# Patient Record
Sex: FEMALE | Race: WHITE | NOT HISPANIC OR LATINO | Employment: OTHER | URBAN - NONMETROPOLITAN AREA
[De-identification: names, ages, dates, MRNs, and addresses within clinical notes are randomized per-mention and may not be internally consistent; named-entity substitution may affect disease eponyms.]

---

## 2017-05-17 ENCOUNTER — TRANSCRIBE ORDERS (OUTPATIENT)
Dept: ADMINISTRATIVE | Facility: HOSPITAL | Age: 47
End: 2017-05-17

## 2017-05-17 DIAGNOSIS — M54.17 LUMBOSACRAL RADICULOPATHY: Primary | ICD-10-CM

## 2017-05-24 ENCOUNTER — HOSPITAL ENCOUNTER (OUTPATIENT)
Dept: MRI IMAGING | Facility: HOSPITAL | Age: 47
Discharge: HOME | End: 2017-05-24
Admitting: CLINICAL NURSE SPECIALIST

## 2017-05-24 DIAGNOSIS — M54.17 LUMBOSACRAL RADICULOPATHY: ICD-10-CM

## 2017-05-24 PROCEDURE — 72148 MRI LUMBAR SPINE W/O DYE: CPT

## 2018-04-30 ENCOUNTER — OFFICE VISIT (OUTPATIENT)
Dept: NEUROLOGY | Age: 48
End: 2018-04-30
Payer: COMMERCIAL

## 2018-04-30 VITALS
HEIGHT: 63 IN | BODY MASS INDEX: 48.9 KG/M2 | DIASTOLIC BLOOD PRESSURE: 73 MMHG | SYSTOLIC BLOOD PRESSURE: 116 MMHG | HEART RATE: 85 BPM | WEIGHT: 276 LBS

## 2018-04-30 DIAGNOSIS — G47.33 OBSTRUCTIVE SLEEP APNEA: Primary | ICD-10-CM

## 2018-04-30 DIAGNOSIS — R06.83 SNORING: ICD-10-CM

## 2018-04-30 DIAGNOSIS — R40.0 SOMNOLENCE, DAYTIME: ICD-10-CM

## 2018-04-30 DIAGNOSIS — R06.81 WITNESSED APNEIC SPELLS: ICD-10-CM

## 2018-04-30 DIAGNOSIS — G47.9 SLEEP DISTURBANCE: ICD-10-CM

## 2018-04-30 PROCEDURE — 99203 OFFICE O/P NEW LOW 30 MIN: CPT | Performed by: PHYSICIAN ASSISTANT

## 2018-04-30 RX ORDER — LISINOPRIL AND HYDROCHLOROTHIAZIDE 20; 12.5 MG/1; MG/1
1 TABLET ORAL DAILY
COMMUNITY
Start: 2018-03-29 | End: 2019-02-07 | Stop reason: SDUPTHER

## 2018-04-30 RX ORDER — EZETIMIBE AND SIMVASTATIN 10; 40 MG/1; MG/1
1 TABLET ORAL DAILY
COMMUNITY
Start: 2018-03-29

## 2018-06-11 ENCOUNTER — TELEPHONE (OUTPATIENT)
Dept: NEUROLOGY | Age: 48
End: 2018-06-11

## 2018-07-06 ENCOUNTER — HOSPITAL ENCOUNTER (OUTPATIENT)
Dept: SLEEP CENTER | Age: 48
Discharge: HOME | End: 2018-07-08
Payer: COMMERCIAL

## 2018-07-06 PROCEDURE — 95811 POLYSOM 6/>YRS CPAP 4/> PARM: CPT

## 2018-07-07 NOTE — PROGRESS NOTES
[facility]  [place] [address]  [place], [address]  Phone [phone]  Fax [phone]     Sleep Study Technician Review    Patient Name:  Jasbir Rocha  :   1970  Referring Provider: MIGUEL Bond       Brief History:  Jasbir Rocha is a 49 y.o. woman with a history of hypertension and hyperlipidemia who was referred for a diagnostic PSG. She was referred due to her history of snoring, excessive daytime somnolence, and witnessed apneas. Patient complains of snoring, witnessed apneas, of excessive daytime somnolence, and of sleep disturbance. She complains of awakening with choking or gasping and complains of insomnia. She does have difficulty initiating and maintaining sleep. She does use sleep aides. She sleeps 4-6 hours per night. The sleep is not restorative. She does fall asleep when sedentary. She does awaken with a dry mouth. She does not awaken with a morning headache. Shedoes not have nocturia. She does not have cognitive deficits. She does have poor concentration. Height:  5' 3\"  Weight: 276 lbs  BMI:  48.8  Neck Circ: 18.5\"  Mallampati  4  ESS:      Type of Study: PSG  Time Stage Position Snore Hypopnea Obs Apnea Abby Apnea PAP O2   2200 1 supine Yes Yes Yes No 0 92   2300 2 supine Yes Yes Yes No 0 88   2400 REM supine No No Yes No 11/7 90   0100 SWS supine No Yes No No  95   0200 2 supine No No No No  96   0300 1 supine No No No No  98   0400 REM supine No No No No  97   0500        RA   0600        RA     Summary:  Severe respiratory events began with sleep onset. Oxygen sat's in the 80's. Severe snoring. Limb movements noted. Began CPAP at 4 cm. Switched to BIPAP @2358 for pt intolerance to pressure. A final BIPAP pressure of 18/14 cmH2O. No arrhythmia. DME:  [facility]  Mask Type: FFM  Mask:  Air Fit F-10  Mask Size: medium    The study was reviewed briefly with Jasbir Rocha.   She will be notified of the formal results and recommendations after the study is scored and interpreted. The report will be sent to her referring provider. Technician: YOLANDA Kapadia

## 2018-07-13 PROCEDURE — 95811 POLYSOM 6/>YRS CPAP 4/> PARM: CPT | Performed by: PSYCHIATRY & NEUROLOGY

## 2018-07-17 DIAGNOSIS — G47.33 OBSTRUCTIVE SLEEP APNEA: Primary | ICD-10-CM

## 2018-07-17 NOTE — PROCEDURES
[facility]  [place] [address]  [place], [address]  Phone [phone]  Fax [phone]     Preliminary Sleep Study Report    Date:   18    Name:   Abida Abad  :   1970  Referring Provider: MIGUEL Garcia  Study:   Split Night Polysomnogram    Abida Abad underwent a split night polysomnogram on 18. The initial portion showed significant obstructive sleep apnea. She underwent a positive airway titration during the later portion. At BiPAP pressures of 18/14cm, her apneas and hypopneas were eliminated. Abida Abad is being referred to the  DME provider of her choice for equipment set up. A sleep clinic follow up is also being arranged. A report will follow.                                    Atul Higgins MD, [facility]         Board Certified Sleep Physician

## 2018-07-17 NOTE — PROGRESS NOTES
[facility]  [place] [address]  [place], [address]  Phone [phone]  Fax [phone]     Patient Name: Ammy Juarez 2018  : 1970  Age: 49 y.o.   Patient Address: [address] [place] [address]       Patient Phone: [phone] (home) [phone] (work)    REFERRAL  Referred to: DME provider of patient's choice  Ammy Juarez is referred for the following:    DME Equipment HPCPS Code Setting   Auto Adjusting BiPAP device with flex or comparable pressure relief per comfort  Min EPAP: 18cm to   Max IPAP: 14cm   Heated Humidifier  Patient Choice       Replinishible PAP Supplies, 1 year supply  Item HPCPS Code Frequency   Mask of choice  or  1 per 3 months   Nasal Mask cushion/pillows  or  2 per 30 days   Full Face Mask Interface  1 per 30 days   Headgear  1 per 6 months   Tubing, length of choice  or  1 per 3 months   Water Chamber  1 per 6 months   Chinstrap  1 per 6 months   Disposable Filters  2 per 30 days   Reusable Filters  1 per 6 months     Diagnoses:  Obstructive sleep apnea (G47.33)  Length of Need: Lifetime, 99    Ordering Provider: KEYSHA Connell Speckin         Signature:       Date: 2018      Electronically Signed by Monika Felix M.D.

## 2018-07-24 ENCOUNTER — HOSPITAL ENCOUNTER (OUTPATIENT)
Dept: MRI IMAGING | Age: 48
Discharge: HOME | End: 2018-07-24
Payer: COMMERCIAL

## 2018-07-24 DIAGNOSIS — M51.16 LUMBAR DISC DISEASE WITH RADICULOPATHY: ICD-10-CM

## 2018-07-24 DIAGNOSIS — M54.16 LUMBAR RADICULOPATHY: ICD-10-CM

## 2018-07-24 PROCEDURE — 72148 MRI LUMBAR SPINE W/O DYE: CPT

## 2018-07-30 ENCOUNTER — TELEPHONE (OUTPATIENT)
Dept: NEUROLOGY | Age: 48
End: 2018-07-30

## 2018-09-04 ENCOUNTER — HOSPITAL ENCOUNTER (OUTPATIENT)
Age: 48
Setting detail: OUTPATIENT SURGERY
Discharge: HOME | End: 2018-09-04
Attending: PHYSICAL MEDICINE & REHABILITATION | Admitting: PHYSICAL MEDICINE & REHABILITATION

## 2018-09-04 VITALS
DIASTOLIC BLOOD PRESSURE: 55 MMHG | SYSTOLIC BLOOD PRESSURE: 115 MMHG | OXYGEN SATURATION: 97 % | RESPIRATION RATE: 18 BRPM | HEART RATE: 78 BPM

## 2018-09-04 PROCEDURE — G8918 PT W/O PREOP ORDER IV AB PRO: HCPCS

## 2018-09-04 PROCEDURE — G8907 PT DOC NO EVENTS ON DISCHARG: HCPCS

## 2018-09-04 PROCEDURE — 62323 NJX INTERLAMINAR LMBR/SAC: CPT

## 2018-09-04 RX ORDER — METHYLPREDNISOLONE ACETATE 80 MG/ML
INJECTION, SUSPENSION INTRA-ARTICULAR; INTRALESIONAL; INTRAMUSCULAR; SOFT TISSUE PRN
Status: DISCONTINUED | OUTPATIENT
Start: 2018-09-04 | End: 2018-09-04 | Stop reason: HOSPADM

## 2018-09-04 RX ORDER — 0.9 % SODIUM CHLORIDE 0.9 %
VIAL (ML) INJECTION PRN
Status: DISCONTINUED | OUTPATIENT
Start: 2018-09-04 | End: 2018-09-04 | Stop reason: HOSPADM

## 2018-09-04 RX ORDER — LIDOCAINE HYDROCHLORIDE 10 MG/ML
INJECTION, SOLUTION INFILTRATION; PERINEURAL PRN
Status: DISCONTINUED | OUTPATIENT
Start: 2018-09-04 | End: 2018-09-04 | Stop reason: HOSPADM

## 2018-10-09 ENCOUNTER — HOSPITAL ENCOUNTER (OUTPATIENT)
Dept: GENERAL RADIOLOGY | Age: 48
Discharge: HOME | End: 2018-10-09
Payer: COMMERCIAL

## 2018-10-09 ENCOUNTER — HOSPITAL ENCOUNTER (OUTPATIENT)
Age: 48
Setting detail: OUTPATIENT SURGERY
Discharge: HOME | End: 2018-10-09
Attending: PHYSICAL MEDICINE & REHABILITATION | Admitting: PHYSICAL MEDICINE & REHABILITATION

## 2018-10-09 VITALS
RESPIRATION RATE: 18 BRPM | SYSTOLIC BLOOD PRESSURE: 115 MMHG | OXYGEN SATURATION: 99 % | DIASTOLIC BLOOD PRESSURE: 65 MMHG | HEART RATE: 86 BPM

## 2018-10-09 DIAGNOSIS — R52 PAIN: ICD-10-CM

## 2018-10-09 PROCEDURE — G8918 PT W/O PREOP ORDER IV AB PRO: HCPCS

## 2018-10-09 PROCEDURE — 3209999900 FLUORO FOR SURGICAL PROCEDURES

## 2018-10-09 PROCEDURE — G8907 PT DOC NO EVENTS ON DISCHARG: HCPCS

## 2018-10-09 PROCEDURE — G0260 INJ FOR SACROILIAC JT ANESTH: HCPCS

## 2018-10-09 RX ORDER — LIDOCAINE HYDROCHLORIDE 10 MG/ML
INJECTION, SOLUTION INFILTRATION; PERINEURAL PRN
Status: DISCONTINUED | OUTPATIENT
Start: 2018-10-09 | End: 2018-10-09 | Stop reason: HOSPADM

## 2018-10-09 RX ORDER — 0.9 % SODIUM CHLORIDE 0.9 %
VIAL (ML) INJECTION PRN
Status: DISCONTINUED | OUTPATIENT
Start: 2018-10-09 | End: 2018-10-09 | Stop reason: HOSPADM

## 2018-10-19 ENCOUNTER — HOSPITAL ENCOUNTER (EMERGENCY)
Age: 48
Discharge: HOME | End: 2018-10-19
Attending: EMERGENCY MEDICINE
Payer: COMMERCIAL

## 2018-10-19 VITALS
RESPIRATION RATE: 22 BRPM | TEMPERATURE: 98 F | SYSTOLIC BLOOD PRESSURE: 145 MMHG | OXYGEN SATURATION: 95 % | HEART RATE: 101 BPM | WEIGHT: 283 LBS | HEIGHT: 63 IN | DIASTOLIC BLOOD PRESSURE: 69 MMHG | BODY MASS INDEX: 50.14 KG/M2

## 2018-10-19 DIAGNOSIS — T50.905A ADVERSE EFFECT OF DRUG, INITIAL ENCOUNTER: Primary | ICD-10-CM

## 2018-10-19 PROCEDURE — 99282 EMERGENCY DEPT VISIT SF MDM: CPT

## 2018-10-19 PROCEDURE — 99283 EMERGENCY DEPT VISIT LOW MDM: CPT | Performed by: EMERGENCY MEDICINE

## 2018-10-19 ASSESSMENT — ENCOUNTER SYMPTOMS
ALLERGIC REACTION: 1
TROUBLE SWALLOWING: 0
DIFFICULTY BREATHING: 0
WHEEZING: 0

## 2018-10-20 NOTE — ED PROVIDER NOTES
[facility] EMERGENCY DEPT  eMERGENCY dEPARTMENT eNCOUnter      Pt Name: Jana Reilly  MRN: [id number]  [place] 1970  Date of evaluation: 10/19/2018  Provider: Sejal Orellana MD    [address] [place] [address]       Chief Complaint   Patient presents with    Allergic Reaction     POSSIBLE; tingling and heat to face/neck/ears since Tuesday, had steriod injection Tuesday         HISTORY OF PRESENT ILLNESS   (Location/Symptom, Timing/Onset,Context/Setting, Quality, Duration, Modifying Factors, Severity)  Note limiting factors. Jana Reilly is a 51 y.o. female who presents to the emergency department      The history is provided by the patient. Allergic Reaction   Presenting symptoms: no difficulty breathing, no difficulty swallowing, no itching, no rash, no swelling and no wheezing    Presenting symptoms comment:  \"tingling, feels hot\" neck up  Severity:  Moderate  Duration:  1 day  Prior allergic episodes:  No prior episodes  Context comment:  \"estrogen shot\" yesterday, \"steroid shot\" 3 days ago. \"turned red\" after that injection. Relieved by: somewhat by 25 mg Benadryl po. Worsened by:  Nothing      NursingNotes were reviewed. REVIEW OF SYSTEMS    (2-9 systems for level 4, 10 or more for level 5)     Review of Systems   HENT: Negative for trouble swallowing. Respiratory: Negative for wheezing. Skin: Negative for itching and rash. All other systems reviewed and are negative. Except as noted above the remainder of the review of systems was reviewed and negative. PAST MEDICAL HISTORY     Past Medical History:   Diagnosis Date    BiPAP (biphasic positive airway pressure) dependence     18cm/14cm    Hyperlipidemia     Hypertension     Obstructive sleep apnea     AHI: 106. 6 per PSG, 7/2018    Osteoarthritis     Psoriasis          SURGICALHISTORY       Past Surgical History:   Procedure Laterality Date    GA INJECT SI JOINT ARTHRGRPHY&/ANES/STEROID W/IMAGE Right 10/9/2018    SACROILIAC JOINT otherwise noted below, none     Procedures    FINAL IMPRESSION      1.  Adverse effect of drug, initial encounter          DISPOSITION/PLAN   DISPOSITION Decision To Discharge 10/19/2018 07:07:40 PM      PATIENT REFERRED TO:  Andrew Yoon MD  [facility]  [phone]      As needed      DISCHARGE MEDICATIONS:  Discharge Medication List as of 10/19/2018  7:08 PM             (Please note that portions of this note were completed with a voice recognition program.  Efforts were made to edit the dictations but occasionally words are mis-transcribed.)    Estelle Means MD (electronically signed)  Attending Emergency Physician         Estelle Means MD  10/20/18 [id number]

## 2018-10-22 ENCOUNTER — OFFICE VISIT (OUTPATIENT)
Dept: NEUROLOGY | Age: 48
End: 2018-10-22
Payer: COMMERCIAL

## 2018-10-22 VITALS
OXYGEN SATURATION: 98 % | WEIGHT: 285 LBS | HEIGHT: 63 IN | DIASTOLIC BLOOD PRESSURE: 85 MMHG | BODY MASS INDEX: 50.5 KG/M2 | HEART RATE: 90 BPM | SYSTOLIC BLOOD PRESSURE: 139 MMHG

## 2018-10-22 DIAGNOSIS — G47.33 OBSTRUCTIVE SLEEP APNEA: Primary | ICD-10-CM

## 2018-10-22 DIAGNOSIS — Z99.89 BIPAP (BIPHASIC POSITIVE AIRWAY PRESSURE) DEPENDENCE: ICD-10-CM

## 2018-10-22 PROCEDURE — 99214 OFFICE O/P EST MOD 30 MIN: CPT | Performed by: PHYSICIAN ASSISTANT

## 2018-10-22 RX ORDER — OMEPRAZOLE 10 MG/1
10 CAPSULE, DELAYED RELEASE ORAL DAILY
COMMUNITY
End: 2018-12-18

## 2018-10-22 NOTE — PATIENT INSTRUCTIONS
another sleep test. While the treatment may seem uncomfortable, noisy, or bulky at first, most people accept the treatment after experiencing better sleep. However, difficulty with mask comfort and nasal congestion prevent up to 50 percent of people from using the treatment on a regular basis. Continued follow up with a healthcare provider helps to ensure that the treatment is effective and comfortable. Information from the CPAP machine is often used by physicians, therapists, and insurers to track the success of treatment. CPAP can be delivered with different features to improve comfort and solve problems that may come up during treatment. Changes in treatment may be needed if symptoms do not improve or if the persons condition changes, such as a gain or loss of weight. Adjust sleep position -- Adjusting sleep position (to stay off the back) may help improve sleep quality in people who have DORITA when sleeping on the back. However, this is difficult to maintain throughout the night and is rarely an adequate solution. Weight loss -- Weight loss may be helpful for obese or overweight patients. Weight loss may be accomplished with dietary changes, exercise, and/or surgical treatment. However, it can be difficult to maintain weight loss; the five-year success of non-surgical weight loss is only 5 percent, meaning that 95 percent of people regain lost weight. Avoid alcohol and other sedatives -- Alcohol can worsen sleepiness, potentially increasing the risk of accidents or injury. People with DORITA are often counseled to drink little to no alcohol, even during the daytime. Similarly, people who take anti-anxiety medications or sedatives to sleep should speak with their healthcare provider about the safety of these medications. People with DORITA must notify all healthcare providers, including surgeons, about their condition and the potential risks of being sedated.  People with DORITA who are given anesthesia and/or pain medications require special management and close monitoring to reduce the risk of a blocked airway. Dental devices -- A dental device, called an oral appliance or mandibular advancement device, can reposition the jaw (mandible), bringing the tongue and soft palate forward as well. This may relieve obstruction in some people. This treatment is excellent for reducing snoring, although the effect on DORITA is sometimes more limited. As a result, dental devices are best used for mild cases of DORITA when relief of snoring is the main goal. Failure to tolerate and accept CPAP is another indication for dental devices. While dental devices are not as effective as CPAP for DORITA, some patients prefer a dental device to CPAP. Side effects of dental devices are generally minor but may include changes to the bite with prolonged use. Surgical treatment -- Surgery is an alternative therapy for patients who cannot tolerate or do not improve with nonsurgical treatments such as CPAP or oral devices. Surgery can also be used in combination with other nonsurgical treatments. Surgical procedures reshape structures in the upper airways or surgically reposition bone or soft tissue. Uvulopalatopharyngoplasty (UPPP) removes the uvula and excessive tissue in the throat, including the tonsils, if present. Other procedures, such as maxillomandibular advancement (MMA), address both the upper and lower pharyngeal airway more globally. UPPP alone has limited success rates (less than 50 percent) and people can relapse (when DORITA symptoms return after surgery). As a result, this surgery is only recommended in a minority of people and should be considered with caution. MMA may have a higher success rate, particularly in people with abnormal jaw (maxilla and mandible) anatomy, but it is the most complicated procedure. A newer surgical approach, nerve stimulation to protrude the tongue, has promising success rates in very selected people.   Tracheostomy creates a permanent opening in the neck. It is reserved for people with severe disease in whom less drastic measures have failed or are inappropriate. Although it is always successful in eliminating obstructive sleep apnea, tracheostomy requires significant lifestyle changes and carries some serious risks (eg, infection, bleeding, blockage). All surgical treatments require discussions about the goals of treatment, the expected outcomes, and potential complications. Hypoglossal nerve stimulator- \"Inspire\" device    WHERE TO GET MORE INFORMATION -- Your healthcare provider is the best source of information for questions and concerns related to your medical problem. Organizations  American Sleep Apnea Association  Provides information about sleep apnea to the public, publishes a newsletter, and serves as an advocate for people with the disorder. [facility], [address], [address] [place] [address]   [email or web address]. org   [email or web address].[employer]amp. org   Tel: [phone]   Fax: [facility] organization that works to [employer] and safety by promoting public understanding of sleep and sleep disorders. Supports sleep-related education, research, and advocacy; produces and distributes educational materials to the public and healthcare professionals; and offers postdoctoral fellowships and grants for sleep researchers. [address]ain [address]   [email or web address]@[email or web address] org   SurferLive.at. org   Tel: [phone]   Fax: [phone]    Important information:  Medicare/private insurance CPAP/BiPAP/APAP requirements:  Medicare/private insurance has specific requirements for PAP compliance that must be met during the first 90 days of use to continue coverage for CPAP/BiPAP/APAP  from day 91 and beyond.  The policy requires that patients use a PAP device 4 hours per 24 hour period, at least 70% of the time over a 30 day

## 2018-11-26 ENCOUNTER — TELEPHONE (OUTPATIENT)
Dept: NEUROSURGERY | Age: 48
End: 2018-11-26

## 2018-12-18 ENCOUNTER — OFFICE VISIT (OUTPATIENT)
Dept: CARDIOLOGY | Age: 48
End: 2018-12-18
Payer: COMMERCIAL

## 2018-12-18 VITALS
HEIGHT: 63 IN | SYSTOLIC BLOOD PRESSURE: 112 MMHG | HEART RATE: 82 BPM | WEIGHT: 283 LBS | BODY MASS INDEX: 50.14 KG/M2 | DIASTOLIC BLOOD PRESSURE: 72 MMHG

## 2018-12-18 DIAGNOSIS — E66.01 MORBID OBESITY WITH BMI OF 50.0-59.9, ADULT (HCC): ICD-10-CM

## 2018-12-18 DIAGNOSIS — Z99.89 BIPAP (BIPHASIC POSITIVE AIRWAY PRESSURE) DEPENDENCE: ICD-10-CM

## 2018-12-18 DIAGNOSIS — I10 ESSENTIAL HYPERTENSION: ICD-10-CM

## 2018-12-18 DIAGNOSIS — R00.0 TACHYCARDIA: ICD-10-CM

## 2018-12-18 DIAGNOSIS — R06.02 SHORTNESS OF BREATH: ICD-10-CM

## 2018-12-18 DIAGNOSIS — R07.89 OTHER CHEST PAIN: Primary | ICD-10-CM

## 2018-12-18 PROBLEM — I48.91 A-FIB (HCC): Status: RESOLVED | Noted: 2018-12-18 | Resolved: 2018-12-18

## 2018-12-18 PROBLEM — I48.91 A-FIB (HCC): Status: ACTIVE | Noted: 2018-12-18

## 2018-12-18 PROCEDURE — 93000 ELECTROCARDIOGRAM COMPLETE: CPT | Performed by: CLINICAL NURSE SPECIALIST

## 2018-12-18 PROCEDURE — 99203 OFFICE O/P NEW LOW 30 MIN: CPT | Performed by: CLINICAL NURSE SPECIALIST

## 2018-12-18 RX ORDER — ESOMEPRAZOLE MAGNESIUM 20 MG/1
20 FOR SUSPENSION ORAL PRN
COMMUNITY
End: 2019-02-14 | Stop reason: ALTCHOICE

## 2018-12-18 ASSESSMENT — ENCOUNTER SYMPTOMS
APNEA: 0
EYE DISCHARGE: 0
FACIAL SWELLING: 0
WHEEZING: 0
CHEST TIGHTNESS: 1
NAUSEA: 0
COUGH: 0
SHORTNESS OF BREATH: 1
ABDOMINAL PAIN: 0
VOMITING: 0
EYE REDNESS: 0

## 2018-12-18 NOTE — PATIENT INSTRUCTIONS
Return in about 1 month (around 1/18/2019) for Dr. Fermin Powers. Dobutamine Stress Echo  Holter Monitor 48 hrs      Tiff at the [facility] and [address] [facility] located on the first floor of [place] [address] through hospital main entrance and turn immediately to your left. Patient's contact number:  [phone] (home)     Date/Time:     Dobutamine Stress Test    A chemical stress test uses chemical agents injected into the body through the vein. These chemicals make the heart function as if it were under stress. A chemical stress test is used when a traditional stress test (called a cardiac stress test) cannot be done. Testing will take approximately one hour. Dobutamine Stress Echocardiogram Instructions:   No caffeine 24 hours prior to the testing. This includes: coffee, pop/soda, chocolate, cold medications, etc.  Any product that might contain caffeine. Do not eat or drink anything, except water, eight (8) hours before the test.   No alcohol or nicotine twelve (12) hours prior to your test.   Wear comfortable clothing. If you are taking metoprolol, stop morning of this procedure. If you need to change this appointment, please call outpatient scheduling at [phone]. Call with any questionsor concerns  Follow up with Tiffanie Madrid MD for non cardiac problems  Report any new problems  Cardiovascular Fitness-Exercise as tolerated. Strive for 15 minutes of exercise most days of the week. Cardiac / HealthyDiet  Continue current medications as directed  Continue plan of treatment  It is always recommended that you bring your medicationsbottles with you to each visit - this is for your safety!

## 2018-12-18 NOTE — PROGRESS NOTES
OR    MT NJX DX/THER SBST INTRLMNR LMBR/SAC W/IMG GDN N/A 9/4/2018    LUMBAR INTER LAMINAR KACEY L4-5 performed by Siena Hernandez at [facility]     History reviewed. No pertinent family history. Social History   Substance Use Topics    Smoking status: Former Smoker     Types: Cigarettes    Smokeless tobacco: Never Used    Alcohol use No      Current Outpatient Prescriptions   Medication Sig Dispense Refill    esomeprazole Magnesium (NEXIUM) 20 MG PACK Take 20 mg by mouth as needed      diclofenac (VOLTAREN) 50 MG EC tablet Take 50 mg by mouth 2 times daily  2    ezetimibe-simvastatin (VYTORIN) 10-40 MG per tablet Take 1 tablet by mouth daily       lisinopril-hydrochlorothiazide (PRINZIDE;ZESTORETIC) 20-12.5 MG per tablet Take 1 tablet by mouth daily        No current facility-administered medications for this visit. Allergies: Cethexonium; Ciprofloxacin; and Penicillins    Review of Systems  Review of Systems   Constitutional: Positive for fatigue. Negative for activity change, diaphoresis, fever and unexpected weight change. HENT: Negative for congestion, facial swelling and nosebleeds. Eyes: Negative for discharge, redness and visual disturbance. Respiratory: Positive for chest tightness and shortness of breath. Negative for apnea, cough and wheezing. Cardiovascular: Positive for chest pain, palpitations and leg swelling (mild). Gastrointestinal: Negative for abdominal pain, nausea and vomiting. Endocrine: Negative for cold intolerance and heat intolerance. Genitourinary: Negative for dysuria and hematuria. Musculoskeletal: Negative for arthralgias and myalgias. Skin: Negative for pallor and rash. Neurological: Negative for dizziness, seizures, syncope, weakness and light-headedness. Hematological: Does not bruise/bleed easily. Psychiatric/Behavioral: Negative for agitation. The patient is not nervous/anxious.         Objective  Vital Signs - /72   Pulse 82   Ht 5' 3\" Reason for Exam?     Answer:   Irregular heart rate     Comments:   Afib    ECHO Pharmacological Stress Test     Standing Status:   Future     Standing Expiration Date:   12/18/2019     Order Specific Question:   Reason for exam:     Answer:   chest pain, dyspnea     Return in about 1 month (around 1/18/2019) for Dr. Brenda Gallo. Dobutamine Stress Echo  Holter Monitor 48 hrs    Call with any questionsor concerns  Follow up with Kerwin Brink MD for non cardiac problems  Report any new problems  Cardiovascular Fitness-Exercise as tolerated. Strive for 15 minutes of exercise most days of the week. Cardiac / HealthyDiet  Continue current medications as directed  Continue plan of treatment  It is always recommended that you bring your medicationsbottles with you to each visit - this is for your safety!        Gisela Gallardo, APRN

## 2018-12-26 ENCOUNTER — HOSPITAL ENCOUNTER (OUTPATIENT)
Dept: NON INVASIVE DIAGNOSTICS | Age: 48
Discharge: HOME | End: 2018-12-26
Payer: COMMERCIAL

## 2018-12-26 VITALS
BODY MASS INDEX: 50.32 KG/M2 | HEIGHT: 63 IN | WEIGHT: 284 LBS | SYSTOLIC BLOOD PRESSURE: 113 MMHG | HEART RATE: 77 BPM | DIASTOLIC BLOOD PRESSURE: 62 MMHG

## 2018-12-26 DIAGNOSIS — R06.02 SHORTNESS OF BREATH: ICD-10-CM

## 2018-12-26 DIAGNOSIS — R07.89 OTHER CHEST PAIN: ICD-10-CM

## 2018-12-26 PROCEDURE — 6360000004 HC RX CONTRAST MEDICATION: Performed by: INTERNAL MEDICINE

## 2018-12-26 PROCEDURE — C8928 TTE W OR W/O FOL W/CON,STRES: HCPCS

## 2018-12-26 PROCEDURE — 93226 XTRNL ECG REC<48 HR SCAN A/R: CPT

## 2018-12-26 PROCEDURE — 6360000002 HC RX W HCPCS: Performed by: INTERNAL MEDICINE

## 2018-12-26 PROCEDURE — 93225 XTRNL ECG REC<48 HRS REC: CPT

## 2018-12-26 RX ORDER — DOBUTAMINE HYDROCHLORIDE 200 MG/100ML
10 INJECTION INTRAVENOUS CONTINUOUS PRN
Status: ACTIVE | OUTPATIENT
Start: 2018-12-26 | End: 2018-12-27

## 2018-12-26 RX ADMIN — PERFLUTREN 2.2 MG: 6.52 INJECTION, SUSPENSION INTRAVENOUS at 11:00

## 2018-12-26 RX ADMIN — PERFLUTREN 2.2 MG: 6.52 INJECTION, SUSPENSION INTRAVENOUS at 11:13

## 2018-12-26 RX ADMIN — DOBUTAMINE HYDROCHLORIDE 10 MCG/KG/MIN: 200 INJECTION INTRAVENOUS at 11:07

## 2018-12-28 ENCOUNTER — TELEPHONE (OUTPATIENT)
Dept: CARDIOLOGY | Age: 48
End: 2018-12-28

## 2018-12-28 NOTE — TELEPHONE ENCOUNTER
Please let patient know her heart monitor shows normal sinus rhythm. She had one brief run of fast heart rate that lasted only 6 beats.  I would not recommend any changes in treatment based on these findings

## 2019-01-15 ENCOUNTER — TELEPHONE (OUTPATIENT)
Dept: CARDIOLOGY | Age: 49
End: 2019-01-15

## 2019-01-16 ENCOUNTER — TELEPHONE (OUTPATIENT)
Dept: CARDIOLOGY | Age: 49
End: 2019-01-16

## 2019-01-17 ENCOUNTER — TELEPHONE (OUTPATIENT)
Dept: CARDIOLOGY | Age: 49
End: 2019-01-17

## 2019-01-29 ENCOUNTER — HOSPITAL ENCOUNTER (OUTPATIENT)
Age: 49
Setting detail: OUTPATIENT SURGERY
Discharge: HOME | End: 2019-01-29
Attending: PHYSICAL MEDICINE & REHABILITATION | Admitting: PHYSICAL MEDICINE & REHABILITATION

## 2019-01-29 ENCOUNTER — HOSPITAL ENCOUNTER (OUTPATIENT)
Dept: GENERAL RADIOLOGY | Age: 49
Discharge: HOME | End: 2019-01-29
Payer: COMMERCIAL

## 2019-01-29 VITALS
DIASTOLIC BLOOD PRESSURE: 80 MMHG | HEART RATE: 73 BPM | OXYGEN SATURATION: 98 % | SYSTOLIC BLOOD PRESSURE: 140 MMHG | RESPIRATION RATE: 16 BRPM

## 2019-01-29 DIAGNOSIS — M54.5 LOW BACK PAIN, UNSPECIFIED BACK PAIN LATERALITY, UNSPECIFIED CHRONICITY, WITH SCIATICA PRESENCE UNSPECIFIED: ICD-10-CM

## 2019-01-29 PROCEDURE — G8918 PT W/O PREOP ORDER IV AB PRO: HCPCS

## 2019-01-29 PROCEDURE — 62323 NJX INTERLAMINAR LMBR/SAC: CPT

## 2019-01-29 PROCEDURE — 3209999900 FLUORO FOR SURGICAL PROCEDURES

## 2019-01-29 PROCEDURE — G8907 PT DOC NO EVENTS ON DISCHARG: HCPCS

## 2019-01-29 RX ORDER — METHYLPREDNISOLONE ACETATE 80 MG/ML
INJECTION, SUSPENSION INTRA-ARTICULAR; INTRALESIONAL; INTRAMUSCULAR; SOFT TISSUE PRN
Status: DISCONTINUED | OUTPATIENT
Start: 2019-01-29 | End: 2019-01-29 | Stop reason: HOSPADM

## 2019-01-29 RX ORDER — 0.9 % SODIUM CHLORIDE 0.9 %
VIAL (ML) INJECTION PRN
Status: DISCONTINUED | OUTPATIENT
Start: 2019-01-29 | End: 2019-01-29 | Stop reason: HOSPADM

## 2019-01-29 RX ORDER — LIDOCAINE HYDROCHLORIDE 10 MG/ML
INJECTION, SOLUTION EPIDURAL; INFILTRATION; INTRACAUDAL; PERINEURAL PRN
Status: DISCONTINUED | OUTPATIENT
Start: 2019-01-29 | End: 2019-01-29 | Stop reason: HOSPADM

## 2019-02-07 ENCOUNTER — OFFICE VISIT (OUTPATIENT)
Dept: CARDIOLOGY | Age: 49
End: 2019-02-07
Payer: COMMERCIAL

## 2019-02-07 VITALS
HEART RATE: 86 BPM | HEIGHT: 63 IN | DIASTOLIC BLOOD PRESSURE: 74 MMHG | BODY MASS INDEX: 49.43 KG/M2 | WEIGHT: 279 LBS | SYSTOLIC BLOOD PRESSURE: 118 MMHG

## 2019-02-07 DIAGNOSIS — I10 ESSENTIAL HYPERTENSION: ICD-10-CM

## 2019-02-07 DIAGNOSIS — E66.01 MORBID OBESITY WITH BMI OF 50.0-59.9, ADULT (HCC): ICD-10-CM

## 2019-02-07 DIAGNOSIS — G47.33 OBSTRUCTIVE SLEEP APNEA: ICD-10-CM

## 2019-02-07 DIAGNOSIS — R00.0 TACHYCARDIA: Primary | ICD-10-CM

## 2019-02-07 PROCEDURE — 99214 OFFICE O/P EST MOD 30 MIN: CPT | Performed by: CLINICAL NURSE SPECIALIST

## 2019-02-07 RX ORDER — METOPROLOL SUCCINATE 25 MG/1
25 TABLET, EXTENDED RELEASE ORAL NIGHTLY
Qty: 30 TABLET | Refills: 5 | Status: SHIPPED | OUTPATIENT
Start: 2019-02-07 | End: 2019-04-19 | Stop reason: SDUPTHER

## 2019-02-07 RX ORDER — LISINOPRIL AND HYDROCHLOROTHIAZIDE 20; 12.5 MG/1; MG/1
0.5 TABLET ORAL DAILY
Qty: 30 TABLET | Refills: 5
Start: 2019-02-07 | End: 2019-10-23

## 2019-02-07 RX ORDER — METOPROLOL SUCCINATE 25 MG/1
25 TABLET, EXTENDED RELEASE ORAL NIGHTLY
Qty: 30 TABLET | Refills: 5 | Status: SHIPPED | OUTPATIENT
Start: 2019-02-07 | End: 2019-02-14 | Stop reason: SDUPTHER

## 2019-02-07 ASSESSMENT — ENCOUNTER SYMPTOMS
EYE REDNESS: 0
CHEST TIGHTNESS: 1
SHORTNESS OF BREATH: 1
COUGH: 0
FACIAL SWELLING: 0
NAUSEA: 0
VOMITING: 0
EYE DISCHARGE: 0
APNEA: 0
ABDOMINAL PAIN: 0
WHEEZING: 0

## 2019-02-14 ENCOUNTER — OFFICE VISIT (OUTPATIENT)
Dept: URGENT CARE | Age: 49
End: 2019-02-14
Payer: COMMERCIAL

## 2019-02-14 VITALS
BODY MASS INDEX: 48.73 KG/M2 | RESPIRATION RATE: 18 BRPM | OXYGEN SATURATION: 99 % | HEART RATE: 88 BPM | TEMPERATURE: 98.1 F | WEIGHT: 275 LBS | HEIGHT: 63 IN

## 2019-02-14 DIAGNOSIS — H00.031 CELLULITIS OF RIGHT UPPER EYELID: Primary | ICD-10-CM

## 2019-02-14 PROCEDURE — 99202 OFFICE O/P NEW SF 15 MIN: CPT | Performed by: SPECIALIST

## 2019-02-14 PROCEDURE — 96372 THER/PROPH/DIAG INJ SC/IM: CPT | Performed by: SPECIALIST

## 2019-02-14 RX ORDER — DEXAMETHASONE SODIUM PHOSPHATE 10 MG/ML
10 INJECTION INTRAMUSCULAR; INTRAVENOUS ONCE
Status: COMPLETED | OUTPATIENT
Start: 2019-02-14 | End: 2019-02-14

## 2019-02-14 RX ORDER — SULFAMETHOXAZOLE AND TRIMETHOPRIM 800; 160 MG/1; MG/1
1 TABLET ORAL 2 TIMES DAILY
Qty: 20 TABLET | Refills: 0 | Status: SHIPPED | OUTPATIENT
Start: 2019-02-14 | End: 2019-02-24

## 2019-02-14 RX ADMIN — DEXAMETHASONE SODIUM PHOSPHATE 10 MG: 10 INJECTION INTRAMUSCULAR; INTRAVENOUS at 10:19

## 2019-02-14 ASSESSMENT — ENCOUNTER SYMPTOMS
EYE REDNESS: 1
FOREIGN BODY SENSATION: 0
BLURRED VISION: 0
EYE DISCHARGE: 0
EYE ITCHING: 1

## 2019-02-25 ENCOUNTER — TELEPHONE (OUTPATIENT)
Dept: CARDIOLOGY | Age: 49
End: 2019-02-25

## 2019-02-26 ENCOUNTER — TELEPHONE (OUTPATIENT)
Dept: CARDIOLOGY | Age: 49
End: 2019-02-26

## 2019-04-16 ENCOUNTER — HOSPITAL ENCOUNTER (OUTPATIENT)
Dept: GENERAL RADIOLOGY | Age: 49
Discharge: HOME | End: 2019-04-16
Payer: COMMERCIAL

## 2019-04-16 ENCOUNTER — HOSPITAL ENCOUNTER (OUTPATIENT)
Age: 49
Setting detail: OUTPATIENT SURGERY
Discharge: HOME | End: 2019-04-16
Attending: PHYSICAL MEDICINE & REHABILITATION | Admitting: PHYSICAL MEDICINE & REHABILITATION

## 2019-04-16 VITALS
HEART RATE: 79 BPM | SYSTOLIC BLOOD PRESSURE: 113 MMHG | DIASTOLIC BLOOD PRESSURE: 52 MMHG | RESPIRATION RATE: 18 BRPM | OXYGEN SATURATION: 96 %

## 2019-04-16 DIAGNOSIS — R52 PAIN: ICD-10-CM

## 2019-04-16 PROCEDURE — G8918 PT W/O PREOP ORDER IV AB PRO: HCPCS | Performed by: NURSE PRACTITIONER

## 2019-04-16 PROCEDURE — 62323 NJX INTERLAMINAR LMBR/SAC: CPT

## 2019-04-16 PROCEDURE — G8907 PT DOC NO EVENTS ON DISCHARG: HCPCS | Performed by: NURSE PRACTITIONER

## 2019-04-16 PROCEDURE — 3209999900 FLUORO FOR SURGICAL PROCEDURES

## 2019-04-16 RX ORDER — METHYLPREDNISOLONE ACETATE 80 MG/ML
INJECTION, SUSPENSION INTRA-ARTICULAR; INTRALESIONAL; INTRAMUSCULAR; SOFT TISSUE PRN
Status: DISCONTINUED | OUTPATIENT
Start: 2019-04-16 | End: 2019-04-16 | Stop reason: ALTCHOICE

## 2019-04-16 RX ORDER — GABAPENTIN 600 MG/1
600 TABLET ORAL 4 TIMES DAILY
COMMUNITY
End: 2021-10-06

## 2019-04-16 RX ORDER — 0.9 % SODIUM CHLORIDE 0.9 %
VIAL (ML) INJECTION PRN
Status: DISCONTINUED | OUTPATIENT
Start: 2019-04-16 | End: 2019-04-16 | Stop reason: ALTCHOICE

## 2019-04-16 RX ORDER — LIDOCAINE HYDROCHLORIDE 10 MG/ML
INJECTION, SOLUTION EPIDURAL; INFILTRATION; INTRACAUDAL; PERINEURAL PRN
Status: DISCONTINUED | OUTPATIENT
Start: 2019-04-16 | End: 2019-04-16 | Stop reason: ALTCHOICE

## 2019-04-19 ENCOUNTER — OFFICE VISIT (OUTPATIENT)
Dept: CARDIOLOGY | Age: 49
End: 2019-04-19
Payer: COMMERCIAL

## 2019-04-19 VITALS
SYSTOLIC BLOOD PRESSURE: 116 MMHG | BODY MASS INDEX: 51.03 KG/M2 | DIASTOLIC BLOOD PRESSURE: 60 MMHG | WEIGHT: 288 LBS | HEIGHT: 63 IN

## 2019-04-19 DIAGNOSIS — R00.2 PALPITATION: Primary | ICD-10-CM

## 2019-04-19 PROCEDURE — 99243 OFF/OP CNSLTJ NEW/EST LOW 30: CPT | Performed by: INTERNAL MEDICINE

## 2019-04-19 RX ORDER — METOPROLOL SUCCINATE 25 MG/1
25 TABLET, EXTENDED RELEASE ORAL NIGHTLY
Qty: 90 TABLET | Refills: 3 | Status: SHIPPED | OUTPATIENT
Start: 2019-04-19 | End: 2020-07-10

## 2019-04-19 ASSESSMENT — ENCOUNTER SYMPTOMS
ABDOMINAL DISTENTION: 0
BACK PAIN: 1
WHEEZING: 0
EYE DISCHARGE: 0
VOMITING: 0
COUGH: 0
DIARRHEA: 0
BLOOD IN STOOL: 0
CONSTIPATION: 0
SHORTNESS OF BREATH: 1

## 2019-04-19 NOTE — PROGRESS NOTES
History:   History reviewed. No pertinent family history. Physical Examination:  /60 (Site: Right Upper Arm, Position: Sitting)   Ht 5' 3\" (1.6 m)   Wt 288 lb (130.6 kg)   BMI 51.02 kg/m²   Physical Exam  Blood pressure right arm sitting 116/80 mmHg, pulse 72 bpm regular  Morbid truncal and generalized obesity  No JVD  No pitting pedal edema  No carotid bruits  S1 and S2 of normal intensity, no murmurs or gallops are noted  Lungs are clear bilaterally with no crepitations or wheezes  Abdomen is soft nontender with no palpable organomegaly. Difficult exam.  No neurological deficits  No deformities  Psoriatic rash noted    Labs:   CBC: No results for input(s): WBC, HGB, HCT, PLT in the last 72 hours. BMP:No results for input(s): NA, K, CO2, BUN, CREATININE, LABGLOM, GLUCOSE in the last 72 hours. BNP: No results for input(s): BNP in the last 72 hours. PT/INR: No results for input(s): PROTIME, INR in the last 72 hours. APTT:No results for input(s): APTT in the last 72 hours. CARDIAC ENZYMES:No results for input(s): CKTOTAL, CKMB, CKMBINDEX, TROPONINI in the last 72 hours. FASTING LIPID PANEL:No results found for: HDL, LDLDIRECT, LDLCALC, TRIG  LIVER PROFILE:No results for input(s): AST, ALT, LABALBU in the last 72 hours. Imaging:    Conclusions      Summary   Dobutamine stress echocardiogram without clinical, electrocardiographic,   or echocardiographic evidence of myocardial ischemia.   Test was supervised by Dr. Dagmar Montoya and PLAN:    39-year-old female patient with past medical history of morbid obesity, obstructive sleep apnea, psoriasis with psoriatic arthritis on Humira presenting with episodes of sinus tachycardia with exertional shortness of breath partly brought on by pain with a negative dobutamine stress echo, Holter with only a brief run of SVT here for follow-up evaluation. 1. Her palpitations have much improved on low-dose beta blocker therapy.  She will

## 2019-04-26 ENCOUNTER — TELEPHONE (OUTPATIENT)
Dept: NEUROLOGY | Age: 49
End: 2019-04-26

## 2019-04-26 NOTE — TELEPHONE ENCOUNTER
Spoke with patient and she rescheduled her appointment due to Conrad Hodgkins out of office for 5/16/19 @8:00

## 2019-05-16 ENCOUNTER — OFFICE VISIT (OUTPATIENT)
Dept: NEUROLOGY | Age: 49
End: 2019-05-16
Payer: COMMERCIAL

## 2019-05-16 VITALS
SYSTOLIC BLOOD PRESSURE: 109 MMHG | WEIGHT: 283 LBS | DIASTOLIC BLOOD PRESSURE: 63 MMHG | HEART RATE: 72 BPM | BODY MASS INDEX: 50.14 KG/M2 | HEIGHT: 63 IN

## 2019-05-16 DIAGNOSIS — G47.33 OBSTRUCTIVE SLEEP APNEA: Primary | ICD-10-CM

## 2019-05-16 DIAGNOSIS — Z99.89 BIPAP (BIPHASIC POSITIVE AIRWAY PRESSURE) DEPENDENCE: ICD-10-CM

## 2019-05-16 PROCEDURE — 99213 OFFICE O/P EST LOW 20 MIN: CPT | Performed by: PHYSICIAN ASSISTANT

## 2019-05-16 RX ORDER — TIZANIDINE 4 MG/1
4 TABLET ORAL NIGHTLY
COMMUNITY
End: 2021-10-06

## 2019-05-16 NOTE — PROGRESS NOTES

## 2019-05-16 NOTE — PATIENT INSTRUCTIONS
Patient education: Sleep apnea in adults       INTRODUCTION -- Normally during sleep, air moves through the throat and in and out of the lungs at a regular rhythm. In a person with sleep apnea, air movement is periodically diminished or stopped. There are two types of sleep apnea: obstructive sleep apnea and central sleep apnea. In obstructive sleep apnea, breathing is abnormal because of narrowing or closure of the throat. In central sleep apnea, breathing is abnormal because of a change in the breathing control and rhythm. Sleep apnea is a serious condition that can affect a person's ability to safely perform normal daily activities and can affect long term health. Approximately 25 percent of adults are at risk for sleep apnea of some degree. Men are more commonly affected than women. Other risk factors include middle and older age, being overweight or obese, and having a small mouth and throat. This topic review focuses on the most common type of sleep apnea in adults, obstructive sleep apnea (DORITA). HOW SLEEP APNEA OCCURS -- The throat is surrounded by muscles that control the airway for speaking, swallowing, and breathing. During sleep, these muscles are less active, and this causes the throat to narrow. In most people, this narrowing does not affect breathing. In others, it can cause snoring, sometimes with reduced or completely blocked airflow. A completely blocked airway without airflow is called an obstructive apnea. Partial obstruction with diminished airflow is called a hypopnea. A person may have apnea and hypopnea during sleep. Insufficient breathing due to apnea or hypopnea causes oxygen levels to fall and carbon dioxide to rise. Because the airway is blocked, breathing faster or harder does not help to improve oxygen levels until the airway is reopened. Typically, the obstruction requires the person to awaken to activate the upper airway muscles.  Once the airway is opened, the person then takes several deep breaths to catch up on breathing. As the person awakens, he or she may move briefly, snort or snore, and take a deep breath. Less frequently, a person may awaken completely with a sensation of gasping, smothering, or choking. If the person falls back to sleep quickly, he or she will not remember the event. Many people with sleep apnea are unaware of their abnormal breathing in sleep, and all patients underestimate how often their sleep is interrupted. Awakening from sleep causes sleep to be unrefreshing and causes fatigue and daytime sleepiness. Anatomic causes of obstructive sleep apnea --  Most patients have DORITA because of a small upper airway. As the bones of the face and skull develop, some people develop a small lower face, a small mouth, and a tongue that seems too large for the mouth. These features are genetically determined, which explains why DORITA tends to cluster in families. Obesity is another major factor. Tonsil enlargement can be an important cause, especially in children. SLEEP APNEA SYMPTOMS -- The main symptoms of DORITA are loud snoring, fatigue, and daytime sleepiness. However, some people have no symptoms. For example, if the person does not have a bed partner, he or she may not be aware of the snoring. Fatigue and sleepiness have many causes and are often attributed to overwork and increasing age. As a result, a person may be slow to recognize that they have a problem. A bed partner or spouse often prompts the patient to seek medical care. Other symptoms may include one or more of the following:  ?Restless sleep  ? Awakening with choking, gasping, or smothering  ? Morning headaches, dry mouth, or sore throat  ? Waking frequently to urinate  ? Awakening unrested, groggy  ? Low energy, difficulty concentrating, memory impairment    Risk factors -- Certain factors increase the risk of sleep apnea.   ?Increasing age - DORITA occurs at all ages, but it is more common in middle and older age adults. ?Male sex - DORITA is two times more common in men, especially in middle age. ?Obesity - The more obese a person is, the more likely he or she is to have DORITA. ? Sedation from medication or alcohol - This interferes with the ability to awaken from sleep and can lengthen periods of apnea (no breathing), with potentially dangerous consequences. ? Abnormality of the airway. SLEEP APNEA CONSEQUENCES -- Complications of sleep apnea can include daytime sleepiness and difficulty concentrating. The consequence of this is an increased risk of accidents and errors in daily activities. Studies have shown that people with severe DORITA are more than twice as likely to be involved in a motor vehicle accident as people without these conditions. People with DORITA are encouraged to discuss options for driving, working, and performing other high-risk tasks with a healthcare provider. In addition, people with untreated DORITA may have an increased risk of cardiovascular problems such as high blood pressure, heart attack, abnormal heart rhythms, or stroke. This risk may be due to changes in the heart rate and blood pressure that occur during sleep. SLEEP APNEA DIAGNOSIS -- The diagnosis of DORITA is best made by a knowledgeable sleep medicine specialist who has an understanding of the individual's health issues. The diagnosis is usually based upon the person's medical history, physical examination, and testing, including:  ? A complaint of snoring and ineffective sleep  ? Neck size (greater than 16 inches in men or 14 inches in women) is associated with an increased risk of sleep apnea  ? A small upper airway: difficulty seeing the throat because of a tongue that is large for the mouth  ? High blood pressure, especially if it is resistant to treatment  ? If a bed partner has observed the patient during episodes of stopped breathing (apnea), choking, or gasping during sleep, there is a strong possibility of sleep apnea. Testing is usually performed in a sleep laboratory. A full sleep study is called a polysomnogram. The polysomnogram measures the breathing effort and airflow, blood oxygen level, heart rate and rhythm, duration of the various stages of sleep, body position, and movement of the arms/legs. Home monitoring devices are available that can perform a sleep study. This is a reasonable alternative to conventional testing in a sleep laboratory if the clinician strongly suspects moderate or severe sleep apnea and the patient does not have other illnesses or sleep disorders that may interfere with the results. SLEEP APNEA TREATMENT -- Sleep apnea is best treated by a knowledgeable sleep medicine specialist. The goal of treatment is to maintain an open airway during sleep. Effective treatment will eliminate the symptoms of sleep disturbance; long-term health consequences are also reduced. Most treatments require nightly use. The challenge for the clinician and the patient is to select an effective therapy that is appropriate for the patient's problem and that is acceptable for long term use. Continuous positive airway pressure (CPAP) -- The most effective treatment for sleep apnea uses air pressure from a mechanical device to keep the upper airway open during sleep. A CPAP (continuous positive airway pressure)  device uses an air-tight attachment to the nose, typically a mask, connected to a tube and a blower which generates the pressure. Devices that fit comfortably into the nasal opening, rather than over the nose, are also available. CPAP should be used any time the person sleeps (day or night). The CPAP device is usually used for the first time in the sleep lab, where a technician can adjust the pressure and select the best equipment to keep the airway open.  Alternatively, an auto device with a self-adjusting pressure feature, provided with proper education and training, can get treatment started without another sleep test. While the treatment may seem uncomfortable, noisy, or bulky at first, most people accept the treatment after experiencing better sleep. However, difficulty with mask comfort and nasal congestion prevent up to 50 percent of people from using the treatment on a regular basis. Continued follow up with a healthcare provider helps to ensure that the treatment is effective and comfortable. Information from the CPAP machine is often used by physicians, therapists, and insurers to track the success of treatment. CPAP can be delivered with different features to improve comfort and solve problems that may come up during treatment. Changes in treatment may be needed if symptoms do not improve or if the persons condition changes, such as a gain or loss of weight. Adjust sleep position -- Adjusting sleep position (to stay off the back) may help improve sleep quality in people who have DORITA when sleeping on the back. However, this is difficult to maintain throughout the night and is rarely an adequate solution. Weight loss -- Weight loss may be helpful for obese or overweight patients. Weight loss may be accomplished with dietary changes, exercise, and/or surgical treatment. However, it can be difficult to maintain weight loss; the five-year success of non-surgical weight loss is only 5 percent, meaning that 95 percent of people regain lost weight. Avoid alcohol and other sedatives -- Alcohol can worsen sleepiness, potentially increasing the risk of accidents or injury. People with DORITA are often counseled to drink little to no alcohol, even during the daytime. Similarly, people who take anti-anxiety medications or sedatives to sleep should speak with their healthcare provider about the safety of these medications. People with DORITA must notify all healthcare providers, including surgeons, about their condition and the potential risks of being sedated.  People with DORITA who are given anesthesia and/or pain medications require special management and close monitoring to reduce the risk of a blocked airway. Dental devices -- A dental device, called an oral appliance or mandibular advancement device, can reposition the jaw (mandible), bringing the tongue and soft palate forward as well. This may relieve obstruction in some people. This treatment is excellent for reducing snoring, although the effect on DORITA is sometimes more limited. As a result, dental devices are best used for mild cases of DORITA when relief of snoring is the main goal. Failure to tolerate and accept CPAP is another indication for dental devices. While dental devices are not as effective as CPAP for DORITA, some patients prefer a dental device to CPAP. Side effects of dental devices are generally minor but may include changes to the bite with prolonged use. Surgical treatment -- Surgery is an alternative therapy for patients who cannot tolerate or do not improve with nonsurgical treatments such as CPAP or oral devices. Surgery can also be used in combination with other nonsurgical treatments. Surgical procedures reshape structures in the upper airways or surgically reposition bone or soft tissue. Uvulopalatopharyngoplasty (UPPP) removes the uvula and excessive tissue in the throat, including the tonsils, if present. Other procedures, such as maxillomandibular advancement (MMA), address both the upper and lower pharyngeal airway more globally. UPPP alone has limited success rates (less than 50 percent) and people can relapse (when DORITA symptoms return after surgery). As a result, this surgery is only recommended in a minority of people and should be considered with caution. MMA may have a higher success rate, particularly in people with abnormal jaw (maxilla and mandible) anatomy, but it is the most complicated procedure. A newer surgical approach, nerve stimulation to protrude the tongue, has promising success rates in very selected people.   Tracheostomy creates a permanent opening in the neck. It is reserved for people with severe disease in whom less drastic measures have failed or are inappropriate. Although it is always successful in eliminating obstructive sleep apnea, tracheostomy requires significant lifestyle changes and carries some serious risks (eg, infection, bleeding, blockage). All surgical treatments require discussions about the goals of treatment, the expected outcomes, and potential complications. Hypoglossal nerve stimulator- \"Inspire\" device    WHERE TO GET MORE INFORMATION -- Your healthcare provider is the best source of information for questions and concerns related to your medical problem. Organizations  American [employer] Association  Provides information about sleep apnea to the public, publishes a newsletter, and serves as an advocate for people with the disorder. [employer], [address], [address] [place] [address]   [email or web address]   [email or web address]   Tel: [phone]   Fax: [facility] that works to [employer] and safety by promoting public understanding of sleep and sleep disorders. Supports sleep-related education, research, and advocacy; produces and distributes educational materials to the public and healthcare professionals; and offers postdoctoral fellowships and grants for sleep researchers. [address]   [email or web address]   [email or web address]   Tel: [phone]   Fax: [phone]    Important information:  Medicare/private insurance CPAP/BiPAP/APAP requirements:  Medicare/private insurance has specific requirements for PAP compliance that must be met during the first 90 days of use to continue coverage for CPAP/BiPAP/APAP  from day 91 and beyond.  The policy requires that patients use a PAP device 4 hours per 24 hour period, at least 70% of the time over a 30 day period. This data must be downloaded as a report direct from the PAP devices. This is called a compliance download. Your PAP supplier will assist you in this matter. Reminder:  Please bring a copy of the compliance download to your next office visit or have your supplier fax it to our office prior to your office visit. Note:  Where applicable, we will utilize PAP device efficiency reports, additional testing, and face-to-face  clinical evaluation subsequent to any treatment, changes in treatment, and continued treatment. Caution:  Please abstain from driving or engaging in other activities which may be hazardous in the presence of diminished alertness or daytime drowsiness. And avoid the use of sedatives or alcohol, which can worsen sleep apnea and daytime drowsiness. Mask suggestions:  - Resmed Airfit N20 (Nasal) or F20 (Full face mask). They conform to your face, thus decreasing the potential for mask leakage. You might like the [employer] (full face mask). It has a \"memory foam\" like cushion. The AirFit F30 is a smaller style full face mask designed to sit low on and cover less of your face for fewer facial marks. Respironics: You might also like to try a nasal mask called a Dreamwear nasal mask or the Dreamwear nasal pillow. Another suggestion is the [employer], it is a minimal contact full face mask. The [employer] incredible under the nose design makes it the only full face mask that won't cause red marks on the bridge of your nose when compared to other full face masks. The Dreamwear full face mask has a  soft feel, unique in-frame air-flow, and innovative air tube connection at the top of the head for the ultimate in sleep comfort. As of 2/2019 there is 1 additional Resmed masks: The AirFit L26u-vu has a top of the head tube with a nasal mask.

## 2019-05-16 NOTE — PROGRESS NOTES
Incontinence [] Urgency   [x] Denies all unless marked  Musculoskeletal: [] Arthralgia  [] Myalgias [] Muscle cramps  [] Muscle twitches   [x] Denies all unless marked   Extremities:   [] Pain   [] Swelling   [x] Denies all unless marked  Skin:[] Rash  [] Color Change  [x] Denies all unless marked  Neurological:[] Visual Disturbance [] Double Vision [] Slurred Speech [] Trouble swallowing  [] Vertigo [] Tingling [] Numbness [] Weakness [] Loss of Balance   [] Loss of Consciousness [] Memory Loss [] Seizures  [x] Denies all unless marked  Psychiatric/Behavioral:[] Depression [] Anxiety  [x] Denies all unless marked  Sleep: []  Insomnia [] Sleep Disturbance [] Snoring [] Restless Legs [] Daytime Sleepiness [x] Sleep Apnea  [x] Denies all unless marked        The MA has completed the ROS with the patient. I have reviewed it in its' entirety with the patient and agree with the documentation. PHYSICAL EXAM       Constitutional - No acute distress    HEENT- Conjunctiva normal.  No scars, masses, or lesions over external nose or ears, no neck masses noted, no jugular vein distension, no bruit  Cardiac- Regular rate and rhythm  Pulmonary- Clear to auscultation, good expansion, normal effort without use of accessory muscles  Musculoskeletal - No significant wasting of muscles noted, no bony deformities  Extremities - No clubbing, cyanosis or edema  Skin - Warm, dry, and intact. No rash, erythema, or pallor  Psychiatric - Mood, affect, and behavior appear normal      Neurologic:  Extraocular movements are intact without nystagmus. Visual fields are full to confrontation. Facial movements are symmetrical and normal.  Speech is precise. Extremity strength is normal in both uppers and lowers. Deep tendon reflexes are intact and symmetrical.  Rapid alternating movements are unimpaired. Finger-to-nose testing is performed well, without dysmetria.   Gait is normal.    I reviewed the following studies:      []  : Clinical laboratory test results    []  :  Radiology reports    []  :  Review and summarization of medical records and/or obtain medical records     []  :  Previous/recent polysomnogram report(s)    []  :  [place] Sleepiness Scale           [x]  :  Compliance download: The BiPAP is set at a pressure of 18cm/14cm. Compliance download shows that she uses device: 100% of the time;  percentage of days with usage >=4 hours: 97%. AHI: 0.9    Assessment:       ICD-10-CM    1. Obstructive sleep apnea G47.33    2. BiPAP (biphasic positive airway pressure) dependence Z99.89           []  :  Stable     []  :  Improved                       [x]  :  Well controlled              []  :  Resolving     []  :  Resolved     []  :  Inadequately controlled     []  :  Worsening     []  :  Additional workup planned    Patient is compliant and benefiting from therapy as indicated by compliance evaluation and patient report. Plan:     No orders of the defined types were placed in this encounter. 1.   Patient advised of the etiology,  pathophysiology, diagnosis, treatment options, and risks of untreated DORITA. Risks may include, but are not limited to  hypertension, coronary artery disease, diabetes, stroke, weight gain, impaired cognition, daytime somnolence,  and motor vehicle accidents. Advised to abstain from driving or operating heavy machinery when drowsy and the use of respiratory suppressants. 2.  The following educational material has been included in this visit after visit summary for your review: DORITA/PAP guidelines-Discussed with the patient and all questions fully answered. 3.  Continue BiPAP  4. Follow up in 1 year        Note:  A total of >50% (>8 minutes) of 15 minutes was spent discussing the pathophysiology and treatment and/or coordination of care of the above diagnoses.

## 2019-06-11 ENCOUNTER — HOSPITAL ENCOUNTER (OUTPATIENT)
Age: 49
Setting detail: OUTPATIENT SURGERY
Discharge: HOME | End: 2019-06-11
Attending: PHYSICAL MEDICINE & REHABILITATION | Admitting: PHYSICAL MEDICINE & REHABILITATION

## 2019-06-11 ENCOUNTER — HOSPITAL ENCOUNTER (OUTPATIENT)
Dept: GENERAL RADIOLOGY | Age: 49
Discharge: HOME | End: 2019-06-11
Payer: COMMERCIAL

## 2019-06-11 VITALS
OXYGEN SATURATION: 96 % | DIASTOLIC BLOOD PRESSURE: 49 MMHG | HEART RATE: 65 BPM | RESPIRATION RATE: 20 BRPM | SYSTOLIC BLOOD PRESSURE: 113 MMHG

## 2019-06-11 DIAGNOSIS — L90.5 SCAR PAINFUL: ICD-10-CM

## 2019-06-11 DIAGNOSIS — R52 SCAR PAINFUL: ICD-10-CM

## 2019-06-11 PROCEDURE — G8907 PT DOC NO EVENTS ON DISCHARG: HCPCS

## 2019-06-11 PROCEDURE — G8918 PT W/O PREOP ORDER IV AB PRO: HCPCS

## 2019-06-11 PROCEDURE — 3209999900 FLUORO FOR SURGICAL PROCEDURES

## 2019-06-11 PROCEDURE — G0260 INJ FOR SACROILIAC JT ANESTH: HCPCS

## 2019-06-11 RX ORDER — LIDOCAINE HYDROCHLORIDE 10 MG/ML
INJECTION, SOLUTION INFILTRATION; PERINEURAL PRN
Status: DISCONTINUED | OUTPATIENT
Start: 2019-06-11 | End: 2019-06-11 | Stop reason: ALTCHOICE

## 2019-06-11 RX ORDER — 0.9 % SODIUM CHLORIDE 0.9 %
VIAL (ML) INJECTION PRN
Status: DISCONTINUED | OUTPATIENT
Start: 2019-06-11 | End: 2019-06-11 | Stop reason: ALTCHOICE

## 2019-06-15 ENCOUNTER — HOSPITAL ENCOUNTER (EMERGENCY)
Age: 49
Discharge: HOME | End: 2019-06-15
Attending: EMERGENCY MEDICINE
Payer: COMMERCIAL

## 2019-06-15 VITALS
OXYGEN SATURATION: 95 % | BODY MASS INDEX: 49.61 KG/M2 | WEIGHT: 280 LBS | RESPIRATION RATE: 17 BRPM | HEIGHT: 63 IN | HEART RATE: 75 BPM | TEMPERATURE: 97.3 F | SYSTOLIC BLOOD PRESSURE: 152 MMHG | DIASTOLIC BLOOD PRESSURE: 85 MMHG

## 2019-06-15 DIAGNOSIS — M54.31 SCIATICA OF RIGHT SIDE: Primary | ICD-10-CM

## 2019-06-15 PROCEDURE — 96372 THER/PROPH/DIAG INJ SC/IM: CPT

## 2019-06-15 PROCEDURE — 99283 EMERGENCY DEPT VISIT LOW MDM: CPT | Performed by: EMERGENCY MEDICINE

## 2019-06-15 PROCEDURE — 6360000002 HC RX W HCPCS: Performed by: EMERGENCY MEDICINE

## 2019-06-15 PROCEDURE — 99282 EMERGENCY DEPT VISIT SF MDM: CPT

## 2019-06-15 PROCEDURE — 6370000000 HC RX 637 (ALT 250 FOR IP): Performed by: EMERGENCY MEDICINE

## 2019-06-15 RX ORDER — HYDROCODONE BITARTRATE AND ACETAMINOPHEN 7.5; 325 MG/1; MG/1
1 TABLET ORAL ONCE
Status: COMPLETED | OUTPATIENT
Start: 2019-06-15 | End: 2019-06-15

## 2019-06-15 RX ORDER — KETOROLAC TROMETHAMINE 10 MG/1
10 TABLET, FILM COATED ORAL EVERY 6 HOURS PRN
Qty: 15 TABLET | Refills: 0 | Status: SHIPPED | OUTPATIENT
Start: 2019-06-15 | End: 2019-10-23

## 2019-06-15 RX ORDER — DIAZEPAM 5 MG/1
10 TABLET ORAL ONCE
Status: COMPLETED | OUTPATIENT
Start: 2019-06-15 | End: 2019-06-15

## 2019-06-15 RX ORDER — DIAZEPAM 10 MG/1
10 TABLET ORAL NIGHTLY PRN
Qty: 10 TABLET | Refills: 0 | Status: SHIPPED | OUTPATIENT
Start: 2019-06-15 | End: 2019-06-25

## 2019-06-15 RX ORDER — HYDROCODONE BITARTRATE AND ACETAMINOPHEN 7.5; 325 MG/1; MG/1
1 TABLET ORAL EVERY 6 HOURS PRN
Qty: 12 TABLET | Refills: 0 | Status: SHIPPED | OUTPATIENT
Start: 2019-06-15 | End: 2019-06-18

## 2019-06-15 RX ORDER — KETOROLAC TROMETHAMINE 30 MG/ML
60 INJECTION, SOLUTION INTRAMUSCULAR; INTRAVENOUS ONCE
Status: COMPLETED | OUTPATIENT
Start: 2019-06-15 | End: 2019-06-15

## 2019-06-15 RX ADMIN — KETOROLAC TROMETHAMINE 60 MG: 30 INJECTION, SOLUTION INTRAMUSCULAR; INTRAVENOUS at 19:46

## 2019-06-15 RX ADMIN — HYDROCODONE BITARTRATE AND ACETAMINOPHEN 1 TABLET: 7.5; 325 TABLET ORAL at 19:46

## 2019-06-15 RX ADMIN — DIAZEPAM 10 MG: 5 TABLET ORAL at 19:46

## 2019-06-15 ASSESSMENT — ENCOUNTER SYMPTOMS
ABDOMINAL PAIN: 0
COLOR CHANGE: 0
NAUSEA: 0
WHEEZING: 0
DIARRHEA: 0
TROUBLE SWALLOWING: 0
ABDOMINAL DISTENTION: 0
SORE THROAT: 0
SHORTNESS OF BREATH: 0
CHEST TIGHTNESS: 0
COUGH: 0
VOMITING: 0
RHINORRHEA: 0
PHOTOPHOBIA: 0
CONSTIPATION: 0
BACK PAIN: 1
EYE PAIN: 0

## 2019-06-15 ASSESSMENT — PAIN SCALES - GENERAL: PAINLEVEL_OUTOF10: 10

## 2019-06-16 NOTE — ED TRIAGE NOTES
PT presents to ED c/o right hip pain. PT is being seen at pain management for pain and had an injection on Monday. No injury.

## 2019-06-16 NOTE — ED PROVIDER NOTES
[address] [facility] EMERGENCY DEPT  eMERGENCY dEPARTMENT eNCOUnter      Pt Name: Lynne Colindres  MRN: [id number]  Yordygfefrem 1970  Date of evaluation: 6/15/2019  Provider: Maco Le MD    [address] [place] [address]       Chief Complaint   Patient presents with    Hip Pain     right; no injury         HISTORY OF PRESENT ILLNESS   (Location/Symptom, Timing/Onset,Context/Setting, Quality, Duration, Modifying Factors, Severity)  Note limiting factors. Lynne Colindres is a 51 y.o. female who presents to the emergency department for right hip pain. She was seen by Dr. Mina Howell with did an SI joint injection 4 days ago with little to no improvement. She states that the pain radiates down the right LE. The pain worsened today. Pt took only her typical medicines for today. HPI    NursingNotes were reviewed. REVIEW OF SYSTEMS    (2-9 systems for level 4, 10 or more for level 5)     Review of Systems   Constitutional: Negative for activity change, appetite change, chills, fatigue and fever. HENT: Negative for congestion, ear pain, rhinorrhea, sore throat and trouble swallowing. Eyes: Negative for photophobia, pain and visual disturbance. Respiratory: Negative for cough, chest tightness, shortness of breath and wheezing. Cardiovascular: Negative for chest pain, palpitations and leg swelling. Gastrointestinal: Negative for abdominal distention, abdominal pain, constipation, diarrhea, nausea and vomiting. Genitourinary: Negative for difficulty urinating, dysuria, flank pain, urgency, vaginal bleeding and vaginal discharge. Musculoskeletal: Positive for back pain and gait problem (2/2 pain). Negative for myalgias and neck stiffness. Skin: Negative for color change, pallor and rash. Neurological: Negative for dizziness, weakness, light-headedness, numbness and headaches. Psychiatric/Behavioral: Negative for agitation, behavioral problems, confusion, hallucinations and suicidal ideas.             PAST MEDICALHISTORY     Past Medical History:   Diagnosis Date    BiPAP (biphasic positive airway pressure) dependence     18cm/14cm    Hyperlipidemia     Hypertension     Obstructive sleep apnea     AHI: 106. 6 per PSG, 2018    Osteoarthritis     Psoriasis          SURGICAL HISTORY       Past Surgical History:   Procedure Laterality Date     SECTION      CHOLECYSTECTOMY      EPIDURAL STEROID INJECTION N/A 2019    LUMBAR EPIDURAL STEROID INJECTION L4-5 performed by Eulalio Joy at [address] N/A 2019    LUMBAR EPIDURAL STEROID INJECTION L5-S1 performed by Eulalio Joy at [facility] HC INJECTION PROCEDURE FOR SACROILIAC JOINT Right 2019    SACROILIAC JOINT INJECTION performed by Vikki Ledesma at [facility] SI JOINT ARTHRGRPHY&/ANES/STEROID W/IMAGE Right 10/9/2018    SACROILIAC JOINT INJECTION performed by Vikki Ledesma at [address] DX/THER SBST INTRLMNR LMBR/SAC W/IMG GDN N/A 2018    LUMBAR INTER LAMINAR KACEY L4-5 performed by Vikki Ledesma at [address]     Discharge Medication List as of 6/15/2019  8:00 PM      CONTINUE these medications which have NOT CHANGED    Details   tiZANidine (ZANAFLEX) 4 MG tablet Take 4 mg by mouth nightly Indications: 1/2 tabHistorical Med      Adalimumab (HUMIRA) 40 MG/0.4ML PSKT Inject into the skinHistorical Med      metoprolol succinate (TOPROL XL) 25 MG extended release tablet Take 1 tablet by mouth nightly, Disp-90 tablet, R-3Normal      gabapentin (NEURONTIN) 600 MG tablet Take 600 mg by mouth 4 times daily. Historical Med      lisinopril-hydrochlorothiazide (PRINZIDE;ZESTORETIC) 20-12.5 MG per tablet Take 0.5 tablets by mouth daily, Disp-30 tablet, R-5NO PRINT      diclofenac (VOLTAREN) 50 MG EC tablet Take 50 mg by mouth 2 times daily, R-2Historical Med      ezetimibe-simvastatin (VYTORIN) 10-40 MG per tablet Take 1 tablet by mouth daily Historical Mouth/Throat: Oropharynx is clear and moist.   Cardiovascular: Normal rate, regular rhythm and normal heart sounds. No murmur heard. Pulmonary/Chest: Effort normal and breath sounds normal. She has no wheezes. She has no rales. Musculoskeletal: She exhibits tenderness. Right hip: She exhibits tenderness. She exhibits normal range of motion, normal strength and no bony tenderness. Legs:  Patient has tenderness to palpation over the right buttock. Active range of motion tends to elicit more pain than passive range of motion. Straight leg raise does not cause the pain to occur. A bent knee and flexion of the hip does not cause significant pain either. Neurological: She is alert and oriented to person, place, and time. No cranial nerve deficit. Skin: Skin is warm and dry. Capillary refill takes less than 2 seconds. Psychiatric: She has a normal mood and affect. Her behavior is normal. Thought content normal.   Nursing note and vitals reviewed. DIAGNOSTIC RESULTS     EKG: All EKG's areinterpreted by the Emergency Department Physician who either signs or Co-signs this chart in the absence of a cardiologist.        RADIOLOGY:  Non-plain film images such as CT, Ultrasound and MRI are read by the radiologist. Plain radiographic images are visualized and preliminarily interpreted bythe emergency physician with the below findings:          No orders to display           LABS:  Labs Reviewed - No data to display    All other labs were within normal range or not returned as of this dictation.     EMERGENCY DEPARTMENT COURSE and DIFFERENTIAL DIAGNOSIS/MDM:   Vitals:    Vitals:    06/15/19 1913   BP: (!) 152/85   Pulse: 75   Resp: 17   Temp: 97.3 °F (36.3 °C)   TempSrc: Temporal   SpO2: 95%   Weight: 280 lb (127 kg)   Height: 5' 3\" (1.6 m)       MDM  Number of Diagnoses or Management Options  Sciatica of right side: new and does not require workup  Diagnosis management comments: Patient has tablet Take 1 tablet by mouth every 6 hours as needed for Pain for up to 3 days. Intended supply: 3 days.  Take lowest dose possible to manage pain, Disp-12 tablet, R-0Print                (Please note that portions of this note were completed with a voice recognition program.  Efforts were made to edit thedictations but occasionally words are mis-transcribed.)    Jame Ochoa MD (electronically signed)  Attending Emergency Physician          Ivan Dodson MD  06/16/19 1304

## 2019-07-23 ENCOUNTER — HOSPITAL ENCOUNTER (OUTPATIENT)
Age: 49
Setting detail: OUTPATIENT SURGERY
Discharge: HOME | End: 2019-07-23
Attending: PHYSICAL MEDICINE & REHABILITATION | Admitting: PHYSICAL MEDICINE & REHABILITATION

## 2019-07-23 ENCOUNTER — HOSPITAL ENCOUNTER (OUTPATIENT)
Dept: GENERAL RADIOLOGY | Age: 49
Discharge: HOME | End: 2019-07-23
Payer: COMMERCIAL

## 2019-07-23 VITALS
BODY MASS INDEX: 49.61 KG/M2 | OXYGEN SATURATION: 97 % | SYSTOLIC BLOOD PRESSURE: 117 MMHG | RESPIRATION RATE: 18 BRPM | HEIGHT: 63 IN | DIASTOLIC BLOOD PRESSURE: 57 MMHG | WEIGHT: 280 LBS | HEART RATE: 69 BPM

## 2019-07-23 DIAGNOSIS — R52 PAIN: ICD-10-CM

## 2019-07-23 PROCEDURE — G8918 PT W/O PREOP ORDER IV AB PRO: HCPCS | Performed by: NURSE PRACTITIONER

## 2019-07-23 PROCEDURE — 3209999900 FLUORO FOR SURGICAL PROCEDURES

## 2019-07-23 PROCEDURE — G8907 PT DOC NO EVENTS ON DISCHARG: HCPCS | Performed by: NURSE PRACTITIONER

## 2019-07-23 PROCEDURE — 62323 NJX INTERLAMINAR LMBR/SAC: CPT

## 2019-07-23 RX ORDER — LIDOCAINE HYDROCHLORIDE 10 MG/ML
INJECTION, SOLUTION INFILTRATION; PERINEURAL PRN
Status: DISCONTINUED | OUTPATIENT
Start: 2019-07-23 | End: 2019-07-23 | Stop reason: ALTCHOICE

## 2019-07-23 RX ORDER — METHYLPREDNISOLONE ACETATE 80 MG/ML
INJECTION, SUSPENSION INTRA-ARTICULAR; INTRALESIONAL; INTRAMUSCULAR; SOFT TISSUE PRN
Status: DISCONTINUED | OUTPATIENT
Start: 2019-07-23 | End: 2019-07-23 | Stop reason: ALTCHOICE

## 2019-07-23 RX ORDER — 0.9 % SODIUM CHLORIDE 0.9 %
VIAL (ML) INJECTION PRN
Status: DISCONTINUED | OUTPATIENT
Start: 2019-07-23 | End: 2019-07-23 | Stop reason: ALTCHOICE

## 2019-09-17 ENCOUNTER — HOSPITAL ENCOUNTER (OUTPATIENT)
Dept: GENERAL RADIOLOGY | Age: 49
Discharge: HOME | End: 2019-09-17
Payer: COMMERCIAL

## 2019-09-17 ENCOUNTER — HOSPITAL ENCOUNTER (OUTPATIENT)
Age: 49
Setting detail: OUTPATIENT SURGERY
Discharge: HOME | End: 2019-09-17
Attending: PHYSICAL MEDICINE & REHABILITATION | Admitting: PHYSICAL MEDICINE & REHABILITATION

## 2019-09-17 VITALS
RESPIRATION RATE: 16 BRPM | BODY MASS INDEX: 48.73 KG/M2 | WEIGHT: 275 LBS | DIASTOLIC BLOOD PRESSURE: 64 MMHG | HEIGHT: 63 IN | OXYGEN SATURATION: 97 % | SYSTOLIC BLOOD PRESSURE: 117 MMHG | HEART RATE: 70 BPM

## 2019-09-17 DIAGNOSIS — L90.5 SCAR PAINFUL: ICD-10-CM

## 2019-09-17 DIAGNOSIS — R52 SCAR PAINFUL: ICD-10-CM

## 2019-09-17 PROCEDURE — G8918 PT W/O PREOP ORDER IV AB PRO: HCPCS

## 2019-09-17 PROCEDURE — 64494 INJ PARAVERT F JNT L/S 2 LEV: CPT

## 2019-09-17 PROCEDURE — 3209999900 FLUORO FOR SURGICAL PROCEDURES

## 2019-09-17 PROCEDURE — 64493 INJ PARAVERT F JNT L/S 1 LEV: CPT

## 2019-09-17 PROCEDURE — G8907 PT DOC NO EVENTS ON DISCHARG: HCPCS

## 2019-09-17 RX ORDER — LIDOCAINE HYDROCHLORIDE 10 MG/ML
INJECTION, SOLUTION INFILTRATION; PERINEURAL PRN
Status: DISCONTINUED | OUTPATIENT
Start: 2019-09-17 | End: 2019-09-17 | Stop reason: ALTCHOICE

## 2019-09-17 RX ORDER — DULOXETIN HYDROCHLORIDE 30 MG/1
30 CAPSULE, DELAYED RELEASE ORAL DAILY
COMMUNITY
End: 2019-10-23

## 2019-09-17 ASSESSMENT — PAIN SCALES - GENERAL: PAINLEVEL_OUTOF10: 0

## 2019-09-30 ENCOUNTER — TELEPHONE (OUTPATIENT)
Dept: NEUROSURGERY | Age: 49
End: 2019-09-30

## 2019-10-16 ENCOUNTER — TRANSCRIBE ORDERS (OUTPATIENT)
Dept: ADMINISTRATIVE | Facility: HOSPITAL | Age: 49
End: 2019-10-16

## 2019-10-16 DIAGNOSIS — M54.50 LOW BACK PAIN, UNSPECIFIED BACK PAIN LATERALITY, UNSPECIFIED CHRONICITY, UNSPECIFIED WHETHER SCIATICA PRESENT: Primary | ICD-10-CM

## 2019-10-23 ENCOUNTER — OFFICE VISIT (OUTPATIENT)
Dept: CARDIOLOGY | Age: 49
End: 2019-10-23
Payer: COMMERCIAL

## 2019-10-23 ENCOUNTER — HOSPITAL ENCOUNTER (OUTPATIENT)
Dept: MRI IMAGING | Facility: HOSPITAL | Age: 49
Discharge: HOME | End: 2019-10-23
Admitting: PHYSICIAN ASSISTANT

## 2019-10-23 VITALS
SYSTOLIC BLOOD PRESSURE: 110 MMHG | HEIGHT: 63 IN | HEART RATE: 68 BPM | WEIGHT: 272 LBS | DIASTOLIC BLOOD PRESSURE: 82 MMHG | BODY MASS INDEX: 48.2 KG/M2

## 2019-10-23 DIAGNOSIS — I10 ESSENTIAL HYPERTENSION: ICD-10-CM

## 2019-10-23 DIAGNOSIS — R00.0 TACHYCARDIA: ICD-10-CM

## 2019-10-23 DIAGNOSIS — I47.1 SVT (SUPRAVENTRICULAR TACHYCARDIA) (HCC): Primary | ICD-10-CM

## 2019-10-23 PROCEDURE — 99213 OFFICE O/P EST LOW 20 MIN: CPT | Performed by: NURSE PRACTITIONER

## 2019-10-23 PROCEDURE — 72148 MRI LUMBAR SPINE W/O DYE: CPT

## 2019-10-23 RX ORDER — LISINOPRIL AND HYDROCHLOROTHIAZIDE 20; 12.5 MG/1; MG/1
1 TABLET ORAL DAILY
COMMUNITY

## 2019-10-23 RX ORDER — DULOXETIN HYDROCHLORIDE 60 MG/1
60 CAPSULE, DELAYED RELEASE ORAL DAILY
COMMUNITY
End: 2021-10-06

## 2019-11-12 ENCOUNTER — HOSPITAL ENCOUNTER (OUTPATIENT)
Dept: GENERAL RADIOLOGY | Age: 49
Discharge: HOME | End: 2019-11-12
Payer: COMMERCIAL

## 2019-11-12 ENCOUNTER — HOSPITAL ENCOUNTER (OUTPATIENT)
Age: 49
Setting detail: OUTPATIENT SURGERY
Discharge: HOME | End: 2019-11-12
Attending: PHYSICAL MEDICINE & REHABILITATION | Admitting: PHYSICAL MEDICINE & REHABILITATION

## 2019-11-12 VITALS
SYSTOLIC BLOOD PRESSURE: 111 MMHG | HEART RATE: 71 BPM | OXYGEN SATURATION: 98 % | RESPIRATION RATE: 20 BRPM | DIASTOLIC BLOOD PRESSURE: 57 MMHG

## 2019-11-12 DIAGNOSIS — R52 PAIN: ICD-10-CM

## 2019-11-12 PROCEDURE — 62323 NJX INTERLAMINAR LMBR/SAC: CPT

## 2019-11-12 PROCEDURE — 3209999900 FLUORO FOR SURGICAL PROCEDURES

## 2019-11-12 PROCEDURE — G8918 PT W/O PREOP ORDER IV AB PRO: HCPCS

## 2019-11-12 PROCEDURE — G8907 PT DOC NO EVENTS ON DISCHARG: HCPCS

## 2019-11-12 RX ORDER — SODIUM CHLORIDE 9 MG/ML
INJECTION INTRAVENOUS PRN
Status: DISCONTINUED | OUTPATIENT
Start: 2019-11-12 | End: 2019-11-12 | Stop reason: ALTCHOICE

## 2019-11-12 RX ORDER — LIDOCAINE HYDROCHLORIDE 10 MG/ML
INJECTION, SOLUTION INFILTRATION; PERINEURAL PRN
Status: DISCONTINUED | OUTPATIENT
Start: 2019-11-12 | End: 2019-11-12 | Stop reason: ALTCHOICE

## 2019-11-12 RX ORDER — METHYLPREDNISOLONE ACETATE 80 MG/ML
INJECTION, SUSPENSION INTRA-ARTICULAR; INTRALESIONAL; INTRAMUSCULAR; SOFT TISSUE PRN
Status: DISCONTINUED | OUTPATIENT
Start: 2019-11-12 | End: 2019-11-12 | Stop reason: ALTCHOICE

## 2019-12-13 ENCOUNTER — HOSPITAL ENCOUNTER (OUTPATIENT)
Facility: HOSPITAL | Age: 49
Setting detail: SURGERY ADMIT
End: 2019-12-13
Attending: ORTHOPAEDIC SURGERY | Admitting: ORTHOPAEDIC SURGERY

## 2019-12-17 ENCOUNTER — TELEPHONE (OUTPATIENT)
Dept: VASCULAR SURGERY | Facility: CLINIC | Age: 49
End: 2019-12-17

## 2019-12-26 ENCOUNTER — OFFICE VISIT (OUTPATIENT)
Dept: GASTROENTEROLOGY | Age: 49
End: 2019-12-26
Payer: COMMERCIAL

## 2019-12-26 VITALS
DIASTOLIC BLOOD PRESSURE: 60 MMHG | OXYGEN SATURATION: 95 % | HEIGHT: 63 IN | WEIGHT: 270 LBS | SYSTOLIC BLOOD PRESSURE: 120 MMHG | BODY MASS INDEX: 47.84 KG/M2 | HEART RATE: 83 BPM

## 2019-12-26 DIAGNOSIS — K62.5 RECTAL BLEEDING: Primary | ICD-10-CM

## 2019-12-26 DIAGNOSIS — R10.2 PELVIC PAIN: ICD-10-CM

## 2019-12-26 PROCEDURE — 99204 OFFICE O/P NEW MOD 45 MIN: CPT | Performed by: NURSE PRACTITIONER

## 2019-12-26 RX ORDER — HYDROCODONE BITARTRATE AND ACETAMINOPHEN 7.5; 325 MG/1; MG/1
1 TABLET ORAL EVERY 6 HOURS PRN
COMMUNITY
End: 2021-10-06

## 2019-12-26 ASSESSMENT — ENCOUNTER SYMPTOMS
CHOKING: 0
BACK PAIN: 1
SHORTNESS OF BREATH: 0
ABDOMINAL PAIN: 1
RECTAL PAIN: 1
ANAL BLEEDING: 1
VOMITING: 0
ABDOMINAL DISTENTION: 0
DIARRHEA: 0
NAUSEA: 0
BLOOD IN STOOL: 0
TROUBLE SWALLOWING: 0
CONSTIPATION: 0
COUGH: 0

## 2020-01-02 ENCOUNTER — HOSPITAL ENCOUNTER (OUTPATIENT)
Dept: GENERAL RADIOLOGY | Facility: HOSPITAL | Age: 50
Discharge: HOME | End: 2020-01-02
Admitting: ORTHOPAEDIC SURGERY

## 2020-01-02 ENCOUNTER — APPOINTMENT (OUTPATIENT)
Dept: PREADMISSION TESTING | Facility: HOSPITAL | Age: 50
End: 2020-01-02

## 2020-01-02 VITALS
DIASTOLIC BLOOD PRESSURE: 59 MMHG | SYSTOLIC BLOOD PRESSURE: 162 MMHG | HEIGHT: 64 IN | RESPIRATION RATE: 16 BRPM | HEART RATE: 97 BPM | OXYGEN SATURATION: 97 % | WEIGHT: 275.13 LBS | BODY MASS INDEX: 46.97 KG/M2

## 2020-01-02 LAB
ALBUMIN SERPL-MCNC: 4.1 G/DL (ref 3.5–5.2)
ALBUMIN/GLOB SERPL: 1.3 G/DL
ALP SERPL-CCNC: 58 U/L (ref 39–117)
ALT SERPL W P-5'-P-CCNC: 23 U/L (ref 1–33)
ANION GAP SERPL CALCULATED.3IONS-SCNC: 13 MMOL/L (ref 5–15)
APTT PPP: 33.7 SECONDS (ref 24.1–35)
AST SERPL-CCNC: 47 U/L (ref 1–32)
BASOPHILS # BLD AUTO: 0.06 10*3/MM3 (ref 0–0.2)
BASOPHILS NFR BLD AUTO: 0.7 % (ref 0–1.5)
BILIRUB SERPL-MCNC: 0.3 MG/DL (ref 0.2–1.2)
BILIRUB UR QL STRIP: NEGATIVE
BUN BLD-MCNC: 12 MG/DL (ref 6–20)
BUN/CREAT SERPL: 23.1 (ref 7–25)
CALCIUM SPEC-SCNC: 9.2 MG/DL (ref 8.6–10.5)
CHLORIDE SERPL-SCNC: 99 MMOL/L (ref 98–107)
CLARITY UR: CLEAR
CO2 SERPL-SCNC: 25 MMOL/L (ref 22–29)
COLOR UR: YELLOW
CREAT BLD-MCNC: 0.52 MG/DL (ref 0.57–1)
DEPRECATED RDW RBC AUTO: 43.6 FL (ref 37–54)
EOSINOPHIL # BLD AUTO: 0.19 10*3/MM3 (ref 0–0.4)
EOSINOPHIL NFR BLD AUTO: 2.3 % (ref 0.3–6.2)
ERYTHROCYTE [DISTWIDTH] IN BLOOD BY AUTOMATED COUNT: 13.9 % (ref 12.3–15.4)
GFR SERPL CREATININE-BSD FRML MDRD: 125 ML/MIN/1.73
GLOBULIN UR ELPH-MCNC: 3.1 GM/DL
GLUCOSE BLD-MCNC: 100 MG/DL (ref 65–99)
GLUCOSE UR STRIP-MCNC: NEGATIVE MG/DL
HCT VFR BLD AUTO: 37 % (ref 34–46.6)
HGB BLD-MCNC: 12.4 G/DL (ref 12–15.9)
HGB UR QL STRIP.AUTO: NEGATIVE
IMM GRANULOCYTES # BLD AUTO: 0.04 10*3/MM3 (ref 0–0.05)
IMM GRANULOCYTES NFR BLD AUTO: 0.5 % (ref 0–0.5)
INR PPP: 0.93 (ref 0.91–1.09)
KETONES UR QL STRIP: NEGATIVE
LEUKOCYTE ESTERASE UR QL STRIP.AUTO: NEGATIVE
LYMPHOCYTES # BLD AUTO: 1.55 10*3/MM3 (ref 0.7–3.1)
LYMPHOCYTES NFR BLD AUTO: 18.4 % (ref 19.6–45.3)
MCH RBC QN AUTO: 28.6 PG (ref 26.6–33)
MCHC RBC AUTO-ENTMCNC: 33.5 G/DL (ref 31.5–35.7)
MCV RBC AUTO: 85.3 FL (ref 79–97)
MONOCYTES # BLD AUTO: 0.61 10*3/MM3 (ref 0.1–0.9)
MONOCYTES NFR BLD AUTO: 7.2 % (ref 5–12)
NEUTROPHILS # BLD AUTO: 5.99 10*3/MM3 (ref 1.7–7)
NEUTROPHILS NFR BLD AUTO: 70.9 % (ref 42.7–76)
NITRITE UR QL STRIP: NEGATIVE
NRBC BLD AUTO-RTO: 0 /100 WBC (ref 0–0.2)
PH UR STRIP.AUTO: 5.5 [PH] (ref 5–8)
PLATELET # BLD AUTO: 336 10*3/MM3 (ref 140–450)
PMV BLD AUTO: 10 FL (ref 6–12)
POTASSIUM BLD-SCNC: 3.7 MMOL/L (ref 3.5–5.2)
PROT SERPL-MCNC: 7.2 G/DL (ref 6–8.5)
PROT UR QL STRIP: NEGATIVE
PROTHROMBIN TIME: 12.7 SECONDS (ref 11.9–14.6)
RBC # BLD AUTO: 4.34 10*6/MM3 (ref 3.77–5.28)
SODIUM BLD-SCNC: 137 MMOL/L (ref 136–145)
SP GR UR STRIP: 1.01 (ref 1–1.03)
UROBILINOGEN UR QL STRIP: NORMAL
WBC NRBC COR # BLD: 8.44 10*3/MM3 (ref 3.4–10.8)

## 2020-01-02 PROCEDURE — 81003 URINALYSIS AUTO W/O SCOPE: CPT | Performed by: ORTHOPAEDIC SURGERY

## 2020-01-02 PROCEDURE — 85025 COMPLETE CBC W/AUTO DIFF WBC: CPT | Performed by: ORTHOPAEDIC SURGERY

## 2020-01-02 PROCEDURE — 80053 COMPREHEN METABOLIC PANEL: CPT | Performed by: ORTHOPAEDIC SURGERY

## 2020-01-02 PROCEDURE — 85730 THROMBOPLASTIN TIME PARTIAL: CPT | Performed by: ORTHOPAEDIC SURGERY

## 2020-01-02 PROCEDURE — 93005 ELECTROCARDIOGRAM TRACING: CPT

## 2020-01-02 PROCEDURE — 71046 X-RAY EXAM CHEST 2 VIEWS: CPT

## 2020-01-02 PROCEDURE — 93010 ELECTROCARDIOGRAM REPORT: CPT | Performed by: INTERNAL MEDICINE

## 2020-01-02 PROCEDURE — 85610 PROTHROMBIN TIME: CPT | Performed by: ORTHOPAEDIC SURGERY

## 2020-01-02 PROCEDURE — 36415 COLL VENOUS BLD VENIPUNCTURE: CPT

## 2020-01-02 RX ORDER — TIZANIDINE 4 MG/1
4 TABLET ORAL NIGHTLY PRN
COMMUNITY
End: 2021-11-08

## 2020-01-02 RX ORDER — DULOXETIN HYDROCHLORIDE 60 MG/1
60 CAPSULE, DELAYED RELEASE ORAL DAILY
COMMUNITY
End: 2021-11-08

## 2020-01-02 RX ORDER — GABAPENTIN 600 MG/1
600 TABLET ORAL 4 TIMES DAILY
COMMUNITY
End: 2021-11-08

## 2020-01-02 RX ORDER — LISINOPRIL AND HYDROCHLOROTHIAZIDE 20; 12.5 MG/1; MG/1
1 TABLET ORAL DAILY
COMMUNITY

## 2020-01-02 RX ORDER — HYDROCODONE BITARTRATE AND ACETAMINOPHEN 7.5; 325 MG/1; MG/1
1 TABLET ORAL EVERY 6 HOURS PRN
COMMUNITY
End: 2020-01-18 | Stop reason: HOSPADM

## 2020-01-02 NOTE — DISCHARGE INSTRUCTIONS
DAY OF SURGERY INSTRUCTIONS        YOUR SURGEON: *** Dr. Bang    PROCEDURE: ***Anterior Decompression, Anterior Lumbar Interbody Fusion With Instrumentation [id number]    DATE OF SURGERY: *** January 17, 2020    ARRIVAL TIME: AS DIRECTED BY OFFICE    YOU MAY TAKE THE FOLLOWING MEDICATION(S) THE MORNING OF SURGERY WITH A SIP OF WATER: ***      ALL OTHER HOME MEDICATION CHECK WITH YOUR PHYSICIAN                MANAGING PAIN AFTER SURGERY    We know you are probably wondering what your pain will be like after surgery.  Following surgery it is unrealistic to expect you will not have pain.   Pain is how our bodies let us know that something is wrong or cautions us to be careful.  That said, our goal is to make your pain tolerable.    Methods we may use to treat your pain include (oral or IV medications, PCAs, epidurals, nerve blocks, etc.)   While some procedures require IV pain medications for a short time after surgery, transitioning to pain medications by mouth allows for better management of pain.   Your nurse will encourage you to take oral pain medications whenever possible.  IV medications work almost immediately, but only last a short while.  Taking medications by mouth allows for a more constant level of medication in your blood stream for a longer period of time.      Once your pain is out of control it is harder to get back under control.  It is important you are aware when your next dose of pain medication is due.  If you are admitted, your nurse may write the time of your next dose on the white board in your room to help you remember.      We are interested in your pain and encourage you to inform us about aggravating factors during your visit.   Many times a simple repositioning every few hours can make a big difference.    If your physician says it is okay, do not let your pain prevent you from getting out of bed. Be sure to call your nurse for assistance prior to getting up so you do not fall.       Before surgery, please decide your tolerable pain goal.  These faces help describe the pain ratings we use on a 0-10 scale.   Be prepared to tell us your goal and whether or not you take pain or anxiety medications at home.          BEFORE YOU COME TO THE HOSPITAL  (Pre-op instructions)  • Do not eat, drink, smoke or chew gum after midnight the night before surgery.  This also includes no mints.  • Morning of surgery take only the medicines you have been instructed with a sip of water unless otherwise instructed  by your physician.  • Do not shave, wear makeup or dark nail polish.  • Remove all jewelry including rings.  • Leave anything you consider valuable at home.  • Leave your suitcase in the car until after your surgery.  • Bring the following with you if applicable:  o Picture ID and insurance, Medicare or Medicaid cards  o Co-pay/deductible required by insurance (cash, check, credit card)  o Copy of advance directive, living will or power-of- documents if not brought to PAT  o CPAP or BIPAP mask and tubing  o Relaxation aids ( book, magazine), etc.  o Hearing aids                        ON THE DAY OF SURGERY  · On the day of surgery check in at registration located at the main entrance of the hospital.   ? You will be registered and given a beeper with instructions where to wait in the main lobby.  ? When your beeper lights up and vibrates a member of the Outpatient Surgery staff will meet you at the double doors under the stair steps and escort you to your preoperative room.   · You may have cloth compression devices placed on your legs. These help to prevent blood clots and reduce swelling in your legs.  · An IV may be inserted into one of your veins.  · In the operating room, you may be given one or more of the following:  ? A medicine to help you relax (sedative).  ? A medicine to numb the area (local anesthetic).  ? A medicine to make you fall asleep (general anesthetic).  ? A medicine that  "is injected into an area of your body to numb everything below the injection site (regional anesthetic).  · Your surgical site will be marked or identified.  · You may be given an antibiotic through your IV to help prevent infection.  Contact a health care provider if you:  · Develop a fever of more than 100.4°F (38°C) or other feelings of illness during the 48 hours before your surgery.  · Have symptoms that get worse.  Have questions or concerns about your surgery    General Anesthesia/Surgery, Adult  General anesthesia is the use of medicines to make a person \"go to sleep\" (unconscious) for a medical procedure. General anesthesia must be used for certain procedures, and is often recommended for procedures that:  · Last a long time.  · Require you to be still or in an unusual position.  · Are major and can cause blood loss.  The medicines used for general anesthesia are called general anesthetics. As well as making you unconscious for a certain amount of time, these medicines:  · Prevent pain.  · Control your blood pressure.  · Relax your muscles.  Tell a health care provider about:  · Any allergies you have.  · All medicines you are taking, including vitamins, herbs, eye drops, creams, and over-the-counter medicines.  · Any problems you or family members have had with anesthetic medicines.  · Types of anesthetics you have had in the past.  · Any blood disorders you have.  · Any surgeries you have had.  · Any medical conditions you have.  · Any recent upper respiratory, chest, or ear infections.  · Any history of:  ? Heart or lung conditions, such as heart failure, sleep apnea, asthma, or chronic obstructive pulmonary disease (COPD).  ?  service.  ? Depression or anxiety.  · Any tobacco or drug use, including marijuana or alcohol use.  · Whether you are pregnant or may be pregnant.  What are the risks?  Generally, this is a safe procedure. However, problems may occur, including:  · Allergic " reaction.  · Lung and heart problems.  · Inhaling food or liquid from the stomach into the lungs (aspiration).  · Nerve injury.  · Air in the bloodstream, which can lead to stroke.  · Extreme agitation or confusion (delirium) when you wake up from the anesthetic.  · Waking up during your procedure and being unable to move. This is rare.  These problems are more likely to develop if you are having a major surgery or if you have an advanced or serious medical condition. You can prevent some of these complications by answering all of your health care provider's questions thoroughly and by following all instructions before your procedure.  General anesthesia can cause side effects, including:  · Nausea or vomiting.  · A sore throat from the breathing tube.  · Hoarseness.  · Wheezing or coughing.  · Shaking chills.  · Tiredness.  · Body aches.  · Anxiety.  · Sleepiness or drowsiness.  · Confusion or agitation.  RISKS AND COMPLICATIONS OF SURGERY  Your health care provider will discuss possible risks and complications with you before surgery. Common risks and complications include:    · Problems due to the use of anesthetics.  · Blood loss and replacement (does not apply to minor surgical procedures).  · Temporary increase in pain due to surgery.  · Uncorrected pain or problems that the surgery was meant to correct.  · Infection.  · New damage.    What happens before the procedure?    Medicines  Ask your health care provider about:  · Changing or stopping your regular medicines. This is especially important if you are taking diabetes medicines or blood thinners.  · Taking medicines such as aspirin and ibuprofen. These medicines can thin your blood. Do not take these medicines unless your health care provider tells you to take them.  · Taking over-the-counter medicines, vitamins, herbs, and supplements. Do not take these during the week before your procedure unless your health care provider approves them.  General  instructions  · Starting 3-6 weeks before the procedure, do not use any products that contain nicotine or tobacco, such as cigarettes and e-cigarettes. If you need help quitting, ask your health care provider.  · If you brush your teeth on the morning of the procedure, make sure to spit out all of the toothpaste.  · Tell your health care provider if you become ill or develop a cold, cough, or fever.  · If instructed by your health care provider, bring your sleep apnea device with you on the day of your surgery (if applicable).  · Ask your health care provider if you will be going home the same day, the following day, or after a longer hospital stay.  ? Plan to have someone take you home from the hospital or clinic.  ? Plan to have a responsible adult care for you for at least 24 hours after you leave the hospital or clinic. This is important.  What happens during the procedure?  · You will be given anesthetics through both of the following:  ? A mask placed over your nose and mouth.  ? An IV in one of your veins.  · You may receive a medicine to help you relax (sedative).  · After you are unconscious, a breathing tube may be inserted down your throat to help you breathe. This will be removed before you wake up.  · An anesthesia specialist will stay with you throughout your procedure. He or she will:  ? Keep you comfortable and safe by continuing to give you medicines and adjusting the amount of medicine that you get.  ? Monitor your blood pressure, pulse, and oxygen levels to make sure that the anesthetics do not cause any problems.  The procedure may vary among health care providers and hospitals.  What happens after the procedure?  · Your blood pressure, temperature, heart rate, breathing rate, and blood oxygen level will be monitored until the medicines you were given have worn off.  · You will wake up in a recovery area. You may wake up slowly.  · If you feel anxious or agitated, you may be given medicine to  help you calm down.  · If you will be going home the same day, your health care provider may check to make sure you can walk, drink, and urinate.  · Your health care provider will treat any pain or side effects you have before you go home.  · Do not drive for 24 hours if you were given a sedative.  Summary  · General anesthesia is used to keep you still and prevent pain during a procedure.  · It is important to tell your healthcare provider about your medical history and any surgeries you have had, and previous experience with anesthesia.  · Follow your healthcare provider’s instructions about when to stop eating, drinking, or taking certain medicines before your procedure.  · Plan to have someone take you home from the hospital or clinic.  This information is not intended to replace advice given to you by your health care provider. Make sure you discuss any questions you have with your health care provider.  Document Released: 03/26/2009 Document Revised: 08/03/2018 Document Reviewed: 08/03/2018  [employer] Interactive Patient Education © 2019 [employer]       Fall Prevention in Hospitals, Adult  As a hospital patient, your condition and the treatments you receive can increase your risk for falls. Some additional risk factors for falls in a hospital include:  · Being in an unfamiliar environment.  · Being on bed rest.  · Your surgery.  · Taking certain medicines.  · Your tubing requirements, such as intravenous (IV) therapy or catheters.  It is important that you learn how to decrease fall risks while at the hospital. Below are important tips that can help prevent falls.  SAFETY TIPS FOR PREVENTING FALLS  Talk about your risk of falling.  · Ask your health care provider why you are at risk for falling. Is it your medicine, illness, tubing placement, or something else?  · Make a plan with your health care provider to keep you safe from falls.  · Ask your health care provider or pharmacist about side effects of your  medicines. Some medicines can make you dizzy or affect your coordination.  Ask for help.  · Ask for help before getting out of bed. You may need to press your call button.  · Ask for assistance in getting safely to the toilet.  · Ask for a walker or cane to be put at your bedside. Ask that most of the side rails on your bed be placed up before your health care provider leaves the room.  · Ask family or friends to sit with you.  · Ask for things that are out of your reach, such as your glasses, hearing aids, telephone, bedside table, or call button.  Follow these tips to avoid falling:  · Stay lying or seated, rather than standing, while waiting for help.  · Wear rubber-soled slippers or shoes whenever you walk in the hospital.  · Avoid quick, sudden movements.  ¨ Change positions slowly.  ¨ Sit on the side of your bed before standing.  ¨ Stand up slowly and wait before you start to walk.  · Let your health care provider know if there is a spill on the floor.  · Pay careful attention to the medical equipment, electrical cords, and tubes around you.  · When you need help, use your call button by your bed or in the bathroom. Wait for one of your health care providers to help you.  · If you feel dizzy or unsure of your footing, return to bed and wait for assistance.  · Avoid being distracted by the TV, telephone, or another person in your room.  · Do not lean or support yourself on rolling objects, such as IV poles or bedside tables.     This information is not intended to replace advice given to you by your health care provider. Make sure you discuss any questions you have with your health care provider.     Document Released: 12/15/2001 Document Revised: 01/08/2016 Document Reviewed: 08/25/2013  [employer] Interactive Patient Education ©2016 [employer]       Surgical Site Infections FAQs  What is a Surgical Site Infection (SSI)?  A surgical site infection is an infection that occurs after surgery in the part of the  body where the surgery took place. Most patients who have surgery do not develop an infection. However, infections develop in about 1 to 3 out of every 100 patients who have surgery.  Some of the common symptoms of a surgical site infection are:  · Redness and pain around the area where you had surgery  · Drainage of cloudy fluid from your surgical wound  · Fever  Can SSIs be treated?  Yes. Most surgical site infections can be treated with antibiotics. The antibiotic given to you depends on the bacteria (germs) causing the infection. Sometimes patients with SSIs also need another surgery to treat the infection.  What are some of the things that hospitals are doing to prevent SSIs?  To prevent SSIs, doctors, nurses, and other healthcare providers:  · Clean their hands and arms up to their elbows with an antiseptic agent just before the surgery.  · Clean their hands with soap and water or an alcohol-based hand rub before and after caring for each patient.  · May remove some of your hair immediately before your surgery using electric clippers if the hair is in the same area where the procedure will occur. They should not shave you with a razor.  · Wear special hair covers, masks, gowns, and gloves during surgery to keep the surgery area clean.  · Give you antibiotics before your surgery starts. In most cases, you should get antibiotics within 60 minutes before the surgery starts and the antibiotics should be stopped within 24 hours after surgery.  · Clean the skin at the site of your surgery with a special soap that kills germs.  What can I do to help prevent SSIs?  Before your surgery:  · Tell your doctor about other medical problems you may have. Health problems such as allergies, diabetes, and obesity could affect your surgery and your treatment.  · Quit smoking. Patients who smoke get more infections. Talk to your doctor about how you can quit before your surgery.  · Do not shave near where you will have surgery.  Shaving with a razor can irritate your skin and make it easier to develop an infection.  At the time of your surgery:  · Speak up if someone tries to shave you with a razor before surgery. Ask why you need to be shaved and talk with your surgeon if you have any concerns.  · Ask if you will get antibiotics before surgery.  After your surgery:  · Make sure that your healthcare providers clean their hands before examining you, either with soap and water or an alcohol-based hand rub.  · If you do not see your providers clean their hands, please ask them to do so.  · Family and friends who visit you should not touch the surgical wound or dressings.  · Family and friends should clean their hands with soap and water or an alcohol-based hand rub before and after visiting you. If you do not see them clean their hands, ask them to clean their hands.  What do I need to do when I go home from the hospital?  · Before you go home, your doctor or nurse should explain everything you need to know about taking care of your wound. Make sure you understand how to care for your wound before you leave the hospital.  · Always clean your hands before and after caring for your wound.  · Before you go home, make sure you know who to contact if you have questions or problems after you get home.  · If you have any symptoms of an infection, such as redness and pain at the surgery site, drainage, or fever, call your doctor immediately.  If you have additional questions, please ask your doctor or nurse.  Developed and co-sponsored by The Society for Healthcare Epidemiology of [place] (SHEA); Infectious Diseases Society of [place] (IDSA); American Hospital Association; Association for Professionals in Infection Control and Epidemiology (APIC); Centers for Disease Control and Prevention (CDC); and The Joint Commission.     This information is not intended to replace advice given to you by your health care provider. Make sure you discuss any  questions you have with your health care provider.     Document Released: 12/23/2014 Document Revised: 01/08/2016 Document Reviewed: 03/02/2016  [employer] Interactive Patient Education ©2016 [employer]           [facility]  CHG 4% Patient Instruction Sheet    Chlorhexidine Before Surgery  Chlorhexidine gluconate (CHG) is a germ-killing (antiseptic) solution that is used to clean the skin. It gets rid of the bacteria that normally live on the skin. Cleaning your skin with CHG before surgery helps lower the risk for infection after surgery.    How to use CHG solution  · You will take 2 showers, one shower the night before surgery, the second shower the morning of surgery before coming to the hospital.  · Use CHG only as told by your health care provider, and follow the instructions on the label.  · Use CHG solution while taking a shower. Follow these steps when using CHG solution (unless your health care provider gives you different instructions):  1. Start the shower.  2. Use your normal soap and shampoo to wash your face and hair.  3. Turn off the shower or move out of the shower stream.  4. Pour the CHG onto a clean washcloth. Do not use any type of brush or rough-edged sponge.  5. Starting at your neck, lather your body down to your toes. Make sure you:  6. Pay special attention to the part of your body where you will be having surgery. Scrub this area for at least 1 minute.  7. Use the full amount of CHG as directed. Usually, this is one half bottle for each shower.  8. Do not use CHG on your head or face. If the solution gets into your ears or eyes, rinse them well with water.  9. Avoid your genital area.  10. Avoid any areas of skin that have broken skin, cuts, or scrapes.  11. Scrub your back and under your arms. Make sure to wash skin folds.  12. Let the lather sit on your skin for 1-2 minutes or as long as told by your health care  provider.  13. Thoroughly rinse your entire body in the shower.  Make sure that all body creases and crevices are rinsed well.  14. Dry off with a clean towel. Do not put any substances on your body afterward, such as powder, lotion, or perfume.  15. Put on clean clothes or pajamas.  16. If it is the night before your surgery, sleep in clean sheets.    What are the risks?  Risks of using CHG include:  · A skin reaction.  · Hearing loss, if CHG gets in your ears.  · Eye injury, if CHG gets in your eyes and is not rinsed out.  · The CHG product catching fire.  Make sure that you avoid smoking and flames after applying CHG to your skin.  Do not use CHG:  · If you have a chlorhexidine allergy or have previously reacted to chlorhexidine.  · On babies younger than 2 months of age.      On the day of surgery, when you are taken to your room in Outpatient Surgery you will be given a CHG prepackaged cloth to wipe the site for your surgery.  How to use CHG prepackaged cloths  · Follow the instructions on the label.  · Use the CHG cloth on clean, dry skin. Follow these steps when using a CHG cloth (unless your health care provider gives you different instructions):  1. Using the CHG cloth, vigorously scrub the part of your body where you will be having surgery. Scrub using a back-and-forth motion for 3 minutes. The area on your body should be completely wet with CHG when you are finished scrubbing.  2. Do not rinse. Discard the cloth and let the area air-dry for 1 minute. Do not put any substances on your body afterward, such as powder, lotion, or perfume.  Contact a health care provider if:  · Your skin gets irritated after scrubbing.  · You have questions about using your solution or cloth.  Get help right away if:  · Your eyes become very red or swollen.  · Your eyes itch badly.  · Your skin itches badly and is red or swollen.  · Your hearing changes.  · You have trouble seeing.  · You have swelling or tingling in your mouth or throat.  · You have trouble breathing.  · You swallow any  chlorhexidine.  Summary  · Chlorhexidine gluconate (CHG) is a germ-killing (antiseptic) solution that is used to clean the skin. Cleaning your skin with CHG before surgery helps lower the risk for infection after surgery.  · You may be given CHG to use at home. It may be in a bottle or in a prepackaged cloth to use on your skin. Carefully follow your health care provider's instructions and the instructions on the product label.  · Do not use CHG if you have a chlorhexidine allergy.  · Contact your health care provider if your skin gets irritated after scrubbing.  This information is not intended to replace advice given to you by your health care provider. Make sure you discuss any questions you have with your health care provider.  Document Released: 09/11/2013 Document Revised: 11/15/2018 Document Reviewed: 11/15/2018  [employer] Interactive Patient Education © 2019 [employer]          PATIENT/FAMILY/RESPONSIBLE PARTY VERBALIZES UNDERSTANDING OF ABOVE EDUCATION.  COPY OF PAIN SCALE GIVEN AND REVIEWED WITH VERBALIZED UNDERSTANDING.

## 2020-01-08 ENCOUNTER — TELEPHONE (OUTPATIENT)
Dept: VASCULAR SURGERY | Facility: CLINIC | Age: 50
End: 2020-01-08

## 2020-01-08 ENCOUNTER — TELEPHONE (OUTPATIENT)
Dept: GASTROENTEROLOGY | Age: 50
End: 2020-01-08

## 2020-01-08 ENCOUNTER — ANESTHESIA (OUTPATIENT)
Dept: ENDOSCOPY | Age: 50
End: 2020-01-08
Payer: COMMERCIAL

## 2020-01-08 ENCOUNTER — HOSPITAL ENCOUNTER (OUTPATIENT)
Age: 50
Setting detail: OUTPATIENT SURGERY
Discharge: HOME | End: 2020-01-08
Attending: INTERNAL MEDICINE | Admitting: INTERNAL MEDICINE
Payer: COMMERCIAL

## 2020-01-08 ENCOUNTER — HOSPITAL ENCOUNTER (EMERGENCY)
Age: 50
Discharge: HOSPICE | End: 2020-01-08
Payer: COMMERCIAL

## 2020-01-08 ENCOUNTER — ANESTHESIA EVENT (OUTPATIENT)
Dept: ENDOSCOPY | Age: 50
End: 2020-01-08
Payer: COMMERCIAL

## 2020-01-08 VITALS
OXYGEN SATURATION: 97 % | DIASTOLIC BLOOD PRESSURE: 56 MMHG | RESPIRATION RATE: 18 BRPM | SYSTOLIC BLOOD PRESSURE: 108 MMHG

## 2020-01-08 VITALS
DIASTOLIC BLOOD PRESSURE: 80 MMHG | HEART RATE: 79 BPM | TEMPERATURE: 97.7 F | WEIGHT: 272 LBS | OXYGEN SATURATION: 95 % | BODY MASS INDEX: 48.18 KG/M2 | RESPIRATION RATE: 18 BRPM | SYSTOLIC BLOOD PRESSURE: 136 MMHG

## 2020-01-08 VITALS
OXYGEN SATURATION: 100 % | BODY MASS INDEX: 48.2 KG/M2 | TEMPERATURE: 98.1 F | DIASTOLIC BLOOD PRESSURE: 65 MMHG | HEART RATE: 69 BPM | RESPIRATION RATE: 18 BRPM | WEIGHT: 272 LBS | SYSTOLIC BLOOD PRESSURE: 119 MMHG | HEIGHT: 63 IN

## 2020-01-08 LAB — HCG(URINE) PREGNANCY TEST: NEGATIVE

## 2020-01-08 PROCEDURE — 45385 COLONOSCOPY W/LESION REMOVAL: CPT | Performed by: INTERNAL MEDICINE

## 2020-01-08 PROCEDURE — 3609010600 HC COLONOSCOPY POLYPECTOMY SNARE/COLD BIOPSY: Performed by: INTERNAL MEDICINE

## 2020-01-08 PROCEDURE — 99282 EMERGENCY DEPT VISIT SF MDM: CPT

## 2020-01-08 PROCEDURE — 2709999900 HC NON-CHARGEABLE SUPPLY: Performed by: INTERNAL MEDICINE

## 2020-01-08 PROCEDURE — 2580000003 HC RX 258: Performed by: INTERNAL MEDICINE

## 2020-01-08 PROCEDURE — 2500000003 HC RX 250 WO HCPCS: Performed by: NURSE ANESTHETIST, CERTIFIED REGISTERED

## 2020-01-08 PROCEDURE — 7100000010 HC PHASE II RECOVERY - FIRST 15 MIN: Performed by: INTERNAL MEDICINE

## 2020-01-08 PROCEDURE — 7100000011 HC PHASE II RECOVERY - ADDTL 15 MIN: Performed by: INTERNAL MEDICINE

## 2020-01-08 PROCEDURE — 84703 CHORIONIC GONADOTROPIN ASSAY: CPT

## 2020-01-08 PROCEDURE — 3700000001 HC ADD 15 MINUTES (ANESTHESIA): Performed by: INTERNAL MEDICINE

## 2020-01-08 PROCEDURE — 6360000002 HC RX W HCPCS: Performed by: NURSE ANESTHETIST, CERTIFIED REGISTERED

## 2020-01-08 PROCEDURE — 3700000000 HC ANESTHESIA ATTENDED CARE: Performed by: INTERNAL MEDICINE

## 2020-01-08 PROCEDURE — 88305 TISSUE EXAM BY PATHOLOGIST: CPT

## 2020-01-08 RX ORDER — LIDOCAINE HYDROCHLORIDE 10 MG/ML
INJECTION, SOLUTION INFILTRATION; PERINEURAL PRN
Status: DISCONTINUED | OUTPATIENT
Start: 2020-01-08 | End: 2020-01-08 | Stop reason: SDUPTHER

## 2020-01-08 RX ORDER — METOCLOPRAMIDE HYDROCHLORIDE 5 MG/ML
10 INJECTION INTRAMUSCULAR; INTRAVENOUS
Status: DISCONTINUED | OUTPATIENT
Start: 2020-01-08 | End: 2020-01-08 | Stop reason: HOSPADM

## 2020-01-08 RX ORDER — ONDANSETRON 4 MG/1
4 TABLET, ORALLY DISINTEGRATING ORAL ONCE
Status: DISCONTINUED | OUTPATIENT
Start: 2020-01-08 | End: 2020-01-08 | Stop reason: HOSPADM

## 2020-01-08 RX ORDER — DEXAMETHASONE SODIUM PHOSPHATE 4 MG/ML
4 INJECTION, SOLUTION INTRA-ARTICULAR; INTRALESIONAL; INTRAMUSCULAR; INTRAVENOUS; SOFT TISSUE ONCE
Status: DISCONTINUED | OUTPATIENT
Start: 2020-01-08 | End: 2020-01-08 | Stop reason: HOSPADM

## 2020-01-08 RX ORDER — DIPHENHYDRAMINE HYDROCHLORIDE 50 MG/ML
12.5 INJECTION INTRAMUSCULAR; INTRAVENOUS
Status: DISCONTINUED | OUTPATIENT
Start: 2020-01-08 | End: 2020-01-08 | Stop reason: HOSPADM

## 2020-01-08 RX ORDER — ENALAPRILAT 2.5 MG/2ML
1.25 INJECTION INTRAVENOUS
Status: DISCONTINUED | OUTPATIENT
Start: 2020-01-08 | End: 2020-01-08 | Stop reason: HOSPADM

## 2020-01-08 RX ORDER — MEPERIDINE HYDROCHLORIDE 50 MG/ML
12.5 INJECTION INTRAMUSCULAR; INTRAVENOUS; SUBCUTANEOUS EVERY 5 MIN PRN
Status: DISCONTINUED | OUTPATIENT
Start: 2020-01-08 | End: 2020-01-08 | Stop reason: HOSPADM

## 2020-01-08 RX ORDER — LABETALOL 20 MG/4 ML (5 MG/ML) INTRAVENOUS SYRINGE
5 EVERY 10 MIN PRN
Status: DISCONTINUED | OUTPATIENT
Start: 2020-01-08 | End: 2020-01-08 | Stop reason: HOSPADM

## 2020-01-08 RX ORDER — PROPOFOL 10 MG/ML
INJECTION, EMULSION INTRAVENOUS PRN
Status: DISCONTINUED | OUTPATIENT
Start: 2020-01-08 | End: 2020-01-08 | Stop reason: SDUPTHER

## 2020-01-08 RX ORDER — HYDRALAZINE HYDROCHLORIDE 20 MG/ML
5 INJECTION INTRAMUSCULAR; INTRAVENOUS EVERY 10 MIN PRN
Status: DISCONTINUED | OUTPATIENT
Start: 2020-01-08 | End: 2020-01-08 | Stop reason: HOSPADM

## 2020-01-08 RX ORDER — PROMETHAZINE HYDROCHLORIDE 25 MG/ML
6.25 INJECTION, SOLUTION INTRAMUSCULAR; INTRAVENOUS
Status: DISCONTINUED | OUTPATIENT
Start: 2020-01-08 | End: 2020-01-08 | Stop reason: HOSPADM

## 2020-01-08 RX ORDER — SODIUM CHLORIDE, SODIUM LACTATE, POTASSIUM CHLORIDE, CALCIUM CHLORIDE 600; 310; 30; 20 MG/100ML; MG/100ML; MG/100ML; MG/100ML
INJECTION, SOLUTION INTRAVENOUS CONTINUOUS
Status: DISCONTINUED | OUTPATIENT
Start: 2020-01-08 | End: 2020-01-08 | Stop reason: HOSPADM

## 2020-01-08 RX ORDER — PREDNISONE 10 MG/1
10 TABLET ORAL DAILY
Qty: 10 TABLET | Refills: 0 | Status: SHIPPED | OUTPATIENT
Start: 2020-01-08 | End: 2020-01-18

## 2020-01-08 RX ADMIN — PROPOFOL 100 MG: 10 INJECTION, EMULSION INTRAVENOUS at 08:50

## 2020-01-08 RX ADMIN — PROPOFOL 100 MG: 10 INJECTION, EMULSION INTRAVENOUS at 08:57

## 2020-01-08 RX ADMIN — SODIUM CHLORIDE, SODIUM LACTATE, POTASSIUM CHLORIDE, AND CALCIUM CHLORIDE: 600; 310; 30; 20 INJECTION, SOLUTION INTRAVENOUS at 08:43

## 2020-01-08 RX ADMIN — SODIUM CHLORIDE, SODIUM LACTATE, POTASSIUM CHLORIDE, AND CALCIUM CHLORIDE: 600; 310; 30; 20 INJECTION, SOLUTION INTRAVENOUS at 08:15

## 2020-01-08 RX ADMIN — PROPOFOL 150 MG: 10 INJECTION, EMULSION INTRAVENOUS at 08:46

## 2020-01-08 RX ADMIN — PROPOFOL 100 MG: 10 INJECTION, EMULSION INTRAVENOUS at 08:55

## 2020-01-08 RX ADMIN — PROPOFOL 100 MG: 10 INJECTION, EMULSION INTRAVENOUS at 08:52

## 2020-01-08 RX ADMIN — PROPOFOL 50 MG: 10 INJECTION, EMULSION INTRAVENOUS at 08:49

## 2020-01-08 RX ADMIN — LIDOCAINE HYDROCHLORIDE 50 MG: 10 INJECTION, SOLUTION INFILTRATION; PERINEURAL at 08:46

## 2020-01-08 ASSESSMENT — PAIN DESCRIPTION - DESCRIPTORS: DESCRIPTORS: STABBING;SHARP

## 2020-01-08 ASSESSMENT — PAIN - FUNCTIONAL ASSESSMENT: PAIN_FUNCTIONAL_ASSESSMENT: 0-10

## 2020-01-08 NOTE — TELEPHONE ENCOUNTER
Left message reminding Mrs Echevarria of her appointment for Thursday, January 9th, 2020 at 130 pm with Dr Villalta. Also advised if she had any questions or needed to reschedule to please call the office at [phone].

## 2020-01-08 NOTE — ANESTHESIA POSTPROCEDURE EVALUATION
Department of Anesthesiology  Postprocedure Note    Patient: Aisha Carty  MRN: [id number]  YOB: 1970  Date of evaluation: 1/8/2020  Time:  9:08 AM     Procedure Summary     Date:  01/08/20 Room / Location:  [address]    Anesthesia Start:  0666 Anesthesia Stop:      Procedure:  COLONOSCOPY POLYPECTOMY SNARE/COLD BIOPSY (N/A ) Diagnosis:  (RB)    Surgeon:  Jerrica Triplett MD Responsible Provider:  ERMI[facility] CRNA    Anesthesia Type:  general ASA Status:  3          Anesthesia Type: general    Landon Phase I: Landon Score: 10    Landon Phase II:      Last vitals: Reviewed and per EMR flowsheets.        Anesthesia Post Evaluation    Patient location during evaluation: PACU  Patient participation: complete - patient participated  Level of consciousness: sleepy but conscious  Pain score: 0  Airway patency: patent  Nausea & Vomiting: no vomiting and no nausea  Complications: no  Cardiovascular status: blood pressure returned to baseline and hemodynamically stable  Respiratory status: acceptable and nasal airway  Hydration status: stable

## 2020-01-08 NOTE — H&P
Patient Name: Juan C Lomeli  : 1970  MRN: [id number]  DATE: 20    Allergies: Allergies   Allergen Reactions    Cethexonium      \"eye drops\"    Ciprofloxacin     Penicillins         ENDOSCOPY  History and Physical    Procedure:    [x] Diagnostic Colonoscopy       [] Screening Colonoscopy  [] EGD      [] ERCP      [] EUS       [] Other    [x] Previous office notes/History and Physical reviewed from the patients chart. Please see EMR for further details of HPI. I have examined the patient's status immediately prior to the procedure and:      Indications/HPI:    []Abdominal Pain   []Cancer- GI/Lung     []Fhx of colon CA/polyps  []History of Polyps  []Barretts            []Melena  []Abnormal Imaging              []Dysphagia              []Persistent Pneumonia   []Anemia                            []Food Impaction        []History of Polyps  [] GI Bleed             []Pulmonary nodule/Mass   []Change in bowel habits []Heartburn/Reflux  [x]Rectal Bleed (BRBPR)  []Chest Pain - Non Cardiac []Heme (+) Stool []Ulcers  [x]Constipation  []Hemoptysis  []Varices  []Diarrhea  []Hypoxemia    []Nausea/Vomiting   []Screening   []Crohns/Colitis  []Other:     Anesthesia:   [x] MAC [] Moderate Sedation   [] General   [] None     ROS: 12 pt Review of Symptoms was negative unless mentioned above    Medications:   Prior to Admission medications    Medication Sig Start Date End Date Taking? Authorizing Provider   HYDROcodone-acetaminophen (NORCO) 7.5-325 MG per tablet Take 1 tablet by mouth every 6 hours as needed for Pain.    Yes Historical Provider, MD   DULoxetine (CYMBALTA) 60 MG extended release capsule Take 60 mg by mouth daily   Yes Historical Provider, MD   lisinopril-hydrochlorothiazide (PRINZIDE;ZESTORETIC) 20-12.5 MG per tablet Take 1 tablet by mouth daily   Yes Historical Provider, MD   tiZANidine (ZANAFLEX) 4 MG tablet Take 4 mg by mouth nightly Indications: 1/4 tab at breakfast and lunch, 1/2 at supper, 1 tab at bedtime    Yes Historical Provider, MD   metoprolol succinate (TOPROL XL) 25 MG extended release tablet Take 1 tablet by mouth nightly 19  Yes Margaret Tom MD   gabapentin (NEURONTIN) 600 MG tablet Take 600 mg by mouth 4 times daily. Yes Historical Provider, MD   diclofenac (VOLTAREN) 50 MG EC tablet Take 50 mg by mouth 2 times daily 18  Yes Historical Provider, MD   ezetimibe-simvastatin (VYTORIN) 10-40 MG per tablet Take 1 tablet by mouth daily  3/29/18  Yes Historical Provider, MD   Adalimumab (HUMIRA) 40 MG/0.4ML PSKT Inject into the skin Pt stopped Humira because of her upcomming surgery. Will get back on after. Historical Provider, MD       Past Medical History:  Past Medical History:   Diagnosis Date    BiPAP (biphasic positive airway pressure) dependence     18cm/14cm    Hyperlipidemia     Hypertension     Obstructive sleep apnea     AHI: 106. 6 per PSG, 2018    Osteoarthritis     Psoriasis        Past Surgical History:  Past Surgical History:   Procedure Laterality Date     SECTION      CHOLECYSTECTOMY      EPIDURAL STEROID INJECTION N/A 2019    LUMBAR EPIDURAL STEROID INJECTION L4-5 performed by Eulalio Joy at . [place] [address] N/A 2019    LUMBAR EPIDURAL STEROID INJECTION L5-S1 performed by Eulalio Joy at [address] [place] [address] OF [place]. INJECTION PROCEDURE FOR SACROILIAC JOINT Right 2019    SACROILIAC JOINT INJECTION performed by Te Chaudhary at [address] [facility] N/A 2019    LUMBAR INTER LAMINAR KACEY L4-5 performed by Eulalio Jyo at [facility] Bilateral 2019    LUMBAR FACET BILATERAL L4-5 L5-S1 performed by Eulalio Joy at [facility] N/A 2019    LUMBAR INTER LAMINAR KACEY L4-5 performed by Eulalio Joy at [address] [facility] SI JOINT ARTHRGRPHY&/ANES/STEROID W/IMAGE Right 10/9/2018    SACROILIAC JOINT INJECTION performed by Te Chaudhary at [facility]  [place] NJX DX/THER SBST INTRLMNR LMBR/SAC W/IMG GDN N/A 2018    LUMBAR INTER LAMINAR KACEY L4-5 performed by Eulalio Joy at [facility]       Social History:  Social History     Tobacco Use    Smoking status: Former Smoker     Types: Cigarettes     Last attempt to quit:      Years since quittin.0    Smokeless tobacco: Never Used   Substance Use Topics    Alcohol use: No    Drug use: No       Vital Signs:   Vitals:    20 0753   BP: (!) 157/87   Pulse: 79   Resp: 16   Temp: 98.1 °F (36.7 °C)   SpO2: 92%        Physical Exam:  Cardiac:  [x]WNL  []Comments:  Pulmonary:  [x]WNL   []Comments:  Neuro/Mental Status:  [x]WNL  []Comments:  Abdominal:  [x]WNL    []Comments:  Other:   []WNL  []Comments:    Informed Consent:  The risks and benefits of the procedure have been discussed with either the patient or if they cannot consent, their representative. Assessment:  Patient examined and appropriate for planned sedation and procedure. Plan:  Proceed with planned sedation and procedure as above.          Isael Flower MD

## 2020-01-08 NOTE — ANESTHESIA PRE PROCEDURE
Department of Anesthesiology  Preprocedure Note       Name:  Karine Oneill   Age:  47 y.o.  :  1970                                          MRN:  [id number]         Date:  2020      Surgeon: Brenda Madera):  Francis Marmolejo MD    Procedure: COLONOSCOPY DIAGNOSTIC (N/A )    Medications prior to admission:   Prior to Admission medications    Medication Sig Start Date End Date Taking? Authorizing Provider   HYDROcodone-acetaminophen (NORCO) 7.5-325 MG per tablet Take 1 tablet by mouth every 6 hours as needed for Pain. Yes Historical Provider, MD   DULoxetine (CYMBALTA) 60 MG extended release capsule Take 60 mg by mouth daily   Yes Historical Provider, MD   lisinopril-hydrochlorothiazide (PRINZIDE;ZESTORETIC) 20-12.5 MG per tablet Take 1 tablet by mouth daily   Yes Historical Provider, MD   tiZANidine (ZANAFLEX) 4 MG tablet Take 4 mg by mouth nightly Indications: 1/4 tab at breakfast and lunch, 1/2 at supper, 1 tab at bedtime    Yes Historical Provider, MD   metoprolol succinate (TOPROL XL) 25 MG extended release tablet Take 1 tablet by mouth nightly 19  Yes Kellen Fierro MD   gabapentin (NEURONTIN) 600 MG tablet Take 600 mg by mouth 4 times daily. Yes Historical Provider, MD   diclofenac (VOLTAREN) 50 MG EC tablet Take 50 mg by mouth 2 times daily 18  Yes Historical Provider, MD   ezetimibe-simvastatin (VYTORIN) 10-40 MG per tablet Take 1 tablet by mouth daily  3/29/18  Yes Historical Provider, MD   Adalimumab (HUMIRA) 40 MG/0.4ML PSKT Inject into the skin Pt stopped Humira because of her upcomming surgery. Will get back on after. Historical Provider, MD       Current medications:    Current Facility-Administered Medications   Medication Dose Route Frequency Provider Last Rate Last Dose    lactated ringers infusion   Intravenous Continuous Francis Marmolejo  mL/hr at 20 0815         Allergies:     Allergies   Allergen Reactions    Cethexonium      \"eye drops\"    Ciprofloxacin     Penicillins        Problem List:    Patient Active Problem List   Diagnosis Code    Obstructive sleep apnea G47.33    Somnolence, daytime R40.0    Snoring R06.83    Witnessed apneic spells R06.81    Sleep disturbance G47.9    Morbid obesity with BMI of 50.0-59.9, adult (HCC) E66.01, Z68.43    BiPAP (biphasic positive airway pressure) dependence Z99.89    Essential hypertension I10    Tachycardia R00.0       Past Medical History:        Diagnosis Date    BiPAP (biphasic positive airway pressure) dependence     18cm/14cm    Hyperlipidemia     Hypertension     Obstructive sleep apnea     AHI: 106. 6 per PSG, 2018    Osteoarthritis     Psoriasis        Past Surgical History:        Procedure Laterality Date     SECTION      CHOLECYSTECTOMY      EPIDURAL STEROID INJECTION N/A 2019    LUMBAR EPIDURAL STEROID INJECTION L4-5 performed by Eulalio Joy at [place] [address] N/A 2019    LUMBAR EPIDURAL STEROID INJECTION L5-S1 performed by Eulalio Joy at [address] [place] [address] OF [facility]. INJECTION PROCEDURE FOR SACROILIAC JOINT Right 2019    SACROILIAC JOINT INJECTION performed by [facility] at [address] N/A 2019    LUMBAR INTER LAMINAR KACEY L4-5 performed by Eulalio Joy at [facility] Bilateral 2019    LUMBAR FACET BILATERAL L4-5 L5-S1 performed by Eulalio Joy at [facility] N/A 2019    LUMBAR INTER LAMINAR KACEY L4-5 performed by Eulalio Joy at [address] [facility] SI JOINT ARTHRGRPHY&/ANES/STEROID W/IMAGE Right 10/9/2018    SACROILIAC JOINT INJECTION performed by [facility] at [address] DX/THER SBST INTRLMNR LMBR/SAC W/IMG GDN N/A 2018    LUMBAR INTER LAMINAR KACEY L4-5 performed by [facility] at [facility]       Social History:    Social History     Tobacco Use    Smoking status: Former Smoker     Types: Cigarettes     Last attempt to quit:  Years since quittin.0    Smokeless tobacco: Never Used   Substance Use Topics    Alcohol use: No                                Counseling given: Not Answered      Vital Signs (Current):   Vitals:    20 0753   BP: (!) 157/87   Pulse: 79   Resp: 16   Temp: 98.1 °F (36.7 °C)   TempSrc: Temporal   SpO2: 92%   Weight: 272 lb (123.4 kg)   Height: 5' 3\" (1.6 m)                                              BP Readings from Last 3 Encounters:   20 (!) 157/87   19 120/60   19 (!) 111/57       NPO Status: Time of last liquid consumption: 0400                        Time of last solid consumption: 1300                        Date of last liquid consumption: 20                        Date of last solid food consumption: 20    BMI:   Wt Readings from Last 3 Encounters:   20 272 lb (123.4 kg)   19 270 lb (122.5 kg)   10/23/19 272 lb (123.4 kg)     Body mass index is 48.18 kg/m². CBC: No results found for: WBC, RBC, HGB, HCT, MCV, RDW, PLT    CMP: No results found for: NA, K, CL, CO2, BUN, CREATININE, GFRAA, AGRATIO, LABGLOM, GLUCOSE, PROT, CALCIUM, BILITOT, ALKPHOS, AST, ALT    POC Tests: No results for input(s): POCGLU, POCNA, POCK, POCCL, POCBUN, POCHEMO, POCHCT in the last 72 hours. Coags: No results found for: PROTIME, INR, APTT    HCG (If Applicable):   Lab Results   Component Value Date    PREGTESTUR Negative 2020        ABGs: No results found for: PHART, PO2ART, NSP3HNX, EDR5ODB, BEART, P3SWJBPS     Type & Screen (If Applicable):  No results found for: LABABO, [address]    Anesthesia Evaluation  Patient summary reviewed and Nursing notes reviewed  Airway: Mallampati: III  TM distance: <3 FB     Mouth opening: > = 3 FB Dental: normal exam         Pulmonary: breath sounds clear to auscultation            Patient did not smoke on day of surgery.                  Cardiovascular:            Rhythm: regular  Rate: normal           Beta Blocker:  Dose within 24

## 2020-01-08 NOTE — OP NOTE
Patient: Chris Solares: 1970  Med Rec#: [id number] Acc#: [id number]   Primary Care Provider Refugio Oneill MD    Date of Procedure:  1/8/2020    Endoscopist: Dayanara Lockhart MD    Referring Provider: Refugio Oneill MD,     Operation Performed: Colonoscopy with snare polypectomy    Indications: screening    Anesthesia:  Sedation was administered by anesthesia who monitored the patient during the procedure. I met with Raisa Gibson prior to procedure. We discussed the procedure itself, and I have discussed the risks of endoscopy (including-- but not limited to-- pain, discomfort, bleeding potentially requiring second endoscopic procedure and/or blood transfusion, organ perforation requiring operative repair, damage to organs near the colon, infection, aspiration, cardiopulmonary/allergic reaction), benefits, indications to endoscopy. Additionally, we discussed options other than colonoscopy. The patient expressed understanding. All questions answered. The patient decided to proceed with the procedure. Signed informed consent was placed on the chart. Blood Loss: minimal    Withdrawal time: > 6 minutes  Bowel Prep: adequate     Complications: no immediate complications    DESCRIPTION OF PROCEDURE:     A time out was performed. After written informed consent was obtained, the patient was placed in the left lateral position. The perianal area was inspected, and a digital rectal exam was performed. A rectal exam was performed: normal tone, no palpable lesions. At this point, a forward viewing Olympus colonoscope was inserted into the anus and carefully advanced to the cecum. The cecum was identified by the ileocecal valve and the appendiceal orifice. The colonoscope was then slowly withdrawn with careful inspection of the mucosa in a linear and circumferential fashion. The scope was retroflexed in the rectum.  Suction was utilized during the procedure to remove as much air as possible from the bowel. The colonoscope was removed from the patient, and the procedure was terminated. Findings are listed below. Findings: The mucosa appeared normal throughout the entire examined colon   In the sigmoid colon, two small sessile polyps seen and removed with hot snare polypectomy  Retroflexion in the rectum was normal and revealed no further abnormalities         Recommendations:  1. Repeat colonoscopy: pending pathology - max of 5 yrs for screening  2. Await biopsy results-you will receive a letter with your results within 7-10 days    Findings and recommendations were discussed w/ the patient. A copy of the images was provided.     Jennifer Suero MD  1/8/2020  9:04 AM

## 2020-01-09 ENCOUNTER — OFFICE VISIT (OUTPATIENT)
Dept: VASCULAR SURGERY | Facility: CLINIC | Age: 50
End: 2020-01-09

## 2020-01-09 VITALS
SYSTOLIC BLOOD PRESSURE: 140 MMHG | DIASTOLIC BLOOD PRESSURE: 90 MMHG | OXYGEN SATURATION: 97 % | HEART RATE: 81 BPM | HEIGHT: 63 IN | BODY MASS INDEX: 47.7 KG/M2 | WEIGHT: 269.2 LBS

## 2020-01-09 DIAGNOSIS — E78.5 HYPERLIPIDEMIA, UNSPECIFIED HYPERLIPIDEMIA TYPE: ICD-10-CM

## 2020-01-09 DIAGNOSIS — I10 ESSENTIAL HYPERTENSION: ICD-10-CM

## 2020-01-09 DIAGNOSIS — M54.41 CHRONIC MIDLINE LOW BACK PAIN WITH BILATERAL SCIATICA: Primary | ICD-10-CM

## 2020-01-09 DIAGNOSIS — M54.42 CHRONIC MIDLINE LOW BACK PAIN WITH BILATERAL SCIATICA: Primary | ICD-10-CM

## 2020-01-09 DIAGNOSIS — G89.29 CHRONIC MIDLINE LOW BACK PAIN WITH BILATERAL SCIATICA: Primary | ICD-10-CM

## 2020-01-09 PROCEDURE — 99204 OFFICE O/P NEW MOD 45 MIN: CPT | Performed by: SURGERY

## 2020-01-09 RX ORDER — PREDNISONE 10 MG/1
10 TABLET ORAL TAKE AS DIRECTED
COMMUNITY
Start: 2020-01-08 | End: 2020-01-18

## 2020-01-09 ASSESSMENT — ENCOUNTER SYMPTOMS
COLOR CHANGE: 0
ABDOMINAL DISTENTION: 0
BACK PAIN: 1
SHORTNESS OF BREATH: 0
APNEA: 0
NAUSEA: 0
SORE THROAT: 0
EYE DISCHARGE: 0
RHINORRHEA: 0
EYE PAIN: 0
ABDOMINAL PAIN: 0
COUGH: 0
PHOTOPHOBIA: 0

## 2020-01-09 NOTE — ED PROVIDER NOTES
change and rash. Neurological: Negative for dizziness, syncope, facial asymmetry and headaches. Hematological: Negative for adenopathy. Does not bruise/bleed easily. Psychiatric/Behavioral: Negative for agitation, behavioral problems and confusion. Except as noted above the remainder of the review of systems was reviewed and negative. PAST MEDICAL HISTORY     Past Medical History:   Diagnosis Date    BiPAP (biphasic positive airway pressure) dependence     18cm/14cm    Hyperlipidemia     Hypertension     Obstructive sleep apnea     AHI: 106. 6 per PSG, 2018    Osteoarthritis     Psoriasis          SURGICAL HISTORY       Past Surgical History:   Procedure Laterality Date     SECTION      CHOLECYSTECTOMY      COLONOSCOPY N/A 2020    Dr Shoaib Luis, 5 yr recall    EPIDURAL STEROID INJECTION N/A 2019    LUMBAR EPIDURAL STEROID INJECTION L4-5 [facility]    LUMBAR EPIDURAL STEROID INJECTION L5-S1 performed by Eulalio Joy at [address] [place] [address] [facility]. INJECTION PROCEDURE FOR SACROILIAC JOINT Right 2019    SACROILIAC JOINT INJECTION performed by Stanislaw Mena at [address] N/A 2019    LUMBAR INTER LAMINAR KACEY L4-5 performed by Eulalio Joy at [facility] Bilateral 2019    LUMBAR FACET BILATERAL L4-5 L5-S1 performed by Eulalio Joy at [facility] N/A 2019    LUMBAR INTER LAMINAR KACEY L4-5 performed by Eulalio Joy at [address] [facility] SI JOINT ARTHRGRPHY&/ANES/STEROID W/IMAGE Right 10/9/2018    SACROILIAC JOINT INJECTION performed by Anneliese Joy at [address] DX/THER SBST INTRLMNR LMBR/SAC W/IMG GDN N/A 2018    LUMBAR INTER LAMINAR KACEY L4-5 performed by Stanislaw Mena at [address]       Discharge Medication List as of 2020  4:31 PM      CONTINUE these medications use: No    Sexual activity: None   Lifestyle    Physical activity:     Days per week: None     Minutes per session: None    Stress: None   Relationships    Social connections:     Talks on phone: None     Gets together: None     Attends [employer] service: None     Active member of club or organization: None     Attends meetings of clubs or organizations: None     Relationship status: None    Intimate partner violence:     Fear of current or ex partner: None     Emotionally abused: None     Physically abused: None     Forced sexual activity: None   Other Topics Concern    None   Social History Narrative    None       SCREENINGS           PHYSICAL EXAM    (up to 7 forlevel 4, 8 or more for level 5)     ED Triage Vitals [01/08/20 1343]   BP Temp Temp src Pulse Resp SpO2 Height Weight   136/80 97.7 °F (36.5 °C) -- 79 18 95 % -- 272 lb (123.4 kg)       Physical Exam  Vitals signs and nursing note reviewed. Constitutional:       General: She is not in acute distress. Appearance: She is well-developed. She is obese. She is not diaphoretic. HENT:      Head: Normocephalic and atraumatic. Right Ear: Tympanic membrane, ear canal and external ear normal.      Left Ear: Tympanic membrane, ear canal and external ear normal.      Nose: Nose normal.      Mouth/Throat:      Mouth: Mucous membranes are moist.      Pharynx: Oropharynx is clear. No oropharyngeal exudate. Eyes:      General:         Right eye: No discharge. Left eye: No discharge. Pupils: Pupils are equal, round, and reactive to light. Neck:      Musculoskeletal: Normal range of motion and neck supple. Thyroid: No thyromegaly. Cardiovascular:      Rate and Rhythm: Normal rate and regular rhythm. Pulses: Normal pulses. Heart sounds: Normal heart sounds. No murmur. No friction rub. Pulmonary:      Effort: Pulmonary effort is normal. No respiratory distress. Breath sounds: Normal breath sounds. No stridor.  No send her home with steroids. He is more than happy to get Dr. surgery to write her a prescription for adjusted dosage of her baseline Norco if she want to come by their office. The plan will be for discharge once we treat her pain here. She is ambulatory without complaints and we will discharge her. PROCEDURES:    Procedures      FINAL IMPRESSION      1. Acute exacerbation of chronic low back pain          DISPOSITION/PLAN   DISPOSITION Decision To Discharge 01/08/2020 08:14:23 PM      PATIENT REFERRED TO:  No follow-up provider specified.     DISCHARGE MEDICATIONS:  Discharge Medication List as of 1/8/2020  4:31 PM      START taking these medications    Details   predniSONE (DELTASONE) 10 MG tablet Take 1 tablet by mouth daily for 10 days, Disp-10 tablet, R-0Print             (Please note that portions of this note were completed with a voice recognition program.  Efforts were made to edit the dictations but occasionallywords are mis-transcribed.)    Jessica Saenz [address] [place] [address]  01/09/20 0232

## 2020-01-09 NOTE — PROGRESS NOTES
2020      PAMELA Bang MD  [address] [facility] [place] [address]    Ayla Echevarria  1970    Chief Complaint   Patient presents with   • Pre-op Exam     ALIF with Dr Bang on Friday, 2020. Patient denies any stroke like symptoms.        Dear PAMELA Bang MD:      HPI  I had the pleasure of seeing your patient Ayla Echevarria in the office today.  Thank you kindly for this consultation.  As you recall, Ayla Echevarria is a 49 y.o.  female who you are currently following for chronic back pain.  Ayla Echevarriais scheduled for anterior lumbar interbody fusion of L5-S1 with Dr. Bang on 2020.  She has tried physical therapy and antiinflammatories without relief.  Symptoms have worsened over the past couple years.  The patient denies any history of DVT.  She has a history of  section x2 and cholecystectomy.    Past Medical History:   Diagnosis Date   • Hyperlipidemia    • Hypertension    • Nerve pain    • Psoriasis    • Sleep apnea        Past Surgical History:   Procedure Laterality Date   •  SECTION      x2   • CHOLECYSTECTOMY         History reviewed. No pertinent family history.    Social History     Socioeconomic History   • Marital status:      Spouse name: Not on file   • Number of children: Not on file   • Years of education: Not on file   • Highest education level: Not on file   Tobacco Use   • Smoking status: Former Smoker   • Smokeless tobacco: Never Used   • Tobacco comment: quit 5 years ago total of 20 years smoking   Substance and Sexual Activity   • Alcohol use: Never     Frequency: Never   • Drug use: Never       Allergies   Allergen Reactions   • Ciprofibrate Swelling     Throat swelling     • Penicillins Swelling     Throat swelling   • Cethexonium Unknown - Low Severity     Patient stated eye burning         Current Outpatient Medications:   •  DICLOFENAC PO, Take 100 mg by mouth Daily., Disp: , Rfl:   •  DULoxetine (CYMBALTA) 60  "MG capsule, Take 60 mg by mouth Daily., Disp: , Rfl:   •  ezetimibe 10 MG tablet 10 mg, simvastatin 40 MG tablet 40 mg, Take 1 tablet by mouth Every Night., Disp: , Rfl:   •  gabapentin (NEURONTIN) 600 MG tablet, Take 600 mg by mouth 4 (Four) Times a Day., Disp: , Rfl:   •  HYDROcodone-acetaminophen (NORCO) 7.5-325 MG per tablet, Take 1 tablet by mouth Every 6 (Six) Hours As Needed for Moderate Pain ., Disp: , Rfl:   •  lisinopril-hydrochlorothiazide (PRINZIDE,ZESTORETIC) 20-12.5 MG per tablet, Take 1 tablet by mouth Daily., Disp: , Rfl:   •  metoprolol tartrate (LOPRESSOR) 25 MG tablet, Take 25 mg by mouth 2 (Two) Times a Day., Disp: , Rfl:   •  predniSONE (DELTASONE) 10 MG tablet, Take 10 mg by mouth Take As Directed., Disp: , Rfl:   •  tiZANidine (ZANAFLEX) 4 MG tablet, Take 4 mg by mouth At Night As Needed for Muscle Spasms (1/4 tab morning and noon 1/2 tab supper and 1 tab at bedtime.)., Disp: , Rfl:      Review of Systems   Constitutional: Negative for fever.   HENT: Negative.    Eyes: Negative.    Respiratory: Negative.    Cardiovascular: Negative.  Negative for chest pain.   Gastrointestinal: Positive for abdominal pain.   Endocrine: Negative.    Genitourinary: Positive for pelvic pain. Negative for dysuria.   Musculoskeletal: Positive for back pain and gait problem.   Skin: Negative.    Allergic/Immunologic: Negative.    Neurological: Positive for weakness and numbness. Negative for headaches.   Hematological: Negative.    Psychiatric/Behavioral: Negative.    All other systems reviewed and are negative.      /90 (BP Location: Left arm, Patient Position: Sitting, Cuff Size: Adult)   Pulse 81   Ht 160 cm (63\")   Wt 122 kg (269 lb 3.2 oz)   SpO2 97%   BMI 47.69 kg/m²   Physical Exam   Constitutional: She is oriented to person, place, and time. She appears well-developed and well-nourished.   HENT:   Head: Normocephalic and atraumatic.   Eyes: Pupils are equal, round, and reactive to light. No " scleral icterus.   Neck: Neck supple. No JVD present. Carotid bruit is not present. No thyromegaly present.   Cardiovascular: Normal rate, regular rhythm and normal heart sounds.   Pulses:       Carotid pulses are 2+ on the right side, and 2+ on the left side.       Femoral pulses are 2+ on the right side, and 2+ on the left side.       Popliteal pulses are 2+ on the right side, and 2+ on the left side.        Dorsalis pedis pulses are 2+ on the right side, and 2+ on the left side.        Posterior tibial pulses are 2+ on the right side, and 2+ on the left side.   Pulmonary/Chest: Effort normal and breath sounds normal.   Abdominal: Soft. Bowel sounds are normal. She exhibits no distension, no abdominal bruit and no mass. There is no hepatosplenomegaly. There is no tenderness.   Musculoskeletal: Normal range of motion. She exhibits no edema.        Lumbar back: She exhibits pain.   Lymphadenopathy:     She has no cervical adenopathy.   Neurological: She is alert and oriented to person, place, and time. She has normal strength. No cranial nerve deficit or sensory deficit.   Skin: Skin is warm, dry and intact.   Psychiatric: She has a normal mood and affect. Her behavior is normal. Judgment and thought content normal.   Nursing note and vitals reviewed.      Xr Chest Pa & Lateral    Result Date: 1/2/2020  Narrative: EXAMINATION: XR CHEST PA AND LATERAL-  1/2/2020 3:45 PM CST  TWO-VIEW CHEST:  HISTORY: Hypertension  Frontal and lateral projection chest radiograph obtained.  COMPARISON:  none  FINDINGS:  The lungs are clear without acute infiltrates.  The heart is normal in size, pulmonary circulation appropriate, without heart failure.  The bony structures are intact without acute osseous abnormality.                                                                                                                 Impression: 1. No acute cardiopulmonary process.   This report was finalized on 01/02/2020 15:45 by Dr. Lezama  MD Austin.      Patient Active Problem List   Diagnosis   • Hypertension   • Hyperlipidemia         ICD-10-CM ICD-9-CM   1. Chronic midline low back pain with bilateral sciatica M54.41 724.2    M54.42 724.3    G89.29 338.29   2. Essential hypertension I10 401.9   3. Hyperlipidemia, unspecified hyperlipidemia type E78.5 272.4       Plan: After thoroughly evaluating Ayla Echevarria, I believe the best course of action is to proceed with anterior lumbar interbody fusion of L5-S1.  Risks of ALIF were discussed and include, but are not limited to, bleeding, infection, nerve damage, vessel damage, retrograde ejaculation, bowel damage, ureteral damage, DVT, MI, stroke, and death.  The patient understands these risks and wishes to proceed with procedure.  I did discuss vascular risk factors as they pertain to the progression of vascular disease including controlling hypertension and hyperlipidemia.  These risk factors are currently stable.  The patient can continue taking their current medication regimen as previously planned.  This was all discussed in full with complete understanding.    Thank you for allowing me to participate in the care of your patient.  Please do not hesitate with any questions or concerns.  I will keep you aware of any further encounters with Ayla Echevarria.        Sincerely yours,         Salvatore Villalta, DO

## 2020-01-17 ENCOUNTER — APPOINTMENT (OUTPATIENT)
Dept: GENERAL RADIOLOGY | Facility: HOSPITAL | Age: 50
End: 2020-01-17

## 2020-01-17 ENCOUNTER — HOSPITAL ENCOUNTER (INPATIENT)
Facility: HOSPITAL | Age: 50
LOS: 1 days | Discharge: HOME | End: 2020-01-18
Attending: ORTHOPAEDIC SURGERY | Admitting: ORTHOPAEDIC SURGERY

## 2020-01-17 ENCOUNTER — ANESTHESIA (OUTPATIENT)
Dept: PERIOP | Facility: HOSPITAL | Age: 50
End: 2020-01-17

## 2020-01-17 ENCOUNTER — ANESTHESIA EVENT (OUTPATIENT)
Dept: PERIOP | Facility: HOSPITAL | Age: 50
End: 2020-01-17

## 2020-01-17 DIAGNOSIS — M48.062 SPINAL STENOSIS OF LUMBAR REGION WITH NEUROGENIC CLAUDICATION: Primary | ICD-10-CM

## 2020-01-17 DIAGNOSIS — Z48.89 AFTERCARE FOLLOWING SURGERY: ICD-10-CM

## 2020-01-17 DIAGNOSIS — Z74.09 IMPAIRED FUNCTIONAL MOBILITY, BALANCE, GAIT, AND ENDURANCE: ICD-10-CM

## 2020-01-17 PROBLEM — M51.379 DISC DEGENERATION, LUMBOSACRAL: Status: ACTIVE | Noted: 2020-01-17

## 2020-01-17 PROBLEM — M48.061 LUMBAR SPINAL STENOSIS: Status: ACTIVE | Noted: 2020-01-17

## 2020-01-17 PROBLEM — M51.37 DISC DEGENERATION, LUMBOSACRAL: Status: ACTIVE | Noted: 2020-01-17

## 2020-01-17 PROBLEM — M54.42 CHRONIC BILATERAL LOW BACK PAIN WITH BILATERAL SCIATICA: Status: ACTIVE | Noted: 2020-01-17

## 2020-01-17 PROBLEM — G89.29 CHRONIC BILATERAL LOW BACK PAIN WITH BILATERAL SCIATICA: Status: ACTIVE | Noted: 2020-01-17

## 2020-01-17 PROBLEM — M54.41 CHRONIC BILATERAL LOW BACK PAIN WITH BILATERAL SCIATICA: Status: ACTIVE | Noted: 2020-01-17

## 2020-01-17 LAB
ABO GROUP BLD: NORMAL
B-HCG UR QL: NEGATIVE
BLD GP AB SCN SERPL QL: NEGATIVE
RH BLD: POSITIVE
T&S EXPIRATION DATE: NORMAL

## 2020-01-17 PROCEDURE — 76000 FLUOROSCOPY <1 HR PHYS/QHP: CPT

## 2020-01-17 PROCEDURE — 25010000002 MIDAZOLAM PER 1 MG: Performed by: ANESTHESIOLOGY

## 2020-01-17 PROCEDURE — 22558 ARTHRD ANT NTRBD MIN DSC LUM: CPT | Performed by: SURGERY

## 2020-01-17 PROCEDURE — 25010000002 VANCOMYCIN PER 500 MG: Performed by: SURGERY

## 2020-01-17 PROCEDURE — 94799 UNLISTED PULMONARY SVC/PX: CPT

## 2020-01-17 PROCEDURE — 25010000002 NEOSTIGMINE 10 MG/10ML SOLUTION 10 ML VIAL: Performed by: NURSE ANESTHETIST, CERTIFIED REGISTERED

## 2020-01-17 PROCEDURE — 81025 URINE PREGNANCY TEST: CPT | Performed by: ORTHOPAEDIC SURGERY

## 2020-01-17 PROCEDURE — 86850 RBC ANTIBODY SCREEN: CPT | Performed by: ORTHOPAEDIC SURGERY

## 2020-01-17 PROCEDURE — C1713 ANCHOR/SCREW BN/BN,TIS/BN: HCPCS | Performed by: ORTHOPAEDIC SURGERY

## 2020-01-17 PROCEDURE — 25010000002 MORPHINE PER 10 MG: Performed by: ANESTHESIOLOGY

## 2020-01-17 PROCEDURE — 0SG30A0 FUSION OF LUMBOSACRAL JOINT WITH INTERBODY FUSION DEVICE, ANTERIOR APPROACH, ANTERIOR COLUMN, OPEN APPROACH: ICD-10-PCS | Performed by: ORTHOPAEDIC SURGERY

## 2020-01-17 PROCEDURE — 97161 PT EVAL LOW COMPLEX 20 MIN: CPT

## 2020-01-17 PROCEDURE — 25010000002 DEXAMETHASONE PER 1 MG: Performed by: ANESTHESIOLOGY

## 2020-01-17 PROCEDURE — 74018 RADEX ABDOMEN 1 VIEW: CPT

## 2020-01-17 PROCEDURE — 86900 BLOOD TYPING SEROLOGIC ABO: CPT | Performed by: ORTHOPAEDIC SURGERY

## 2020-01-17 PROCEDURE — 0SB40ZZ EXCISION OF LUMBOSACRAL DISC, OPEN APPROACH: ICD-10-PCS | Performed by: ORTHOPAEDIC SURGERY

## 2020-01-17 PROCEDURE — 97165 OT EVAL LOW COMPLEX 30 MIN: CPT | Performed by: OCCUPATIONAL THERAPIST

## 2020-01-17 PROCEDURE — 25010000002 VANCOMYCIN 1 G RECONSTITUTED SOLUTION: Performed by: ORTHOPAEDIC SURGERY

## 2020-01-17 PROCEDURE — 25010000002 PROPOFOL 10 MG/ML EMULSION: Performed by: NURSE ANESTHETIST, CERTIFIED REGISTERED

## 2020-01-17 PROCEDURE — 86901 BLOOD TYPING SEROLOGIC RH(D): CPT | Performed by: ORTHOPAEDIC SURGERY

## 2020-01-17 PROCEDURE — 72100 X-RAY EXAM L-S SPINE 2/3 VWS: CPT

## 2020-01-17 PROCEDURE — 25010000002 ONDANSETRON PER 1 MG: Performed by: NURSE ANESTHETIST, CERTIFIED REGISTERED

## 2020-01-17 PROCEDURE — 25010000002 FENTANYL CITRATE (PF) 100 MCG/2ML SOLUTION: Performed by: ANESTHESIOLOGY

## 2020-01-17 DEVICE — CAGE ALIF STANDALONE M3 15DEG 27X40X16MM: Type: IMPLANTABLE DEVICE | Status: FUNCTIONAL

## 2020-01-17 DEVICE — LK ASMBL STANDALONE M3 ALIF: Type: IMPLANTABLE DEVICE | Status: FUNCTIONAL

## 2020-01-17 DEVICE — ALIF SCREW, 6.0MM X 30MM
Type: IMPLANTABLE DEVICE | Status: FUNCTIONAL
Brand: ASPIDA

## 2020-01-17 DEVICE — 16MM SACRAL PLATE
Type: IMPLANTABLE DEVICE | Status: FUNCTIONAL
Brand: ASPIDA

## 2020-01-17 DEVICE — SCRW STANDALONE M3 ALIF VARI S/TAP 5.5X25MM: Type: IMPLANTABLE DEVICE | Status: FUNCTIONAL

## 2020-01-17 DEVICE — BONE FILLER VOID NANOSS BIOACTIVE 3D 20CC: Type: IMPLANTABLE DEVICE | Status: FUNCTIONAL

## 2020-01-17 DEVICE — ALLOGRFT FLD BIOBURST 1ML: Type: IMPLANTABLE DEVICE | Status: FUNCTIONAL

## 2020-01-17 RX ORDER — SODIUM CHLORIDE, SODIUM LACTATE, POTASSIUM CHLORIDE, CALCIUM CHLORIDE 600; 310; 30; 20 MG/100ML; MG/100ML; MG/100ML; MG/100ML
100 INJECTION, SOLUTION INTRAVENOUS CONTINUOUS PRN
Status: DISCONTINUED | OUTPATIENT
Start: 2020-01-17 | End: 2020-01-17 | Stop reason: HOSPADM

## 2020-01-17 RX ORDER — OXYCODONE AND ACETAMINOPHEN 10; 325 MG/1; MG/1
1 TABLET ORAL ONCE AS NEEDED
Status: DISCONTINUED | OUTPATIENT
Start: 2020-01-17 | End: 2020-01-17 | Stop reason: HOSPADM

## 2020-01-17 RX ORDER — SODIUM CHLORIDE 0.9 % (FLUSH) 0.9 %
10 SYRINGE (ML) INJECTION AS NEEDED
Status: DISCONTINUED | OUTPATIENT
Start: 2020-01-17 | End: 2020-01-17 | Stop reason: HOSPADM

## 2020-01-17 RX ORDER — HYDROCODONE BITARTRATE AND ACETAMINOPHEN 7.5; 325 MG/1; MG/1
2 TABLET ORAL EVERY 4 HOURS PRN
Status: DISCONTINUED | OUTPATIENT
Start: 2020-01-17 | End: 2020-01-18 | Stop reason: HOSPADM

## 2020-01-17 RX ORDER — SODIUM CHLORIDE 0.9 % (FLUSH) 0.9 %
3-10 SYRINGE (ML) INJECTION AS NEEDED
Status: DISCONTINUED | OUTPATIENT
Start: 2020-01-17 | End: 2020-01-17 | Stop reason: HOSPADM

## 2020-01-17 RX ORDER — LIDOCAINE HYDROCHLORIDE 10 MG/ML
0.5 INJECTION, SOLUTION EPIDURAL; INFILTRATION; INTRACAUDAL; PERINEURAL ONCE AS NEEDED
Status: DISCONTINUED | OUTPATIENT
Start: 2020-01-17 | End: 2020-01-17 | Stop reason: HOSPADM

## 2020-01-17 RX ORDER — ONDANSETRON 2 MG/ML
4 INJECTION INTRAMUSCULAR; INTRAVENOUS EVERY 6 HOURS PRN
Status: DISCONTINUED | OUTPATIENT
Start: 2020-01-17 | End: 2020-01-18 | Stop reason: HOSPADM

## 2020-01-17 RX ORDER — SODIUM CHLORIDE 0.9 % (FLUSH) 0.9 %
10 SYRINGE (ML) INJECTION AS NEEDED
Status: DISCONTINUED | OUTPATIENT
Start: 2020-01-17 | End: 2020-01-18 | Stop reason: HOSPADM

## 2020-01-17 RX ORDER — SENNA AND DOCUSATE SODIUM 50; 8.6 MG/1; MG/1
1 TABLET, FILM COATED ORAL NIGHTLY PRN
Status: DISCONTINUED | OUTPATIENT
Start: 2020-01-17 | End: 2020-01-18 | Stop reason: HOSPADM

## 2020-01-17 RX ORDER — VANCOMYCIN HYDROCHLORIDE 1 G/200ML
1000 INJECTION, SOLUTION INTRAVENOUS ONCE
Status: COMPLETED | OUTPATIENT
Start: 2020-01-17 | End: 2020-01-17

## 2020-01-17 RX ORDER — SUFENTANIL CITRATE 50 UG/ML
INJECTION EPIDURAL; INTRAVENOUS AS NEEDED
Status: DISCONTINUED | OUTPATIENT
Start: 2020-01-17 | End: 2020-01-17 | Stop reason: SURG

## 2020-01-17 RX ORDER — ONDANSETRON 4 MG/1
4 TABLET, FILM COATED ORAL EVERY 6 HOURS PRN
Status: DISCONTINUED | OUTPATIENT
Start: 2020-01-17 | End: 2020-01-18 | Stop reason: HOSPADM

## 2020-01-17 RX ORDER — HYDROCODONE BITARTRATE AND ACETAMINOPHEN 7.5; 325 MG/1; MG/1
1 TABLET ORAL EVERY 4 HOURS PRN
Status: DISCONTINUED | OUTPATIENT
Start: 2020-01-17 | End: 2020-01-18 | Stop reason: HOSPADM

## 2020-01-17 RX ORDER — SODIUM CHLORIDE 0.9 % (FLUSH) 0.9 %
3 SYRINGE (ML) INJECTION EVERY 12 HOURS SCHEDULED
Status: DISCONTINUED | OUTPATIENT
Start: 2020-01-17 | End: 2020-01-17 | Stop reason: HOSPADM

## 2020-01-17 RX ORDER — LIDOCAINE HYDROCHLORIDE 20 MG/ML
INJECTION, SOLUTION INFILTRATION; PERINEURAL AS NEEDED
Status: DISCONTINUED | OUTPATIENT
Start: 2020-01-17 | End: 2020-01-17 | Stop reason: SURG

## 2020-01-17 RX ORDER — NEOSTIGMINE METHYLSULFATE 1 MG/ML
INJECTION, SOLUTION INTRAVENOUS AS NEEDED
Status: DISCONTINUED | OUTPATIENT
Start: 2020-01-17 | End: 2020-01-17 | Stop reason: SURG

## 2020-01-17 RX ORDER — FENTANYL CITRATE 50 UG/ML
25 INJECTION, SOLUTION INTRAMUSCULAR; INTRAVENOUS AS NEEDED
Status: COMPLETED | OUTPATIENT
Start: 2020-01-17 | End: 2020-01-17

## 2020-01-17 RX ORDER — NALOXONE HCL 0.4 MG/ML
0.04 VIAL (ML) INJECTION AS NEEDED
Status: DISCONTINUED | OUTPATIENT
Start: 2020-01-17 | End: 2020-01-17 | Stop reason: HOSPADM

## 2020-01-17 RX ORDER — ACETAMINOPHEN 325 MG/1
650 TABLET ORAL EVERY 4 HOURS PRN
Status: DISCONTINUED | OUTPATIENT
Start: 2020-01-17 | End: 2020-01-18 | Stop reason: HOSPADM

## 2020-01-17 RX ORDER — ONDANSETRON 2 MG/ML
INJECTION INTRAMUSCULAR; INTRAVENOUS AS NEEDED
Status: DISCONTINUED | OUTPATIENT
Start: 2020-01-17 | End: 2020-01-17 | Stop reason: SURG

## 2020-01-17 RX ORDER — METOCLOPRAMIDE HYDROCHLORIDE 5 MG/ML
5 INJECTION INTRAMUSCULAR; INTRAVENOUS
Status: DISCONTINUED | OUTPATIENT
Start: 2020-01-17 | End: 2020-01-17 | Stop reason: HOSPADM

## 2020-01-17 RX ORDER — SODIUM CHLORIDE, SODIUM LACTATE, POTASSIUM CHLORIDE, CALCIUM CHLORIDE 600; 310; 30; 20 MG/100ML; MG/100ML; MG/100ML; MG/100ML
1000 INJECTION, SOLUTION INTRAVENOUS CONTINUOUS
Status: DISCONTINUED | OUTPATIENT
Start: 2020-01-17 | End: 2020-01-17 | Stop reason: HOSPADM

## 2020-01-17 RX ORDER — SODIUM CHLORIDE 0.9 % (FLUSH) 0.9 %
10 SYRINGE (ML) INJECTION EVERY 12 HOURS SCHEDULED
Status: DISCONTINUED | OUTPATIENT
Start: 2020-01-17 | End: 2020-01-17 | Stop reason: HOSPADM

## 2020-01-17 RX ORDER — DEXAMETHASONE SODIUM PHOSPHATE 4 MG/ML
4 INJECTION, SOLUTION INTRA-ARTICULAR; INTRALESIONAL; INTRAMUSCULAR; INTRAVENOUS; SOFT TISSUE ONCE AS NEEDED
Status: COMPLETED | OUTPATIENT
Start: 2020-01-17 | End: 2020-01-17

## 2020-01-17 RX ORDER — NALOXONE HCL 0.4 MG/ML
0.4 VIAL (ML) INJECTION
Status: DISCONTINUED | OUTPATIENT
Start: 2020-01-17 | End: 2020-01-18 | Stop reason: HOSPADM

## 2020-01-17 RX ORDER — MIDAZOLAM HYDROCHLORIDE 1 MG/ML
1 INJECTION INTRAMUSCULAR; INTRAVENOUS
Status: DISCONTINUED | OUTPATIENT
Start: 2020-01-17 | End: 2020-01-17 | Stop reason: HOSPADM

## 2020-01-17 RX ORDER — SODIUM CHLORIDE 0.9 % (FLUSH) 0.9 %
3 SYRINGE (ML) INJECTION EVERY 12 HOURS SCHEDULED
Status: DISCONTINUED | OUTPATIENT
Start: 2020-01-17 | End: 2020-01-18 | Stop reason: HOSPADM

## 2020-01-17 RX ORDER — GABAPENTIN 300 MG/1
600 CAPSULE ORAL EVERY 8 HOURS SCHEDULED
Status: DISCONTINUED | OUTPATIENT
Start: 2020-01-17 | End: 2020-01-18 | Stop reason: HOSPADM

## 2020-01-17 RX ORDER — MIDAZOLAM HYDROCHLORIDE 1 MG/ML
2 INJECTION INTRAMUSCULAR; INTRAVENOUS
Status: DISCONTINUED | OUTPATIENT
Start: 2020-01-17 | End: 2020-01-17 | Stop reason: HOSPADM

## 2020-01-17 RX ORDER — HYDRALAZINE HYDROCHLORIDE 20 MG/ML
5 INJECTION INTRAMUSCULAR; INTRAVENOUS
Status: DISCONTINUED | OUTPATIENT
Start: 2020-01-17 | End: 2020-01-17 | Stop reason: HOSPADM

## 2020-01-17 RX ORDER — FLUMAZENIL 0.1 MG/ML
0.2 INJECTION INTRAVENOUS AS NEEDED
Status: DISCONTINUED | OUTPATIENT
Start: 2020-01-17 | End: 2020-01-17 | Stop reason: HOSPADM

## 2020-01-17 RX ORDER — SODIUM CHLORIDE 0.9 % (FLUSH) 0.9 %
3 SYRINGE (ML) INJECTION AS NEEDED
Status: DISCONTINUED | OUTPATIENT
Start: 2020-01-17 | End: 2020-01-17 | Stop reason: HOSPADM

## 2020-01-17 RX ORDER — OXYCODONE HCL 20 MG/1
20 TABLET, FILM COATED, EXTENDED RELEASE ORAL ONCE
Status: COMPLETED | OUTPATIENT
Start: 2020-01-17 | End: 2020-01-17

## 2020-01-17 RX ORDER — ROCURONIUM BROMIDE 10 MG/ML
INJECTION, SOLUTION INTRAVENOUS AS NEEDED
Status: DISCONTINUED | OUTPATIENT
Start: 2020-01-17 | End: 2020-01-17 | Stop reason: SURG

## 2020-01-17 RX ORDER — ONDANSETRON 2 MG/ML
4 INJECTION INTRAMUSCULAR; INTRAVENOUS AS NEEDED
Status: DISCONTINUED | OUTPATIENT
Start: 2020-01-17 | End: 2020-01-17 | Stop reason: HOSPADM

## 2020-01-17 RX ORDER — SODIUM CHLORIDE, SODIUM LACTATE, POTASSIUM CHLORIDE, CALCIUM CHLORIDE 600; 310; 30; 20 MG/100ML; MG/100ML; MG/100ML; MG/100ML
100 INJECTION, SOLUTION INTRAVENOUS CONTINUOUS
Status: DISCONTINUED | OUTPATIENT
Start: 2020-01-17 | End: 2020-01-17 | Stop reason: HOSPADM

## 2020-01-17 RX ORDER — SODIUM CHLORIDE 9 MG/ML
100 INJECTION, SOLUTION INTRAVENOUS CONTINUOUS
Status: DISCONTINUED | OUTPATIENT
Start: 2020-01-17 | End: 2020-01-18 | Stop reason: HOSPADM

## 2020-01-17 RX ORDER — PROPOFOL 10 MG/ML
VIAL (ML) INTRAVENOUS AS NEEDED
Status: DISCONTINUED | OUTPATIENT
Start: 2020-01-17 | End: 2020-01-17 | Stop reason: SURG

## 2020-01-17 RX ORDER — TIZANIDINE 4 MG/1
4 TABLET ORAL EVERY 8 HOURS PRN
Status: DISCONTINUED | OUTPATIENT
Start: 2020-01-17 | End: 2020-01-18 | Stop reason: HOSPADM

## 2020-01-17 RX ORDER — MORPHINE SULFATE 2 MG/ML
2 INJECTION, SOLUTION INTRAMUSCULAR; INTRAVENOUS
Status: COMPLETED | OUTPATIENT
Start: 2020-01-17 | End: 2020-01-17

## 2020-01-17 RX ORDER — LIDOCAINE HYDROCHLORIDE 40 MG/ML
SOLUTION TOPICAL AS NEEDED
Status: DISCONTINUED | OUTPATIENT
Start: 2020-01-17 | End: 2020-01-17 | Stop reason: SURG

## 2020-01-17 RX ORDER — DULOXETIN HYDROCHLORIDE 30 MG/1
60 CAPSULE, DELAYED RELEASE ORAL DAILY
Status: DISCONTINUED | OUTPATIENT
Start: 2020-01-17 | End: 2020-01-18 | Stop reason: HOSPADM

## 2020-01-17 RX ORDER — MAGNESIUM HYDROXIDE 1200 MG/15ML
LIQUID ORAL AS NEEDED
Status: DISCONTINUED | OUTPATIENT
Start: 2020-01-17 | End: 2020-01-17 | Stop reason: HOSPADM

## 2020-01-17 RX ORDER — LABETALOL HYDROCHLORIDE 5 MG/ML
5 INJECTION, SOLUTION INTRAVENOUS
Status: DISCONTINUED | OUTPATIENT
Start: 2020-01-17 | End: 2020-01-17 | Stop reason: HOSPADM

## 2020-01-17 RX ORDER — GLYCOPYRROLATE 0.2 MG/ML
INJECTION INTRAMUSCULAR; INTRAVENOUS AS NEEDED
Status: DISCONTINUED | OUTPATIENT
Start: 2020-01-17 | End: 2020-01-17 | Stop reason: SURG

## 2020-01-17 RX ORDER — IPRATROPIUM BROMIDE AND ALBUTEROL SULFATE 2.5; .5 MG/3ML; MG/3ML
3 SOLUTION RESPIRATORY (INHALATION) ONCE AS NEEDED
Status: DISCONTINUED | OUTPATIENT
Start: 2020-01-17 | End: 2020-01-17 | Stop reason: HOSPADM

## 2020-01-17 RX ADMIN — SODIUM CHLORIDE, PRESERVATIVE FREE 3 ML: 5 INJECTION INTRAVENOUS at 13:29

## 2020-01-17 RX ADMIN — LIDOCAINE HYDROCHLORIDE 1 EACH: 40 SOLUTION TOPICAL at 07:14

## 2020-01-17 RX ADMIN — MORPHINE SULFATE 2 MG: 2 INJECTION, SOLUTION INTRAMUSCULAR; INTRAVENOUS at 10:50

## 2020-01-17 RX ADMIN — VANCOMYCIN HYDROCHLORIDE 1000 MG: 1 INJECTION, POWDER, LYOPHILIZED, FOR SOLUTION INTRAVENOUS at 06:36

## 2020-01-17 RX ADMIN — GLYCOPYRROLATE 0.4 MG: 0.2 INJECTION, SOLUTION INTRAMUSCULAR; INTRAVENOUS at 09:50

## 2020-01-17 RX ADMIN — FENTANYL CITRATE 25 MCG: 50 INJECTION, SOLUTION INTRAMUSCULAR; INTRAVENOUS at 10:22

## 2020-01-17 RX ADMIN — HYDROCODONE BITARTRATE AND ACETAMINOPHEN 1 TABLET: 7.5; 325 TABLET ORAL at 15:04

## 2020-01-17 RX ADMIN — MORPHINE SULFATE 2 MG: 2 INJECTION, SOLUTION INTRAMUSCULAR; INTRAVENOUS at 10:30

## 2020-01-17 RX ADMIN — FENTANYL CITRATE 25 MCG: 50 INJECTION, SOLUTION INTRAMUSCULAR; INTRAVENOUS at 10:36

## 2020-01-17 RX ADMIN — DEXAMETHASONE SODIUM PHOSPHATE 4 MG: 4 INJECTION, SOLUTION INTRAMUSCULAR; INTRAVENOUS at 06:36

## 2020-01-17 RX ADMIN — SUFENTANIL CITRATE 35 MCG: 50 INJECTION EPIDURAL; INTRAVENOUS at 07:48

## 2020-01-17 RX ADMIN — MORPHINE SULFATE 2 MG: 2 INJECTION, SOLUTION INTRAMUSCULAR; INTRAVENOUS at 10:40

## 2020-01-17 RX ADMIN — FENTANYL CITRATE 25 MCG: 50 INJECTION, SOLUTION INTRAMUSCULAR; INTRAVENOUS at 10:37

## 2020-01-17 RX ADMIN — SODIUM CHLORIDE, PRESERVATIVE FREE 3 ML: 5 INJECTION INTRAVENOUS at 21:38

## 2020-01-17 RX ADMIN — LISINOPRIL: 10 TABLET ORAL at 13:26

## 2020-01-17 RX ADMIN — FENTANYL CITRATE 25 MCG: 50 INJECTION, SOLUTION INTRAMUSCULAR; INTRAVENOUS at 10:35

## 2020-01-17 RX ADMIN — DULOXETINE HYDROCHLORIDE 60 MG: 30 CAPSULE, DELAYED RELEASE ORAL at 13:26

## 2020-01-17 RX ADMIN — METOPROLOL TARTRATE 25 MG: 25 TABLET, FILM COATED ORAL at 21:38

## 2020-01-17 RX ADMIN — SODIUM CHLORIDE, POTASSIUM CHLORIDE, SODIUM LACTATE AND CALCIUM CHLORIDE 1000 ML: 600; 310; 30; 20 INJECTION, SOLUTION INTRAVENOUS at 06:04

## 2020-01-17 RX ADMIN — SUFENTANIL CITRATE 15 MCG: 50 INJECTION EPIDURAL; INTRAVENOUS at 07:13

## 2020-01-17 RX ADMIN — MORPHINE SULFATE 2 MG: 2 INJECTION, SOLUTION INTRAMUSCULAR; INTRAVENOUS at 11:00

## 2020-01-17 RX ADMIN — SODIUM CHLORIDE 100 ML/HR: 9 INJECTION, SOLUTION INTRAVENOUS at 12:03

## 2020-01-17 RX ADMIN — ONDANSETRON HYDROCHLORIDE 4 MG: 2 SOLUTION INTRAMUSCULAR; INTRAVENOUS at 09:50

## 2020-01-17 RX ADMIN — PROPOFOL 180 MG: 10 INJECTION, EMULSION INTRAVENOUS at 07:13

## 2020-01-17 RX ADMIN — FENTANYL CITRATE 25 MCG: 50 INJECTION, SOLUTION INTRAMUSCULAR; INTRAVENOUS at 10:38

## 2020-01-17 RX ADMIN — VANCOMYCIN HYDROCHLORIDE 1000 MG: 1 INJECTION, SOLUTION INTRAVENOUS at 18:14

## 2020-01-17 RX ADMIN — SODIUM CHLORIDE 100 ML/HR: 9 INJECTION, SOLUTION INTRAVENOUS at 11:30

## 2020-01-17 RX ADMIN — FENTANYL CITRATE 25 MCG: 50 INJECTION, SOLUTION INTRAMUSCULAR; INTRAVENOUS at 10:23

## 2020-01-17 RX ADMIN — LIDOCAINE HYDROCHLORIDE 100 MG: 20 INJECTION, SOLUTION INFILTRATION; PERINEURAL at 07:13

## 2020-01-17 RX ADMIN — OXYCODONE HYDROCHLORIDE 20 MG: 20 TABLET, FILM COATED, EXTENDED RELEASE ORAL at 06:07

## 2020-01-17 RX ADMIN — FENTANYL CITRATE 25 MCG: 50 INJECTION, SOLUTION INTRAMUSCULAR; INTRAVENOUS at 10:21

## 2020-01-17 RX ADMIN — FENTANYL CITRATE 25 MCG: 50 INJECTION, SOLUTION INTRAMUSCULAR; INTRAVENOUS at 10:20

## 2020-01-17 RX ADMIN — VANCOMYCIN HYDROCHLORIDE 750 MG: 1 INJECTION, POWDER, LYOPHILIZED, FOR SOLUTION INTRAVENOUS at 09:58

## 2020-01-17 RX ADMIN — MIDAZOLAM 2 MG: 1 INJECTION INTRAMUSCULAR; INTRAVENOUS at 06:39

## 2020-01-17 RX ADMIN — GABAPENTIN 600 MG: 300 CAPSULE ORAL at 21:38

## 2020-01-17 RX ADMIN — GABAPENTIN 600 MG: 300 CAPSULE ORAL at 13:28

## 2020-01-17 RX ADMIN — MORPHINE SULFATE 2 MG: 2 INJECTION, SOLUTION INTRAMUSCULAR; INTRAVENOUS at 10:18

## 2020-01-17 RX ADMIN — HYDROCODONE BITARTRATE AND ACETAMINOPHEN 2 TABLET: 7.5; 325 TABLET ORAL at 20:39

## 2020-01-17 RX ADMIN — ROCURONIUM BROMIDE 50 MG: 10 INJECTION INTRAVENOUS at 07:13

## 2020-01-17 RX ADMIN — NEOSTIGMINE METHYLSULFATE 3 MG: 1 INJECTION INTRAVENOUS at 09:50

## 2020-01-17 NOTE — OP NOTE
Ayla Echevarria  2020     PREOPERATIVE DIAGNOSIS: M43.10     POSTOPERATIVE DIAGNOSIS: same     PROCEDURE PERFORMED:   1.  Anterior lumbar interbody fusion of L5-S1 with instrumentation     SURGEON: Salvatore Villalta DO   COSURGEON: Sharif Bang MD     ANESTHESIA: General.    PREPARATION: Routine.    STAFF: Circulator: Bri Lo RN; Mercy Ernandez RN  Scrub Person: Iglesia [place]; Taty Pace  Vendor Representative: Kristina Maldonado Cari  Assistant: Tristin John PA    ESTIMATED BLOOD LOSS: 50 mL    SPECIMENS: None    COMPLICATIONS: None    INDICATIONS: Ayla Echevarria is a 49 y.o. female who you are currently following for chronic back pain.  Ayla Echevarriais scheduled for anterior lumbar interbody fusion of L5-S1 with Dr. Bang on 2020.  She has tried physical therapy and antiinflammatories without relief.  Symptoms have worsened over the past couple years.  The patient denies any history of DVT.  She has a history of  section x2 and cholecystectomy. The indications, risks, and possible complications of the procedure were explained to the patient, who voiced understanding and wished to proceed with surgery.     PROCEDURE IN DETAIL:   The patient was taken to the operating room and placed on the operating table in a supine position. After general anesthesia was obtained, the abdomen was prepped and draped in a sterile manner.  A transverse incision was then made in the left lower quadrant.  Careful dissection was made down through the subcutaneous tissues using the Bovie cautery to ensure hemostasis.  Any crossing veins were ligated with 3-0 silk suture and hemoclips.  The rectus fascia was identified.  It was incised with the Bovie cautery.  Kocher clamps are placed on each side of the rectus fascia and subfascial planes were established in a cephalad and caudad direction.  Once the subfascial planes were established the attention was then turned to the  left rectus muscle.  Blunt mobilization was made of the left rectus muscle including its blood supply medially and to the right.  Once it was fully mobilized the attention was then turned laterally to the peritoneal reflection.  Entrance into the retroperitoneal space was established with the use of a sponge stick and the Bovie cautery.  Continued blunt mobilization was made with my hand using a finger sweeping motion moving the peritoneal contents and sacral fat pad medially and to the right.  Once it was fully mobilized the Brau-Heaton retractor system was set up.  The retractor blades were set in place.  The left iliac vein was then carefully dissected free and placed safely behind the retractor blade.  The sacral vessels were carefully taken down with hemoclips.  At this point full exposure was established of the L5-S1 disc space.  The next part of the case will be dictated by Dr. Bang. Upon completion of Dr. Bang's part of the case the wound bed was irrigated with antibiotic saline and hemostasis was observed.  The retractor blades were carefully taken out one at a time.  The structures were then placed back in their normal anatomic positions.  The rectus fascia was then closed with a #1 PDS in a running fashion to meet in the midline.  The deep layers were closed with a 2-0 Vicryl in a running fashion.  The subcutaneous layers were closed with a 3-0 Vicryl in a running fashion.  The skin was then reapproximated using a 4-0 Monocryl in a subcuticular fashion.  The wound was then cleaned.  Sterile dressings were applied. The patient tolerated the procedure well. Sponge and needle counts were correct. The patient was then awakened and extubated in the operating room and taken to the recovery room in good condition.    Salvatore Villalta DO    Date: 1/17/2020 Time: 10:04 AM

## 2020-01-17 NOTE — ANESTHESIA POSTPROCEDURE EVALUATION
Patient: Ayla Echevarria    Procedure Summary     Date:  01/17/20 Room / Location:  [facility] OR  /  [facility] OR    Anesthesia Start:  0707 Anesthesia Stop:  1006    Procedures:       ANTERIOR DECOMPRESSION, ANTERIOR LUMBAR INTERBODY FUSION WITH INSTRUMENTATION L5-S1 (N/A Spine Lumbar)      ANTERIOR LUMBAR EXPOSURE (N/A Abdomen) Diagnosis:  (M43.10)    Surgeon:  PAMELA Bang MD; Salvatore Villalta DO Provider:  Sylvain Montalvo CRNA    Anesthesia Type:  general ASA Status:  3          Anesthesia Type: general    Vitals  Vitals Value Taken Time   /68 1/17/2020 11:30 AM   Temp 97.7 °F (36.5 °C) 1/17/2020 11:30 AM   Pulse 69 1/17/2020 11:33 AM   Resp 10 1/17/2020 11:30 AM   SpO2 100 % 1/17/2020 11:33 AM   Vitals shown include unvalidated device data.        Post Anesthesia Care and Evaluation    Patient location during evaluation: PACU  Patient participation: complete - patient participated  Level of consciousness: awake and alert  Pain management: adequate  Airway patency: patent  Anesthetic complications: No anesthetic complications  PONV Status: none  Cardiovascular status: acceptable and hemodynamically stable  Respiratory status: acceptable  Hydration status: acceptable    Comments: Blood pressure 144/68, pulse 76, temperature 97.7 °F (36.5 °C), temperature source Temporal, resp. rate 10, last menstrual period 01/10/2020, SpO2 99 %, not currently breastfeeding.    Patient discharged from PACU based upon Caridad score. Please see RN notes for further details

## 2020-01-17 NOTE — THERAPY EVALUATION
Patient Name: Ayla Echevarria  : 1970    MRN: [id number]                              Today's Date: 2020       Admit Date: 2020    Visit Dx:     ICD-10-CM ICD-9-CM   1. Impaired functional mobility, balance, gait, and endurance Z74.09 V49.89   2. Aftercare following surgery Z48.89 V58.89     Patient Active Problem List   Diagnosis   • Hypertension   • Hyperlipidemia   • Disc degeneration, lumbosacral   • Chronic bilateral low back pain with bilateral sciatica   • Lumbar spinal stenosis     Past Medical History:   Diagnosis Date   • Chronic bilateral low back pain with bilateral sciatica 2020   • Disc degeneration, lumbosacral 2020   • Hyperlipidemia    • Hypertension    • Nerve pain    • Psoriasis    • Sleep apnea      Past Surgical History:   Procedure Laterality Date   •  SECTION      x2   • CHOLECYSTECTOMY       General Information     Row Name 20 1346          PT Evaluation Time/Intention    Document Type  evaluation  (Pended)  anterior discectomy decompression with B neural foraminotomy L5-S1; ALIF L5-S1 with spinal instrumentation; B buttock, thigh, leg radiculopathy  -AN     Mode of Treatment  physical therapy;concurrent therapy  (Pended)   -AN     Row Name 20 1346          General Information    Patient Profile Reviewed?  yes  (Pended)   -AN     Prior Level of Function  independent:;grooming;dressing;ADL's;all household mobility;community mobility;driving  (Pended)   -AN     Existing Precautions/Restrictions  fall;spinal;brace worn when out of bed  (Pended)   -AN     Barriers to Rehab  none identified  (Pended)   -AN     Row Name 20 1346          Relationship/Environment    Lives With  spouse;child(alicia), adult  (Pended)   -AN     Row Name 20 1346          Resource/Environmental Concerns    Current Living Arrangements  home/apartment/condo  (Pended)   -AN     Row Name 20 1346          Home Main Entrance    Number of Stairs, Main Entrance  three   (Pended)  tub shower; shower stool; 4WW  -AN     Stair Railings, Main Entrance  none  (Pended)   -AN     [facility] Name 01/17/20 1346          Cognitive Assessment/Intervention- PT/OT    Orientation Status (Cognition)  oriented x 4  (Pended)   -AN     Row Name 01/17/20 1346          Safety Issues, Functional Mobility    Impairments Affecting Function (Mobility)  balance;endurance/activity tolerance;pain;strength  (Pended)   -AN       User Key  (r) = Recorded By, (t) = Taken By, (c) = Cosigned By    Initials Name Provider Type    Niesha Burch, PT Student PT Student        Mobility     [facility] Name 01/17/20 1346          Bed Mobility Assessment/Treatment    Bed Mobility Assessment/Treatment  bed mobility (all) activities  (Pended)   -AN     [place] Level (Bed Mobility)  verbal cues;contact guard assist  (Pended)   -AN     Row Name 01/17/20 1346          Sit-Stand Transfer    Sit-Stand [place] (Transfers)  verbal cues;contact guard;1 person assist  (Pended)  1 HHA  -AN     Row Name 01/17/20 [facility]          Gait/Stairs Assessment/Training    [place] Level (Gait)  contact guard;verbal cues  (Pended)  1 HHA for 50% of ambulation  -AN     Distance in Feet (Gait)  500ft  (Pended)   -AN     Comment (Gait/Stairs)  L/R sidestepping and backward stepping with decreased speed and no LOB  (Pended)   -AN       User Key  (r) = Recorded By, (t) = Taken By, (c) = Cosigned By    Initials Name Provider Type    Niesha Burch, PT Student PT Student        Obj/Interventions     [facility] Name 01/17/20 1346          General ROM    GENERAL ROM COMMENTS  generally WFL  (Pended)   -AN     Row Name 01/17/20 1346          MMT (Manual Muscle Testing)    General MMT Comments  functionally 4/5  (Pended)   -AN     Row Name 01/17/20 1346          Static Sitting Balance    Level of [place] (Unsupported Sitting, Static Balance)  supervision  (Pended)   -AN     Sitting Position (Unsupported Sitting, Static Balance)  sitting on edge of bed   (Pended)   -AN     Time Able to Maintain Position (Unsupported Sitting, Static Balance)  2 to 3 minutes  (Pended)   -AN     Row Name 01/17/20 1346          Dynamic Sitting Balance    Level of [place], Reaches Outside Midline (Sitting, Dynamic Balance)  supervision  (Pended)   -AN     Sitting Position, Reaches Outside Midline (Sitting, Dynamic Balance)  sitting on edge of bed  (Pended)   -AN     Row Name 01/17/20 1346          Static Standing Balance    Level of [place] (Supported Standing, Static Balance)  contact guard assist  (Pended)  1 HHA  -AN     Time Able to Maintain Position (Supported Standing, Static Balance)  2 to 3 minutes  (Pended)   -AN     Row Name 01/17/20 1346          Dynamic Standing Balance    Level of [place], Reaches Outside Midline (Standing, Dynamic Balance)  contact guard assist  (Pended)   -AN     Comment, Reaches Outside Midline (Standing, Dynamic Balance)  increased ant/post sway with EC  (Pended)   -AN     Row Name 01/17/20 1346          Sensory Assessment/Intervention    Sensory General Assessment  --  (Pended)  indicates decreased sensation of LLE in sitting; expresses n/t in RLE during ambulation  -AN       User Key  (r) = Recorded By, (t) = Taken By, (c) = Cosigned By    Initials Name Provider Type    AN Niesha Gagnon, PT Student PT Student        Goals/Plan     Row Name 01/17/20 1432          Transfer Goal 1 (PT)    Activity/Assistive Device (Transfer Goal 1, PT)  sit-to-stand/stand-to-sit  (Pended)   -AN     [place] Level/Cues Needed (Transfer Goal 1, PT)  independent  (Pended)   -AN     Time Frame (Transfer Goal 1, PT)  long term goal (LTG);by discharge  (Pended)   -AN     Progress/Outcome (Transfer Goal 1, PT)  goal ongoing  (Pended)   -AN     Row Name 01/17/20 1432          Gait Training Goal 1 (PT)    Activity/Assistive Device (Gait Training Goal 1, PT)  gait (walking locomotion);improve balance and speed;decrease fall risk;increase endurance/gait  distance  (Pended)   -AN     [place] Level (Gait Training Goal 1, PT)  supervision required  (Pended)   -AN     Time Frame (Gait Training Goal 1, PT)  long term goal (LTG)  (Pended)   -AN     Progress/Outcome (Gait Training Goal 1, PT)  goal ongoing  (Pended)   -AN     Row Name 01/17/20 1432          Stairs Goal 1 (PT)    Activity/Assistive Device (Stairs Goal 1, PT)  ascending stairs;descending stairs;decrease fall risk  (Pended)   -AN     Time Frame (Stairs Goal 1, PT)  long term goal (LTG);by discharge  (Pended)   -AN     Progress/Outcome (Stairs Goal 1, PT)  goal ongoing  (Pended)   -AN       User Key  (r) = Recorded By, (t) = Taken By, (c) = Cosigned By    Initials Name Provider Type    Niesha Burch, PT Student PT Student        Clinical Impression     Row Name 01/17/20 134          Pain Assessment    Additional Documentation  Pain Scale: Numbers Pre/Post-Treatment (Group)  (Pended)   -AN     Row Name 01/17/20 7766          Pain Scale: Numbers Pre/Post-Treatment    Pain Scale: Numbers, Pretreatment  5/10  (Pended)   -AN     Pain Location - Orientation  anterior  (Pended)   -AN     Pain Location  abdomen  (Pended)   -AN     Pain Intervention(s)  Ambulation/increased activity;Repositioned  (Pended)   -AN     Row Name 01/17/20 4232          Plan of Care Review    Plan of Care Reviewed With  patient  (Pended)   -AN     Progress  improving  (Pended)   -AN     Outcome Summary  PT evaluation completed. Pt demonstrates understanding of spinal precautions and LSO brace after education. Pt expresses increased pain of incision site upon sit-stand-sit transfers which dissipates after 2-3 minutes. Pt ambulates with increased SLS of RLE but no significant gait deviations. Pt requires skilled PT services to address balance and stair training. Recommend discharge home with assist.    (Pended)   -AN     Row Name 01/17/20 8853          Physical Therapy Clinical Impression    Patient/Family Goals Statement (PT  Clinical Impression)  To return to PLOF.  (Pended)   -AN     Criteria for Skilled Interventions Met (PT Clinical Impression)  yes;treatment indicated  (Pended)   -AN     Rehab Potential (PT Clinical Summary)  good, to achieve stated therapy goals  (Pended)   -AN     Predicted Duration of Therapy (PT)  until discharge  (Pended)   -AN     Row Name 01/17/20 1349          Positioning and Restraints    Pre-Treatment Position  in bed  (Pended)   -AN     Post Treatment Position  bed  (Pended)   -AN     In Bed  fowlers;call light within reach;encouraged to call for assist;with family/caregiver;side rails up x2  (Pended)   -AN       User Key  (r) = Recorded By, (t) = Taken By, (c) = Cosigned By    Initials Name Provider Type    Niesha Burch, PT Student PT Student        Outcome Measures     Row Name 01/17/20 1346          How much help from another person do you currently need...    Turning from your back to your side while in flat bed without using bedrails?  3  (Pended)   -AN     Moving from lying on back to sitting on the side of a flat bed without bedrails?  3  (Pended)   -AN     Moving to and from a bed to a chair (including a wheelchair)?  3  (Pended)   -AN     Standing up from a chair using your arms (e.g., wheelchair, bedside chair)?  4  (Pended)   -AN     Climbing 3-5 steps with a railing?  3  (Pended)   -AN     To walk in hospital room?  4  (Pended)   -AN     AM-PAC 6 Clicks Score (PT)  20  (Pended)   -CH (r) AN (t)     Row Name 01/17/20 1346          Functional Assessment    Outcome Measure Options  AM-PAC 6 Clicks Basic Mobility (PT)  (Pended)   -AN       User Key  (r) = Recorded By, (t) = Taken By, (c) = Cosigned By    Initials Name Provider Type    Jeanie Armando, OTR/L Occupational Therapist    Niesha Burch, PT Student PT Student        Physical Therapy Education                 Title: PT OT SLP Therapies (In Progress)     Topic: Physical Therapy (In Progress)     Point: Mobility training (Done)      Description:   Instruct learner(s) on safety and technique for assisting patient out of bed, chair or wheelchair.  Instruct in the proper use of assistive devices, such as walker, crutches, cane or brace.              Patient Friendly Description:   It's important to get you on your feet again, but we need to do so in a way that is safe for you. Falling has serious consequences, and your personal safety is the most important thing of all.        When it's time to get out of bed, one of us or a family member will sit next to you on the bed to give you support.     If your doctor or nurse tells you to use a walker, crutches, a cane, or a brace, be sure you use it every time you get out of bed, even if you think you don't need it.    Learning Progress Summary           Patient Acceptance, E,TB, VU by AN at 1/17/2020 1346    Comment:  Spinal precautions and techniques utilized for accomodation. LSO brace kermit/doff techniques and utilization. Role of therapy in plan of care. Encouragement of mobility.   Significant Other Acceptance, E,TB, VU by AN at 1/17/2020 1346    Comment:  Spinal precautions and techniques utilized for accomodation. LSO brace kermit/doff techniques and utilization. Role of therapy in plan of care. Encouragement of mobility.                   Point: Precautions (Done)     Description:   Instruct learner(s) on prescribed precautions during mobility and gait tasks              Learning Progress Summary           Patient Acceptance, E,TB, VU by AN at 1/17/2020 1346    Comment:  Spinal precautions and techniques utilized for accomodation. LSO brace kermit/doff techniques and utilization. Role of therapy in plan of care. Encouragement of mobility.   Significant Other Acceptance, E,TB, VU by AN at 1/17/2020 1346    Comment:  Spinal precautions and techniques utilized for accomodation. LSO brace kermit/doff techniques and utilization. Role of therapy in plan of care. Encouragement of mobility.                                User Key     Initials Effective Dates Name Provider Type Discipline    AN 12/04/19 -  Niesha Gagnon, PT Student PT Student PT              PT Recommendation and Plan  Planned Therapy Interventions (PT Eval): (P) balance training, transfer training, gait training, stair training, strengthening, patient/family education  Outcome Summary/Treatment Plan (PT)  Anticipated Discharge Disposition (PT): (P) home with assist  Plan of Care Reviewed With: (P) patient  Progress: (P) improving  Outcome Summary: (P) PT evaluation completed. Pt demonstrates understanding of spinal precautions and LSO brace after education. Pt expresses increased pain of incision site upon sit-stand-sit transfers which dissipates after 2-3 minutes. Pt ambulates with increased SLS of RLE but no significant gait deviations. Pt requires skilled PT services to address balance and stair training. Recommend discharge home with assist.       Time Calculation:   PT Charges     Row Name 01/17/20 1436             Time Calculation    Start Time  1346  (Pended)  chart review 1440-7120  -AN      Stop Time  1422  (Pended)   -AN      Time Calculation (min)  36 min  (Pended)   -AN      PT Received On  01/17/20  (Pended)   -AN      PT Goal Re-Cert Due Date  01/27/20  (Pended)   -AN        User Key  (r) = Recorded By, (t) = Taken By, (c) = Cosigned By    Initials Name Provider Type    AN Niesha Gagnon, PT Student PT Student        Therapy Charges for Today     Code Description Service Date Service Provider Modifiers Qty    04019531734 HC PT EVAL LOW COMPLEXITY 3 1/17/2020 Niesha Gagnon, PT Student GP 1          PT G-Codes  Outcome Measure Options: AM-PAC 6 Clicks Daily Activity (OT)  AM-PAC 6 Clicks Score (PT): (P) 20  AM-PAC 6 Clicks Score (OT): 22    Niesha Gagnon PT Student  1/17/2020

## 2020-01-17 NOTE — ANESTHESIA PROCEDURE NOTES
Airway  Urgency: elective    Date/Time: 1/17/2020 7:14 AM  Airway not difficult    General Information and Staff    Patient location during procedure: OR  CRNA: Sylvain Montalvo CRNA    Indications and Patient Condition  Indications for airway management: airway protection    Preoxygenated: yes  Mask difficulty assessment: 1 - vent by mask    Final Airway Details  Final airway type: endotracheal airway      Successful airway: ETT  Cuffed: yes   Successful intubation technique: direct laryngoscopy  Facilitating devices/methods: intubating stylet  Endotracheal tube insertion site: oral  Blade: [place]  Blade size: 2  ETT size (mm): 7.5  Cormack-Lehane Classification: grade I - full view of glottis  Placement verified by: chest auscultation and capnometry   Cuff volume (mL): 8  Measured from: teeth  ETT/EBT  to teeth (cm): 21  Number of attempts at approach: 1  Assessment: lips, teeth, and gum same as pre-op and atraumatic intubation

## 2020-01-17 NOTE — OP NOTE
LUMBAR ANTERIOR INTERBODY FUSION  Procedure Note    Ayla Echevarria  1/17/2020    Pre-op Diagnosis:    1. Psoriatic arthritis.   2. Increasing chronic back pain.   3. Bilateral buttock, thigh, and leg radiculopathy.   4. Severe neurogenic claudication.   5. Degenerative disk disease L4 to S1.   6. Severe L4-L5.   7. Spondylolisthesis with instability L4-L5.   8. Focal kyphosis L4-5.   9. Facet arthropathy L4 to S1.   10. Huge central disk herniation at L4-5.   11. Focal scoliosis concave left L5-S1.   12. Central and bilateral foraminal stenosis L4 to S1, worst central L4-5, left L5-S1.    Post-op Diagnosis:    same    Procedure/CPT® Codes:     1. Anterior discectomy decompression with bilateral neural foraminotomy L5-S1  2. Anterior lumbar interbody fusion L5-S1  3. Anterior spinal instrumentation L5-S1 (ATEC anterior plate and screws)  4. Use of titanium interbody biomechanical device for fusion L5-S1 (CoreLink titanium spacer)  5. Use of allograft bone matrix for fusion L5-S1  6. Use of allograft liquid for fusion L5-S1 (BioBurst)  7. Use of fluoroscopy for confirmation of surgical level, placement of interbody spacer and instrumentation  8. Intraoperative neural monitoring     Anesthesia: General     Surgeon: ASHLEY Bang MD     Co Surgeon: Dr. Salvatore Villalta D.O.     Assistant: Chuck John PA-C     Estimated Blood Loss: 50 mL     Complications: None     Condition: Stable to PACU.     Indications:     The patient is a 49-year-old who sees Dr. Malcolm Hernandes for medical issues he presented to the office with a history of increasing chronic back pain along with symptoms down both buttocks, thighs, and into her legs.  Her symptoms were consistent with severe neurogenic claudication.  Imaging studies reveal degenerative disc disease from L4-S1 that was severe at L4-5 where there was also spondylolisthesis with instability and focal kyphosis along with a huge central disc herniation.  There was facet  arthropathy at both levels there was also focal scoliosis concave to the left at L5-S1.  All of the degenerative changes combined to form central and bilateral foraminal stenosis at both levels that was worse centrally at L4-5 and on the left at L5-S1.    After failing all conservative measures, it was mutually decided that surgery would be the best option.  Risks, benefits, and complications of surgery were discussed in detail. The patient appeared well informed and wished to proceed. We specifically discussed the risk of infection, blood loss, nerve root injury, CSF leak, and the possibility of incomplete resolution of symptoms. We also discussed the possible risk of a nonunion and the potential need for additional surgery in the event of a pseudoarthrosis or hardware failure.    We elected to proceed with a staged operation.  Today we are performing an anterior decompression and fusion of L5-S1, it is planned that we will be returning to surgery for a second lateral procedure at a later date to address L4-5 followed by a final posterior procedure involving a posterior spinal fusion with instrumentation from L4-S1.     Operative Procedure:     After obtaining informed consent and verifying the correct operative level, the patient was brought to the operating room and placed supine on an operating table. A general anesthetic was provided by the anesthesia service with the assistance of an endotracheal tube. Once this was appropriately positioned and secured, the anterior abdominal region was prepped and draped in usual sterile fashion. A surgical timeout was taken to confirm this was the correct patient, we were working at the correct level, and that preoperative antibiotics were given in a timely fashion.     At this point Dr. Salvatore Villalta D.O. provided vascular access to the L5-S1 level. He performed a left sided anterior retroperitoneal approach to the L5-S1 segment. Please see his separate dictated operative  report regarding the details on the approach itself.  When I entered the procedure, self retaining retractors were already in position with excellent exposure of the L5-S1 disc space.     After confirming we were at the correct level using fluoroscopy, I used a long handle 10 blade scalpel to cut into the L5-S1 disc space. A Castellano elevator was used to remove disc material off of the endplates. Disc material was retrieved using pituitaries and Kerrisons. A disc space distractor was then placed into the L5-S1 disc space and I used curettes to remove posterior disc material. Kerrisons were used to remove posterior osteophytes across the endplates of L5 and S1. There was high-grade stenosis centrally but also foraminally. I then performed a bilateral neural foraminotomy with Kerrisons and curettes. The decompression was much more involved than what is usually required for an anterior lumbar interbody fusion by itself and required significantly more time to perform.  This was due to the severe disc space collapse and high-grade foraminal stenosis.     After the decompression was completed bilaterally and centrally, I used a series of endplate scrapers to prepare the endplates for interbody fusion. Trial spacers were then malleted into position and it was felt that a 16 mm titanium spacer with 15 degrees lordosis from the CoreLink instrumentation set would be the best fit to restore disc height also restoring foraminal height and providing some indirect decompression. The disc space was then thoroughly irrigated with saline solution.  Gelfoam powder with thrombin was used to control epidural bleeding.     A 16 mm titanium spacer from the CoreLink instrumentation set was then packed as tightly as possible with allograft bone matrix mixed with an allograft liquid called BioBurst. This spacer was then malleted into the L5-S1 disc space under fluoroscopic guidance. It was placed as an interbody biomechanical device to assist  with fusion.  I then placed a single 25 mm screw through the spacer into the L5 vertebral body to assist with preventing migration.     A 4-hole anterior plate from the [facility] instrumentation set was then chosen. The 4 holes were drilled and four 30 mm screws were used to fix the plate across the L5-S1 segment augmenting the fusion.      We then inspected the entire operative field for any signs of bleeding.  Bleeding was once again controlled using thrombin with Gelfoam powder and bipolar cautery.  Once we ensured that adequate hemostasis was accomplished, final fluoroscopy imaging was taken to confirm adequate position of our implants. There was excellent restoration of disc height and the plate and screw construct appeared to be adequately positioned across L5-S1.     Please see Dr. Salvatore Villalta's separate dictated operative report regarding the closure of the wound. Once this was accomplished, the patient was extubated and sent to the recovery room in good stable condition. We estimated blood loss to be approximately 100 mL and the patient remained hemodynamically stable during the procedure.     Dr. Salvatore Villalta D.KIP provided vascular access to the L5-S1 level and acted as a co-surgeon in this fashion.  Chuck John PA-C provided critical assistance during the decompression at L5-S1 as well as during the placement of the titanium spacer, bone graft and instrumentation to obtain a fusion across L5-S1.    ASHLEY Bang MD    Date: 1/17/2020  Time: 9:29 AM

## 2020-01-17 NOTE — THERAPY DISCHARGE NOTE
Acute Care - Occupational Therapy Initial Evaluation  [facility]     Patient Name: Ayla Echevarria  : 1970  MRN: [id number]  Today's Date: 2020  Onset of Illness/Injury or Date of Surgery: 20  Date of Referral to OT: 20  Referring Physician: Dr. Bang    Admit Date: 2020       ICD-10-CM ICD-9-CM   1. Impaired functional mobility, balance, gait, and endurance Z74.09 V49.89   2. Aftercare following surgery Z48.89 V58.89     Patient Active Problem List   Diagnosis   • Hypertension   • Hyperlipidemia   • Disc degeneration, lumbosacral   • Chronic bilateral low back pain with bilateral sciatica   • Lumbar spinal stenosis     Past Medical History:   Diagnosis Date   • Chronic bilateral low back pain with bilateral sciatica 2020   • Disc degeneration, lumbosacral 2020   • Hyperlipidemia    • Hypertension    • Nerve pain    • Psoriasis    • Sleep apnea      Past Surgical History:   Procedure Laterality Date   •  SECTION      x2   • CHOLECYSTECTOMY            OT ASSESSMENT FLOWSHEET (last 12 hours)      Occupational Therapy Evaluation     Row Name 20 1347                   OT Evaluation Time/Intention    Document Type  evaluation  -        Mode of Treatment  occupational therapy  -           General Information    Patient Profile Reviewed?  yes  -        Onset of Illness/Injury or Date of Surgery  20  -        Referring Physician  Dr. Bang  -        Patient Observations  alert;cooperative;agree to therapy  -        Patient/Family Observations  spouse present  -        General Observations of Patient  fowlers, bilat SCDs, IVs, cont pulse ox, O2  -        Prior Level of Function  independent:;ADL's;all household mobility;community mobility;home management;driving  -        Equipment Currently Used at Home  bipap/ cpap;walker, rolling  -        Existing Precautions/Restrictions  fall;spinal;brace worn when out of bed  -        Risks Reviewed   patient:;LOB;increased discomfort  -        Benefits Reviewed  patient:;increase independence;improve function;increase strength;increase balance  -           Relationship/Environment    Lives With  spouse;child(alicia), adult  -           Resource/Environmental Concerns    Current Living Arrangements  home/apartment/condo  -           Home Main Entrance    Number of Stairs, Main Entrance  three  -           Cognitive Assessment/Interventions    Additional Documentation  Cognitive Assessment/Intervention (Group)  -           Cognitive Assessment/Intervention- PT/OT    Orientation Status (Cognition)  oriented x 4  -CH        Personal Safety Interventions  fall prevention program maintained;gait belt;muscle strengthening facilitated;nonskid shoes/slippers when out of bed;supervised activity  -           Safety Issues, Functional Mobility    Impairments Affecting Function (Mobility)  balance;endurance/activity tolerance;pain  -           Bed Mobility Assessment/Treatment    Bed Mobility Assessment/Treatment  rolling right;sidelying-sit;sit-sidelying;rolling left  -        [place] Level (Bed Mobility)  contact guard assist;verbal cues  -        Rolling Left [place] (Bed Mobility)  contact guard;verbal cues  -        Rolling Right [place] (Bed Mobility)  contact guard;verbal cues  -        Sidelying-Sit [place] (Bed Mobility)  contact guard;verbal cues  -        Sit-Sidelying [place] (Bed Mobility)  contact guard;verbal cues  -           Functional Mobility    Functional Mobility- Ind. Level  supervision required  -        Functional Mobility- Comment  in room   -           Transfer Assessment/Treatment    Transfer Assessment/Treatment  sit-stand transfer;stand-sit transfer  -           Sit-Stand Transfer    Sit-Stand [place] (Transfers)  contact guard;verbal cues  -           Stand-Sit Transfer    Stand-Sit [place] (Transfers)  contact guard;verbal cues   -           ADL Assessment/Intervention    BADL Assessment/Intervention  lower body dressing;upper body dressing;toileting  -           Upper Body Dressing Assessment/Training    Upper Body Dressing [place] Level  supervision;doff;minimum assist (75% patient effort);don LSO  -        Upper Body Dressing Position  edge of bed sitting  -           Lower Body Dressing Assessment/Training    Lower Body Dressing [place] Level  minimum assist (75% patient effort);don;socks  -        Lower Body Dressing Position  edge of bed sitting  -           Toileting Assessment/Training    [place] Level (Toileting)  supervision;verbal cues;adjust/manage clothing;perform perineal hygiene  -        Assistive Devices (Toileting)  commode  -        Toileting Position  unsupported sitting;supported standing  -           BADL Safety/Performance    Impairments, BADL Safety/Performance  pain;balance;endurance/activity tolerance  -           General ROM    GENERAL ROM COMMENTS  BUE AROM WFL  -           MMT (Manual Muscle Testing)    General MMT Comments  no formal testing done 2' lifting restriction  -           Static Sitting Balance    Level of [place] (Unsupported Sitting, Static Balance)  supervision  -        Sitting Position (Unsupported Sitting, Static Balance)  sitting on edge of bed  -           Dynamic Sitting Balance    Level of [place], Reaches Outside Midline (Sitting, Dynamic Balance)  supervision  -        Sitting Position, Reaches Outside Midline (Sitting, Dynamic Balance)  sitting on edge of bed  -           Static Standing Balance    Level of [place] (Supported Standing, Static Balance)  contact guard assist  -           Dynamic Standing Balance    Level of [place], Reaches Outside Midline (Standing, Dynamic Balance)  contact guard assist  -           Positioning and Restraints    Pre-Treatment Position  in bed  -        Post Treatment Position  bed   -        In Bed  fowlers;call light within reach;encouraged to call for assist;with family/caregiver;side rails up x2  -CH           Pain Assessment    Additional Documentation  Pain Scale: Numbers Pre/Post-Treatment (Group)  -CH           Pain Scale: Numbers Pre/Post-Treatment    Pain Scale: Numbers, Pretreatment  5/10  -CH        Pain Scale: Numbers, Post-Treatment  5/10  -CH        Pain Location - Orientation  incisional  -CH           Orthotics & Prosthetics Management    Orthosis Location  spinal orthosis  -        Additional Documentation  Orthosis Location (Row)  -           Spinal Orthosis Management    Type (Spinal Orthosis)  LSO (lumbar sacral orthosis)  -CH        Functional Design (Spinal Orthosis)  static orthosis  -        Therapeutic Indications (Spinal Orthosis)  post-op positioning/protection;pain management  -CH        Wearing Schedule (Spinal Orthosis)  wear when out of bed only  -        Orthosis Training (Spinal Orthosis)  patient and caregiver;all orthosis skills  -           Wound 01/17/20 0955 lower abdomen Incision    Wound - Properties Group Date first assessed: 01/17/20  -LW Time first assessed: 0955  -LW Present on Hospital Admission: N  -LW Orientation: lower  -LW Location: abdomen  -LW Primary Wound Type: Incision  -LW Additional Comments: mastisol, steri strips, telfa, 4x4s, tegaderm  -LW       Plan of Care Review    Plan of Care Reviewed With  patient  -CH        Progress  improving  -           Clinical Impression (OT)    Date of Referral to OT  01/17/20  -        Criteria for Skilled Therapeutic Interventions Met (OT Eval)  no;treatment indicated;no problems identified which require skilled intervention  -        Rehab Potential (OT Eval)  good, to achieve stated therapy goals  -        Therapy Frequency (OT Eval)  evaluation only  -        Care Plan Review (OT)  evaluation/treatment results reviewed;care plan/treatment goals reviewed;risks/benefits  reviewed;current/potential barriers reviewed;patient/other agree to care plan  -        Anticipated Discharge Disposition (OT)  home with assist  -           Discharge Summary (Occupational Therapy)    Additional Documentation  Discharge Summary, OT Eval (Group)  -           Discharge Summary, OT Eval    Reason for Discharge (OT Discharge Summary)  no further expectation of functional progress;no further needs identified  -           Living Environment    Home Accessibility  stairs to enter home;tub/shower is not walk in  -          User Key  (r) = Recorded By, (t) = Taken By, (c) = Cosigned By    Initials Name Effective Dates    Jeanie Armando, OTR/L 08/02/16 -     Mercy Oropeza RN 09/25/19 -                OT Recommendation and Plan  Outcome Summary/Treatment Plan (OT)  Anticipated Discharge Disposition (OT): home with assist  Reason for Discharge (OT Discharge Summary): no further expectation of functional progress, no further needs identified  Therapy Frequency (OT Eval): evaluation only  Plan of Care Review  Plan of Care Reviewed With: patient  Plan of Care Reviewed With: patient    Outcome Measures     Row Name 01/17/20 1400             How much help from another is currently needed...    Putting on and taking off regular lower body clothing?  3  -CH      Bathing (including washing, rinsing, and drying)  3  -CH      Toileting (which includes using toilet bed pan or urinal)  4  -CH      Putting on and taking off regular upper body clothing  4  -CH      Taking care of personal grooming (such as brushing teeth)  4  -CH      Eating meals  4  -CH      AM-PAC 6 Clicks Score (OT)  22  -CH         Functional Assessment    Outcome Measure Options  AM-PAC 6 Clicks Daily Activity (OT)  -        User Key  (r) = Recorded By, (t) = Taken By, (c) = Cosigned By    Initials Name Provider Type    Jeanie Armando, OTR/L Occupational Therapist          Time Calculation:   Time Calculation- OT     Row Name  01/17/20 1347             Time Calculation- OT    OT Start Time  1347  -CH      OT Stop Time  1425  -CH      OT Time Calculation (min)  38 min  -      OT Received On  01/17/20  -        User Key  (r) = Recorded By, (t) = Taken By, (c) = Cosigned By    Initials Name Provider Type     Jeanie Hughes, OTR/L Occupational Therapist        Therapy Charges for Today     Code Description Service Date Service Provider Modifiers Qty    69841765349  OT EVAL LOW COMPLEXITY 3 1/17/2020 Jeanie Hughes, OTR/L GO 1               Jeanie Hughes OTR/L  1/17/2020

## 2020-01-17 NOTE — ANESTHESIA PREPROCEDURE EVALUATION
Anesthesia Evaluation     Patient summary reviewed   no history of anesthetic complications:  NPO Solid Status: > 8 hours  NPO Liquid Status: > 8 hours           Airway   Mallampati: II  TM distance: >3 FB  Neck ROM: full  Large neck circumference  Dental - normal exam     Pulmonary - normal exam    breath sounds clear to auscultation  (+) sleep apnea on CPAP,   (-) asthma, recent URI, not a smoker  Cardiovascular - normal exam  Exercise tolerance: good (4-7 METS)    ECG reviewed  Patient on routine beta blocker and Beta blocker given within 24 hours of surgery  Rhythm: regular  Rate: normal    (+) hypertension, hyperlipidemia,   (-) pacemaker, past MI, angina, cardiac stents      Neuro/Psych  (-) seizures, TIA, CVA  GI/Hepatic/Renal/Endo    (+) morbid obesity,    (-) GERD, liver disease, no renal disease, diabetes, no thyroid disorder    Musculoskeletal     Abdominal   (+) obese,    Substance History      OB/GYN          Other                      Anesthesia Plan    ASA 3     general     intravenous induction     Anesthetic plan, all risks, benefits, and alternatives have been provided, discussed and informed consent has been obtained with: patient.

## 2020-01-18 VITALS
SYSTOLIC BLOOD PRESSURE: 125 MMHG | HEART RATE: 82 BPM | DIASTOLIC BLOOD PRESSURE: 49 MMHG | RESPIRATION RATE: 18 BRPM | OXYGEN SATURATION: 96 % | TEMPERATURE: 97.5 F

## 2020-01-18 LAB
ANION GAP SERPL CALCULATED.3IONS-SCNC: 9 MMOL/L (ref 5–15)
BASOPHILS # BLD AUTO: 0.03 10*3/MM3 (ref 0–0.2)
BASOPHILS NFR BLD AUTO: 0.3 % (ref 0–1.5)
BUN BLD-MCNC: 11 MG/DL (ref 6–20)
BUN/CREAT SERPL: 17.5 (ref 7–25)
CALCIUM SPEC-SCNC: 7.4 MG/DL (ref 8.6–10.5)
CHLORIDE SERPL-SCNC: 100 MMOL/L (ref 98–107)
CO2 SERPL-SCNC: 30 MMOL/L (ref 22–29)
CREAT BLD-MCNC: 0.63 MG/DL (ref 0.57–1)
DEPRECATED RDW RBC AUTO: 45.1 FL (ref 37–54)
EOSINOPHIL # BLD AUTO: 0.02 10*3/MM3 (ref 0–0.4)
EOSINOPHIL NFR BLD AUTO: 0.2 % (ref 0.3–6.2)
ERYTHROCYTE [DISTWIDTH] IN BLOOD BY AUTOMATED COUNT: 13.8 % (ref 12.3–15.4)
GFR SERPL CREATININE-BSD FRML MDRD: 100 ML/MIN/1.73
GLUCOSE BLD-MCNC: 124 MG/DL (ref 65–99)
HCT VFR BLD AUTO: 34.8 % (ref 34–46.6)
HGB BLD-MCNC: 11.4 G/DL (ref 12–15.9)
IMM GRANULOCYTES # BLD AUTO: 0.05 10*3/MM3 (ref 0–0.05)
IMM GRANULOCYTES NFR BLD AUTO: 0.4 % (ref 0–0.5)
LYMPHOCYTES # BLD AUTO: 1.72 10*3/MM3 (ref 0.7–3.1)
LYMPHOCYTES NFR BLD AUTO: 15.3 % (ref 19.6–45.3)
MCH RBC QN AUTO: 29.2 PG (ref 26.6–33)
MCHC RBC AUTO-ENTMCNC: 32.8 G/DL (ref 31.5–35.7)
MCV RBC AUTO: 89.2 FL (ref 79–97)
MONOCYTES # BLD AUTO: 1.07 10*3/MM3 (ref 0.1–0.9)
MONOCYTES NFR BLD AUTO: 9.5 % (ref 5–12)
NEUTROPHILS # BLD AUTO: 8.35 10*3/MM3 (ref 1.7–7)
NEUTROPHILS NFR BLD AUTO: 74.3 % (ref 42.7–76)
NRBC BLD AUTO-RTO: 0 /100 WBC (ref 0–0.2)
PLATELET # BLD AUTO: 388 10*3/MM3 (ref 140–450)
PMV BLD AUTO: 9.8 FL (ref 6–12)
POTASSIUM BLD-SCNC: 3.8 MMOL/L (ref 3.5–5.2)
RBC # BLD AUTO: 3.9 10*6/MM3 (ref 3.77–5.28)
SODIUM BLD-SCNC: 139 MMOL/L (ref 136–145)
WBC NRBC COR # BLD: 11.24 10*3/MM3 (ref 3.4–10.8)

## 2020-01-18 PROCEDURE — 97530 THERAPEUTIC ACTIVITIES: CPT

## 2020-01-18 PROCEDURE — 85025 COMPLETE CBC W/AUTO DIFF WBC: CPT | Performed by: ORTHOPAEDIC SURGERY

## 2020-01-18 PROCEDURE — 80048 BASIC METABOLIC PNL TOTAL CA: CPT | Performed by: ORTHOPAEDIC SURGERY

## 2020-01-18 PROCEDURE — 97116 GAIT TRAINING THERAPY: CPT

## 2020-01-18 RX ORDER — TIZANIDINE 4 MG/1
4 TABLET ORAL EVERY 8 HOURS PRN
Qty: 15 TABLET | Refills: 0 | Status: ON HOLD | OUTPATIENT
Start: 2020-01-18 | End: 2020-01-22 | Stop reason: SDUPTHER

## 2020-01-18 RX ORDER — HYDROCODONE BITARTRATE AND ACETAMINOPHEN 7.5; 325 MG/1; MG/1
1 TABLET ORAL EVERY 4 HOURS PRN
Qty: 42 TABLET | Refills: 0
Start: 2020-01-18 | End: 2020-01-26 | Stop reason: HOSPADM

## 2020-01-18 RX ADMIN — METOPROLOL TARTRATE 25 MG: 25 TABLET, FILM COATED ORAL at 10:04

## 2020-01-18 RX ADMIN — HYDROCODONE BITARTRATE AND ACETAMINOPHEN 1 TABLET: 7.5; 325 TABLET ORAL at 12:07

## 2020-01-18 RX ADMIN — GABAPENTIN 600 MG: 300 CAPSULE ORAL at 06:49

## 2020-01-18 RX ADMIN — DULOXETINE HYDROCHLORIDE 60 MG: 30 CAPSULE, DELAYED RELEASE ORAL at 10:04

## 2020-01-18 RX ADMIN — HYDROCODONE BITARTRATE AND ACETAMINOPHEN 1 TABLET: 7.5; 325 TABLET ORAL at 06:51

## 2020-01-18 NOTE — DISCHARGE SUMMARY
Date of Discharge:  1/18/2020    Admission Diagnosis: M43.10    Discharge Diagnosis:   1. Anterior lumbar interbody fusion L5-S1 1/17/2020    Consults During Admission: none    Hospital Course  Patient is a 49 y.o. female who underwent a left L5-S1 1/17/2020.  She experienced no intraoperative or postoperative complications.  She notes some mild soreness in the abdominal area but pain is well controlled.  Patient denies any chest pain or shortness of breath.  Patient denies any worsening neurologic issues.  Patient feels ready for discharge to home.    Condition on Discharge:  STABLE    Vital Signs  Temp:  [97.6 °F (36.4 °C)-98.5 °F (36.9 °C)] 97.9 °F (36.6 °C)  Heart Rate:  [66-87] 66  Resp:  [10-18] 18  BP: (101-144)/(44-76) 119/59    Physical Exam:   Reveals a pleasant 49-year-old female she is alert and oriented x3 mood affect is appropriate no acute distress.  Patient's  is present in the room.  Patient is able to move all 4 extremities upon presentation in the room today.  Dressing is clean dry and intact.  Sensation is intact in all 4 extremities.  Patient is able to move herself about the bed independently.  No complaints of chest pain or shortness of breath.  Homans is negative.    Discharge Disposition  Home or Self Care    Discharge Medications     Discharge Medications      Changes to Medications      Instructions Start Date   HYDROcodone-acetaminophen 7.5-325 MG per tablet  Commonly known as:  NORCO  What changed:    · when to take this  · reasons to take this   1 tablet, Oral, Every 4 Hours PRN      tiZANidine 4 MG tablet  Commonly known as:  ZANAFLEX  What changed:  Another medication with the same name was added. Make sure you understand how and when to take each.   4 mg, Oral, Nightly PRN      tiZANidine 4 MG tablet  Commonly known as:  ZANAFLEX  What changed:  You were already taking a medication with the same name, and this prescription was added. Make sure you understand how and when to  take each.   4 mg, Oral, Every 8 Hours PRN         Continue These Medications      Instructions Start Date   DULoxetine 60 MG capsule  Commonly known as:  CYMBALTA   60 mg, Oral, Daily      ezetimibe 10 MG tablet 10 mg, simvastatin 40 MG tablet 40 mg   1 tablet, Oral, Nightly      gabapentin 600 MG tablet  Commonly known as:  NEURONTIN   600 mg, Oral, 4 Times Daily      lisinopril-hydrochlorothiazide 20-12.5 MG per tablet  Commonly known as:  PRINZIDE,ZESTORETIC   1 tablet, Oral, Daily      metoprolol tartrate 25 MG tablet  Commonly known as:  LOPRESSOR   25 mg, Oral, 2 Times Daily      predniSONE 10 MG tablet  Commonly known as:  DELTASONE   10 mg, Oral, Take As Directed         Stop These Medications    DICLOFENAC PO            Discharge Diet: Resume Home diet, advance as tolerated    Activity at Discharge: Resume home activity advace as tolerated, no lifting, no twisting, no bending, brace as directed, no driving until directed.     Follow-up Appointments  Followup with PCP within one week  Followup [facility] at 2weeks post-op         Danis Arora PA-C  01/18/20  10:19 AM

## 2020-01-18 NOTE — THERAPY TREATMENT NOTE
Acute Care - Physical Therapy Treatment Note  [facility]     Patient Name: Ayla Echevarria  : 1970  MRN: [id number]  Today's Date: 2020  Onset of Illness/Injury or Date of Surgery: 20     Referring Physician: Dr. Bang    Admit Date: 2020    Visit Dx:    ICD-10-CM ICD-9-CM   1. Impaired functional mobility, balance, gait, and endurance Z74.09 V49.89   2. Aftercare following surgery Z48.89 V58.89     Patient Active Problem List   Diagnosis   • Hypertension   • Hyperlipidemia   • Disc degeneration, lumbosacral   • Chronic bilateral low back pain with bilateral sciatica   • Lumbar spinal stenosis       Therapy Treatment    Rehabilitation Treatment Summary     Row Name 20 0841             Treatment Time/Intention    Discipline  physical therapy assistant  -      Document Type  therapy note (daily note)  -[facility]      Subjective Information  complains of;pain  -2      Mode of Treatment  physical therapy  -2      Existing Precautions/Restrictions  fall;spinal;brace worn when out of bed  -2      Recorded by [] Niesha Jack, SASKIA 20 0841  [2] Niesha Jack, [facility] 20      Row Name 20 0841             Bed Mobility Assessment/Treatment    Comment (Bed Mobility)  sitting EOB upon entry  -      Recorded by [] Niesha Jack PTA 20 09      Row Name 20 0841             Sit-Stand Transfer    Sit-Stand [place] (Transfers)  stand by assist  -      Recorded by [] Niesha Jack PTA 20 09      Row Name 20 0841             Stand-Sit Transfer    Stand-Sit [place] (Transfers)  stand by assist  -      Recorded by [] Niesha Jack PTA 20 09      Row Name 20 0841             Gait/Stairs Assessment/Training    [place] Level (Gait)  contact guard;verbal cues  -      Distance in Feet (Gait)  200' x2 1 standing rest  -      [place] Level (Stairs)  stand by assist  -      Handrail Location  (Stairs)  left side (ascending)  -      Number of Steps (Stairs)  3  -AH      Ascending Technique (Stairs)  step-to-step  -AH      Descending Technique (Stairs)  step-to-step  -AH      Recorded by [] Niesha Jack, PTA 01/18/20 0912      Row Name 01/18/20 0841             Standing Balance Activity    Activities Performed (Standing, Balance Training)  feet together in stance;tandem stance  -      Support Needed for Balance (Standing, Balance Training)  CGA  -      Progressive Balance Activity (Standing, Balance Training)  eyes closed during activity;manual resistance applied during activity  -AH      Recorded by [] Niesha Jack, PTA 01/18/20 0912      Row Name 01/18/20 0841             Dynamic Balance Activity    Therapeutic Training Performed (Dynamic Balance)  ambulate backward;lateral walking  -      Support Needed for Balance (Dynamic Balance Training)  [facility]  -      Progressive Balance Activity (Dynamic Balance Training)  combined head and eye movements during activity  -AH      Recorded by [] Niesha Jack, SASKIA 01/18/20 0912      Row Name 01/18/20 0841             Positioning and Restraints    Pre-Treatment Position  in bed  -      Post Treatment Position  chair  -      In Chair  sitting;call light within reach;encouraged to call for assist;with family/caregiver  -AH      Recorded by [] Niesha Jack, SASKIA 01/18/20 0912      Row Name 01/18/20 0841             Pain Scale: Numbers Pre/Post-Treatment    Pain Scale: Numbers, Pretreatment  6/10  -      Pain Scale: Numbers, Post-Treatment  6/10  -      Pain Location - Orientation  lower  -      Pain Location  extremity  -      Pre/Post Treatment Pain Comment  pain in low back, L buttock, and R sciatic pattern  -      Pain Intervention(s)  Repositioned;Ambulation/increased activity  -      Recorded by [] Niesha Jack, PTA 01/18/20 0912      Row Name                Wound 01/17/20 0955 lower abdomen Incision    Wound -  Properties Group Date first assessed: 01/17/20 [LW] Time first assessed: 0955 [LW] Present on Hospital Admission: N [LW] Orientation: lower [LW] Location: abdomen [LW] Primary Wound Type: Incision [LW] Additional Comments: mastisol, steri strips, telfa, 4x4s, tegaderm [LW] Recorded by:  [LW] Mercy Ernandez RN 01/17/20 0959      User Key  (r) = Recorded By, (t) = Taken By, (c) = Cosigned By    Initials Name Effective Dates Discipline     Niesha Jack, SASKIA 01/06/20 -  PT    LW Mercy Ernandez RN 09/25/19 -  Nurse          Wound 01/17/20 0941 lower abdomen Incision (Active)   Dressing Appearance dry;intact 1/17/2020  8:39 PM   Closure JUAN 1/17/2020  8:39 PM   Base dressing in place, unable to visualize 1/17/2020  8:39 PM   Drainage Amount none 1/17/2020  8:39 PM   Dressing Care, Wound gauze;transparent film 1/17/2020  8:39 PM               PT Recommendation and Plan     Plan of Care Reviewed With: patient, spouse  Progress: improving  Outcome Summary: Pt. sitting EOB w/ nsg upon arrival stood and amb to BR w/ SBA and amb 200' x2 in hallway w/ 1 standing rest and CGA. Pt. c//o radicular symptoms in B LE which she states are typical and will hopefully resolve after her next surgery on Wednesday. Pt. asc/desc 3 steps 2x w/  HR L w/ SBA. Pt. does not have a handrail at home but  plans on changing that. We also worked on staic standing and dynammic balancec activities. Pt. did not have any LOB. Pt. does feel weak and would benefit from continued therapy to increase B LE strength and amb.  Outcome Measures     Row Name 01/18/20 0841 01/17/20 1400          How much help from another person do you currently need...    Turning from your back to your side while in flat bed without using bedrails?  3  -AH  --     Moving from lying on back to sitting on the side of a flat bed without bedrails?  3  -AH  --     Moving to and from a bed to a chair (including a wheelchair)?  3  -AH  --     Standing up from a chair using  your arms (e.g., wheelchair, bedside chair)?  4  -  --     Climbing 3-5 steps with a railing?  3  -  --     To walk in hospital room?  4  -  --     AM-PAC 6 Clicks Score (PT)  20  -  --        How much help from another is currently needed...    Putting on and taking off regular lower body clothing?  --  3  -CH     Bathing (including washing, rinsing, and drying)  --  3  -CH     Toileting (which includes using toilet bed pan or urinal)  --  4  -CH     Putting on and taking off regular upper body clothing  --  4  -CH     Taking care of personal grooming (such as brushing teeth)  --  4  -CH     Eating meals  --  4  -CH     AM-PAC 6 Clicks Score (OT)  --  22  -        Functional Assessment    Outcome Measure Options  AM-[facility] 6 Clicks Basic Mobility (PT)  -  AM-[facility] 6 Clicks Daily Activity (OT)  -       User Key  (r) = Recorded By, (t) = Taken By, (c) = Cosigned By    Initials Name Provider Type     Jeanie Hughes, OTR/L Occupational Therapist     Niesha Jack PTA Physical Therapy Assistant         Time Calculation:   PT Charges     Row Name 01/18/20 0925             Time Calculation    Start Time  0841  -      Stop Time  0923  -      Time Calculation (min)  42 min  -      PT Non-Billable Time (min)  18 min  -      PT Received On  01/18/20  -      PT Goal Re-Cert Due Date  01/27/20  -         Timed Charges    67511 - PT Therapeutic Exercise Minutes  --  -      51262 - Gait Training Minutes   12  -      76566 - PT Therapeutic Activity Minutes  12  -        User Key  (r) = Recorded By, (t) = Taken By, (c) = Cosigned By    Initials Name Provider Type     Niesha Jack PTA Physical Therapy Assistant        Therapy Charges for Today     Code Description Service Date Service Provider Modifiers Qty    59118222184 HC GAIT TRAINING EA 15 MIN 1/18/2020 Niesha Jack PTA GP 1    73033415944 HC PT THERAPEUTIC ACT EA 15 MIN 1/18/2020 Niesha Jack PTA GP 1          PT G-Codes  Outcome  Measure Options: AM-PAC 6 Clicks Basic Mobility (PT)  AM-PAC 6 Clicks Score (PT): 20  AM-PAC 6 Clicks Score (OT): 22    Niesha Jack, SASKIA  1/18/2020

## 2020-01-18 NOTE — DISCHARGE SUMMARY
Date of Discharge:  1/18/2020    Admission Diagnosis: M43.10    Discharge Diagnosis:   1. Anterior lumbar interbody fusion L5-S1 1/17/2020    Consults During Admission: none    Hospital Course  Patient is a 49 y.o. female who underwent a left L5-S1 1/17/2020.  She experienced no intraoperative or postoperative complications.  She notes some mild soreness in the abdominal area but pain is well controlled.  Patient denies any chest pain or shortness of breath.  Patient denies any worsening neurologic issues.  Patient feels ready for discharge to home.    Condition on Discharge:  STABLE    Vital Signs  Temp:  [97.4 °F (36.3 °C)-98.5 °F (36.9 °C)] 97.9 °F (36.6 °C)  Heart Rate:  [66-87] 66  Resp:  [10-18] 18  BP: (101-144)/(44-76) 119/59    Physical Exam:   Reveals a pleasant 49-year-old female she is alert and oriented x3 mood affect is appropriate no acute distress.  Patient's  is present in the room.  Patient is able to move all 4 extremities upon presentation in the room today.  Dressing is clean dry and intact.  Sensation is intact in all 4 extremities.  Patient is able to move herself about the bed independently.  No complaints of chest pain or shortness of breath.  Homans is negative.    Discharge Disposition      Discharge Medications     Discharge Medications      ASK your doctor about these medications      Instructions Start Date   DICLOFENAC PO   100 mg, Oral, Daily      DULoxetine 60 MG capsule  Commonly known as:  CYMBALTA   60 mg, Oral, Daily      ezetimibe 10 MG tablet 10 mg, simvastatin 40 MG tablet 40 mg   1 tablet, Oral, Nightly      gabapentin 600 MG tablet  Commonly known as:  NEURONTIN   600 mg, Oral, 4 Times Daily      HYDROcodone-acetaminophen 7.5-325 MG per tablet  Commonly known as:  NORCO   1 tablet, Oral, Every 6 Hours PRN      lisinopril-hydrochlorothiazide 20-12.5 MG per tablet  Commonly known as:  PRINZIDE,ZESTORETIC   1 tablet, Oral, Daily      metoprolol tartrate 25 MG  tablet  Commonly known as:  LOPRESSOR   25 mg, Oral, 2 Times Daily      predniSONE 10 MG tablet  Commonly known as:  DELTASONE   10 mg, Oral, Take As Directed      tiZANidine 4 MG tablet  Commonly known as:  ZANAFLEX   4 mg, Oral, Nightly PRN             Discharge Diet: Resume Home diet, advance as tolerated    Activity at Discharge: Resume home activity advace as tolerated, no lifting, no twisting, no bending, brace as directed, no driving until directed.     Follow-up Appointments  Followup with PCP within one week  [facility]         Danis Arora PA-C  01/18/20  9:39 AM

## 2020-01-19 NOTE — THERAPY DISCHARGE NOTE
Acute Care - Occupational Therapy Discharge Summary  [facility]     Patient Name: Ayla Echevarria  : 1970  MRN: [id number]    Today's Date: 2020  Onset of Illness/Injury or Date of Surgery: 20    Date of Referral to OT: 20  Referring Physician: Dr. Bang      Admit Date: 2020        OT Recommendation and Plan    Visit Dx:    ICD-10-CM ICD-9-CM   1. Spinal stenosis of lumbar region with neurogenic claudication M48.062 724.03   2. Impaired functional mobility, balance, gait, and endurance Z74.09 V49.89   3. Aftercare following surgery Z48.89 V58.89                   Outcome Measures     Row Name 20 0841 20 1400          How much help from another person do you currently need...    Turning from your back to your side while in flat bed without using bedrails?  3  -AH  --     Moving from lying on back to sitting on the side of a flat bed without bedrails?  3  -AH  --     Moving to and from a bed to a chair (including a wheelchair)?  3  -AH  --     Standing up from a chair using your arms (e.g., wheelchair, bedside chair)?  4  -AH  --     Climbing 3-5 steps with a railing?  3  -AH  --     To walk in hospital room?  4  -AH  --     AM-PAC 6 Clicks Score (PT)  20  -AH  --        How much help from another is currently needed...    Putting on and taking off regular lower body clothing?  --  3  -CH     Bathing (including washing, rinsing, and drying)  --  3  -CH     Toileting (which includes using toilet bed pan or urinal)  --  4  -CH     Putting on and taking off regular upper body clothing  --  4  -CH     Taking care of personal grooming (such as brushing teeth)  --  4  -CH     Eating meals  --  4  -CH     AM-PAC 6 Clicks Score (OT)  --  22  -CH        Functional Assessment    Outcome Measure Options  AM-PAC 6 Clicks Basic Mobility (PT)  -AH  AM-PAC 6 Clicks Daily Activity (OT)  -       User Key  (r) = Recorded By, (t) = Taken By, (c) = Cosigned By    Initials Name Provider Type     Jeanie Armando, OTR/L Occupational Therapist    Niesha Heller PTA Physical Therapy Assistant          Therapy Suggested Charges     Code   Minutes Charges    None                 OT Discharge Summary  Anticipated Discharge Disposition (OT): home with assist  Reason for Discharge: (1x OT eval)  Outcomes Achieved: Other(1x OT eval)  Discharge Destination: Home with assist      Codie Smith, OTR/L, CNT  1/19/2020

## 2020-01-20 ENCOUNTER — NURSE TRIAGE (OUTPATIENT)
Dept: CALL CENTER | Facility: HOSPITAL | Age: 50
End: 2020-01-20

## 2020-01-20 NOTE — TELEPHONE ENCOUNTER
"Caller has questions about her up coming surgery.  Questions answered.    Reason for Disposition  • Information only question and nurse able to answer    Additional Information  • Negative: Nursing judgment  • Negative: Nursing judgment  • Negative: Nursing judgment  • Negative: Nursing judgment    Answer Assessment - Initial Assessment Questions  1. REASON FOR CALL or QUESTION: \"What is your reason for calling today?\" or \"How can I best help you?\" or \"What question do you have that I can help answer?\"      I am having the second part of my spinal surgery on 1/22 and I have some questions.    Protocols used: INFORMATION ONLY CALL - NO TRIAGE-ADULT-[place]      "

## 2020-01-20 NOTE — PAYOR COMM NOTE
"[facility] HOME 1-18-20  [id number]   [phone]    Ayla Merritt NAYE (49 y.o. Female)     Date of Birth Social Security Number Address Home Phone MRN    1970  [id number]  [place] [phone] [id number]    Advent Marital Status          Restorationism        Admission Date Admission Type Admitting Provider Attending Provider Department, Room/Bed    1/17/20 Elective PAMELA Bang MD  [facility] 3A, 357/1    Discharge Date Discharge Disposition Discharge Destination        1/18/2020 Home or Self Care              Attending Provider:  (none)   Allergies:  Ciprofloxacin, Penicillins, Cethexonium    Isolation:  None   Infection:  None   Code Status:  Prior    Ht:  160 cm (63\")   Wt:  122 kg (269 lb 3.2 oz)    Admission Cmt:  None   Principal Problem:  None                Active Insurance as of 1/17/2020     Primary Coverage     Payor Plan Insurance Group Employer/Plan Group    [employer] BLUE SHIELD PPO [id number]     Payor Plan Address Payor Plan Phone Number Payor Plan Fax Number Effective Dates    [address] [phone]  1/1/2005 - None Entered    [place] [id number]       Subscriber Name Subscriber Birth Date Member ID       ROSE,DUANE [birth year]10[birth year]1964 [id number]                 Emergency Contacts      (Rel.) Home Phone Work Phone Mobile Phone    Rose,Duane (Spouse) [phone] -- [phone]               Operative/Procedure Notes (all)      PAMELA Bang MD at 01/17/20 0540  Version 1 of 1       LUMBAR ANTERIOR INTERBODY FUSION  Procedure Note    yAla Merritt  1/17/2020    Pre-op Diagnosis:    1. Psoriatic arthritis.   2. Increasing chronic back pain.   3. Bilateral buttock, thigh, and leg radiculopathy.   4. Severe neurogenic claudication.   5. Degenerative disk disease L4 to S1.   6. Severe L4-L5.   7. Spondylolisthesis with instability L4-L5.   8. Focal kyphosis L4-5.   9. Facet arthropathy L4 to S1.   10. Huge central disk " herniation at L4-5.   11. Focal scoliosis concave left L5-S1.   12. Central and bilateral foraminal stenosis L4 to S1, worst central L4-5, left L5-S1.    Post-op Diagnosis:    same    Procedure/CPT® Codes:     1. Anterior discectomy decompression with bilateral neural foraminotomy L5-S1  2. Anterior lumbar interbody fusion L5-S1  3. Anterior spinal instrumentation L5-S1 (ATEC anterior plate and screws)  4. Use of titanium interbody biomechanical device for fusion L5-S1 (CoreLink titanium spacer)  5. Use of allograft bone matrix for fusion L5-S1  6. Use of allograft liquid for fusion L5-S1 (BioBurst)  7. Use of fluoroscopy for confirmation of surgical level, placement of interbody spacer and instrumentation  8. Intraoperative neural monitoring     Anesthesia: General     Surgeon: ASHLEY Bang MD     Co Surgeon: Dr. Salvatore Villalta D.O.     Assistant: Chuck John PA-C     Estimated Blood Loss: 50 mL     Complications: None     Condition: Stable to PACU.     Indications:     The patient is a 49-year-old who sees Dr. Malcolm Hernandes for medical issues he presented to the office with a history of increasing chronic back pain along with symptoms down both buttocks, thighs, and into her legs.  Her symptoms were consistent with severe neurogenic claudication.  Imaging studies reveal degenerative disc disease from L4-S1 that was severe at L4-5 where there was also spondylolisthesis with instability and focal kyphosis along with a huge central disc herniation.  There was facet arthropathy at both levels there was also focal scoliosis concave to the left at L5-S1.  All of the degenerative changes combined to form central and bilateral foraminal stenosis at both levels that was worse centrally at L4-5 and on the left at L5-S1.    After failing all conservative measures, it was mutually decided that surgery would be the best option.  Risks, benefits, and complications of surgery were discussed in detail. The patient  appeared well informed and wished to proceed. We specifically discussed the risk of infection, blood loss, nerve root injury, CSF leak, and the possibility of incomplete resolution of symptoms. We also discussed the possible risk of a nonunion and the potential need for additional surgery in the event of a pseudoarthrosis or hardware failure.    We elected to proceed with a staged operation.  Today we are performing an anterior decompression and fusion of L5-S1, it is planned that we will be returning to surgery for a second lateral procedure at a later date to address L4-5 followed by a final posterior procedure involving a posterior spinal fusion with instrumentation from L4-S1.     Operative Procedure:     After obtaining informed consent and verifying the correct operative level, the patient was brought to the operating room and placed supine on an operating table. A general anesthetic was provided by the anesthesia service with the assistance of an endotracheal tube. Once this was appropriately positioned and secured, the anterior abdominal region was prepped and draped in usual sterile fashion. A surgical timeout was taken to confirm this was the correct patient, we were working at the correct level, and that preoperative antibiotics were given in a timely fashion.     At this point Dr. Salvatore Villalta D.O. provided vascular access to the L5-S1 level. He performed a left sided anterior retroperitoneal approach to the L5-S1 segment. Please see his separate dictated operative report regarding the details on the approach itself.  When I entered the procedure, self retaining retractors were already in position with excellent exposure of the L5-S1 disc space.     After confirming we were at the correct level using fluoroscopy, I used a long handle 10 blade scalpel to cut into the L5-S1 disc space. A Castellano elevator was used to remove disc material off of the endplates. Disc material was retrieved using pituitaries  and Kerrisons. A disc space distractor was then placed into the L5-S1 disc space and I used curettes to remove posterior disc material. Kerrisons were used to remove posterior osteophytes across the endplates of L5 and S1. There was high-grade stenosis centrally but also foraminally. I then performed a bilateral neural foraminotomy with Kerrisons and curettes. The decompression was much more involved than what is usually required for an anterior lumbar interbody fusion by itself and required significantly more time to perform.  This was due to the severe disc space collapse and high-grade foraminal stenosis.     After the decompression was completed bilaterally and centrally, I used a series of endplate scrapers to prepare the endplates for interbody fusion. Trial spacers were then malleted into position and it was felt that a 16 mm titanium spacer with 15 degrees lordosis from the CoreLink instrumentation set would be the best fit to restore disc height also restoring foraminal height and providing some indirect decompression. The disc space was then thoroughly irrigated with saline solution.  Gelfoam powder with thrombin was used to control epidural bleeding.     A 16 mm titanium spacer from the CoreLink instrumentation set was then packed as tightly as possible with allograft bone matrix mixed with an allograft liquid called BioBurst. This spacer was then malleted into the L5-S1 disc space under fluoroscopic guidance. It was placed as an interbody biomechanical device to assist with fusion.  I then placed a single 25 mm screw through the spacer into the L5 vertebral body to assist with preventing migration.     A 4-hole anterior plate from the [facility] instrumentation set was then chosen. The 4 holes were drilled and four 30 mm screws were used to fix the plate across the L5-S1 segment augmenting the fusion.      We then inspected the entire operative field for any signs of bleeding.  Bleeding was once again  controlled using thrombin with Gelfoam powder and bipolar cautery.  Once we ensured that adequate hemostasis was accomplished, final fluoroscopy imaging was taken to confirm adequate position of our implants. There was excellent restoration of disc height and the plate and screw construct appeared to be adequately positioned across L5-S1.     Please see Dr. Salvatore Villalta's separate dictated operative report regarding the closure of the wound. Once this was accomplished, the patient was extubated and sent to the recovery room in good stable condition. We estimated blood loss to be approximately 100 mL and the patient remained hemodynamically stable during the procedure.     Dr. Salvatore Villalta D.O. provided vascular access to the L5-S1 level and acted as a co-surgeon in this fashion.  Chuck John PA-C provided critical assistance during the decompression at L5-S1 as well as during the placement of the titanium spacer, bone graft and instrumentation to obtain a fusion across L5-S1.    ASHLEY Bang MD    Date: 1/17/2020  Time: 9:29 AM      Electronically signed by PAMELA Bang MD at 01/17/20 0929     Salvatore Villalta DO at 01/17/20 0649  Version 1 of 1       Ayla YANCEY Swathi  1/17/2020     PREOPERATIVE DIAGNOSIS: M43.10     POSTOPERATIVE DIAGNOSIS: same     PROCEDURE PERFORMED:   1.  Anterior lumbar interbody fusion of L5-S1 with instrumentation     SURGEON: Salvatore Villalta DO   COSURGEON: Sharif Bang MD     ANESTHESIA: General.    PREPARATION: Routine.    STAFF: Circulator: Bri Lo RN; Mercy Ernandez RN  Scrub Person: Iglesia Loaiza Helen B  Vendor Representative: Kristina Maldonado Cari  Assistant: Tristin John PA    ESTIMATED BLOOD LOSS: 50 mL    SPECIMENS: None    COMPLICATIONS: None    INDICATIONS: Ayla Echevarria is a 49 y.o. female who you are currently following for chronic back pain.  Ayla Echevarriais scheduled for anterior lumbar interbody  fusion of L5-S1 with Dr. Bang on 2020.  She has tried physical therapy and antiinflammatories without relief.  Symptoms have worsened over the past couple years.  The patient denies any history of DVT.  She has a history of  section x2 and cholecystectomy. The indications, risks, and possible complications of the procedure were explained to the patient, who voiced understanding and wished to proceed with surgery.     PROCEDURE IN DETAIL:   The patient was taken to the operating room and placed on the operating table in a supine position. After general anesthesia was obtained, the abdomen was prepped and draped in a sterile manner.  A transverse incision was then made in the left lower quadrant.  Careful dissection was made down through the subcutaneous tissues using the Bovie cautery to ensure hemostasis.  Any crossing veins were ligated with 3-0 silk suture and hemoclips.  The rectus fascia was identified.  It was incised with the Bovie cautery.  Kocher clamps are placed on each side of the rectus fascia and subfascial planes were established in a cephalad and caudad direction.  Once the subfascial planes were established the attention was then turned to the left rectus muscle.  Blunt mobilization was made of the left rectus muscle including its blood supply medially and to the right.  Once it was fully mobilized the attention was then turned laterally to the peritoneal reflection.  Entrance into the retroperitoneal space was established with the use of a sponge stick and the Bovie cautery.  Continued blunt mobilization was made with my hand using a finger sweeping motion moving the peritoneal contents and sacral fat pad medially and to the right.  Once it was fully mobilized the Brau-Heaton retractor system was set up.  The retractor blades were set in place.  The left iliac vein was then carefully dissected free and placed safely behind the retractor blade.  The sacral vessels were carefully  taken down with hemoclips.  At this point full exposure was established of the L5-S1 disc space.  The next part of the case will be dictated by Dr. Bang. Upon completion of Dr. Bang's part of the case the wound bed was irrigated with antibiotic saline and hemostasis was observed.  The retractor blades were carefully taken out one at a time.  The structures were then placed back in their normal anatomic positions.  The rectus fascia was then closed with a #1 PDS in a running fashion to meet in the midline.  The deep layers were closed with a 2-0 Vicryl in a running fashion.  The subcutaneous layers were closed with a 3-0 Vicryl in a running fashion.  The skin was then reapproximated using a 4-0 Monocryl in a subcuticular fashion.  The wound was then cleaned.  Sterile dressings were applied. The patient tolerated the procedure well. Sponge and needle counts were correct. The patient was then awakened and extubated in the operating room and taken to the recovery room in good condition.    Salvatore Villalta DO    Date: 1/17/2020 Time: 10:04 AM      Electronically signed by Salvatore Villalta DO at 01/17/20 1005          Discharge Summary      Danis Arora PA-C at 01/18/20 0938            Date of Discharge:  1/18/2020    Admission Diagnosis: M43.10    Discharge Diagnosis:   1. Anterior lumbar interbody fusion L5-S1 1/17/2020    Consults During Admission: none    Hospital Course  Patient is a 49 y.o. female who underwent a left L5-S1 1/17/2020.  She experienced no intraoperative or postoperative complications.  She notes some mild soreness in the abdominal area but pain is well controlled.  Patient denies any chest pain or shortness of breath.  Patient denies any worsening neurologic issues.  Patient feels ready for discharge to home.    Condition on Discharge:  STABLE    Vital Signs  Temp:  [97.4 °F (36.3 °C)-98.5 °F (36.9 °C)] 97.9 °F (36.6 °C)  Heart Rate:  [66-87] 66  Resp:  [10-18] 18  BP:  (101144)/(44-76) 119/59    Physical Exam:   Reveals a pleasant 49-year-old female she is alert and oriented x3 mood affect is appropriate no acute distress.  Patient's  is present in the room.  Patient is able to move all 4 extremities upon presentation in the room today.  Dressing is clean dry and intact.  Sensation is intact in all 4 extremities.  Patient is able to move herself about the bed independently.  No complaints of chest pain or shortness of breath.  Homans is negative.    Discharge Disposition      Discharge Medications     Discharge Medications      ASK your doctor about these medications      Instructions Start Date   DICLOFENAC PO   100 mg, Oral, Daily      DULoxetine 60 MG capsule  Commonly known as:  CYMBALTA   60 mg, Oral, Daily      ezetimibe 10 MG tablet 10 mg, simvastatin 40 MG tablet 40 mg   1 tablet, Oral, Nightly      gabapentin 600 MG tablet  Commonly known as:  NEURONTIN   600 mg, Oral, 4 Times Daily      HYDROcodone-acetaminophen 7.5-325 MG per tablet  Commonly known as:  NORCO   1 tablet, Oral, Every 6 Hours PRN      lisinopril-hydrochlorothiazide 20-12.5 MG per tablet  Commonly known as:  PRINZIDE,ZESTORETIC   1 tablet, Oral, Daily      metoprolol tartrate 25 MG tablet  Commonly known as:  LOPRESSOR   25 mg, Oral, 2 Times Daily      predniSONE 10 MG tablet  Commonly known as:  DELTASONE   10 mg, Oral, Take As Directed      tiZANidine 4 MG tablet  Commonly known as:  ZANAFLEX   4 mg, Oral, Nightly PRN             Discharge Diet: Resume Home diet, advance as tolerated    Activity at Discharge: Resume home activity advace as tolerated, no lifting, no twisting, no bending, brace as directed, no driving until directed.     Follow-up Appointments  Followup with PCP within one week  Followup [facility] at 2weeks post-op         Danis Arora PA-C  01/18/20  9:39 AM    Electronically signed by Danis Arora PA-C at 01/18/20 0950     Danis Arora PA-C at 01/18/20  1019            Date of Discharge:  1/18/2020    Admission Diagnosis: M43.10    Discharge Diagnosis:   1. Anterior lumbar interbody fusion L5-S1 1/17/2020    Consults During Admission: none    Hospital Course  Patient is a 49 y.o. female who underwent a left L5-S1 1/17/2020.  She experienced no intraoperative or postoperative complications.  She notes some mild soreness in the abdominal area but pain is well controlled.  Patient denies any chest pain or shortness of breath.  Patient denies any worsening neurologic issues.  Patient feels ready for discharge to home.    Condition on Discharge:  STABLE    Vital Signs  Temp:  [97.6 °F (36.4 °C)-98.5 °F (36.9 °C)] 97.9 °F (36.6 °C)  Heart Rate:  [66-87] 66  Resp:  [10-18] 18  BP: (101-144)/(44-76) 119/59    Physical Exam:   Reveals a pleasant 49-year-old female she is alert and oriented x3 mood affect is appropriate no acute distress.  Patient's  is present in the room.  Patient is able to move all 4 extremities upon presentation in the room today.  Dressing is clean dry and intact.  Sensation is intact in all 4 extremities.  Patient is able to move herself about the bed independently.  No complaints of chest pain or shortness of breath.  Homans is negative.    Discharge Disposition  Home or Self Care    Discharge Medications     Discharge Medications      Changes to Medications      Instructions Start Date   HYDROcodone-acetaminophen 7.5-325 MG per tablet  Commonly known as:  NORCO  What changed:    · when to take this  · reasons to take this   1 tablet, Oral, Every 4 Hours PRN      tiZANidine 4 MG tablet  Commonly known as:  ZANAFLEX  What changed:  Another medication with the same name was added. Make sure you understand how and when to take each.   4 mg, Oral, Nightly PRN      tiZANidine 4 MG tablet  Commonly known as:  ZANAFLEX  What changed:  You were already taking a medication with the same name, and this prescription was added. Make sure you understand  how and when to take each.   4 mg, Oral, Every 8 Hours PRN         Continue These Medications      Instructions Start Date   DULoxetine 60 MG capsule  Commonly known as:  CYMBALTA   60 mg, Oral, Daily      ezetimibe 10 MG tablet 10 mg, simvastatin 40 MG tablet 40 mg   1 tablet, Oral, Nightly      gabapentin 600 MG tablet  Commonly known as:  NEURONTIN   600 mg, Oral, 4 Times Daily      lisinopril-hydrochlorothiazide 20-12.5 MG per tablet  Commonly known as:  PRINZIDE,ZESTORETIC   1 tablet, Oral, Daily      metoprolol tartrate 25 MG tablet  Commonly known as:  LOPRESSOR   25 mg, Oral, 2 Times Daily      predniSONE 10 MG tablet  Commonly known as:  DELTASONE   10 mg, Oral, Take As Directed         Stop These Medications    DICLOFENAC PO            Discharge Diet: Resume Home diet, advance as tolerated    Activity at Discharge: Resume home activity advace as tolerated, no lifting, no twisting, no bending, brace as directed, no driving until directed.     Follow-up Appointments  Followup with PCP within one week  Followup [facility] at 2weeks post-op         Danis Arora PA-C  01/18/20  10:19 AM    Electronically signed by Danis Arora PA-C at 01/18/20 1021

## 2020-01-21 ENCOUNTER — NURSE TRIAGE (OUTPATIENT)
Dept: CALL CENTER | Facility: HOSPITAL | Age: 50
End: 2020-01-21

## 2020-01-21 NOTE — TELEPHONE ENCOUNTER
"    Reason for Disposition  • Nursing judgment    Additional Information  • Negative: Nursing judgment  • Negative: Nursing judgment    Answer Assessment - Initial Assessment Questions  1. REASON FOR CALL or QUESTION: \"What is your reason for calling today?\" or \"How can I best help you?\" or \"What question do you have that I can help answer?\"      Needs pre op surgical wash solution    Protocols used: INFORMATION ONLY CALL - NO TRIAGE-ADULT-OH      "

## 2020-01-21 NOTE — THERAPY DISCHARGE NOTE
Acute Care - Physical Therapy Discharge Summary  [facility]       Patient Name: Ayla Echevarria  : 1970  MRN: [id number]    Today's Date: 2020  Onset of Illness/Injury or Date of Surgery: 20       Referring Physician: Dr. Bang      Admit Date: 2020      PT Recommendation and Plan    Visit Dx:    ICD-10-CM ICD-9-CM   1. Spinal stenosis of lumbar region with neurogenic claudication M48.062 724.03   2. Impaired functional mobility, balance, gait, and endurance Z74.09 V49.89   3. Aftercare following surgery Z48.89 V58.89               Rehab Goal Summary     Row Name 20 1542             Transfer Goal 1 (PT)    Activity/Assistive Device (Transfer Goal 1, PT)  sit-to-stand/stand-to-sit  -AB      [place] Level/Cues Needed (Transfer Goal 1, PT)  independent  -AB      Time Frame (Transfer Goal 1, PT)  long term goal (LTG);by discharge  -AB      Progress/Outcome (Transfer Goal 1, PT)  goal not met  -AB         Gait Training Goal 1 (PT)    Activity/Assistive Device (Gait Training Goal 1, PT)  gait (walking locomotion);improve balance and speed;decrease fall risk;increase endurance/gait distance  -AB      [place] Level (Gait Training Goal 1, PT)  supervision required  -AB      Time Frame (Gait Training Goal 1, PT)  long term goal (LTG)  -AB      Progress/Outcome (Gait Training Goal 1, PT)  goal not met  -AB         Stairs Goal 1 (PT)    Activity/Assistive Device (Stairs Goal 1, PT)  ascending stairs;descending stairs;decrease fall risk  -AB      Time Frame (Stairs Goal 1, PT)  long term goal (LTG);by discharge  -AB      Progress/Outcome (Stairs Goal 1, PT)  goal not met  -AB        User Key  (r) = Recorded By, (t) = Taken By, (c) = Cosigned By    Initials Name Provider Type Discipline    Marah Ruiz, PTA Physical Therapy Assistant PT              PT Discharge Summary  Anticipated Discharge Disposition (PT): home with assist  Reason for Discharge: Discharge from  facility  Outcomes Achieved: Refer to plan of care for updates on goals achieved  Discharge Destination: Home with assist      Marah Banuelos, PTA   1/21/2020

## 2020-01-22 ENCOUNTER — HOSPITAL ENCOUNTER (INPATIENT)
Facility: HOSPITAL | Age: 50
LOS: 4 days | Discharge: HOME | End: 2020-01-26
Attending: ORTHOPAEDIC SURGERY | Admitting: ORTHOPAEDIC SURGERY

## 2020-01-22 ENCOUNTER — ANESTHESIA (OUTPATIENT)
Dept: PERIOP | Facility: HOSPITAL | Age: 50
End: 2020-01-22

## 2020-01-22 ENCOUNTER — ANESTHESIA EVENT (OUTPATIENT)
Dept: PERIOP | Facility: HOSPITAL | Age: 50
End: 2020-01-22

## 2020-01-22 ENCOUNTER — APPOINTMENT (OUTPATIENT)
Dept: GENERAL RADIOLOGY | Facility: HOSPITAL | Age: 50
End: 2020-01-22

## 2020-01-22 DIAGNOSIS — Z74.09 IMPAIRED FUNCTIONAL MOBILITY AND ACTIVITY TOLERANCE: ICD-10-CM

## 2020-01-22 DIAGNOSIS — Z48.89 AFTERCARE FOLLOWING SURGERY: Primary | ICD-10-CM

## 2020-01-22 DIAGNOSIS — Z78.9 DECREASED ACTIVITIES OF DAILY LIVING (ADL): ICD-10-CM

## 2020-01-22 DIAGNOSIS — M54.41 CHRONIC BILATERAL LOW BACK PAIN WITH BILATERAL SCIATICA: ICD-10-CM

## 2020-01-22 DIAGNOSIS — G89.29 CHRONIC BILATERAL LOW BACK PAIN WITH BILATERAL SCIATICA: ICD-10-CM

## 2020-01-22 DIAGNOSIS — M54.42 CHRONIC BILATERAL LOW BACK PAIN WITH BILATERAL SCIATICA: ICD-10-CM

## 2020-01-22 PROBLEM — K21.9 GERD WITHOUT ESOPHAGITIS: Chronic | Status: ACTIVE | Noted: 2020-01-22

## 2020-01-22 PROBLEM — M48.061 LUMBAR STENOSIS: Status: ACTIVE | Noted: 2020-01-22

## 2020-01-22 PROBLEM — K59.03 DRUG-INDUCED CONSTIPATION: Chronic | Status: ACTIVE | Noted: 2020-01-22

## 2020-01-22 PROCEDURE — C1713 ANCHOR/SCREW BN/BN,TIS/BN: HCPCS | Performed by: ORTHOPAEDIC SURGERY

## 2020-01-22 PROCEDURE — 25010000002 PROPOFOL 10 MG/ML EMULSION: Performed by: NURSE ANESTHETIST, CERTIFIED REGISTERED

## 2020-01-22 PROCEDURE — 0ST20ZZ RESECTION OF LUMBAR VERTEBRAL DISC, OPEN APPROACH: ICD-10-PCS | Performed by: ORTHOPAEDIC SURGERY

## 2020-01-22 PROCEDURE — 0SG00A0 FUSION OF LUMBAR VERTEBRAL JOINT WITH INTERBODY FUSION DEVICE, ANTERIOR APPROACH, ANTERIOR COLUMN, OPEN APPROACH: ICD-10-PCS | Performed by: ORTHOPAEDIC SURGERY

## 2020-01-22 PROCEDURE — 25010000002 FENTANYL CITRATE (PF) 100 MCG/2ML SOLUTION: Performed by: ANESTHESIOLOGY

## 2020-01-22 PROCEDURE — 72100 X-RAY EXAM L-S SPINE 2/3 VWS: CPT

## 2020-01-22 PROCEDURE — 25010000002 SUCCINYLCHOLINE PER 20 MG: Performed by: NURSE ANESTHETIST, CERTIFIED REGISTERED

## 2020-01-22 PROCEDURE — 25010000002 MIDAZOLAM PER 1 MG: Performed by: ANESTHESIOLOGY

## 2020-01-22 PROCEDURE — 25010000002 MORPHINE SULFATE (PF) 2 MG/ML SOLUTION: Performed by: ANESTHESIOLOGY

## 2020-01-22 PROCEDURE — 76000 FLUOROSCOPY <1 HR PHYS/QHP: CPT

## 2020-01-22 PROCEDURE — 25010000002 FENTANYL CITRATE (PF) 250 MCG/5ML SOLUTION: Performed by: NURSE ANESTHETIST, CERTIFIED REGISTERED

## 2020-01-22 PROCEDURE — 81025 URINE PREGNANCY TEST: CPT | Performed by: ORTHOPAEDIC SURGERY

## 2020-01-22 PROCEDURE — 25010000002 DEXAMETHASONE PER 1 MG: Performed by: ANESTHESIOLOGY

## 2020-01-22 PROCEDURE — 86850 RBC ANTIBODY SCREEN: CPT | Performed by: ANESTHESIOLOGY

## 2020-01-22 PROCEDURE — 97161 PT EVAL LOW COMPLEX 20 MIN: CPT

## 2020-01-22 PROCEDURE — 25010000002 VANCOMYCIN 1 G RECONSTITUTED SOLUTION: Performed by: ORTHOPAEDIC SURGERY

## 2020-01-22 PROCEDURE — 97168 OT RE-EVAL EST PLAN CARE: CPT | Performed by: OCCUPATIONAL THERAPIST

## 2020-01-22 PROCEDURE — 25010000002 ONDANSETRON PER 1 MG: Performed by: NURSE ANESTHETIST, CERTIFIED REGISTERED

## 2020-01-22 PROCEDURE — 25010000002 DEXAMETHASONE PER 1 MG: Performed by: NURSE ANESTHETIST, CERTIFIED REGISTERED

## 2020-01-22 PROCEDURE — 86900 BLOOD TYPING SEROLOGIC ABO: CPT | Performed by: ANESTHESIOLOGY

## 2020-01-22 PROCEDURE — 86901 BLOOD TYPING SEROLOGIC RH(D): CPT | Performed by: ANESTHESIOLOGY

## 2020-01-22 PROCEDURE — 25010000002 HYDROMORPHONE PER 4 MG: Performed by: ANESTHESIOLOGY

## 2020-01-22 PROCEDURE — 94799 UNLISTED PULMONARY SVC/PX: CPT

## 2020-01-22 PROCEDURE — 25010000002 FENTANYL CITRATE (PF) 100 MCG/2ML SOLUTION: Performed by: NURSE ANESTHETIST, CERTIFIED REGISTERED

## 2020-01-22 DEVICE — SCRW ORO LAT PLT SYS 5.5X30MM: Type: IMPLANTABLE DEVICE | Status: FUNCTIONAL

## 2020-01-22 DEVICE — ALLOGRFT FLD BIOBURST 1ML: Type: IMPLANTABLE DEVICE | Status: FUNCTIONAL

## 2020-01-22 DEVICE — BONE FILLER VOID NANOSS BIOACTIVE 3D 20CC: Type: IMPLANTABLE DEVICE | Status: FUNCTIONAL

## 2020-01-22 DEVICE — PLT LAT ORO SYS 2/SCRW 15X18MM: Type: IMPLANTABLE DEVICE | Status: FUNCTIONAL

## 2020-01-22 DEVICE — CAGE LIF FOUNDATION F3D 8DEG 22X50X12MM: Type: IMPLANTABLE DEVICE | Status: FUNCTIONAL

## 2020-01-22 RX ORDER — NALOXONE HCL 0.4 MG/ML
0.04 VIAL (ML) INJECTION AS NEEDED
Status: DISCONTINUED | OUTPATIENT
Start: 2020-01-22 | End: 2020-01-22 | Stop reason: HOSPADM

## 2020-01-22 RX ORDER — ONDANSETRON 2 MG/ML
4 INJECTION INTRAMUSCULAR; INTRAVENOUS AS NEEDED
Status: DISCONTINUED | OUTPATIENT
Start: 2020-01-22 | End: 2020-01-22 | Stop reason: HOSPADM

## 2020-01-22 RX ORDER — SODIUM CHLORIDE 9 MG/ML
75 INJECTION, SOLUTION INTRAVENOUS CONTINUOUS
Status: DISPENSED | OUTPATIENT
Start: 2020-01-22 | End: 2020-01-23

## 2020-01-22 RX ORDER — SODIUM CHLORIDE 0.9 % (FLUSH) 0.9 %
10 SYRINGE (ML) INJECTION AS NEEDED
Status: DISCONTINUED | OUTPATIENT
Start: 2020-01-22 | End: 2020-01-22

## 2020-01-22 RX ORDER — FENTANYL CITRATE 50 UG/ML
INJECTION, SOLUTION INTRAMUSCULAR; INTRAVENOUS AS NEEDED
Status: DISCONTINUED | OUTPATIENT
Start: 2020-01-22 | End: 2020-01-22 | Stop reason: SURG

## 2020-01-22 RX ORDER — SUCCINYLCHOLINE CHLORIDE 20 MG/ML
INJECTION INTRAMUSCULAR; INTRAVENOUS AS NEEDED
Status: DISCONTINUED | OUTPATIENT
Start: 2020-01-22 | End: 2020-01-22 | Stop reason: SURG

## 2020-01-22 RX ORDER — DEXAMETHASONE SODIUM PHOSPHATE 4 MG/ML
4 INJECTION, SOLUTION INTRA-ARTICULAR; INTRALESIONAL; INTRAMUSCULAR; INTRAVENOUS; SOFT TISSUE ONCE AS NEEDED
Status: COMPLETED | OUTPATIENT
Start: 2020-01-22 | End: 2020-01-22

## 2020-01-22 RX ORDER — TIZANIDINE 4 MG/1
4 TABLET ORAL EVERY 8 HOURS PRN
Status: DISCONTINUED | OUTPATIENT
Start: 2020-01-22 | End: 2020-01-26 | Stop reason: HOSPADM

## 2020-01-22 RX ORDER — CLINDAMYCIN PHOSPHATE 900 MG/50ML
900 INJECTION INTRAVENOUS EVERY 8 HOURS SCHEDULED
Status: COMPLETED | OUTPATIENT
Start: 2020-01-22 | End: 2020-01-23

## 2020-01-22 RX ORDER — SODIUM CHLORIDE, SODIUM LACTATE, POTASSIUM CHLORIDE, CALCIUM CHLORIDE 600; 310; 30; 20 MG/100ML; MG/100ML; MG/100ML; MG/100ML
100 INJECTION, SOLUTION INTRAVENOUS CONTINUOUS
Status: DISCONTINUED | OUTPATIENT
Start: 2020-01-22 | End: 2020-01-22

## 2020-01-22 RX ORDER — METOPROLOL SUCCINATE 25 MG/1
25 TABLET, EXTENDED RELEASE ORAL NIGHTLY
COMMUNITY

## 2020-01-22 RX ORDER — DIPHENHYDRAMINE HCL 25 MG
25 CAPSULE ORAL NIGHTLY PRN
Status: DISCONTINUED | OUTPATIENT
Start: 2020-01-22 | End: 2020-01-26 | Stop reason: HOSPADM

## 2020-01-22 RX ORDER — LABETALOL HYDROCHLORIDE 5 MG/ML
5 INJECTION, SOLUTION INTRAVENOUS
Status: DISCONTINUED | OUTPATIENT
Start: 2020-01-22 | End: 2020-01-22 | Stop reason: HOSPADM

## 2020-01-22 RX ORDER — PROPOFOL 10 MG/ML
VIAL (ML) INTRAVENOUS AS NEEDED
Status: DISCONTINUED | OUTPATIENT
Start: 2020-01-22 | End: 2020-01-22 | Stop reason: SURG

## 2020-01-22 RX ORDER — HYDROMORPHONE HYDROCHLORIDE 1 MG/ML
0.5 INJECTION, SOLUTION INTRAMUSCULAR; INTRAVENOUS; SUBCUTANEOUS AS NEEDED
Status: DISCONTINUED | OUTPATIENT
Start: 2020-01-22 | End: 2020-01-22 | Stop reason: HOSPADM

## 2020-01-22 RX ORDER — DEXAMETHASONE SODIUM PHOSPHATE 4 MG/ML
INJECTION, SOLUTION INTRA-ARTICULAR; INTRALESIONAL; INTRAMUSCULAR; INTRAVENOUS; SOFT TISSUE AS NEEDED
Status: DISCONTINUED | OUTPATIENT
Start: 2020-01-22 | End: 2020-01-22 | Stop reason: SURG

## 2020-01-22 RX ORDER — SODIUM CHLORIDE 9 MG/ML
75 INJECTION, SOLUTION INTRAVENOUS CONTINUOUS
Status: DISPENSED | OUTPATIENT
Start: 2020-01-22 | End: 2020-01-24

## 2020-01-22 RX ORDER — SODIUM CHLORIDE 0.9 % (FLUSH) 0.9 %
3 SYRINGE (ML) INJECTION EVERY 12 HOURS SCHEDULED
Status: DISCONTINUED | OUTPATIENT
Start: 2020-01-22 | End: 2020-01-22

## 2020-01-22 RX ORDER — FLUMAZENIL 0.1 MG/ML
0.2 INJECTION INTRAVENOUS AS NEEDED
Status: DISCONTINUED | OUTPATIENT
Start: 2020-01-22 | End: 2020-01-22 | Stop reason: HOSPADM

## 2020-01-22 RX ORDER — FAMOTIDINE 20 MG/1
20 TABLET, FILM COATED ORAL EVERY 12 HOURS SCHEDULED
Status: DISCONTINUED | OUTPATIENT
Start: 2020-01-22 | End: 2020-01-26 | Stop reason: HOSPADM

## 2020-01-22 RX ORDER — ONDANSETRON 2 MG/ML
INJECTION INTRAMUSCULAR; INTRAVENOUS AS NEEDED
Status: DISCONTINUED | OUTPATIENT
Start: 2020-01-22 | End: 2020-01-22 | Stop reason: SURG

## 2020-01-22 RX ORDER — FAMOTIDINE 10 MG/ML
20 INJECTION, SOLUTION INTRAVENOUS EVERY 12 HOURS SCHEDULED
Status: DISCONTINUED | OUTPATIENT
Start: 2020-01-22 | End: 2020-01-26 | Stop reason: HOSPADM

## 2020-01-22 RX ORDER — MAGNESIUM HYDROXIDE 1200 MG/15ML
LIQUID ORAL AS NEEDED
Status: DISCONTINUED | OUTPATIENT
Start: 2020-01-22 | End: 2020-01-22 | Stop reason: HOSPADM

## 2020-01-22 RX ORDER — METOCLOPRAMIDE HYDROCHLORIDE 5 MG/ML
5 INJECTION INTRAMUSCULAR; INTRAVENOUS
Status: DISCONTINUED | OUTPATIENT
Start: 2020-01-22 | End: 2020-01-22 | Stop reason: HOSPADM

## 2020-01-22 RX ORDER — SODIUM CHLORIDE 0.9 % (FLUSH) 0.9 %
3 SYRINGE (ML) INJECTION EVERY 12 HOURS SCHEDULED
Status: DISCONTINUED | OUTPATIENT
Start: 2020-01-22 | End: 2020-01-26 | Stop reason: HOSPADM

## 2020-01-22 RX ORDER — EZETIMIBE AND SIMVASTATIN 10; 40 MG/1; MG/1
1 TABLET ORAL NIGHTLY
COMMUNITY

## 2020-01-22 RX ORDER — SODIUM CHLORIDE 0.9 % (FLUSH) 0.9 %
10 SYRINGE (ML) INJECTION AS NEEDED
Status: DISCONTINUED | OUTPATIENT
Start: 2020-01-22 | End: 2020-01-26 | Stop reason: HOSPADM

## 2020-01-22 RX ORDER — ROCURONIUM BROMIDE 10 MG/ML
INJECTION, SOLUTION INTRAVENOUS AS NEEDED
Status: DISCONTINUED | OUTPATIENT
Start: 2020-01-22 | End: 2020-01-22 | Stop reason: SURG

## 2020-01-22 RX ORDER — OXYCODONE AND ACETAMINOPHEN 10; 325 MG/1; MG/1
2 TABLET ORAL EVERY 4 HOURS PRN
Status: DISCONTINUED | OUTPATIENT
Start: 2020-01-22 | End: 2020-01-24

## 2020-01-22 RX ORDER — GABAPENTIN 300 MG/1
600 CAPSULE ORAL EVERY 6 HOURS
Status: DISCONTINUED | OUTPATIENT
Start: 2020-01-22 | End: 2020-01-26 | Stop reason: HOSPADM

## 2020-01-22 RX ORDER — LIDOCAINE HYDROCHLORIDE 10 MG/ML
0.5 INJECTION, SOLUTION EPIDURAL; INFILTRATION; INTRACAUDAL; PERINEURAL ONCE AS NEEDED
Status: DISCONTINUED | OUTPATIENT
Start: 2020-01-22 | End: 2020-01-22

## 2020-01-22 RX ORDER — HYDRALAZINE HYDROCHLORIDE 20 MG/ML
5 INJECTION INTRAMUSCULAR; INTRAVENOUS
Status: DISCONTINUED | OUTPATIENT
Start: 2020-01-22 | End: 2020-01-22 | Stop reason: HOSPADM

## 2020-01-22 RX ORDER — MORPHINE SULFATE 2 MG/ML
2 INJECTION, SOLUTION INTRAMUSCULAR; INTRAVENOUS
Status: COMPLETED | OUTPATIENT
Start: 2020-01-22 | End: 2020-01-22

## 2020-01-22 RX ORDER — OXYCODONE AND ACETAMINOPHEN 10; 325 MG/1; MG/1
1 TABLET ORAL ONCE AS NEEDED
Status: DISCONTINUED | OUTPATIENT
Start: 2020-01-22 | End: 2020-01-22 | Stop reason: HOSPADM

## 2020-01-22 RX ORDER — POLYETHYLENE GLYCOL 3350 17 G/17G
17 POWDER, FOR SOLUTION ORAL DAILY PRN
Status: DISCONTINUED | OUTPATIENT
Start: 2020-01-22 | End: 2020-01-26 | Stop reason: HOSPADM

## 2020-01-22 RX ORDER — FENTANYL CITRATE 50 UG/ML
25 INJECTION, SOLUTION INTRAMUSCULAR; INTRAVENOUS AS NEEDED
Status: COMPLETED | OUTPATIENT
Start: 2020-01-22 | End: 2020-01-22

## 2020-01-22 RX ORDER — SODIUM CHLORIDE 9 MG/ML
INJECTION, SOLUTION INTRAVENOUS AS NEEDED
Status: DISCONTINUED | OUTPATIENT
Start: 2020-01-22 | End: 2020-01-22 | Stop reason: HOSPADM

## 2020-01-22 RX ORDER — SODIUM CHLORIDE 0.9 % (FLUSH) 0.9 %
10 SYRINGE (ML) INJECTION EVERY 12 HOURS SCHEDULED
Status: DISCONTINUED | OUTPATIENT
Start: 2020-01-22 | End: 2020-01-22

## 2020-01-22 RX ORDER — ONDANSETRON 2 MG/ML
4 INJECTION INTRAMUSCULAR; INTRAVENOUS EVERY 6 HOURS PRN
Status: DISCONTINUED | OUTPATIENT
Start: 2020-01-22 | End: 2020-01-22 | Stop reason: SDUPTHER

## 2020-01-22 RX ORDER — SODIUM CHLORIDE, SODIUM LACTATE, POTASSIUM CHLORIDE, CALCIUM CHLORIDE 600; 310; 30; 20 MG/100ML; MG/100ML; MG/100ML; MG/100ML
20 INJECTION, SOLUTION INTRAVENOUS CONTINUOUS
Status: DISCONTINUED | OUTPATIENT
Start: 2020-01-22 | End: 2020-01-22

## 2020-01-22 RX ORDER — MIDAZOLAM HYDROCHLORIDE 1 MG/ML
2 INJECTION INTRAMUSCULAR; INTRAVENOUS
Status: DISCONTINUED | OUTPATIENT
Start: 2020-01-22 | End: 2020-01-22

## 2020-01-22 RX ORDER — ONDANSETRON 4 MG/1
4 TABLET, FILM COATED ORAL EVERY 6 HOURS PRN
Status: DISCONTINUED | OUTPATIENT
Start: 2020-01-22 | End: 2020-01-26 | Stop reason: HOSPADM

## 2020-01-22 RX ORDER — OXYCODONE HCL 20 MG/1
20 TABLET, FILM COATED, EXTENDED RELEASE ORAL ONCE
Status: COMPLETED | OUTPATIENT
Start: 2020-01-22 | End: 2020-01-22

## 2020-01-22 RX ORDER — DULOXETIN HYDROCHLORIDE 30 MG/1
60 CAPSULE, DELAYED RELEASE ORAL DAILY
Status: DISCONTINUED | OUTPATIENT
Start: 2020-01-22 | End: 2020-01-26 | Stop reason: HOSPADM

## 2020-01-22 RX ORDER — PHENYLEPHRINE HCL IN 0.9% NACL 0.8MG/10ML
SYRINGE (ML) INTRAVENOUS AS NEEDED
Status: DISCONTINUED | OUTPATIENT
Start: 2020-01-22 | End: 2020-01-22 | Stop reason: SURG

## 2020-01-22 RX ORDER — ACETAMINOPHEN 500 MG
1000 TABLET ORAL ONCE
Status: COMPLETED | OUTPATIENT
Start: 2020-01-22 | End: 2020-01-22

## 2020-01-22 RX ORDER — MIDAZOLAM HYDROCHLORIDE 1 MG/ML
1 INJECTION INTRAMUSCULAR; INTRAVENOUS
Status: DISCONTINUED | OUTPATIENT
Start: 2020-01-22 | End: 2020-01-22

## 2020-01-22 RX ORDER — SODIUM CHLORIDE, SODIUM LACTATE, POTASSIUM CHLORIDE, CALCIUM CHLORIDE 600; 310; 30; 20 MG/100ML; MG/100ML; MG/100ML; MG/100ML
100 INJECTION, SOLUTION INTRAVENOUS CONTINUOUS PRN
Status: DISCONTINUED | OUTPATIENT
Start: 2020-01-22 | End: 2020-01-22

## 2020-01-22 RX ORDER — IPRATROPIUM BROMIDE AND ALBUTEROL SULFATE 2.5; .5 MG/3ML; MG/3ML
3 SOLUTION RESPIRATORY (INHALATION) ONCE AS NEEDED
Status: DISCONTINUED | OUTPATIENT
Start: 2020-01-22 | End: 2020-01-22 | Stop reason: HOSPADM

## 2020-01-22 RX ORDER — ONDANSETRON 2 MG/ML
4 INJECTION INTRAMUSCULAR; INTRAVENOUS EVERY 6 HOURS PRN
Status: DISCONTINUED | OUTPATIENT
Start: 2020-01-22 | End: 2020-01-26 | Stop reason: HOSPADM

## 2020-01-22 RX ORDER — SODIUM CHLORIDE 0.9 % (FLUSH) 0.9 %
3-10 SYRINGE (ML) INJECTION AS NEEDED
Status: DISCONTINUED | OUTPATIENT
Start: 2020-01-22 | End: 2020-01-22

## 2020-01-22 RX ORDER — METOPROLOL SUCCINATE 25 MG/1
25 TABLET, EXTENDED RELEASE ORAL
Status: DISCONTINUED | OUTPATIENT
Start: 2020-01-22 | End: 2020-01-26 | Stop reason: HOSPADM

## 2020-01-22 RX ADMIN — Medication 160 MCG: at 07:40

## 2020-01-22 RX ADMIN — Medication 120 MCG: at 07:13

## 2020-01-22 RX ADMIN — FENTANYL CITRATE 100 MCG: 50 INJECTION, SOLUTION INTRAMUSCULAR; INTRAVENOUS at 08:05

## 2020-01-22 RX ADMIN — OXYCODONE HYDROCHLORIDE 20 MG: 20 TABLET, FILM COATED, EXTENDED RELEASE ORAL at 05:44

## 2020-01-22 RX ADMIN — MORPHINE SULFATE 2 MG: 2 INJECTION, SOLUTION INTRAMUSCULAR; INTRAVENOUS at 09:30

## 2020-01-22 RX ADMIN — FENTANYL CITRATE 25 MCG: 50 INJECTION, SOLUTION INTRAMUSCULAR; INTRAVENOUS at 09:05

## 2020-01-22 RX ADMIN — CLINDAMYCIN PHOSPHATE 900 MG: 900 INJECTION, SOLUTION INTRAVENOUS at 22:08

## 2020-01-22 RX ADMIN — SODIUM CHLORIDE, POTASSIUM CHLORIDE, SODIUM LACTATE AND CALCIUM CHLORIDE 20 ML/HR: 600; 310; 30; 20 INJECTION, SOLUTION INTRAVENOUS at 06:03

## 2020-01-22 RX ADMIN — GABAPENTIN 600 MG: 300 CAPSULE ORAL at 22:07

## 2020-01-22 RX ADMIN — GABAPENTIN 600 MG: 300 CAPSULE ORAL at 17:58

## 2020-01-22 RX ADMIN — METOPROLOL SUCCINATE 25 MG: 25 TABLET, EXTENDED RELEASE ORAL at 22:07

## 2020-01-22 RX ADMIN — Medication 160 MCG: at 07:34

## 2020-01-22 RX ADMIN — SODIUM CHLORIDE 75 ML/HR: 9 INJECTION, SOLUTION INTRAVENOUS at 13:10

## 2020-01-22 RX ADMIN — FAMOTIDINE 20 MG: 20 TABLET, FILM COATED ORAL at 20:18

## 2020-01-22 RX ADMIN — MORPHINE SULFATE 2 MG: 2 INJECTION, SOLUTION INTRAMUSCULAR; INTRAVENOUS at 09:20

## 2020-01-22 RX ADMIN — PROPOFOL 150 MG: 10 INJECTION, EMULSION INTRAVENOUS at 07:01

## 2020-01-22 RX ADMIN — FENTANYL CITRATE 25 MCG: 50 INJECTION, SOLUTION INTRAMUSCULAR; INTRAVENOUS at 08:54

## 2020-01-22 RX ADMIN — MORPHINE SULFATE 2 MG: 2 INJECTION, SOLUTION INTRAMUSCULAR; INTRAVENOUS at 08:50

## 2020-01-22 RX ADMIN — Medication 160 MCG: at 07:29

## 2020-01-22 RX ADMIN — FENTANYL CITRATE 25 MCG: 50 INJECTION, SOLUTION INTRAMUSCULAR; INTRAVENOUS at 08:51

## 2020-01-22 RX ADMIN — VANCOMYCIN HYDROCHLORIDE 1000 MG: 1 INJECTION, POWDER, LYOPHILIZED, FOR SOLUTION INTRAVENOUS at 06:41

## 2020-01-22 RX ADMIN — FENTANYL CITRATE 25 MCG: 50 INJECTION, SOLUTION INTRAMUSCULAR; INTRAVENOUS at 09:01

## 2020-01-22 RX ADMIN — FENTANYL CITRATE 25 MCG: 50 INJECTION, SOLUTION INTRAMUSCULAR; INTRAVENOUS at 09:03

## 2020-01-22 RX ADMIN — ONDANSETRON HYDROCHLORIDE 4 MG: 2 SOLUTION INTRAMUSCULAR; INTRAVENOUS at 08:11

## 2020-01-22 RX ADMIN — HYDROMORPHONE HYDROCHLORIDE 0.5 MG: 1 INJECTION, SOLUTION INTRAMUSCULAR; INTRAVENOUS; SUBCUTANEOUS at 09:55

## 2020-01-22 RX ADMIN — Medication 160 MCG: at 07:19

## 2020-01-22 RX ADMIN — CLINDAMYCIN PHOSPHATE 900 MG: 900 INJECTION, SOLUTION INTRAVENOUS at 15:00

## 2020-01-22 RX ADMIN — TIZANIDINE 4 MG: 4 TABLET ORAL at 20:18

## 2020-01-22 RX ADMIN — DULOXETINE HYDROCHLORIDE 60 MG: 30 CAPSULE, DELAYED RELEASE ORAL at 15:01

## 2020-01-22 RX ADMIN — FAMOTIDINE 20 MG: 20 TABLET, FILM COATED ORAL at 13:07

## 2020-01-22 RX ADMIN — SUCCINYLCHOLINE CHLORIDE 100 MG: 20 INJECTION, SOLUTION INTRAMUSCULAR; INTRAVENOUS at 07:01

## 2020-01-22 RX ADMIN — HYDROMORPHONE HYDROCHLORIDE 0.5 MG: 1 INJECTION, SOLUTION INTRAMUSCULAR; INTRAVENOUS; SUBCUTANEOUS at 09:45

## 2020-01-22 RX ADMIN — DEXAMETHASONE SODIUM PHOSPHATE 6 MG: 4 INJECTION, SOLUTION INTRAMUSCULAR; INTRAVENOUS at 08:16

## 2020-01-22 RX ADMIN — GABAPENTIN 600 MG: 300 CAPSULE ORAL at 13:07

## 2020-01-22 RX ADMIN — ROCURONIUM BROMIDE 5 MG: 10 INJECTION INTRAVENOUS at 07:01

## 2020-01-22 RX ADMIN — OXYCODONE HYDROCHLORIDE AND ACETAMINOPHEN 2 TABLET: 10; 325 TABLET ORAL at 13:08

## 2020-01-22 RX ADMIN — FENTANYL CITRATE 25 MCG: 50 INJECTION, SOLUTION INTRAMUSCULAR; INTRAVENOUS at 09:02

## 2020-01-22 RX ADMIN — PROPOFOL 50 MG: 10 INJECTION, EMULSION INTRAVENOUS at 07:06

## 2020-01-22 RX ADMIN — FENTANYL CITRATE 25 MCG: 50 INJECTION, SOLUTION INTRAMUSCULAR; INTRAVENOUS at 08:52

## 2020-01-22 RX ADMIN — SODIUM CHLORIDE, PRESERVATIVE FREE 3 ML: 5 INJECTION INTRAVENOUS at 20:20

## 2020-01-22 RX ADMIN — ACETAMINOPHEN 1000 MG: 500 TABLET, FILM COATED ORAL at 06:40

## 2020-01-22 RX ADMIN — MORPHINE SULFATE 2 MG: 2 INJECTION, SOLUTION INTRAMUSCULAR; INTRAVENOUS at 09:00

## 2020-01-22 RX ADMIN — MORPHINE SULFATE 2 MG: 2 INJECTION, SOLUTION INTRAMUSCULAR; INTRAVENOUS at 09:10

## 2020-01-22 RX ADMIN — SODIUM CHLORIDE, POTASSIUM CHLORIDE, SODIUM LACTATE AND CALCIUM CHLORIDE 100 ML/HR: 600; 310; 30; 20 INJECTION, SOLUTION INTRAVENOUS at 05:57

## 2020-01-22 RX ADMIN — FENTANYL CITRATE 100 MCG: 50 INJECTION INTRAMUSCULAR; INTRAVENOUS at 06:59

## 2020-01-22 RX ADMIN — FENTANYL CITRATE 25 MCG: 50 INJECTION, SOLUTION INTRAMUSCULAR; INTRAVENOUS at 08:55

## 2020-01-22 RX ADMIN — OXYCODONE HYDROCHLORIDE AND ACETAMINOPHEN 2 TABLET: 10; 325 TABLET ORAL at 22:06

## 2020-01-22 RX ADMIN — OXYCODONE HYDROCHLORIDE AND ACETAMINOPHEN 2 TABLET: 10; 325 TABLET ORAL at 17:58

## 2020-01-22 RX ADMIN — DEXAMETHASONE SODIUM PHOSPHATE 4 MG: 4 INJECTION, SOLUTION INTRAMUSCULAR; INTRAVENOUS at 06:41

## 2020-01-22 RX ADMIN — Medication 80 MCG: at 07:10

## 2020-01-22 RX ADMIN — MIDAZOLAM 2 MG: 1 INJECTION INTRAMUSCULAR; INTRAVENOUS at 06:41

## 2020-01-22 RX ADMIN — FENTANYL CITRATE 100 MCG: 50 INJECTION, SOLUTION INTRAMUSCULAR; INTRAVENOUS at 07:43

## 2020-01-22 RX ADMIN — FENTANYL CITRATE 150 MCG: 50 INJECTION INTRAMUSCULAR; INTRAVENOUS at 07:05

## 2020-01-22 NOTE — ANESTHESIA POSTPROCEDURE EVALUATION
Patient: Ayla Echevarria    Procedure Summary     Date:  01/22/20 Room / Location:   [facility] OR  /  [facility]    Anesthesia Start:  0658 Anesthesia Stop:  0840    Procedure:  RIGHT  LATERAL LUMBAR  INTERBODY FUSION WITH INSTRUMENTATION L4-5 (Right Spine Lumbar) Diagnosis:  (M43.10)    Surgeon:  PAMELA Bang MD Provider:  Jakub Bhagat CRNA    Anesthesia Type:  general ASA Status:  3          Anesthesia Type: general    Vitals  Vitals Value Taken Time   /64 1/22/2020 10:20 AM   Temp 98 °F (36.7 °C) 1/22/2020 10:20 AM   Pulse 83 1/22/2020 10:30 AM   Resp 14 1/22/2020 10:20 AM   SpO2 93 % 1/22/2020 10:30 AM   Vitals shown include unvalidated device data.        Post Anesthesia Care and Evaluation    Patient location during evaluation: PACU  Patient participation: complete - patient participated  Level of consciousness: awake and alert  Pain management: adequate  Airway patency: patent  Anesthetic complications: No anesthetic complications  PONV Status: none  Cardiovascular status: acceptable and hemodynamically stable  Respiratory status: acceptable  Hydration status: acceptable    Comments: Blood pressure 130/59, pulse 83, temperature 98.1 °F (36.7 °C), temperature source Temporal, resp. rate 16, last menstrual period 01/10/2020, SpO2 96 %, not currently breastfeeding.    Patient discharged from PACU based upon Caridad score. Please see RN notes for further details

## 2020-01-22 NOTE — ANESTHESIA PROCEDURE NOTES
Airway  Urgency: elective    Date/Time: 1/22/2020 7:03 AM  Airway not difficult    General Information and Staff    Patient location during procedure: OR  CRNA: Jakub Bhagat CRNA    Indications and Patient Condition  Indications for airway management: airway protection    Preoxygenated: yes  MILS maintained throughout  Mask difficulty assessment: 1 - vent by mask    Final Airway Details  Final airway type: endotracheal airway      Successful airway: ETT  Cuffed: yes   Successful intubation technique: direct laryngoscopy  Endotracheal tube insertion site: oral  Blade: Bermeo  Blade size: 2  ETT size (mm): 7.0  Cormack-Lehane Classification: grade IIa - partial view of glottis  Placement verified by: chest auscultation and capnometry   Cuff volume (mL): 5  Measured from: gums  ETT/EBT to gums (cm): 21  Number of attempts at approach: 1  Assessment: lips, teeth, and gum same as pre-op and atraumatic intubation

## 2020-01-22 NOTE — ANESTHESIA PREPROCEDURE EVALUATION
Anesthesia Evaluation     Patient summary reviewed   no history of anesthetic complications:  NPO Solid Status: > 8 hours  NPO Liquid Status: > 2 hours           Airway   Mallampati: II  TM distance: >3 FB  Neck ROM: full  Large neck circumference  Dental - normal exam     Pulmonary - normal exam    breath sounds clear to auscultation  (+) sleep apnea on CPAP,   (-) asthma, recent URI, not a smoker  Cardiovascular - normal exam  Exercise tolerance: good (4-7 METS)    ECG reviewed  Patient on routine beta blocker and Beta blocker given within 24 hours of surgery  Rhythm: regular  Rate: normal    (+) hypertension, hyperlipidemia,   (-) pacemaker, past MI, angina, cardiac stents      Neuro/Psych  (-) seizures, TIA, CVA  GI/Hepatic/Renal/Endo    (+) morbid obesity,    (-) GERD, liver disease, no renal disease, diabetes, no thyroid disorder    Musculoskeletal     Abdominal   (+) obese,    Substance History      OB/GYN          Other                          Anesthesia Plan    ASA 3     general     intravenous induction     Anesthetic plan, all risks, benefits, and alternatives have been provided, discussed and informed consent has been obtained with: patient.

## 2020-01-23 LAB
ANION GAP SERPL CALCULATED.3IONS-SCNC: 10 MMOL/L (ref 5–15)
BASOPHILS # BLD AUTO: 0.03 10*3/MM3 (ref 0–0.2)
BASOPHILS NFR BLD AUTO: 0.2 % (ref 0–1.5)
BUN BLD-MCNC: 9 MG/DL (ref 6–20)
BUN/CREAT SERPL: 17 (ref 7–25)
CALCIUM SPEC-SCNC: 9.1 MG/DL (ref 8.6–10.5)
CHLORIDE SERPL-SCNC: 98 MMOL/L (ref 98–107)
CO2 SERPL-SCNC: 28 MMOL/L (ref 22–29)
CREAT BLD-MCNC: 0.53 MG/DL (ref 0.57–1)
DEPRECATED RDW RBC AUTO: 42.6 FL (ref 37–54)
EOSINOPHIL # BLD AUTO: 0 10*3/MM3 (ref 0–0.4)
EOSINOPHIL NFR BLD AUTO: 0 % (ref 0.3–6.2)
ERYTHROCYTE [DISTWIDTH] IN BLOOD BY AUTOMATED COUNT: 13.4 % (ref 12.3–15.4)
GFR SERPL CREATININE-BSD FRML MDRD: 122 ML/MIN/1.73
GLUCOSE BLD-MCNC: 229 MG/DL (ref 65–99)
HCT VFR BLD AUTO: 32.6 % (ref 34–46.6)
HGB BLD-MCNC: 10.7 G/DL (ref 12–15.9)
IMM GRANULOCYTES # BLD AUTO: 0.1 10*3/MM3 (ref 0–0.05)
IMM GRANULOCYTES NFR BLD AUTO: 0.6 % (ref 0–0.5)
LYMPHOCYTES # BLD AUTO: 0.93 10*3/MM3 (ref 0.7–3.1)
LYMPHOCYTES NFR BLD AUTO: 6 % (ref 19.6–45.3)
MCH RBC QN AUTO: 28.8 PG (ref 26.6–33)
MCHC RBC AUTO-ENTMCNC: 32.8 G/DL (ref 31.5–35.7)
MCV RBC AUTO: 87.6 FL (ref 79–97)
MONOCYTES # BLD AUTO: 1.11 10*3/MM3 (ref 0.1–0.9)
MONOCYTES NFR BLD AUTO: 7.2 % (ref 5–12)
NEUTROPHILS # BLD AUTO: 13.29 10*3/MM3 (ref 1.7–7)
NEUTROPHILS NFR BLD AUTO: 86 % (ref 42.7–76)
NRBC BLD AUTO-RTO: 0 /100 WBC (ref 0–0.2)
PLATELET # BLD AUTO: 462 10*3/MM3 (ref 140–450)
PMV BLD AUTO: 9.6 FL (ref 6–12)
POTASSIUM BLD-SCNC: 4.1 MMOL/L (ref 3.5–5.2)
RBC # BLD AUTO: 3.72 10*6/MM3 (ref 3.77–5.28)
SODIUM BLD-SCNC: 136 MMOL/L (ref 136–145)
WBC NRBC COR # BLD: 15.46 10*3/MM3 (ref 3.4–10.8)

## 2020-01-23 PROCEDURE — 85025 COMPLETE CBC W/AUTO DIFF WBC: CPT | Performed by: ORTHOPAEDIC SURGERY

## 2020-01-23 PROCEDURE — 80048 BASIC METABOLIC PNL TOTAL CA: CPT | Performed by: ORTHOPAEDIC SURGERY

## 2020-01-23 RX ORDER — OXYCODONE HCL 20 MG/1
20 TABLET, FILM COATED, EXTENDED RELEASE ORAL ONCE
Status: CANCELLED | OUTPATIENT
Start: 2020-01-23 | End: 2020-01-23

## 2020-01-23 RX ORDER — SODIUM CHLORIDE 0.9 % (FLUSH) 0.9 %
10 SYRINGE (ML) INJECTION AS NEEDED
Status: CANCELLED | OUTPATIENT
Start: 2020-01-23

## 2020-01-23 RX ORDER — LIDOCAINE HYDROCHLORIDE 10 MG/ML
0.5 INJECTION, SOLUTION EPIDURAL; INFILTRATION; INTRACAUDAL; PERINEURAL ONCE AS NEEDED
Status: CANCELLED | OUTPATIENT
Start: 2020-01-23

## 2020-01-23 RX ORDER — SODIUM CHLORIDE 0.9 % (FLUSH) 0.9 %
10 SYRINGE (ML) INJECTION EVERY 12 HOURS SCHEDULED
Status: CANCELLED | OUTPATIENT
Start: 2020-01-23

## 2020-01-23 RX ORDER — SODIUM CHLORIDE, SODIUM LACTATE, POTASSIUM CHLORIDE, CALCIUM CHLORIDE 600; 310; 30; 20 MG/100ML; MG/100ML; MG/100ML; MG/100ML
100 INJECTION, SOLUTION INTRAVENOUS CONTINUOUS PRN
Status: CANCELLED | OUTPATIENT
Start: 2020-01-23

## 2020-01-23 RX ADMIN — OXYCODONE HYDROCHLORIDE AND ACETAMINOPHEN 2 TABLET: 10; 325 TABLET ORAL at 05:35

## 2020-01-23 RX ADMIN — DULOXETINE HYDROCHLORIDE 60 MG: 30 CAPSULE, DELAYED RELEASE ORAL at 08:19

## 2020-01-23 RX ADMIN — GABAPENTIN 600 MG: 300 CAPSULE ORAL at 23:22

## 2020-01-23 RX ADMIN — OXYCODONE HYDROCHLORIDE AND ACETAMINOPHEN 2 TABLET: 10; 325 TABLET ORAL at 23:29

## 2020-01-23 RX ADMIN — OXYCODONE HYDROCHLORIDE AND ACETAMINOPHEN 2 TABLET: 10; 325 TABLET ORAL at 17:09

## 2020-01-23 RX ADMIN — SODIUM CHLORIDE, PRESERVATIVE FREE 3 ML: 5 INJECTION INTRAVENOUS at 20:16

## 2020-01-23 RX ADMIN — CLINDAMYCIN PHOSPHATE 900 MG: 900 INJECTION, SOLUTION INTRAVENOUS at 05:36

## 2020-01-23 RX ADMIN — LISINOPRIL: 10 TABLET ORAL at 08:19

## 2020-01-23 RX ADMIN — POLYETHYLENE GLYCOL (3350) 17 G: 17 POWDER, FOR SOLUTION ORAL at 20:15

## 2020-01-23 RX ADMIN — METOPROLOL SUCCINATE 25 MG: 25 TABLET, EXTENDED RELEASE ORAL at 20:15

## 2020-01-23 RX ADMIN — GABAPENTIN 600 MG: 300 CAPSULE ORAL at 05:35

## 2020-01-23 RX ADMIN — GABAPENTIN 600 MG: 300 CAPSULE ORAL at 17:48

## 2020-01-23 RX ADMIN — FAMOTIDINE 20 MG: 20 TABLET, FILM COATED ORAL at 08:19

## 2020-01-23 RX ADMIN — TIZANIDINE 4 MG: 4 TABLET ORAL at 13:41

## 2020-01-23 RX ADMIN — GABAPENTIN 600 MG: 300 CAPSULE ORAL at 12:05

## 2020-01-23 RX ADMIN — POLYETHYLENE GLYCOL 3350 17 G: 17 POWDER, FOR SOLUTION ORAL at 08:20

## 2020-01-23 RX ADMIN — FAMOTIDINE 20 MG: 20 TABLET, FILM COATED ORAL at 20:15

## 2020-01-23 RX ADMIN — POLYETHYLENE GLYCOL 3350 17 G: 17 POWDER, FOR SOLUTION ORAL at 13:49

## 2020-01-24 ENCOUNTER — APPOINTMENT (OUTPATIENT)
Dept: GENERAL RADIOLOGY | Facility: HOSPITAL | Age: 50
End: 2020-01-24

## 2020-01-24 ENCOUNTER — ANESTHESIA EVENT (OUTPATIENT)
Dept: PERIOP | Facility: HOSPITAL | Age: 50
End: 2020-01-24

## 2020-01-24 ENCOUNTER — ANESTHESIA (OUTPATIENT)
Dept: PERIOP | Facility: HOSPITAL | Age: 50
End: 2020-01-24

## 2020-01-24 LAB
FOLATE SERPL-MCNC: 13.5 NG/ML (ref 4.78–24.2)
IRON 24H UR-MRATE: 33 MCG/DL (ref 37–145)
IRON SATN MFR SERPL: 11 % (ref 20–50)
TIBC SERPL-MCNC: 299 MCG/DL (ref 298–536)
TRANSFERRIN SERPL-MCNC: 201 MG/DL (ref 200–360)
VIT B12 BLD-MCNC: 410 PG/ML (ref 211–946)

## 2020-01-24 PROCEDURE — 25010000002 VANCOMYCIN PER 500 MG: Performed by: ORTHOPAEDIC SURGERY

## 2020-01-24 PROCEDURE — 25010000002 FENTANYL CITRATE (PF) 250 MCG/5ML SOLUTION: Performed by: NURSE ANESTHETIST, CERTIFIED REGISTERED

## 2020-01-24 PROCEDURE — 0SG3071 FUSION OF LUMBOSACRAL JOINT WITH AUTOLOGOUS TISSUE SUBSTITUTE, POSTERIOR APPROACH, POSTERIOR COLUMN, OPEN APPROACH: ICD-10-PCS | Performed by: ORTHOPAEDIC SURGERY

## 2020-01-24 PROCEDURE — 76000 FLUOROSCOPY <1 HR PHYS/QHP: CPT

## 2020-01-24 PROCEDURE — 25010000002 FENTANYL CITRATE (PF) 100 MCG/2ML SOLUTION: Performed by: NURSE ANESTHETIST, CERTIFIED REGISTERED

## 2020-01-24 PROCEDURE — 25010000002 MORPHINE SULFATE (PF) 2 MG/ML SOLUTION: Performed by: NURSE ANESTHETIST, CERTIFIED REGISTERED

## 2020-01-24 PROCEDURE — 25010000002 METHYLNALTREXONE 12 MG/0.6ML SOLUTION: Performed by: PHYSICIAN ASSISTANT

## 2020-01-24 PROCEDURE — 25010000002 HYDROMORPHONE PER 4 MG: Performed by: ANESTHESIOLOGY

## 2020-01-24 PROCEDURE — 82607 VITAMIN B-12: CPT | Performed by: FAMILY MEDICINE

## 2020-01-24 PROCEDURE — 72100 X-RAY EXAM L-S SPINE 2/3 VWS: CPT

## 2020-01-24 PROCEDURE — 25010000002 VANCOMYCIN 1 G RECONSTITUTED SOLUTION: Performed by: ORTHOPAEDIC SURGERY

## 2020-01-24 PROCEDURE — 83540 ASSAY OF IRON: CPT | Performed by: FAMILY MEDICINE

## 2020-01-24 PROCEDURE — C1713 ANCHOR/SCREW BN/BN,TIS/BN: HCPCS | Performed by: ORTHOPAEDIC SURGERY

## 2020-01-24 PROCEDURE — 0SG0071 FUSION OF LUMBAR VERTEBRAL JOINT WITH AUTOLOGOUS TISSUE SUBSTITUTE, POSTERIOR APPROACH, POSTERIOR COLUMN, OPEN APPROACH: ICD-10-PCS | Performed by: ORTHOPAEDIC SURGERY

## 2020-01-24 PROCEDURE — 25010000002 PROPOFOL 10 MG/ML EMULSION: Performed by: NURSE ANESTHETIST, CERTIFIED REGISTERED

## 2020-01-24 PROCEDURE — 84466 ASSAY OF TRANSFERRIN: CPT | Performed by: FAMILY MEDICINE

## 2020-01-24 PROCEDURE — 25010000003 BUPIVACAINE LIPOSOME 1.3 % SUSPENSION 20 ML VIAL: Performed by: ORTHOPAEDIC SURGERY

## 2020-01-24 PROCEDURE — 97166 OT EVAL MOD COMPLEX 45 MIN: CPT | Performed by: OCCUPATIONAL THERAPIST

## 2020-01-24 PROCEDURE — 82746 ASSAY OF FOLIC ACID SERUM: CPT | Performed by: FAMILY MEDICINE

## 2020-01-24 PROCEDURE — 25010000002 DEXAMETHASONE PER 1 MG: Performed by: NURSE ANESTHETIST, CERTIFIED REGISTERED

## 2020-01-24 PROCEDURE — C1769 GUIDE WIRE: HCPCS | Performed by: ORTHOPAEDIC SURGERY

## 2020-01-24 PROCEDURE — C9290 INJ, BUPIVACAINE LIPOSOME: HCPCS | Performed by: ORTHOPAEDIC SURGERY

## 2020-01-24 PROCEDURE — 25010000002 MIDAZOLAM PER 1 MG: Performed by: NURSE ANESTHETIST, CERTIFIED REGISTERED

## 2020-01-24 PROCEDURE — 25010000002 ONDANSETRON PER 1 MG: Performed by: NURSE ANESTHETIST, CERTIFIED REGISTERED

## 2020-01-24 PROCEDURE — 25010000002 SUCCINYLCHOLINE PER 20 MG: Performed by: NURSE ANESTHETIST, CERTIFIED REGISTERED

## 2020-01-24 PROCEDURE — 97168 OT RE-EVAL EST PLAN CARE: CPT | Performed by: OCCUPATIONAL THERAPIST

## 2020-01-24 DEVICE — TI MIS LORDOTIC ROD, 5.5 MM X 60 MM
Type: IMPLANTABLE DEVICE | Status: FUNCTIONAL
Brand: INVICTUS

## 2020-01-24 DEVICE — CANNULATED EXTENDED TAB POLYAXIAL REDUCTION SCREW, 6.5 MM X 45 MM
Type: IMPLANTABLE DEVICE | Status: FUNCTIONAL
Brand: INVICTUS

## 2020-01-24 DEVICE — SET SCREW
Type: IMPLANTABLE DEVICE | Status: FUNCTIONAL
Brand: INVICTUS

## 2020-01-24 RX ORDER — SODIUM CHLORIDE 0.9 % (FLUSH) 0.9 %
3 SYRINGE (ML) INJECTION AS NEEDED
Status: DISCONTINUED | OUTPATIENT
Start: 2020-01-24 | End: 2020-01-24 | Stop reason: HOSPADM

## 2020-01-24 RX ORDER — ACETAMINOPHEN 500 MG
1000 TABLET ORAL ONCE
Status: COMPLETED | OUTPATIENT
Start: 2020-01-24 | End: 2020-01-24

## 2020-01-24 RX ORDER — MORPHINE SULFATE 2 MG/ML
2 INJECTION, SOLUTION INTRAMUSCULAR; INTRAVENOUS
Status: COMPLETED | OUTPATIENT
Start: 2020-01-24 | End: 2020-01-24

## 2020-01-24 RX ORDER — OXYCODONE AND ACETAMINOPHEN 10; 325 MG/1; MG/1
1 TABLET ORAL EVERY 4 HOURS PRN
Status: DISCONTINUED | OUTPATIENT
Start: 2020-01-24 | End: 2020-01-26 | Stop reason: HOSPADM

## 2020-01-24 RX ORDER — HYDRALAZINE HYDROCHLORIDE 20 MG/ML
5 INJECTION INTRAMUSCULAR; INTRAVENOUS
Status: DISCONTINUED | OUTPATIENT
Start: 2020-01-24 | End: 2020-01-24 | Stop reason: HOSPADM

## 2020-01-24 RX ORDER — SODIUM CHLORIDE 0.9 % (FLUSH) 0.9 %
10 SYRINGE (ML) INJECTION EVERY 12 HOURS SCHEDULED
Status: DISCONTINUED | OUTPATIENT
Start: 2020-01-24 | End: 2020-01-24 | Stop reason: HOSPADM

## 2020-01-24 RX ORDER — HYDROMORPHONE HYDROCHLORIDE 1 MG/ML
0.5 INJECTION, SOLUTION INTRAMUSCULAR; INTRAVENOUS; SUBCUTANEOUS AS NEEDED
Status: COMPLETED | OUTPATIENT
Start: 2020-01-24 | End: 2020-01-24

## 2020-01-24 RX ORDER — OXYCODONE HCL 20 MG/1
20 TABLET, FILM COATED, EXTENDED RELEASE ORAL ONCE
Status: COMPLETED | OUTPATIENT
Start: 2020-01-24 | End: 2020-01-24

## 2020-01-24 RX ORDER — SODIUM CHLORIDE, SODIUM LACTATE, POTASSIUM CHLORIDE, CALCIUM CHLORIDE 600; 310; 30; 20 MG/100ML; MG/100ML; MG/100ML; MG/100ML
1000 INJECTION, SOLUTION INTRAVENOUS CONTINUOUS
Status: DISPENSED | OUTPATIENT
Start: 2020-01-24 | End: 2020-01-25

## 2020-01-24 RX ORDER — LIDOCAINE HYDROCHLORIDE 10 MG/ML
0.5 INJECTION, SOLUTION EPIDURAL; INFILTRATION; INTRACAUDAL; PERINEURAL ONCE AS NEEDED
Status: DISCONTINUED | OUTPATIENT
Start: 2020-01-24 | End: 2020-01-24 | Stop reason: HOSPADM

## 2020-01-24 RX ORDER — MIDAZOLAM HYDROCHLORIDE 1 MG/ML
1 INJECTION INTRAMUSCULAR; INTRAVENOUS
Status: DISCONTINUED | OUTPATIENT
Start: 2020-01-24 | End: 2020-01-24 | Stop reason: HOSPADM

## 2020-01-24 RX ORDER — SODIUM CHLORIDE 9 MG/ML
500 INJECTION, SOLUTION INTRAVENOUS CONTINUOUS
Status: DISCONTINUED | OUTPATIENT
Start: 2020-01-24 | End: 2020-01-26 | Stop reason: HOSPADM

## 2020-01-24 RX ORDER — ONDANSETRON 2 MG/ML
INJECTION INTRAMUSCULAR; INTRAVENOUS AS NEEDED
Status: DISCONTINUED | OUTPATIENT
Start: 2020-01-24 | End: 2020-01-24 | Stop reason: SURG

## 2020-01-24 RX ORDER — METOCLOPRAMIDE HYDROCHLORIDE 5 MG/ML
5 INJECTION INTRAMUSCULAR; INTRAVENOUS
Status: DISCONTINUED | OUTPATIENT
Start: 2020-01-24 | End: 2020-01-24 | Stop reason: HOSPADM

## 2020-01-24 RX ORDER — MIDAZOLAM HYDROCHLORIDE 1 MG/ML
2 INJECTION INTRAMUSCULAR; INTRAVENOUS
Status: DISCONTINUED | OUTPATIENT
Start: 2020-01-24 | End: 2020-01-24 | Stop reason: HOSPADM

## 2020-01-24 RX ORDER — SODIUM CHLORIDE, SODIUM LACTATE, POTASSIUM CHLORIDE, CALCIUM CHLORIDE 600; 310; 30; 20 MG/100ML; MG/100ML; MG/100ML; MG/100ML
100 INJECTION, SOLUTION INTRAVENOUS CONTINUOUS
Status: DISCONTINUED | OUTPATIENT
Start: 2020-01-24 | End: 2020-01-26 | Stop reason: HOSPADM

## 2020-01-24 RX ORDER — SODIUM CHLORIDE 0.9 % (FLUSH) 0.9 %
3-10 SYRINGE (ML) INJECTION AS NEEDED
Status: DISCONTINUED | OUTPATIENT
Start: 2020-01-24 | End: 2020-01-24 | Stop reason: HOSPADM

## 2020-01-24 RX ORDER — NALOXONE HCL 0.4 MG/ML
0.04 VIAL (ML) INJECTION AS NEEDED
Status: DISCONTINUED | OUTPATIENT
Start: 2020-01-24 | End: 2020-01-24 | Stop reason: HOSPADM

## 2020-01-24 RX ORDER — LIDOCAINE HYDROCHLORIDE 20 MG/ML
INJECTION, SOLUTION INFILTRATION; PERINEURAL AS NEEDED
Status: DISCONTINUED | OUTPATIENT
Start: 2020-01-24 | End: 2020-01-24 | Stop reason: SURG

## 2020-01-24 RX ORDER — FLUMAZENIL 0.1 MG/ML
0.2 INJECTION INTRAVENOUS AS NEEDED
Status: DISCONTINUED | OUTPATIENT
Start: 2020-01-24 | End: 2020-01-24 | Stop reason: HOSPADM

## 2020-01-24 RX ORDER — SODIUM CHLORIDE 0.9 % (FLUSH) 0.9 %
10 SYRINGE (ML) INJECTION AS NEEDED
Status: DISCONTINUED | OUTPATIENT
Start: 2020-01-24 | End: 2020-01-24 | Stop reason: HOSPADM

## 2020-01-24 RX ORDER — AMOXICILLIN 250 MG
2 CAPSULE ORAL NIGHTLY
Status: DISCONTINUED | OUTPATIENT
Start: 2020-01-24 | End: 2020-01-26 | Stop reason: HOSPADM

## 2020-01-24 RX ORDER — IPRATROPIUM BROMIDE AND ALBUTEROL SULFATE 2.5; .5 MG/3ML; MG/3ML
3 SOLUTION RESPIRATORY (INHALATION) ONCE AS NEEDED
Status: DISCONTINUED | OUTPATIENT
Start: 2020-01-24 | End: 2020-01-24 | Stop reason: HOSPADM

## 2020-01-24 RX ORDER — SODIUM CHLORIDE 0.9 % (FLUSH) 0.9 %
3 SYRINGE (ML) INJECTION EVERY 12 HOURS SCHEDULED
Status: DISCONTINUED | OUTPATIENT
Start: 2020-01-24 | End: 2020-01-24 | Stop reason: HOSPADM

## 2020-01-24 RX ORDER — OXYCODONE AND ACETAMINOPHEN 10; 325 MG/1; MG/1
1 TABLET ORAL ONCE AS NEEDED
Status: DISCONTINUED | OUTPATIENT
Start: 2020-01-24 | End: 2020-01-24 | Stop reason: HOSPADM

## 2020-01-24 RX ORDER — SODIUM CHLORIDE, SODIUM LACTATE, POTASSIUM CHLORIDE, CALCIUM CHLORIDE 600; 310; 30; 20 MG/100ML; MG/100ML; MG/100ML; MG/100ML
100 INJECTION, SOLUTION INTRAVENOUS CONTINUOUS PRN
Status: DISCONTINUED | OUTPATIENT
Start: 2020-01-24 | End: 2020-01-26 | Stop reason: HOSPADM

## 2020-01-24 RX ORDER — FENTANYL CITRATE 50 UG/ML
INJECTION, SOLUTION INTRAMUSCULAR; INTRAVENOUS AS NEEDED
Status: DISCONTINUED | OUTPATIENT
Start: 2020-01-24 | End: 2020-01-24 | Stop reason: SURG

## 2020-01-24 RX ORDER — PROPOFOL 10 MG/ML
VIAL (ML) INTRAVENOUS AS NEEDED
Status: DISCONTINUED | OUTPATIENT
Start: 2020-01-24 | End: 2020-01-24 | Stop reason: SURG

## 2020-01-24 RX ORDER — FENTANYL CITRATE 50 UG/ML
25 INJECTION, SOLUTION INTRAMUSCULAR; INTRAVENOUS AS NEEDED
Status: COMPLETED | OUTPATIENT
Start: 2020-01-24 | End: 2020-01-24

## 2020-01-24 RX ORDER — ONDANSETRON 2 MG/ML
4 INJECTION INTRAMUSCULAR; INTRAVENOUS AS NEEDED
Status: DISCONTINUED | OUTPATIENT
Start: 2020-01-24 | End: 2020-01-24 | Stop reason: HOSPADM

## 2020-01-24 RX ORDER — VANCOMYCIN HYDROCHLORIDE 1 G/200ML
1000 INJECTION, SOLUTION INTRAVENOUS ONCE
Status: COMPLETED | OUTPATIENT
Start: 2020-01-24 | End: 2020-01-24

## 2020-01-24 RX ORDER — DEXAMETHASONE SODIUM PHOSPHATE 4 MG/ML
INJECTION, SOLUTION INTRA-ARTICULAR; INTRALESIONAL; INTRAMUSCULAR; INTRAVENOUS; SOFT TISSUE AS NEEDED
Status: DISCONTINUED | OUTPATIENT
Start: 2020-01-24 | End: 2020-01-24 | Stop reason: SURG

## 2020-01-24 RX ORDER — SUCCINYLCHOLINE CHLORIDE 20 MG/ML
INJECTION INTRAMUSCULAR; INTRAVENOUS AS NEEDED
Status: DISCONTINUED | OUTPATIENT
Start: 2020-01-24 | End: 2020-01-24 | Stop reason: SURG

## 2020-01-24 RX ORDER — LABETALOL HYDROCHLORIDE 5 MG/ML
5 INJECTION, SOLUTION INTRAVENOUS
Status: DISCONTINUED | OUTPATIENT
Start: 2020-01-24 | End: 2020-01-24 | Stop reason: HOSPADM

## 2020-01-24 RX ORDER — ROCURONIUM BROMIDE 10 MG/ML
INJECTION, SOLUTION INTRAVENOUS AS NEEDED
Status: DISCONTINUED | OUTPATIENT
Start: 2020-01-24 | End: 2020-01-24 | Stop reason: SURG

## 2020-01-24 RX ADMIN — SODIUM CHLORIDE, POTASSIUM CHLORIDE, SODIUM LACTATE AND CALCIUM CHLORIDE 100 ML/HR: 600; 310; 30; 20 INJECTION, SOLUTION INTRAVENOUS at 06:24

## 2020-01-24 RX ADMIN — FENTANYL CITRATE 25 MCG: 50 INJECTION, SOLUTION INTRAMUSCULAR; INTRAVENOUS at 08:59

## 2020-01-24 RX ADMIN — FENTANYL CITRATE 25 MCG: 50 INJECTION, SOLUTION INTRAMUSCULAR; INTRAVENOUS at 09:08

## 2020-01-24 RX ADMIN — SUCCINYLCHOLINE CHLORIDE 120 MG: 20 INJECTION, SOLUTION INTRAMUSCULAR; INTRAVENOUS at 07:10

## 2020-01-24 RX ADMIN — VANCOMYCIN HYDROCHLORIDE 1000 MG: 1 INJECTION, POWDER, LYOPHILIZED, FOR SOLUTION INTRAVENOUS at 06:42

## 2020-01-24 RX ADMIN — MORPHINE SULFATE 2 MG: 2 INJECTION, SOLUTION INTRAMUSCULAR; INTRAVENOUS at 09:30

## 2020-01-24 RX ADMIN — GABAPENTIN 600 MG: 300 CAPSULE ORAL at 11:35

## 2020-01-24 RX ADMIN — FAMOTIDINE 20 MG: 20 TABLET, FILM COATED ORAL at 20:53

## 2020-01-24 RX ADMIN — LIDOCAINE HYDROCHLORIDE 100 MG: 20 INJECTION, SOLUTION INFILTRATION; PERINEURAL at 07:10

## 2020-01-24 RX ADMIN — METOPROLOL SUCCINATE 25 MG: 25 TABLET, EXTENDED RELEASE ORAL at 20:53

## 2020-01-24 RX ADMIN — POLYETHYLENE GLYCOL (3350) 17 G: 17 POWDER, FOR SOLUTION ORAL at 20:53

## 2020-01-24 RX ADMIN — DEXAMETHASONE SODIUM PHOSPHATE 4 MG: 4 INJECTION, SOLUTION INTRAMUSCULAR; INTRAVENOUS at 07:30

## 2020-01-24 RX ADMIN — SODIUM CHLORIDE, POTASSIUM CHLORIDE, SODIUM LACTATE AND CALCIUM CHLORIDE 1000 ML: 600; 310; 30; 20 INJECTION, SOLUTION INTRAVENOUS at 06:28

## 2020-01-24 RX ADMIN — HYDROMORPHONE HYDROCHLORIDE 0.5 MG: 1 INJECTION, SOLUTION INTRAMUSCULAR; INTRAVENOUS; SUBCUTANEOUS at 10:12

## 2020-01-24 RX ADMIN — MORPHINE SULFATE 2 MG: 2 INJECTION, SOLUTION INTRAMUSCULAR; INTRAVENOUS at 09:20

## 2020-01-24 RX ADMIN — FENTANYL CITRATE 150 MCG: 50 INJECTION INTRAMUSCULAR; INTRAVENOUS at 07:10

## 2020-01-24 RX ADMIN — FENTANYL CITRATE 25 MCG: 50 INJECTION, SOLUTION INTRAMUSCULAR; INTRAVENOUS at 09:11

## 2020-01-24 RX ADMIN — FENTANYL CITRATE 25 MCG: 50 INJECTION, SOLUTION INTRAMUSCULAR; INTRAVENOUS at 09:10

## 2020-01-24 RX ADMIN — MORPHINE SULFATE 2 MG: 2 INJECTION, SOLUTION INTRAMUSCULAR; INTRAVENOUS at 09:10

## 2020-01-24 RX ADMIN — ACETAMINOPHEN 1000 MG: 500 TABLET, FILM COATED ORAL at 06:42

## 2020-01-24 RX ADMIN — TIZANIDINE 4 MG: 4 TABLET ORAL at 21:01

## 2020-01-24 RX ADMIN — MORPHINE SULFATE 2 MG: 2 INJECTION, SOLUTION INTRAMUSCULAR; INTRAVENOUS at 09:40

## 2020-01-24 RX ADMIN — GABAPENTIN 600 MG: 300 CAPSULE ORAL at 22:39

## 2020-01-24 RX ADMIN — OXYCODONE HYDROCHLORIDE AND ACETAMINOPHEN 1 TABLET: 10; 325 TABLET ORAL at 16:13

## 2020-01-24 RX ADMIN — METHYLNALTREXONE BROMIDE 12 MG: 12 INJECTION, SOLUTION SUBCUTANEOUS at 14:33

## 2020-01-24 RX ADMIN — HYDROMORPHONE HYDROCHLORIDE 0.5 MG: 1 INJECTION, SOLUTION INTRAMUSCULAR; INTRAVENOUS; SUBCUTANEOUS at 10:15

## 2020-01-24 RX ADMIN — HYDROMORPHONE HYDROCHLORIDE 0.5 MG: 1 INJECTION, SOLUTION INTRAMUSCULAR; INTRAVENOUS; SUBCUTANEOUS at 10:08

## 2020-01-24 RX ADMIN — OXYCODONE HYDROCHLORIDE AND ACETAMINOPHEN 1 TABLET: 10; 325 TABLET ORAL at 11:35

## 2020-01-24 RX ADMIN — FENTANYL CITRATE 25 MCG: 50 INJECTION, SOLUTION INTRAMUSCULAR; INTRAVENOUS at 09:09

## 2020-01-24 RX ADMIN — MORPHINE SULFATE 2 MG: 2 INJECTION, SOLUTION INTRAMUSCULAR; INTRAVENOUS at 08:57

## 2020-01-24 RX ADMIN — FENTANYL CITRATE 25 MCG: 50 INJECTION, SOLUTION INTRAMUSCULAR; INTRAVENOUS at 09:02

## 2020-01-24 RX ADMIN — ONDANSETRON HYDROCHLORIDE 4 MG: 2 SOLUTION INTRAMUSCULAR; INTRAVENOUS at 08:17

## 2020-01-24 RX ADMIN — FAMOTIDINE 20 MG: 20 TABLET, FILM COATED ORAL at 11:36

## 2020-01-24 RX ADMIN — GABAPENTIN 600 MG: 300 CAPSULE ORAL at 17:56

## 2020-01-24 RX ADMIN — SODIUM CHLORIDE, PRESERVATIVE FREE 3 ML: 5 INJECTION INTRAVENOUS at 20:53

## 2020-01-24 RX ADMIN — PROPOFOL 150 MG: 10 INJECTION, EMULSION INTRAVENOUS at 07:10

## 2020-01-24 RX ADMIN — FENTANYL CITRATE 25 MCG: 50 INJECTION, SOLUTION INTRAMUSCULAR; INTRAVENOUS at 09:00

## 2020-01-24 RX ADMIN — FENTANYL CITRATE 100 MCG: 50 INJECTION INTRAMUSCULAR; INTRAVENOUS at 07:30

## 2020-01-24 RX ADMIN — ROCURONIUM BROMIDE 10 MG: 10 INJECTION INTRAVENOUS at 07:09

## 2020-01-24 RX ADMIN — MIDAZOLAM 2 MG: 1 INJECTION INTRAMUSCULAR; INTRAVENOUS at 06:45

## 2020-01-24 RX ADMIN — OXYCODONE HYDROCHLORIDE 20 MG: 20 TABLET, FILM COATED, EXTENDED RELEASE ORAL at 06:31

## 2020-01-24 RX ADMIN — FENTANYL CITRATE 25 MCG: 50 INJECTION, SOLUTION INTRAMUSCULAR; INTRAVENOUS at 09:01

## 2020-01-24 RX ADMIN — VANCOMYCIN HYDROCHLORIDE 1000 MG: 1 INJECTION, SOLUTION INTRAVENOUS at 14:33

## 2020-01-24 RX ADMIN — SENNOSIDES AND DOCUSATE SODIUM 2 TABLET: 8.6; 5 TABLET ORAL at 22:39

## 2020-01-24 RX ADMIN — HYDROMORPHONE HYDROCHLORIDE 1 MG: 1 INJECTION, SOLUTION INTRAMUSCULAR; INTRAVENOUS; SUBCUTANEOUS at 17:55

## 2020-01-24 RX ADMIN — HYDROMORPHONE HYDROCHLORIDE 0.5 MG: 1 INJECTION, SOLUTION INTRAMUSCULAR; INTRAVENOUS; SUBCUTANEOUS at 10:10

## 2020-01-24 RX ADMIN — OXYCODONE HYDROCHLORIDE AND ACETAMINOPHEN 1 TABLET: 10; 325 TABLET ORAL at 22:40

## 2020-01-24 NOTE — ANESTHESIA PROCEDURE NOTES
Airway  Urgency: elective    Date/Time: 1/24/2020 7:11 AM  Airway not difficult    General Information and Staff    Patient location during procedure: OR  CRNA: Taras Rios CRNA    Indications and Patient Condition  Indications for airway management: airway protection    Preoxygenated: yes  Mask difficulty assessment: 0 - not attempted    Final Airway Details  Final airway type: endotracheal airway      Successful airway: ETT  Cuffed: yes   Successful intubation technique: direct laryngoscopy  Facilitating devices/methods: intubating stylet  Endotracheal tube insertion site: oral  Blade: Bermeo  Blade size: 2  ETT size (mm): 7.0  Cormack-Lehane Classification: grade IIa - partial view of glottis  Placement verified by: capnometry   Cuff volume (mL): 6  Measured from: lips  ETT/EBT  to lips (cm): 21  Number of attempts at approach: 1  Assessment: lips, teeth, and gum same as pre-op and atraumatic intubation

## 2020-01-24 NOTE — ANESTHESIA POSTPROCEDURE EVALUATION
Patient: Ayla Echevarria    Procedure Summary     Date:  01/24/20 Room / [facility]:   [facility]  [facility]    Anesthesia Start:  0702 Anesthesia Stop:  0848    Procedure:  POSTERIOR SPINAL FUSION WITH INSTRUMENTATION L4-S1 (N/A Spine Lumbar) Diagnosis:  (M43.10)    Surgeon:  PAMELA Bang MD Provider:  Taras Rios CRNA    Anesthesia Type:  general ASA Status:  3          Anesthesia Type: general    Vitals  Vitals Value Taken Time   /68 1/24/2020 10:45 AM   Temp 97.4 °F (36.3 °C) 1/24/2020 10:45 AM   Pulse 82 1/24/2020 10:45 AM   Resp 10 1/24/2020 10:45 AM   SpO2 99 % 1/24/2020 10:45 AM   Vitals shown include unvalidated device data.        Post Anesthesia Care and Evaluation    Patient location during evaluation: PACU  Patient participation: complete - patient participated  Level of consciousness: awake and alert  Pain management: adequate  Airway patency: patent  Anesthetic complications: No anesthetic complications    Cardiovascular status: acceptable  Respiratory status: acceptable  Hydration status: acceptable    Comments: Blood pressure 137/68, pulse 87, temperature 97.4 °F (36.3 °C), temperature source Temporal, resp. rate 10, last menstrual period 01/10/2020, SpO2 99 %, not currently breastfeeding.    Pt discharged from PACU based on bran score >8

## 2020-01-24 NOTE — ANESTHESIA PREPROCEDURE EVALUATION
Anesthesia Evaluation     NPO Solid Status: > 8 hours  NPO Liquid Status: > 8 hours           Airway   Mallampati: II  TM distance: >3 FB  Neck ROM: full  Large neck circumference  Comment: Grade lla with hylton 2  Dental - normal exam     Pulmonary - normal exam   (+) sleep apnea on CPAP,   Cardiovascular - normal exam  Exercise tolerance: good (4-7 METS)    Patient on routine beta blocker and Beta blocker given within 24 hours of surgery    (+) hypertension, hyperlipidemia,   (-) past MI, cardiac stents      Neuro/Psych  (-) TIA, CVA  GI/Hepatic/Renal/Endo    (+) morbid obesity, GERD,    (-) liver disease, no renal disease, diabetes    Musculoskeletal     Abdominal  - normal exam   Substance History - negative use     OB/GYN negative ob/gyn ROS         Other   arthritis,                      Anesthesia Plan    ASA 3     general     intravenous induction     Anesthetic plan, all risks, benefits, and alternatives have been provided, discussed and informed consent has been obtained with: patient.

## 2020-01-25 PROCEDURE — 25010000002 ONDANSETRON PER 1 MG: Performed by: PHYSICIAN ASSISTANT

## 2020-01-25 RX ADMIN — GABAPENTIN 600 MG: 300 CAPSULE ORAL at 11:22

## 2020-01-25 RX ADMIN — OXYCODONE HYDROCHLORIDE AND ACETAMINOPHEN 1 TABLET: 10; 325 TABLET ORAL at 21:51

## 2020-01-25 RX ADMIN — DULOXETINE HYDROCHLORIDE 60 MG: 30 CAPSULE, DELAYED RELEASE ORAL at 08:53

## 2020-01-25 RX ADMIN — TIZANIDINE 4 MG: 4 TABLET ORAL at 17:36

## 2020-01-25 RX ADMIN — SENNOSIDES AND DOCUSATE SODIUM 2 TABLET: 8.6; 5 TABLET ORAL at 20:04

## 2020-01-25 RX ADMIN — OXYCODONE HYDROCHLORIDE AND ACETAMINOPHEN 1 TABLET: 10; 325 TABLET ORAL at 12:53

## 2020-01-25 RX ADMIN — SODIUM CHLORIDE, PRESERVATIVE FREE 3 ML: 5 INJECTION INTRAVENOUS at 20:05

## 2020-01-25 RX ADMIN — FAMOTIDINE 20 MG: 20 TABLET, FILM COATED ORAL at 08:53

## 2020-01-25 RX ADMIN — GABAPENTIN 600 MG: 300 CAPSULE ORAL at 17:36

## 2020-01-25 RX ADMIN — SODIUM CHLORIDE, PRESERVATIVE FREE 3 ML: 5 INJECTION INTRAVENOUS at 08:53

## 2020-01-25 RX ADMIN — METOPROLOL SUCCINATE 25 MG: 25 TABLET, EXTENDED RELEASE ORAL at 20:04

## 2020-01-25 RX ADMIN — TIZANIDINE 4 MG: 4 TABLET ORAL at 08:53

## 2020-01-25 RX ADMIN — OXYCODONE HYDROCHLORIDE AND ACETAMINOPHEN 1 TABLET: 10; 325 TABLET ORAL at 17:36

## 2020-01-25 RX ADMIN — GABAPENTIN 600 MG: 300 CAPSULE ORAL at 23:05

## 2020-01-25 RX ADMIN — ONDANSETRON HYDROCHLORIDE 4 MG: 2 SOLUTION INTRAMUSCULAR; INTRAVENOUS at 13:45

## 2020-01-25 RX ADMIN — POLYETHYLENE GLYCOL (3350) 17 G: 17 POWDER, FOR SOLUTION ORAL at 20:04

## 2020-01-25 RX ADMIN — OXYCODONE HYDROCHLORIDE AND ACETAMINOPHEN 1 TABLET: 10; 325 TABLET ORAL at 08:53

## 2020-01-25 RX ADMIN — OXYCODONE HYDROCHLORIDE AND ACETAMINOPHEN 1 TABLET: 10; 325 TABLET ORAL at 03:59

## 2020-01-25 RX ADMIN — POLYETHYLENE GLYCOL (3350) 17 G: 17 POWDER, FOR SOLUTION ORAL at 08:53

## 2020-01-25 RX ADMIN — GABAPENTIN 600 MG: 300 CAPSULE ORAL at 05:06

## 2020-01-25 RX ADMIN — LISINOPRIL: 10 TABLET ORAL at 08:53

## 2020-01-25 RX ADMIN — FAMOTIDINE 20 MG: 20 TABLET, FILM COATED ORAL at 20:04

## 2020-01-26 VITALS
HEART RATE: 81 BPM | RESPIRATION RATE: 18 BRPM | TEMPERATURE: 98.1 F | SYSTOLIC BLOOD PRESSURE: 118 MMHG | DIASTOLIC BLOOD PRESSURE: 61 MMHG | OXYGEN SATURATION: 95 %

## 2020-01-26 LAB
ANION GAP SERPL CALCULATED.3IONS-SCNC: 10 MMOL/L (ref 5–15)
BASOPHILS # BLD AUTO: 0.04 10*3/MM3 (ref 0–0.2)
BASOPHILS NFR BLD AUTO: 0.4 % (ref 0–1.5)
BUN BLD-MCNC: 10 MG/DL (ref 6–20)
BUN/CREAT SERPL: 17.9 (ref 7–25)
CALCIUM SPEC-SCNC: 9.1 MG/DL (ref 8.6–10.5)
CHLORIDE SERPL-SCNC: 98 MMOL/L (ref 98–107)
CO2 SERPL-SCNC: 28 MMOL/L (ref 22–29)
CREAT BLD-MCNC: 0.56 MG/DL (ref 0.57–1)
DEPRECATED RDW RBC AUTO: 43.2 FL (ref 37–54)
EOSINOPHIL # BLD AUTO: 0.31 10*3/MM3 (ref 0–0.4)
EOSINOPHIL NFR BLD AUTO: 3 % (ref 0.3–6.2)
ERYTHROCYTE [DISTWIDTH] IN BLOOD BY AUTOMATED COUNT: 13.8 % (ref 12.3–15.4)
GFR SERPL CREATININE-BSD FRML MDRD: 115 ML/MIN/1.73
GLUCOSE BLD-MCNC: 133 MG/DL (ref 65–99)
HCT VFR BLD AUTO: 30.5 % (ref 34–46.6)
HGB BLD-MCNC: 9.9 G/DL (ref 12–15.9)
IMM GRANULOCYTES # BLD AUTO: 0.08 10*3/MM3 (ref 0–0.05)
IMM GRANULOCYTES NFR BLD AUTO: 0.8 % (ref 0–0.5)
LYMPHOCYTES # BLD AUTO: 1.97 10*3/MM3 (ref 0.7–3.1)
LYMPHOCYTES NFR BLD AUTO: 18.9 % (ref 19.6–45.3)
MCH RBC QN AUTO: 28.1 PG (ref 26.6–33)
MCHC RBC AUTO-ENTMCNC: 32.5 G/DL (ref 31.5–35.7)
MCV RBC AUTO: 86.6 FL (ref 79–97)
MONOCYTES # BLD AUTO: 0.87 10*3/MM3 (ref 0.1–0.9)
MONOCYTES NFR BLD AUTO: 8.3 % (ref 5–12)
NEUTROPHILS # BLD AUTO: 7.16 10*3/MM3 (ref 1.7–7)
NEUTROPHILS NFR BLD AUTO: 68.6 % (ref 42.7–76)
NRBC BLD AUTO-RTO: 0 /100 WBC (ref 0–0.2)
PLATELET # BLD AUTO: 448 10*3/MM3 (ref 140–450)
PMV BLD AUTO: 9 FL (ref 6–12)
POTASSIUM BLD-SCNC: 4 MMOL/L (ref 3.5–5.2)
RBC # BLD AUTO: 3.52 10*6/MM3 (ref 3.77–5.28)
SODIUM BLD-SCNC: 136 MMOL/L (ref 136–145)
WBC NRBC COR # BLD: 10.43 10*3/MM3 (ref 3.4–10.8)

## 2020-01-26 PROCEDURE — 25010000002 METHYLNALTREXONE 12 MG/0.6ML SOLUTION: Performed by: PHYSICIAN ASSISTANT

## 2020-01-26 PROCEDURE — 80048 BASIC METABOLIC PNL TOTAL CA: CPT | Performed by: FAMILY MEDICINE

## 2020-01-26 PROCEDURE — 85025 COMPLETE CBC W/AUTO DIFF WBC: CPT | Performed by: FAMILY MEDICINE

## 2020-01-26 RX ORDER — METHYLPREDNISOLONE 4 MG/1
TABLET ORAL
Qty: 21 TABLET | Refills: 0 | Status: SHIPPED | OUTPATIENT
Start: 2020-01-26 | End: 2021-11-08

## 2020-01-26 RX ORDER — OXYCODONE AND ACETAMINOPHEN 10; 325 MG/1; MG/1
1 TABLET ORAL EVERY 4 HOURS PRN
Start: 2020-01-26 | End: 2020-02-01

## 2020-01-26 RX ADMIN — FAMOTIDINE 20 MG: 20 TABLET, FILM COATED ORAL at 09:07

## 2020-01-26 RX ADMIN — SODIUM CHLORIDE, PRESERVATIVE FREE 3 ML: 5 INJECTION INTRAVENOUS at 09:07

## 2020-01-26 RX ADMIN — OXYCODONE HYDROCHLORIDE AND ACETAMINOPHEN 1 TABLET: 10; 325 TABLET ORAL at 06:28

## 2020-01-26 RX ADMIN — METHYLNALTREXONE BROMIDE 12 MG: 12 INJECTION, SOLUTION SUBCUTANEOUS at 09:07

## 2020-01-26 RX ADMIN — POLYETHYLENE GLYCOL (3350) 17 G: 17 POWDER, FOR SOLUTION ORAL at 09:07

## 2020-01-26 RX ADMIN — DULOXETINE HYDROCHLORIDE 60 MG: 30 CAPSULE, DELAYED RELEASE ORAL at 09:07

## 2020-01-26 RX ADMIN — TIZANIDINE 4 MG: 4 TABLET ORAL at 02:00

## 2020-01-26 RX ADMIN — GABAPENTIN 600 MG: 300 CAPSULE ORAL at 06:28

## 2020-01-26 RX ADMIN — OXYCODONE HYDROCHLORIDE AND ACETAMINOPHEN 1 TABLET: 10; 325 TABLET ORAL at 11:06

## 2020-01-26 RX ADMIN — LISINOPRIL: 10 TABLET ORAL at 09:07

## 2020-01-26 RX ADMIN — OXYCODONE HYDROCHLORIDE AND ACETAMINOPHEN 1 TABLET: 10; 325 TABLET ORAL at 02:00

## 2020-01-27 ENCOUNTER — READMISSION MANAGEMENT (OUTPATIENT)
Dept: CALL CENTER | Facility: HOSPITAL | Age: 50
End: 2020-01-27

## 2020-01-27 NOTE — OUTREACH NOTE
Prep Survey      Responses   Facility patient discharged from?  [facility]   Is patient eligible?  Yes   Discharge diagnosis  Psoriatic arthritis   Does the patient have one of the following disease processes/diagnoses(primary or secondary)?  General Surgery   Does the patient have Home health ordered?  No   Is there a DME ordered?  No   Prep survey completed?  Yes          Ginna Acevedo RN

## 2020-01-28 ENCOUNTER — READMISSION MANAGEMENT (OUTPATIENT)
Dept: CALL CENTER | Facility: HOSPITAL | Age: 50
End: 2020-01-28

## 2020-01-28 NOTE — OUTREACH NOTE
General Surgery Week 1 Survey      Responses   Facility patient discharged from?  [facility]   Does the patient have one of the following disease processes/diagnoses(primary or secondary)?  General Surgery   Is there a successful TCM telephone encounter documented?  No   Week 1 attempt successful?  Yes   Call start time  1425   Call end time  1428   Discharge diagnosis  Psoriatic arthritis   Meds reviewed with patient/caregiver?  Yes   Is the patient having any side effects they believe may be caused by any medication additions or changes?  No   Does the patient have all medications related to this admission filled (includes all antibiotics, pain medications, etc.)  Yes   Is the patient taking all medications as directed (includes completed medication regime)?  Yes   Does the patient have a follow up appointment scheduled with their surgeon?  Yes   Has the patient kept scheduled appointments due by today?  N/A   Has home health visited the patient within 72 hours of discharge?  N/A   Psychosocial issues?  No   Did the patient receive a copy of their discharge instructions?  Yes   Is the patient /caregiver able to teach back basic post-op care?  Continue use of incentive spirometry at least 1 week post discharge, Practice 'cough and deep breath', Drive as instructed by MD in discharge instructions, Take showers only when approved by MD-sponge bathe until then, No tub bath, swimming, or hot tub until instructed by MD, Keep incision areas clean,dry and protected, Do not remove steri-strips, Lifting as instructed by MD in discharge instructions   Is the patient/caregiver able to teach back signs and symptoms of incisional infection?  Increased redness, swelling or pain at the incisonal site, Increased drainage or bleeding, Incisional warmth, Pus or odor from incision, Fever   Is the patient/caregiver able to teach back steps to recovery at home?  Set small, achievable goals for return to baseline health, Rest and rebuild  strength, gradually increase activity, Practice good oral hygiene, Eat a well-balance diet, Make a list of questions for surgeon's appointment   Is the patient/caregiver able to teach back the hierarchy of who to call/visit for symptoms/problems? PCP, Specialist, Home health nurse, Urgent Care, ED, 911  Yes   Week 1 call completed?  Yes   Wrap up additional comments  Doing well.          Jes Arias, RN

## 2020-02-03 ENCOUNTER — APPOINTMENT (OUTPATIENT)
Dept: CT IMAGING | Facility: HOSPITAL | Age: 50
End: 2020-02-03

## 2020-02-03 ENCOUNTER — NURSE TRIAGE (OUTPATIENT)
Dept: CALL CENTER | Facility: HOSPITAL | Age: 50
End: 2020-02-03

## 2020-02-03 ENCOUNTER — HOSPITAL ENCOUNTER (EMERGENCY)
Facility: HOSPITAL | Age: 50
Discharge: HOME | End: 2020-02-04
Admitting: FAMILY MEDICINE

## 2020-02-03 DIAGNOSIS — M54.31 SCIATICA OF RIGHT SIDE: Primary | ICD-10-CM

## 2020-02-03 PROCEDURE — 72131 CT LUMBAR SPINE W/O DYE: CPT

## 2020-02-03 PROCEDURE — 99284 EMERGENCY DEPT VISIT MOD MDM: CPT

## 2020-02-03 RX ORDER — METHYLPREDNISOLONE SODIUM SUCCINATE 125 MG/2ML
125 INJECTION, POWDER, LYOPHILIZED, FOR SOLUTION INTRAMUSCULAR; INTRAVENOUS ONCE
Status: COMPLETED | OUTPATIENT
Start: 2020-02-03 | End: 2020-02-04

## 2020-02-03 RX ORDER — ONDANSETRON 2 MG/ML
4 INJECTION INTRAMUSCULAR; INTRAVENOUS ONCE
Status: COMPLETED | OUTPATIENT
Start: 2020-02-03 | End: 2020-02-04

## 2020-02-04 ENCOUNTER — READMISSION MANAGEMENT (OUTPATIENT)
Dept: CALL CENTER | Facility: HOSPITAL | Age: 50
End: 2020-02-04

## 2020-02-04 VITALS
TEMPERATURE: 97.7 F | BODY MASS INDEX: 45.07 KG/M2 | RESPIRATION RATE: 16 BRPM | HEART RATE: 78 BPM | DIASTOLIC BLOOD PRESSURE: 71 MMHG | HEIGHT: 64 IN | OXYGEN SATURATION: 96 % | WEIGHT: 264 LBS | SYSTOLIC BLOOD PRESSURE: 129 MMHG

## 2020-02-04 LAB
ALBUMIN SERPL-MCNC: 4.1 G/DL (ref 3.5–5.2)
ALBUMIN/GLOB SERPL: 1.2 G/DL
ALP SERPL-CCNC: 139 U/L (ref 39–117)
ALT SERPL W P-5'-P-CCNC: 20 U/L (ref 1–33)
ANION GAP SERPL CALCULATED.3IONS-SCNC: 13 MMOL/L (ref 5–15)
AST SERPL-CCNC: 34 U/L (ref 1–32)
B-HCG UR QL: NEGATIVE
BASOPHILS # BLD AUTO: 0.11 10*3/MM3 (ref 0–0.2)
BASOPHILS NFR BLD AUTO: 1 % (ref 0–1.5)
BILIRUB SERPL-MCNC: 0.2 MG/DL (ref 0.2–1.2)
BUN BLD-MCNC: 17 MG/DL (ref 6–20)
BUN/CREAT SERPL: 26.2 (ref 7–25)
CALCIUM SPEC-SCNC: 9.4 MG/DL (ref 8.6–10.5)
CHLORIDE SERPL-SCNC: 101 MMOL/L (ref 98–107)
CO2 SERPL-SCNC: 26 MMOL/L (ref 22–29)
CREAT BLD-MCNC: 0.65 MG/DL (ref 0.57–1)
CRP SERPL-MCNC: 0.36 MG/DL (ref 0–0.5)
DEPRECATED RDW RBC AUTO: 42.5 FL (ref 37–54)
EOSINOPHIL # BLD AUTO: 0.36 10*3/MM3 (ref 0–0.4)
EOSINOPHIL NFR BLD AUTO: 3.4 % (ref 0.3–6.2)
ERYTHROCYTE [DISTWIDTH] IN BLOOD BY AUTOMATED COUNT: 13.6 % (ref 12.3–15.4)
ERYTHROCYTE [SEDIMENTATION RATE] IN BLOOD: 45 MM/HR (ref 0–20)
GFR SERPL CREATININE-BSD FRML MDRD: 96 ML/MIN/1.73
GLOBULIN UR ELPH-MCNC: 3.5 GM/DL
GLUCOSE BLD-MCNC: 147 MG/DL (ref 65–99)
HCT VFR BLD AUTO: 37.5 % (ref 34–46.6)
HGB BLD-MCNC: 12.5 G/DL (ref 12–15.9)
IMM GRANULOCYTES # BLD AUTO: 0.05 10*3/MM3 (ref 0–0.05)
IMM GRANULOCYTES NFR BLD AUTO: 0.5 % (ref 0–0.5)
INTERNAL NEGATIVE CONTROL: NEGATIVE
INTERNAL POSITIVE CONTROL: POSITIVE
LYMPHOCYTES # BLD AUTO: 2.48 10*3/MM3 (ref 0.7–3.1)
LYMPHOCYTES NFR BLD AUTO: 23.4 % (ref 19.6–45.3)
Lab: NORMAL
MCH RBC QN AUTO: 28.3 PG (ref 26.6–33)
MCHC RBC AUTO-ENTMCNC: 33.3 G/DL (ref 31.5–35.7)
MCV RBC AUTO: 85 FL (ref 79–97)
MONOCYTES # BLD AUTO: 0.63 10*3/MM3 (ref 0.1–0.9)
MONOCYTES NFR BLD AUTO: 5.9 % (ref 5–12)
NEUTROPHILS # BLD AUTO: 6.97 10*3/MM3 (ref 1.7–7)
NEUTROPHILS NFR BLD AUTO: 65.8 % (ref 42.7–76)
NRBC BLD AUTO-RTO: 0 /100 WBC (ref 0–0.2)
PLATELET # BLD AUTO: 607 10*3/MM3 (ref 140–450)
PMV BLD AUTO: 8.8 FL (ref 6–12)
POTASSIUM BLD-SCNC: 4.2 MMOL/L (ref 3.5–5.2)
PROT SERPL-MCNC: 7.6 G/DL (ref 6–8.5)
RBC # BLD AUTO: 4.41 10*6/MM3 (ref 3.77–5.28)
SODIUM BLD-SCNC: 140 MMOL/L (ref 136–145)
WBC NRBC COR # BLD: 10.6 10*3/MM3 (ref 3.4–10.8)

## 2020-02-04 PROCEDURE — 80053 COMPREHEN METABOLIC PANEL: CPT | Performed by: FAMILY MEDICINE

## 2020-02-04 PROCEDURE — 86140 C-REACTIVE PROTEIN: CPT | Performed by: FAMILY MEDICINE

## 2020-02-04 PROCEDURE — 96375 TX/PRO/DX INJ NEW DRUG ADDON: CPT

## 2020-02-04 PROCEDURE — 85025 COMPLETE CBC W/AUTO DIFF WBC: CPT | Performed by: FAMILY MEDICINE

## 2020-02-04 PROCEDURE — 25010000002 ONDANSETRON PER 1 MG: Performed by: FAMILY MEDICINE

## 2020-02-04 PROCEDURE — 25010000002 METHYLPREDNISOLONE PER 125 MG: Performed by: FAMILY MEDICINE

## 2020-02-04 PROCEDURE — 81025 URINE PREGNANCY TEST: CPT | Performed by: PHYSICIAN ASSISTANT

## 2020-02-04 PROCEDURE — 96374 THER/PROPH/DIAG INJ IV PUSH: CPT

## 2020-02-04 PROCEDURE — 85651 RBC SED RATE NONAUTOMATED: CPT | Performed by: FAMILY MEDICINE

## 2020-02-04 RX ADMIN — HYDROMORPHONE HYDROCHLORIDE 1 MG: 1 INJECTION, SOLUTION INTRAMUSCULAR; INTRAVENOUS; SUBCUTANEOUS at 00:45

## 2020-02-04 RX ADMIN — ONDANSETRON HYDROCHLORIDE 4 MG: 2 SOLUTION INTRAMUSCULAR; INTRAVENOUS at 00:43

## 2020-02-04 RX ADMIN — SODIUM CHLORIDE 1000 ML: 9 INJECTION, SOLUTION INTRAVENOUS at 00:41

## 2020-02-04 RX ADMIN — METHYLPREDNISOLONE SODIUM SUCCINATE 125 MG: 125 INJECTION, POWDER, FOR SOLUTION INTRAMUSCULAR; INTRAVENOUS at 00:41

## 2020-02-04 NOTE — OUTREACH NOTE
General Surgery Week 2 Survey      Responses   Facility patient discharged from?  [place]   Does the patient have one of the following disease processes/diagnoses(primary or secondary)?  General Surgery   Week 2 attempt successful?  Yes   Call start time  1655   Call end time  1657   Discharge diagnosis  LUMBAR LATERAL INTERBODY FUSION     Meds reviewed with patient/caregiver?  Yes   Is the patient taking all medications as directed (includes completed medication regime)?  Yes   Has the patient kept scheduled appointments due by today?  Yes   Psychosocial issues?  No   Nursing interventions  Reviewed instructions with patient, Educated on MyChart   What is the patient's perception of their health status since discharge?  Improving   Nursing interventions  Nurse provided patient education   Is the patient /caregiver able to teach back basic post-op care?  Drive as instructed by MD in discharge instructions, Keep incision areas clean,dry and protected   Is the patient/caregiver able to teach back signs and symptoms of incisional infection?  Fever, Pus or odor from incision, Increased drainage or bleeding   Is the patient/caregiver able to teach back steps to recovery at home?  Eat a well-balance diet   If the patient is a current smoker, are they able to teach back resources for cessation?  -- [Pt is a nonsmoker]   Week 2 call completed?  Yes          Marsha Ruiz RN

## 2020-02-04 NOTE — TELEPHONE ENCOUNTER
"    Reason for Disposition  • [1] SEVERE post-op pain (e.g., excruciating, pain scale 8-10) AND [2] not controlled with pain medications    Additional Information  • Negative: Sounds like a life-threatening emergency to the triager  • Negative: Chest pain  • Negative: Difficulty breathing  • Negative: Surgical incision symptoms and questions  • Negative: [1] Discomfort (pain, burning or stinging) when passing urine AND [2] male  • Negative: [1] Discomfort (pain, burning or stinging) when passing urine AND [2] female  • Negative: Constipation  • Negative: New or worsening leg (calf, thigh) pain  • Negative: New or worsening leg swelling  • Negative: Dizziness is severe, or persists > 24 hours after surgery  • Negative: Pain, redness, swelling, or pus at IV Site  • Negative: Symptoms arising from use of a urinary catheter (Martinez or Coude)  • Negative: Cast problems or questions  • Negative: Medication question  • Negative: [1] Widespread rash AND [2] bright red, sunburn-like  • Negative: [1] SEVERE headache AND [2] after spinal (epidural) anesthesia  • Negative: [1] Vomiting AND [2] persists > 4 hours  • Negative: [1] Vomiting AND [2] abdomen looks much more swollen than usual  • Negative: [1] Drinking very little AND [2] dehydration suspected (e.g., no urine > 12 hours, very dry mouth, very lightheaded)  • Negative: Patient sounds very sick or weak to the triager  • Negative: Sounds like a serious complication to the triager  • Negative: Fever > 100.4 F (38.0 C)    Answer Assessment - Initial Assessment Questions  1. SYMPTOM: \"What's the main symptom you're concerned about?\" (e.g., pain, fever, vomiting)      Pain   2. ONSET: \"When did the pain start?\"      Day or two   3. SURGERY: \"What surgery was performed?\"      Back   4. DATE of SURGERY: \"When was surgery performed?\"       1/22/2019   5. ANESTHESIA: \" What type of anesthesia did you have?\" (e.g., general, spinal, epidural, local)      General   6. PAIN: \"Is " "there any pain?\" If so, ask: \"How bad is it?\"  (Scale 1-10; or mild, moderate, severe)      Yes; severe   7. FEVER: \"Do you have a fever?\" If so, ask: \"What is your temperature, how was it measured, and when did it start?\"      No   8. VOMITING: \"Is there any vomiting?\" If yes, ask: \"How many times?\"      No   9. BLEEDING: \"Is there any bleeding?\" If so, ask: \"How much?\" and \"Where?\"      No   10. OTHER SYMPTOMS: \"Do you have any other symptoms?\" (e.g., drainage from wound, painful urination, constipation)        Small amount of drainage    Protocols used: POST-OP SYMPTOMS AND QUESTIONS-ADULT-      "

## 2020-02-04 NOTE — ED PROVIDER NOTES
Subjective   History of Present Illness  49-year-old female presents with chief complaint of low back pain and right-sided low back pain with pain shooting down her right leg.  Patient reports has a history of sciatica and had a lumbar fusion on .  The patient reports this was done by Dr. Bang.  She reports after her surgery she had left-sided leg pain with pain shooting down her left leg.  She was given a Medrol Dosepak to take and her left leg pain had resolved however, now she is developed right leg pain over the past 24 hours and low back pain that is severe.  Patient denies saddle anesthesia or bowel or bladder incontinence and she has been afebrile.  Review of Systems   All other systems reviewed and are negative.      Past Medical History:   Diagnosis Date   • Carpal tunnel syndrome    • Chronic bilateral low back pain with bilateral sciatica 2020   • Disc degeneration, lumbosacral 2020   • Hyperlipidemia    • Hypertension    • Nerve pain    • Psoriasis    • Sleep apnea        Allergies   Allergen Reactions   • Ciprofloxacin Swelling     Throat swelling   • Penicillins Swelling     Throat swelling   • Cethexonium Unknown - Low Severity     Patient stated eye burning       Past Surgical History:   Procedure Laterality Date   • ANTERIOR LUMBAR EXPOSURE N/A 2020    Procedure: ANTERIOR LUMBAR EXPOSURE;  Surgeon: Salvatore Villalta DO;  Location:  [facility];  Service: Vascular   •  SECTION      x2   • CHOLECYSTECTOMY     • LUMBAR FUSION N/A 2020    Procedure: ANTERIOR DECOMPRESSION, ANTERIOR LUMBAR INTERBODY FUSION WITH INSTRUMENTATION L5-S1;  Surgeon: PAMELA Bang MD;  Location:  [facility];  Service: Orthopedic Spine   • LUMBAR FUSION Right 2020    Procedure: RIGHT  LATERAL LUMBAR  INTERBODY FUSION WITH INSTRUMENTATION L4-5;  Surgeon: PAMELA Bang MD;  Location:  [facility];  Service: Orthopedic Spine   • LUMBAR LAMINECTOMY WITH FUSION N/A 2020     Procedure: POSTERIOR SPINAL FUSION WITH INSTRUMENTATION L4-S1;  Surgeon: PAMELA Bang MD;  Location: [facility];  Service: Orthopedic Spine       History reviewed. No pertinent family history.    Social History     Socioeconomic History   • Marital status:      Spouse name: Not on file   • Number of children: Not on file   • Years of education: Not on file   • Highest education level: Not on file   Tobacco Use   • Smoking status: Former Smoker   • Smokeless tobacco: Never Used   • Tobacco comment: quit 5 years ago total of 20 years smoking   Substance and Sexual Activity   • Alcohol use: Never     Frequency: Never   • Drug use: Never   • Sexual activity: Defer           Objective   Physical Exam   Constitutional: She is oriented to person, place, and time. She appears well-developed and well-nourished.   HENT:   Head: Normocephalic and atraumatic.   Eyes: Pupils are equal, round, and reactive to light. EOM are normal.   Neck: Normal range of motion. Neck supple.   Cardiovascular: Normal rate and regular rhythm.   Pulmonary/Chest: Effort normal and breath sounds normal.   Abdominal: Soft. Bowel sounds are normal.   Musculoskeletal: Normal range of motion.   Neurological: She is alert and oriented to person, place, and time. No cranial nerve deficit. Coordination normal.   Skin: Skin is warm. Capillary refill takes less than 2 seconds.   Psychiatric: She has a normal mood and affect. Her behavior is normal.   Nursing note and vitals reviewed.      Procedures           ED Course           Labs Reviewed   COMPREHENSIVE METABOLIC PANEL - Abnormal; Notable for the following components:       Result Value    Glucose 147 (*)     AST (SGOT) 34 (*)     Alkaline Phosphatase 139 (*)     BUN/Creatinine Ratio 26.2 (*)     All other components within normal limits    Narrative:     GFR Normal >60  Chronic Kidney Disease <60  Kidney Failure <15     SEDIMENTATION RATE - Abnormal; Notable for the following components:     Sed Rate 45 (*)     All other components within normal limits   CBC WITH AUTO DIFFERENTIAL - Abnormal; Notable for the following components:    Platelets 607 (*)     All other components within normal limits   C-REACTIVE PROTEIN - Normal   POCT PEFORM URINE PREGNANCY - Normal   CBC AND DIFFERENTIAL    Narrative:     The following orders were created for panel order CBC & Differential.  Procedure                               Abnormality         Status                     ---------                               -----------         ------                     CBC Auto Differential[[id number]]        Abnormal            Final result                 Please view results for these tests on the individual orders.     CT Lumbar Spine Without Contrast    (Results Pending)                Rice Coma Scale Score: 15                          MDM  Number of Diagnoses or Management Options  Sciatica of right side: new and requires workup  Diagnosis management comments: Stable condition, nothing acute, consulted Dr. Bang, to see in 6 hours        Amount and/or Complexity of Data Reviewed  Clinical lab tests: ordered and reviewed  Tests in the radiology section of CPT®: ordered and reviewed  Tests in the medicine section of CPT®: reviewed and ordered    Risk of Complications, Morbidity, and/or Mortality  Presenting problems: moderate  Diagnostic procedures: moderate  Management options: moderate    Patient Progress  Patient progress: stable      Final diagnoses:   Sciatica of right side            Adrien John PA-C  02/04/20 0253

## 2020-02-11 ENCOUNTER — READMISSION MANAGEMENT (OUTPATIENT)
Dept: CALL CENTER | Facility: HOSPITAL | Age: 50
End: 2020-02-11

## 2020-02-11 NOTE — OUTREACH NOTE
General Surgery Week 3 Survey      Responses   Facility patient discharged from?  [facility]   Does the patient have one of the following disease processes/diagnoses(primary or secondary)?  General Surgery   Week 3 attempt successful?  Yes   Call start time  1318   Call end time  1321   Meds reviewed with patient/caregiver?  Yes   Is the patient taking all medications as directed (includes completed medication regime)?  Yes   Has the patient kept scheduled appointments due by today?  Yes   Comments  Has seen Dr. Hernandes and Dr. Bang   Comments  has brace to wear when up out of bed.    What is the patient's perception of their health status since discharge?  Improving   Week 3 call completed?  Yes   Revoked  No further contact(revokes)-requires comment   Graduated/Revoked comments  She feels no need for call next week, no new issues.    Wrap up additional comments  Incision healing ok, has one hole that is draining , Dr. Bang knows this and she has nurse to observe it.          Olimpia Gr, RN

## 2020-02-14 ENCOUNTER — TELEPHONE (OUTPATIENT)
Dept: GASTROENTEROLOGY | Age: 50
End: 2020-02-14

## 2020-02-14 NOTE — TELEPHONE ENCOUNTER
Findings:      The mucosa appeared normal throughout the entire examined colon   In the sigmoid colon, two small sessile polyps seen and removed with hot snare polypectomy  Retroflexion in the rectum was normal and revealed no further abnormalities            Recommendations:  1. Repeat colonoscopy: pending pathology - max of 5 yrs for screening  2. Await biopsy results-you will receive a letter with your results within 7-10 days     Findings and recommendations were discussed w/ the patient. A copy of the images was provided.     Alice Wong MD  1/8/2020  9:04 AM      Last Susana Elam aprsherrie 12-26-19. CLN completed per 1-8-20. No FU scheduled. I do not see previous Egd in Epic. Patient called from  said today she had a BM with dark blood mixed in the stool today, moderate amount, said this has never happened before, it is usually BR blood when it occurs and it concerned her, does not report any increased pain or fever or swelling. Patient said she had her CLN with  and other than 2 polyps it was ok. The patient has had recent Lumbar surgeries x 3 and is recovering well but does have to take Percocet PRN but she is weaning herself off of this, does not like to take pain medication. Patient said at present stools are softer but did still have to strain a bit to pass the stool. I asked the patient if any stomach issues and she said not really other than her stomach gurgles a lot and will on occasion have slight discomfort but for the most part no issues. Patient asked me to send this information to [facility] aprn to see what she can recommend. Please advise. Patient asking for a return call. Uses [address] till 2-28-20 and Deepali Blandon is forcing them to change to [facility].  [facility]

## 2020-02-14 NOTE — TELEPHONE ENCOUNTER
Thanks Hali Hampton, I spoke to the patient and she said she did D/C the use of her NSAID since 1-17-20 and said she has not used any Pepto or Iron. Patient said she will watch this over the weekend and if it gets worse she will proceed to ED if necessary. If occurs on occasion she will call back here for an appointment for Egd evaluation if Hali Hampton feels necessary.  Keven magdaleno

## 2020-03-07 ENCOUNTER — HOSPITAL ENCOUNTER (EMERGENCY)
Age: 50
Discharge: HOME | End: 2020-03-07
Payer: COMMERCIAL

## 2020-03-07 PROCEDURE — 99282 EMERGENCY DEPT VISIT SF MDM: CPT

## 2020-03-07 PROCEDURE — 87070 CULTURE OTHR SPECIMN AEROBIC: CPT

## 2020-03-07 PROCEDURE — 87205 SMEAR GRAM STAIN: CPT

## 2020-03-07 PROCEDURE — 87186 SC STD MICRODIL/AGAR DIL: CPT

## 2020-03-09 LAB
GRAM STAIN RESULT: ABNORMAL
ORGANISM: ABNORMAL
ORGANISM: ABNORMAL
WOUND/ABSCESS: ABNORMAL
WOUND/ABSCESS: ABNORMAL

## 2020-06-18 ENCOUNTER — OFFICE VISIT (OUTPATIENT)
Dept: NEUROLOGY | Age: 50
End: 2020-06-18
Payer: COMMERCIAL

## 2020-06-18 VITALS
SYSTOLIC BLOOD PRESSURE: 129 MMHG | HEART RATE: 74 BPM | WEIGHT: 280 LBS | DIASTOLIC BLOOD PRESSURE: 69 MMHG | HEIGHT: 63 IN | BODY MASS INDEX: 49.61 KG/M2 | RESPIRATION RATE: 18 BRPM

## 2020-06-18 PROCEDURE — 99213 OFFICE O/P EST LOW 20 MIN: CPT | Performed by: PHYSICIAN ASSISTANT

## 2020-06-18 RX ORDER — CITALOPRAM 20 MG/1
TABLET ORAL
COMMUNITY
Start: 2020-06-05 | End: 2022-07-15

## 2020-06-18 NOTE — PATIENT INSTRUCTIONS
Patient education: Sleep apnea in adults       INTRODUCTION -- Normally during sleep, air moves through the throat and in and out of the lungs at a regular rhythm. In a person with sleep apnea, air movement is periodically diminished or stopped. There are two types of sleep apnea: obstructive sleep apnea and central sleep apnea. In obstructive sleep apnea, breathing is abnormal because of narrowing or closure of the throat. In central sleep apnea, breathing is abnormal because of a change in the breathing control and rhythm. Sleep apnea is a serious condition that can affect a person's ability to safely perform normal daily activities and can affect long term health. Approximately 25 percent of adults are at risk for sleep apnea of some degree. Men are more commonly affected than women. Other risk factors include middle and older age, being overweight or obese, and having a small mouth and throat. This topic review focuses on the most common type of sleep apnea in adults, obstructive sleep apnea (DORITA). HOW SLEEP APNEA OCCURS -- The throat is surrounded by muscles that control the airway for speaking, swallowing, and breathing. During sleep, these muscles are less active, and this causes the throat to narrow. In most people, this narrowing does not affect breathing. In others, it can cause snoring, sometimes with reduced or completely blocked airflow. A completely blocked airway without airflow is called an obstructive apnea. Partial obstruction with diminished airflow is called a hypopnea. A person may have apnea and hypopnea during sleep. Insufficient breathing due to apnea or hypopnea causes oxygen levels to fall and carbon dioxide to rise. Because the airway is blocked, breathing faster or harder does not help to improve oxygen levels until the airway is reopened. Typically, the obstruction requires the person to awaken to activate the upper airway muscles.  Once the airway is opened, the person then takes several deep breaths to catch up on breathing. As the person awakens, he or she may move briefly, snort or snore, and take a deep breath. Less frequently, a person may awaken completely with a sensation of gasping, smothering, or choking. If the person falls back to sleep quickly, he or she will not remember the event. Many people with sleep apnea are unaware of their abnormal breathing in sleep, and all patients underestimate how often their sleep is interrupted. Awakening from sleep causes sleep to be unrefreshing and causes fatigue and daytime sleepiness. Anatomic causes of obstructive sleep apnea --  Most patients have DORITA because of a small upper airway. As the bones of the face and skull develop, some people develop a small lower face, a small mouth, and a tongue that seems too large for the mouth. These features are genetically determined, which explains why DORITA tends to cluster in families. Obesity is another major factor. Tonsil enlargement can be an important cause, especially in children. SLEEP APNEA SYMPTOMS -- The main symptoms of DORITA are loud snoring, fatigue, and daytime sleepiness. However, some people have no symptoms. For example, if the person does not have a bed partner, he or she may not be aware of the snoring. Fatigue and sleepiness have many causes and are often attributed to overwork and increasing age. As a result, a person may be slow to recognize that they have a problem. A bed partner or spouse often prompts the patient to seek medical care. Other symptoms may include one or more of the following:  ?Restless sleep  ? Awakening with choking, gasping, or smothering  ? Morning headaches, dry mouth, or sore throat  ? Waking frequently to urinate  ? Awakening unrested, groggy  ? Low energy, difficulty concentrating, memory impairment    Risk factors -- Certain factors increase the risk of sleep apnea.   ?Increasing age - DORITA occurs at all ages, but it is more common in middle and weight loss is only 5 percent, meaning that 95 percent of people regain lost weight. Avoid alcohol and other sedatives -- Alcohol can worsen sleepiness, potentially increasing the risk of accidents or injury. People with DORITA are often counseled to drink little to no alcohol, even during the daytime. Similarly, people who take anti-anxiety medications or sedatives to sleep should speak with their healthcare provider about the safety of these medications. People with DORITA must notify all healthcare providers, including surgeons, about their condition and the potential risks of being sedated. People with DORITA who are given anesthesia and/or pain medications require special management and close monitoring to reduce the risk of a blocked airway. Dental devices -- A dental device, called an oral appliance or mandibular advancement device, can reposition the jaw (mandible), bringing the tongue and soft palate forward as well. This may relieve obstruction in some people. This treatment is excellent for reducing snoring, although the effect on DORITA is sometimes more limited. As a result, dental devices are best used for mild cases of DORITA when relief of snoring is the main goal. Failure to tolerate and accept CPAP is another indication for dental devices. While dental devices are not as effective as CPAP for DORITA, some patients prefer a dental device to CPAP. Side effects of dental devices are generally minor but may include changes to the bite with prolonged use. Surgical treatment -- Surgery is an alternative therapy for patients who cannot tolerate or do not improve with nonsurgical treatments such as CPAP or oral devices. Surgery can also be used in combination with other nonsurgical treatments. Surgical procedures reshape structures in the upper airways or surgically reposition bone or soft tissue. Uvulopalatopharyngoplasty (UPPP) removes the uvula and excessive tissue in the throat, including the tonsils, if present.

## 2020-06-18 NOTE — PROGRESS NOTES
[facility]  [address] [place] [address] Drive, [address] [address]  Juan A Sweeney  Phone [phone]  Fax [phone]       [facility] Sleep Follow Up Encounter      Information:   Patient Name: Perri Ramirez  :   1970  Age:   47 y.o. MRN:   [id number]  Account #:  [de-identified]  Today:                20    Provider:  Naomy Lloyd PA-C    Chief Complaint   Patient presents with    Follow-up    Sleep Apnea        Subjective:   Perri Ramirez is a 47 y.o. female  with a history of severe DORITA who comes in for an annual sleep clinic follow up. The PSG, 18 revealed an AHI of 106.6 with O2 desaturations as low as 71%. She is prescribed BiPAP therapy with a pressure of 18cm/14cm. The compliance report indicates that she is averaging 9-10 hours of BiPAP use per day. She reports that consistent BiPAP use has alleviated the previous DORITA symptoms. The excessive daytime somnolence has improved greatly. She no longer falls asleep when drowsy.      Location or symptom:  DORITA  Onset:  PS2018   Timing:  q hs  Severity:  Severe  Associated:  Snoring, witnessed apneas, and excessive daytime somnolence  Alleviated: BiPAP       Objective:     Past Medical History:   Diagnosis Date    BiPAP (biphasic positive airway pressure) dependence     18cm/14cm    Hyperlipidemia     Hypertension     Obstructive sleep apnea     AHI: 106. 6 per PSG, 2018    Osteoarthritis     Psoriasis        Past Surgical History:   Procedure Laterality Date    CARPAL TUNNEL RELEASE       SECTION      CHOLECYSTECTOMY      COLONOSCOPY N/A 2020    Dr Amrit Kingsley AP x 2, HP x 1, 5 yr recall    EPIDURAL STEROID INJECTION N/A 2019    LUMBAR EPIDURAL STEROID INJECTION L4-5 performed by Eulalio Joy at [address] N/A 2019    LUMBAR EPIDURAL STEROID INJECTION L5-S1 performed by Eulalio Joy at [address] [place] [address] OF [place], [facility]. INJECTION PROCEDURE FOR SACROILIAC JOINT Right 2019 needed for Pain.  DULoxetine (CYMBALTA) 60 MG extended release capsule Take 60 mg by mouth daily      tiZANidine (ZANAFLEX) 4 MG tablet Take 4 mg by mouth nightly Indications: 1/4 tab at breakfast and lunch, 1/2 at supper, 1 tab at bedtime       gabapentin (NEURONTIN) 600 MG tablet Take 600 mg by mouth 4 times daily. No current facility-administered medications for this visit. Allergies:  Cethexonium; Ciprofloxacin; and Penicillins    REVIEW OF SYSTEMS     Constitutional: []? Fever []? Sweats []? Chills []? Recent Injury   [x]? Denies all unless marked  HENT:[]? Headache  []? Head Injury  []? Sore Throat  []? Ear Pain  []? Dizziness []? Hearing Loss   [x]? Denies all unless marked  Spine:  []? Neck pain  []? Back pain  []? Sciaticia  [x]? Denies all unless marked  Cardiovascular:[]? Chest Pain []? Palpitations []? Heart Disease  [x]? Denies all unless marked  Pulmonary: []? Shortness of Breath []? Cough   [x]? Denies all unless marked  Gastrointestinal:  []? Abdominal Pain  []? Blood in Stool  []? Diarrhea []? Constipation []? Nausea  []? Vomiting  [x]? Denies all unless marked  Genitourinary:  []? Dysuria []? Frequency  []? Incontinence []? Urgency   [x]? Denies all unless marked  Musculoskeletal: []? Arthralgia  []? Myalgias []? Muscle cramps  []? Muscle twitches   [x]? Denies all unless marked   Extremities:   []? Pain   []? Swelling   [x]? Denies all unless marked  Skin:[]? Rash  []? Color Change  [x]? Denies all unless marked  Neurological:[]? Visual Disturbance []? Double Vision []? Slurred Speech []? Trouble swallowing  []? Vertigo []? Tingling []? Numbness []? Weakness []? Loss of Balance   []? Loss of Consciousness []? Memory Loss []? Seizures  [x]? Denies all unless marked  Psychiatric/Behavioral:[]? Depression []? Anxiety  [x]? Denies all unless marked  Sleep: []? Insomnia []? Sleep Disturbance []? Snoring []? Restless Legs []? Daytime Sleepiness [x]? Sleep Apnea  []?  Denies all unless

## 2020-07-13 RX ORDER — METOPROLOL SUCCINATE 25 MG/1
TABLET, EXTENDED RELEASE ORAL
Qty: 90 TABLET | Refills: 1 | Status: SHIPPED | OUTPATIENT
Start: 2020-07-13 | End: 2020-12-28

## 2020-10-23 ENCOUNTER — OFFICE VISIT (OUTPATIENT)
Dept: CARDIOLOGY | Age: 50
End: 2020-10-23
Payer: COMMERCIAL

## 2020-10-23 VITALS
WEIGHT: 293 LBS | HEART RATE: 86 BPM | HEIGHT: 64 IN | BODY MASS INDEX: 50.02 KG/M2 | DIASTOLIC BLOOD PRESSURE: 60 MMHG | SYSTOLIC BLOOD PRESSURE: 124 MMHG

## 2020-10-23 PROCEDURE — 99213 OFFICE O/P EST LOW 20 MIN: CPT | Performed by: INTERNAL MEDICINE

## 2020-10-23 ASSESSMENT — ENCOUNTER SYMPTOMS
CONSTIPATION: 0
DIARRHEA: 0
COUGH: 0
BACK PAIN: 0
ABDOMINAL DISTENTION: 0
WHEEZING: 0
BLOOD IN STOOL: 0
EYE DISCHARGE: 0
SHORTNESS OF BREATH: 0
VOMITING: 0

## 2020-10-23 NOTE — PROGRESS NOTES
[facility]  Cardiology Office Note  [place]  [id number]  Phone: [phone]  Fax: [phone]                            Date:  10/23/2020  Patient: Zachary Armstrong  Age:  45 y.o., 1970    Referral: Joon Mcdermott MD      PROBLEM LIST:    Patient Active Problem List    Diagnosis Date Noted    Tachycardia 02/07/2019     Priority: Low     Overview Note:     12/26/18 DSE negative for myocardial ischemia  12/30/18 Zio NSR      Essential hypertension 12/18/2018     Priority: Low    BiPAP (biphasic positive airway pressure) dependence      Priority: Low     Overview Note:     18cm/14cm      Obstructive sleep apnea 04/30/2018     Priority: Low    Somnolence, daytime 04/30/2018     Priority: Low    Snoring 04/30/2018     Priority: Low    Witnessed apneic spells 04/30/2018     Priority: Low    Sleep disturbance 04/30/2018     Priority: Low    Morbid obesity with BMI of 50.0-59.9, adult ([address] 04/30/2018     Priority: Low     1. Morbid obesity with obstructive sleep apnea. 2. Psoriasis with psoriatic arthritis, started on Humira. 3. Ex-tobacco use stopped 2015. 4. Bouts of sinus tachycardia on Holter with single brief run of SVT (6 beat), negative dobutamine stress echo. PRESENTATION: Zachary Armstrong is a 45y.o. year old female presents for follow-up evaluation. She underwent back surgery in January this year. Her symptoms are much improved with less pain and as a result less palpitations. She is doing well otherwise. No chest pain or shortness of breath. REVIEW OF SYSTEMS:  Review of Systems   Constitutional: Negative for activity change, fatigue and fever. HENT: Negative for ear pain, hearing loss and tinnitus. Eyes: Negative for discharge and visual disturbance. Respiratory: Negative for cough, shortness of breath and wheezing. Cardiovascular: Negative for chest pain, palpitations and leg swelling.    Gastrointestinal: Negative for abdominal distention, blood in stool, constipation, diarrhea and vomiting. Endocrine: Negative for cold intolerance, heat intolerance, polydipsia and polyuria. Genitourinary: Negative for dysuria and hematuria. Musculoskeletal: Negative for arthralgias, back pain and myalgias. Skin: Negative for pallor and rash. Neurological: Negative for seizures, syncope, weakness and headaches. Psychiatric/Behavioral: Negative for behavioral problems and dysphoric mood. Past Medical History:      Diagnosis Date    BiPAP (biphasic positive airway pressure) dependence     18cm/14cm    Hyperlipidemia     Hypertension     Obstructive sleep apnea     AHI: 106. 6 per PSG, 2018    Osteoarthritis     Psoriasis        Past Surgical History:      Procedure Laterality Date    CARPAL TUNNEL RELEASE       SECTION      CHOLECYSTECTOMY      COLONOSCOPY N/A 2020    Dr Susan Zheng AP x 2, HP x 1, 5 yr recall    EPIDURAL STEROID INJECTION N/A 2019    LUMBAR EPIDURAL STEROID INJECTION L4-5 performed by Eulalio Joy at [address] [place] [address] N/A 2019    LUMBAR EPIDURAL STEROID INJECTION L5-S1 performed by Eulalio Joy at [address] [place] [address] OF [place], [facility]. INJECTION PROCEDURE FOR SACROILIAC JOINT Right 2019    SACROILIAC JOINT INJECTION performed by Ese York at [address] N/A 2019    LUMBAR INTER LAMINAR KACEY L4-5 performed by Eulalio Joy at [facility] Bilateral 2019    LUMBAR FACET BILATERAL L4-5 L5-S1 performed by Eulalio Joy at [facility] N/A 2019    LUMBAR INTER LAMINAR KACEY L4-5 performed by Eulalio Joy at [address] [facility] SI JOINT ARTHRGRPHY&/ANES/STEROID W/IMAGE Right 10/9/2018    SACROILIAC JOINT INJECTION performed by Eulalio Joy at [address] DX/THER SBST INTRLMNR LMBR/SAC W/IMG GDN N/A 2018    LUMBAR INTER LAMINAR KACEY L4-5 performed by SYSCO Benita at [facility] OR       Medications:  Current Outpatient Medications   Medication Sig Dispense Refill    metFORMIN (GLUCOPHAGE) 500 MG tablet Take 500 mg by mouth 2 times daily (with meals)      metoprolol succinate (TOPROL XL) 25 MG extended release tablet TAKE 1 TABLET NIGHTLY 90 tablet 1    lisinopril-hydrochlorothiazide (PRINZIDE;ZESTORETIC) 20-12.5 MG per tablet Take 1 tablet by mouth daily      Adalimumab (HUMIRA) 40 MG/0.4ML PSKT Inject into the skin Pt stopped Humira because of her upcomming surgery. Will get back on after.  diclofenac (VOLTAREN) 50 MG EC tablet Take 50 mg by mouth 2 times daily  2    ezetimibe-simvastatin (VYTORIN) 10-40 MG per tablet Take 1 tablet by mouth daily       citalopram (CELEXA) 20 MG tablet TK 1 T PO QD      HYDROcodone-acetaminophen (NORCO) 7.5-325 MG per tablet Take 1 tablet by mouth every 6 hours as needed for Pain.  DULoxetine (CYMBALTA) 60 MG extended release capsule Take 60 mg by mouth daily      tiZANidine (ZANAFLEX) 4 MG tablet Take 4 mg by mouth nightly Indications: 1/4 tab at breakfast and lunch, 1/2 at supper, 1 tab at bedtime       gabapentin (NEURONTIN) 600 MG tablet Take 600 mg by mouth 4 times daily. No current facility-administered medications for this visit.         Allergies:  Cethexonium; Ciprofloxacin; and Penicillins    Past Social History:  Social History     Socioeconomic History    Marital status:      Spouse name: Not on file    Number of children: Not on file    Years of education: Not on file    Highest education level: Not on file   Occupational History    Not on file   Social Needs    Financial resource strain: Not on file    Food insecurity     Worry: Not on file     Inability: Not on file   [employer] needs     Medical: Not on file     Non-medical: Not on file   Tobacco Use    Smoking status: Former Smoker     Types: Cigarettes     Last attempt to quit: 2015     Years since quittin.8    Breath sounds: Normal breath sounds. No wheezing or rales. Chest:      Chest wall: No tenderness. Abdominal:      General: Bowel sounds are normal. There is no distension. Palpations: Abdomen is soft. There is no mass. Tenderness: There is no abdominal tenderness. There is no guarding or rebound. Hernia: No hernia is present. Comments: No palpable organomegaly   Musculoskeletal: Normal range of motion. Skin:     General: Skin is warm. Coloration: Skin is not pale. Findings: No rash. Neurological:      Mental Status: She is alert and oriented to person, place, and time. Cranial Nerves: No cranial nerve deficit. Deep Tendon Reflexes: Reflexes normal.           Labs:   CBC: No results for input(s): WBC, HGB, HCT, PLT in the last 72 hours. BMP:No results for input(s): NA, K, CO2, BUN, CREATININE, LABGLOM, GLUCOSE in the last 72 hours. BNP: No results for input(s): BNP in the last 72 hours. PT/INR: No results for input(s): PROTIME, INR in the last 72 hours. APTT:No results for input(s): APTT in the last 72 hours. CARDIAC ENZYMES:No results for input(s): CKTOTAL, CKMB, CKMBINDEX, TROPONINI in the last 72 hours. FASTING LIPID PANEL:No results found for: HDL, LDLDIRECT, LDLCALC, TRIG  LIVER PROFILE:No results for input(s): AST, ALT, LABALBU in the last 72 hours. Imaging:          ASSESSMENT and PLAN:    18-year-old female patient with past medical history of morbid obesity, obstructive sleep apnea, psoriasis with psoriatic arthritis on Humira presenting with episodes of sinus tachycardia with exertional shortness of breath partly brought on by pain with a negative dobutamine stress echo, Holter with only a brief run of SVT here for follow-up evaluation.     1. She is doing well with less pain episodes since her back surgery. This has resulted in less palpitations and she is doing well. She will continue antihypertensive therapy as appropriate.   She may follow-up with primary care in this regard. From a cardiac viewpoint if she may follow-up as needed. If there is any change in status I have asked her to be in touch with us. Orders:  No orders of the defined types were placed in this encounter. No orders of the defined types were placed in this encounter. Return if symptoms worsen or fail to improve. Electronically signed by Barak Low MD on 10/23/2020 at 9:27 [address] [facility]      Thisdictation was generated by voice recognition computer software. Although all attempts are made to edit the dictation for accuracy, there may be errors in the transcription that are not intended.

## 2020-12-28 RX ORDER — METOPROLOL SUCCINATE 25 MG/1
TABLET, EXTENDED RELEASE ORAL
Qty: 90 TABLET | Refills: 3 | Status: SHIPPED | OUTPATIENT
Start: 2020-12-28 | End: 2021-11-29

## 2021-04-02 ENCOUNTER — OFFICE VISIT (OUTPATIENT)
Dept: URGENT CARE | Age: 51
End: 2021-04-02
Payer: COMMERCIAL

## 2021-04-02 VITALS
HEIGHT: 63 IN | HEART RATE: 88 BPM | RESPIRATION RATE: 20 BRPM | WEIGHT: 293 LBS | DIASTOLIC BLOOD PRESSURE: 60 MMHG | TEMPERATURE: 98.4 F | BODY MASS INDEX: 51.91 KG/M2 | SYSTOLIC BLOOD PRESSURE: 110 MMHG | OXYGEN SATURATION: 98 %

## 2021-04-02 DIAGNOSIS — J01.90 ACUTE SINUSITIS, RECURRENCE NOT SPECIFIED, UNSPECIFIED LOCATION: Primary | ICD-10-CM

## 2021-04-02 DIAGNOSIS — H11.31 CONJUNCTIVAL HEMORRHAGE OF RIGHT EYE: ICD-10-CM

## 2021-04-02 PROCEDURE — 99213 OFFICE O/P EST LOW 20 MIN: CPT | Performed by: NURSE PRACTITIONER

## 2021-04-02 RX ORDER — DOXYCYCLINE 100 MG/1
100 TABLET ORAL 2 TIMES DAILY
Qty: 20 TABLET | Refills: 0 | Status: SHIPPED | OUTPATIENT
Start: 2021-04-02 | End: 2021-04-12

## 2021-04-02 RX ORDER — GUSELKUMAB 100 MG/ML
INJECTION SUBCUTANEOUS
COMMUNITY

## 2021-04-02 ASSESSMENT — ENCOUNTER SYMPTOMS
EYE REDNESS: 1
EYE ITCHING: 0
SORE THROAT: 0
RHINORRHEA: 0
DIARRHEA: 0
VOMITING: 0
COUGH: 0
NAUSEA: 0
PHOTOPHOBIA: 0
SHORTNESS OF BREATH: 0
ABDOMINAL PAIN: 0
SINUS PRESSURE: 1
EYE PAIN: 0
EYE DISCHARGE: 0

## 2021-04-02 NOTE — PROGRESS NOTES
[id number] [facility]  [address]  [address] [id number]  Dept: [phone]  Dept Fax: [phone]  Loc: [phone]    Luis Fernando Grimaldo is a 45 y.o. female who presents today for her medical conditions/complaintsas noted below. Luis Fernando Grimaldo is c/o of Eye Problem (Right) and Dizziness        HPI:     HPI [facility] presents today with right eye issue, dizziness, headache, and sinus drainage. Symptoms started a week ago. She saw her PCP Dr. Sho Bal on Monday. She states he prescribed her zofran and meclizine for her symptoms. She has taken a dose of the meclizine. No other changes in medications. Daughter has been ill. No known exposure to covid-19. No fever or chills. No one sided weakness. Does not typically get headaches and that concerns her. It is right sided. Rates pain a 2/10. Woke up this morning with blood on the conjuctiva of her right eye. Has not been forcefully coughing and has not vomited in several days. Reports BP has not been high. Past Medical History:   Diagnosis Date    BiPAP (biphasic positive airway pressure) dependence     18cm/14cm    Hyperlipidemia     Hypertension     Obstructive sleep apnea     AHI: 106. 6 per PSG, 2018    Osteoarthritis     Psoriasis      Past Surgical History:   Procedure Laterality Date    CARPAL TUNNEL RELEASE       SECTION      CHOLECYSTECTOMY      COLONOSCOPY N/A 2020    Dr Natanael Bustos AP x 2, HP x 1, 5 yr recall    EPIDURAL STEROID INJECTION N/A 2019    LUMBAR EPIDURAL STEROID INJECTION L4-5 performed by Eulalio Joy at . [place] N/A 2019    LUMBAR EPIDURAL STEROID INJECTION L5-S1 performed by Eulalio Joy at [address] [place] [address] OF [place], [facility]. INJECTION PROCEDURE FOR SACROILIAC JOINT Right 2019    SACROILIAC JOINT INJECTION performed by Thien Wayne at [address] [place] [address] N/A 2019    LUMBAR INTER LAMINAR KACEY L4-5 performed by SYSCO Benita at [address] [place] [address] NERVE SURGERY Bilateral 2019    LUMBAR FACET BILATERAL L4-5 L5-S1 performed by Eulalio Joy at [facility] N/A 2019    LUMBAR INTER LAMINAR KACEY L4-5 performed by Eulalio Joy at [address] [place] [address] [place] INJECT SI JOINT ARTHRGRPHY&/ANES/STEROID W/IMAGE Right 10/9/2018    SACROILIAC JOINT INJECTION performed by Cooper Woody at [address] DX/THER SBST INTRLMNR LMBR/SAC W/IMG GDN N/A 2018    LUMBAR INTER LAMINAR KACEY L4-5 performed by Cooper Woody at [facility] OR       Family History   Problem Relation Age of Onset    Colon Cancer Neg Hx     Colon Polyps Neg Hx        Social History     Tobacco Use    Smoking status: Former Smoker     Types: Cigarettes     Quit date:      Years since quittin.2    Smokeless tobacco: Never Used   Substance Use Topics    Alcohol use: No      Current Outpatient Medications   Medication Sig Dispense Refill    Guselkumab (TREMFYA) 100 MG/ML SOPN Inject into the skin      doxycycline monohydrate (ADOXA) 100 MG tablet Take 1 tablet by mouth 2 times daily for 10 days 20 tablet 0    metoprolol succinate (TOPROL XL) 25 MG extended release tablet TAKE 1 TABLET NIGHTLY 90 tablet 3    metFORMIN (GLUCOPHAGE) 500 MG tablet Take 500 mg by mouth 2 times daily (with meals)      citalopram (CELEXA) 20 MG tablet TK 1 T PO QD      lisinopril-hydrochlorothiazide (PRINZIDE;ZESTORETIC) 20-12.5 MG per tablet Take 1 tablet by mouth daily      diclofenac (VOLTAREN) 50 MG EC tablet Take 50 mg by mouth 2 times daily  2    ezetimibe-simvastatin (VYTORIN) 10-40 MG per tablet Take 1 tablet by mouth daily       HYDROcodone-acetaminophen (NORCO) 7.5-325 MG per tablet Take 1 tablet by mouth every 6 hours as needed for Pain.       DULoxetine (CYMBALTA) 60 MG extended release capsule Take 60 mg by mouth daily      tiZANidine (ZANAFLEX) 4 MG tablet Take 4 mg by mouth nightly Indications: 1/4 tab at breakfast and lunch, 1/2 at Normocephalic and atraumatic. Right Ear: Tympanic membrane, ear canal and external ear normal.      Left Ear: Tympanic membrane, ear canal and external ear normal.      Nose: Nose normal.      Mouth/Throat:      Mouth: Mucous membranes are moist.      Pharynx: Oropharynx is clear. Eyes:      Conjunctiva/sclera:      Right eye: Hemorrhage (subconjuctival) present. Pupils: Pupils are equal, round, and reactive to light. Neck:      Musculoskeletal: Normal range of motion. Cardiovascular:      Rate and Rhythm: Normal rate and regular rhythm. Heart sounds: Normal heart sounds. No murmur. Pulmonary:      Effort: Pulmonary effort is normal. No respiratory distress. Breath sounds: Normal breath sounds. No wheezing. Skin:     General: Skin is warm and dry. Findings: No rash. Neurological:      Mental Status: She is alert and oriented to person, place, and time. Psychiatric:         Mood and Affect: Mood normal.         Behavior: Behavior normal.       /60   Pulse 88   Temp 98.4 °F (36.9 °C)   Resp 20   Ht 5' 3\" (1.6 m)   Wt (!) 303 lb (137.4 kg)   SpO2 98%   BMI 53.67 kg/m²     :Assessment       Diagnosis Orders   1. Acute sinusitis, recurrence not specified, unspecified location  doxycycline monohydrate (ADOXA) 100 MG tablet   2. Conjunctival hemorrhage of right eye         :Plan      1. Take full course of antibiotics  2. Continue treatment plan by your pcp   3. Follow up with eye doctor   4. Monitor fever and treat as needed  5. If symptoms continue contact PCP on Monday   6. If symptoms become worse : worsening headache, one sided weakness, slurred speech, chest pain, change in vision, or shortness of breath go to ER      No orders of the defined types were placed in this encounter. No follow-ups on file.     Orders Placed This Encounter   Medications    doxycycline monohydrate (ADOXA) 100 MG tablet     Sig: Take 1 tablet by mouth 2 times daily for 10 days Dispense:  20 tablet     Refill:  0       Patient given educational materials- see patient instructions. Discussed use, benefit, and side effects of prescribedmedications. All patient questions answered. Pt voiced understanding. Patient Instructions       Patient Education        Sinusitis: Care Instructions  Your Care Instructions     Sinusitis is an infection of the lining of the sinus cavities in your head. Sinusitis often follows a cold. It causes pain and pressure in your head and face. In most cases, sinusitis gets better on its own in 1 to 2 weeks. But some mild symptoms may last for several weeks. Sometimes antibiotics are needed. Follow-up care is a key part of your treatment and safety. Be sure to make and go to all appointments, and call your doctor if you are having problems. It's also a good idea to know your test results and keep a list of the medicines you take. How can you care for yourself at home? · Take an over-the-counter pain medicine, such as acetaminophen (Tylenol), ibuprofen (Advil, Motrin), or naproxen (Aleve). Read and follow all instructions on the label. · If the doctor prescribed antibiotics, take them as directed. Do not stop taking them just because you feel better. You need to take the full course of antibiotics. · Be careful when taking over-the-counter cold or flu medicines and Tylenol at the same time. Many of these medicines have acetaminophen, which is Tylenol. Read the labels to make sure that you are not taking more than the recommended dose. Too much acetaminophen (Tylenol) can be harmful. · Breathe warm, moist air from a steamy shower, a hot bath, or a sink filled with hot water. Avoid cold, dry air. Using a humidifier in your home may help. Follow the directions for cleaning the machine. · Use saline (saltwater) nasal washes. This can help keep your nasal passages open and wash out mucus and bacteria.  You can buy saline nose drops at a grocery store or sneeze, cough, vomit, strain, or bend over. Sometimes there is no clear cause. The blood may look alarming, especially if the spot is large. If there is no pain or vision change, there is usually no reason to worry, and the blood slowly will go away on its own in 2 to 3 weeks. Follow-up care is a key part of your treatment and safety. Be sure to make and go to all appointments, and call your doctor if you are having problems. It's also a good idea to know your test results and keep a list of the medicines you take. How can you care for yourself at home? · Watch for changes in your eye. It is normal for the red spot on your eyeball to change color as it heals. Just like a bruise on your skin, it may change from red to brown to purple to yellow. · Do not take aspirin or products that contain aspirin, which can increase bleeding. Use acetaminophen (Tylenol) if you need pain relief for another problem. · Do not take two or more pain medicines at the same time unless the doctor told you to. Many pain medicines have acetaminophen, which is Tylenol. Too much acetaminophen (Tylenol) can be harmful. When should you call for help? Call your doctor now or seek immediate medical care if:    · You have signs of an eye infection, such as:  ? Pus or thick discharge coming from the eye.  ? Redness or swelling around the eye.  ? A fever.     · You see blood over the black part of your eye (pupil).     · You have any changes or problems in your vision.     · You have any pain in your eye. Watch closely for changes in your health, and be sure to contact your doctor if:    · You do not get better as expected. Where can you learn more? Go to https://Bplatssharieb.InComm. org and sign in to your Zauber account. Enter X139 in the RipCode box to learn more about \"Subconjunctival Hemorrhage: Care Instructions. \"     If you do not have an account, please click on the \"Sign Up Now\" link.   Current as of: August 31, 2020               Content Version: 12.8  © 2006-2021 [employer]wise, [employer]. Care instructions adapted under license by [facility] ([facility]). If you have questions about a medical condition or this instruction, always ask your healthcare professional. [address] any warranty or liability for your use of this information. 1. Take full course of antibiotics  2. Continue treatment plan by your pcp   3. Follow up with eye doctor   4. Monitor fever and treat as needed  5. If symptoms continue contact PCP on Monday   6.  If symptoms become worse : worsening headache, one sided weakness, slurred speech, chest pain, change in vision, or shortness of breath go to ER         Electronically signed by ERMIAS Baca on 4/2/2021 at 11:12 AM

## 2021-04-02 NOTE — PATIENT INSTRUCTIONS
Patient Education        Sinusitis: Care Instructions  Your Care Instructions     Sinusitis is an infection of the lining of the sinus cavities in your head. Sinusitis often follows a cold. It causes pain and pressure in your head and face. In most cases, sinusitis gets better on its own in 1 to 2 weeks. But some mild symptoms may last for several weeks. Sometimes antibiotics are needed. Follow-up care is a key part of your treatment and safety. Be sure to make and go to all appointments, and call your doctor if you are having problems. It's also a good idea to know your test results and keep a list of the medicines you take. How can you care for yourself at home? · Take an over-the-counter pain medicine, such as acetaminophen (Tylenol), ibuprofen (Advil, Motrin), or naproxen (Aleve). Read and follow all instructions on the label. · If the doctor prescribed antibiotics, take them as directed. Do not stop taking them just because you feel better. You need to take the full course of antibiotics. · Be careful when taking over-the-counter cold or flu medicines and Tylenol at the same time. Many of these medicines have acetaminophen, which is Tylenol. Read the labels to make sure that you are not taking more than the recommended dose. Too much acetaminophen (Tylenol) can be harmful. · Breathe warm, moist air from a steamy shower, a hot bath, or a sink filled with hot water. Avoid cold, dry air. Using a humidifier in your home may help. Follow the directions for cleaning the machine. · Use saline (saltwater) nasal washes. This can help keep your nasal passages open and wash out mucus and bacteria. You can buy saline nose drops at a grocery store or drugstore. Or you can make your own at home by adding 1 teaspoon of salt and 1 teaspoon of baking soda to 2 cups of distilled water. If you make your own, fill a bulb syringe with the solution, insert the tip into your nostril, and squeeze gently. Ulice Meres your nose.   · Put a hot, wet towel or a warm gel pack on your face 3 or 4 times a day for 5 to 10 minutes each time. · Try a decongestant nasal spray like oxymetazoline (Afrin). Do not use it for more than 3 days in a row. Using it for more than 3 days can make your congestion worse. When should you call for help? Call your doctor now or seek immediate medical care if:    · You have new or worse swelling or redness in your face or around your eyes.     · You have a new or higher fever. Watch closely for changes in your health, and be sure to contact your doctor if:    · You have new or worse facial pain.     · The mucus from your nose becomes thicker (like pus) or has new blood in it.     · You are not getting better as expected. Where can you learn more? Go to https://Black Rhino GrouppeLivemapeweb.Spredfast. org and sign in to your Spinomix account. Enter L585 in the Hypemarks box to learn more about \"Sinusitis: Care Instructions. \"     If you do not have an account, please click on the \"Sign Up Now\" link. Current as of: December 2, 2020               Content Version: 12.8  © 5734-1220 [employer]. Care instructions adapted under license by [facility] ([facility]). If you have questions about a medical condition or this instruction, always ask your healthcare professional. [place] [address] any warranty or liability for your use of this information. Patient Education        Subconjunctival Hemorrhage: Care Instructions  Your Care Instructions     Sometimes small blood vessels in the white of the eye can break, causing a red spot or speck. This is called a subconjunctival hemorrhage. The blood vessels may break when you sneeze, cough, vomit, strain, or bend over. Sometimes there is no clear cause. The blood may look alarming, especially if the spot is large.  If there is no pain or vision change, there is usually no reason to worry, and the blood slowly will go away on its own in 2 to 3 Inc[employer] disclaims any warranty or liability for your use of this information. 1. Take full course of antibiotics  2. Continue treatment plan by your pcp   3. Follow up with eye doctor   4. Monitor fever and treat as needed  5. If symptoms continue contact PCP on Monday   6.  If symptoms become worse : worsening headache, one sided weakness, slurred speech, chest pain, change in vision, or shortness of breath go to ER

## 2021-06-03 ENCOUNTER — HOSPITAL ENCOUNTER (OUTPATIENT)
Dept: MRI IMAGING | Age: 51
Discharge: HOME | End: 2021-06-03
Payer: COMMERCIAL

## 2021-06-03 DIAGNOSIS — R42 DIZZINESS: ICD-10-CM

## 2021-06-03 PROCEDURE — 6360000004 HC RX CONTRAST MEDICATION: Performed by: FAMILY MEDICINE

## 2021-06-03 PROCEDURE — A9577 INJ MULTIHANCE: HCPCS | Performed by: FAMILY MEDICINE

## 2021-06-03 PROCEDURE — 70553 MRI BRAIN STEM W/O & W/DYE: CPT

## 2021-06-03 RX ADMIN — GADOBENATE DIMEGLUMINE 20 ML: 529 INJECTION, SOLUTION INTRAVENOUS at 13:22

## 2021-06-18 ENCOUNTER — OFFICE VISIT (OUTPATIENT)
Dept: NEUROLOGY | Age: 51
End: 2021-06-18
Payer: COMMERCIAL

## 2021-06-18 VITALS
BODY MASS INDEX: 51.91 KG/M2 | OXYGEN SATURATION: 98 % | WEIGHT: 293 LBS | TEMPERATURE: 98.3 F | SYSTOLIC BLOOD PRESSURE: 119 MMHG | HEIGHT: 63 IN | HEART RATE: 81 BPM | DIASTOLIC BLOOD PRESSURE: 69 MMHG

## 2021-06-18 DIAGNOSIS — Z99.89 BIPAP (BIPHASIC POSITIVE AIRWAY PRESSURE) DEPENDENCE: ICD-10-CM

## 2021-06-18 DIAGNOSIS — G47.33 OBSTRUCTIVE SLEEP APNEA: Primary | ICD-10-CM

## 2021-06-18 PROCEDURE — 99213 OFFICE O/P EST LOW 20 MIN: CPT | Performed by: PHYSICIAN ASSISTANT

## 2021-06-18 NOTE — PATIENT INSTRUCTIONS
several deep breaths to catch up on breathing. As the person awakens, he or she may move briefly, snort or snore, and take a deep breath. Less frequently, a person may awaken completely with a sensation of gasping, smothering, or choking. If the person falls back to sleep quickly, he or she will not remember the event. Many people with sleep apnea are unaware of their abnormal breathing in sleep, and all patients underestimate how often their sleep is interrupted. Awakening from sleep causes sleep to be unrefreshing and causes fatigue and daytime sleepiness. Anatomic causes of obstructive sleep apnea   Most patients have DORITA because of a small upper airway. As the bones of the face and skull develop, some people develop a small lower face, a small mouth, and a tongue that seems too large for the mouth. These features are genetically determined, which explains why DORITA tends to cluster in families. Obesity is another major factor. Tonsil enlargement can be an important cause, especially in children. SLEEP APNEA SYMPTOMS  The main symptoms of DORITA are loud snoring, fatigue, and daytime sleepiness. However, some people have no symptoms. For example, if the person does not have a bed partner, he or she may not be aware of the snoring. Fatigue and sleepiness have many causes and are often attributed to overwork and increasing age. As a result, a person may be slow to recognize that they have a problem. A bed partner or spouse often prompts the patient to seek medical care. Other symptoms may include one or more of the following:  ?Restless sleep  ? Awakening with choking, gasping, or smothering  ? Morning headaches, dry mouth, or sore throat  ? Waking frequently to urinate  ? Awakening unrested, groggy  ? Low energy, difficulty concentrating, memory impairment    Risk factors  Certain factors increase the risk of sleep apnea.   ?Increasing age [de-identified] DORITA occurs at all ages, but it is more common in middle and older age adults. ?Male sex  DORITA is two times more common in men, especially in middle age. ?Obesity  The more obese a person is, the more likely he or she is to have DORITA. ? Sedation from medication or alcohol  This interferes with the ability to awaken from sleep and can lengthen periods of apnea (no breathing), with potentially dangerous consequences. ? Abnormality of the airway. SLEEP APNEA CONSEQUENCES  Complications of sleep apnea can include daytime sleepiness and difficulty concentrating. The consequence of this is an increased risk of accidents and errors in daily activities. Studies have shown that people with severe DORITA are more than twice as likely to be involved in a motor vehicle accident as people without these conditions. People with DORITA are encouraged to discuss options for driving, working, and performing other high-risk tasks with a healthcare provider. In addition, people with untreated DORITA may have an increased risk of cardiovascular problems such as high blood pressure, heart attack, abnormal heart rhythms, or stroke. This risk may be due to changes in the heart rate and blood pressure that occur during sleep. SLEEP APNEA DIAGNOSIS  The diagnosis of DORITA is best made by a knowledgeable sleep medicine specialist who has an understanding of the individual's health issues. The diagnosis is usually based upon the person's medical history, physical examination, and testing, including:  ? A complaint of snoring and ineffective sleep  ? Neck size (greater than 16 inches in men or 14 inches in women) is associated with an increased risk of sleep apnea  ? A small upper airway: difficulty seeing the throat because of a tongue that is large for the mouth  ? High blood pressure, especially if it is resistant to treatment  ? If a bed partner has observed the patient during episodes of stopped breathing (apnea), choking, or gasping during sleep, there is a strong possibility of sleep apnea.     Testing is usually performed in a sleep laboratory. A full sleep study is called a polysomnogram. The polysomnogram measures the breathing effort and airflow, blood oxygen level, heart rate and rhythm, duration of the various stages of sleep, body position, and movement of the arms/legs. Home monitoring devices are available that can perform a sleep study. This is a reasonable alternative to conventional testing in a sleep laboratory if the clinician strongly suspects moderate or severe sleep apnea and the patient does not have other illnesses or sleep disorders that may interfere with the results. SLEEP APNEA TREATMENT  Sleep apnea is best treated by a knowledgeable sleep medicine specialist. The goal of treatment is to maintain an open airway during sleep. Effective treatment will eliminate the symptoms of sleep disturbance; long-term health consequences are also reduced. Most treatments require nightly use. The challenge for the clinician and the patient is to select an effective therapy that is appropriate for the patient's problem and that is acceptable for long term use. Auto-titrating CPAP delivers an amount of PAP that varies during the night. The variation is dependent on event detection software algorithms, which will increase the pressure gradually in response to flow changes until adequate patency is detected. After a period of sustained upper airway patency, the delivered level of pressure gradually decreases until the algorithm identifies recurrent upper airway obstruction, at which point the delivered pressure again increases. The result is that the delivered pressure varies throughout the night, in an effort to provide the lowest pressure that is necessary to maintain upper airway patency. Continuous positive airway pressure (CPAP)  The most effective treatment for sleep apnea uses air pressure from a mechanical device to keep the upper airway open during sleep.  A CPAP (continuous positive airway pressure)  device uses an air-tight attachment to the nose, typically a mask, connected to a tube and a blower which generates the pressure. Devices that fit comfortably into the nasal opening, rather than over the nose, are also available. CPAP should be used any time the person sleeps (day or night). The CPAP device is usually used for the first time in the sleep lab, where a technician can adjust the pressure and select the best equipment to keep the airway open. Alternatively, an auto device with a self-adjusting pressure feature, provided with proper education and training, can get treatment started without another sleep test. While the treatment may seem uncomfortable, noisy, or bulky at first, most people accept the treatment after experiencing better sleep. However, difficulty with mask comfort and nasal congestion prevent up to 50 percent of people from using the treatment on a regular basis. Continued follow up with a healthcare provider helps to ensure that the treatment is effective and comfortable. Information from the CPAP machine is often used by physicians, therapists, and insurers to track the success of treatment. CPAP can be delivered with different features to improve comfort and solve problems that may come up during treatment. Changes in treatment may be needed if symptoms do not improve or if the persons condition changes, such as a gain or loss of weight. Adjust sleep position  Adjusting sleep position (to stay off the back) may help improve sleep quality in people who have DORITA when sleeping on the back. However, this is difficult to maintain throughout the night and is rarely an adequate solution. Weight loss  Weight loss may be helpful for obese or overweight patients. Weight loss may be accomplished with dietary changes, exercise, and/or surgical treatment.  However, it can be difficult to maintain weight loss; the five-year success of non-surgical weight loss is only 5 percent, meaning that 95 percent of people regain lost weight. Avoid alcohol and other sedatives  Alcohol can worsen sleepiness, potentially increasing the risk of accidents or injury. People with DORITA are often counseled to drink little to no alcohol, even during the daytime. Similarly, people who take anti-anxiety medications or sedatives to sleep should speak with their healthcare provider about the safety of these medications. People with DORITA must notify all healthcare providers, including surgeons, about their condition and the potential risks of being sedated. People with DORITA who are given anesthesia and/or pain medications require special management and close monitoring to reduce the risk of a blocked airway. Dental devices  A dental device, called an oral appliance or mandibular advancement device, can reposition the jaw (mandible), bringing the tongue and soft palate forward as well. This may relieve obstruction in some people. This treatment is excellent for reducing snoring, although the effect on DORITA is sometimes more limited. As a result, dental devices are best used for mild cases of DORITA when relief of snoring is the main goal. Failure to tolerate and accept CPAP is another indication for dental devices. While dental devices are not as effective as CPAP for DORITA, some patients prefer a dental device to CPAP. Side effects of dental devices are generally minor but may include changes to the bite with prolonged use. Surgical treatment  Surgery is an alternative therapy for patients who cannot tolerate or do not improve with nonsurgical treatments such as CPAP or oral devices. Surgery can also be used in combination with other nonsurgical treatments. Surgical procedures reshape structures in the upper airways or surgically reposition bone or soft tissue. Uvulopalatopharyngoplasty (UPPP) removes the uvula and excessive tissue in the throat, including the tonsils, if present.  Other procedures, such as maxillomandibular advancement (MMA), address both the upper and lower pharyngeal airway more globally. UPPP alone has limited success rates (less than 50 percent) and people can relapse (when DORITA symptoms return after surgery). As a result, this surgery is only recommended in a minority of people and should be considered with caution. MMA may have a higher success rate, particularly in people with abnormal jaw (maxilla and mandible) anatomy, but it is the most complicated procedure. A newer surgical approach, nerve stimulation to protrude the tongue, has promising success rates in very selected people. Tracheostomy creates a permanent opening in the neck. It is reserved for people with severe disease in whom less drastic measures have failed or are inappropriate. Although it is always successful in eliminating obstructive sleep apnea, tracheostomy requires significant lifestyle changes and carries some serious risks (eg, infection, bleeding, blockage). All surgical treatments require discussions about the goals of treatment, the expected outcomes, and potential complications. Hypoglossal nerve stimulator- \"Inspire\" device    PAP treatment failure:  Possible causes of treatment failure include nonadherence or suboptimal adherence, weight gain, an inappropriate level of prescribed positive pressure, or an additional disorder causing sleepiness (eg, narcolepsy) that may require alterations in the therapeutic regimen. A review of medications should also be undertaken since many drugs may lead to sleepiness. Inadequate sleep time may also negate the expected effects from treatment of DORITA. Also, pt's can have persistent hypersomnolence associated with sleep apnea even in the presence of adequate therapy and at those times Provigil or Nuvigil or other stimulants may be indicated.     Once the patient's positive airway pressure therapy has been optimized and symptoms resolved, a regimen of long-term follow-up should be established. Annual visits are reasonable, with more frequent visits in between if new issues arise. The purpose of long-term follow-up is to assess usage and monitor for recurrent DORITA, new side effects, air leakage, and fluctuations in body weight. WHERE TO GET MORE INFORMATION  Your healthcare provider is the best source of information for questions and concerns related to your medical problem. Organizations  American Sleep Apnea Association  Provides information about sleep apnea to the public, publishes a newsletter, and serves as an advocate for people with the disorder. Luis A, [address]   [email or web address]. org   http://hawk.[employer].io/. org   Tel: [phone]   Fax: [facility] organization that works to [employer] and safety by promoting public understanding of sleep and sleep disorders. Supports sleep-related education, research, and advocacy; produces and distributes educational materials to the public and healthcare professionals; and offers postdoctoral fellowships and grants for sleep researchers. [address]   João@[employer]. org   [employer]. org   Tel: [phone]   Fax: [phone]    Important information:  Medicare/private insurance CPAP/BiPAP/APAP requirements:  Medicare/private insurance has specific requirements for PAP compliance that must be met during the first 90 days of use to continue coverage for CPAP/BiPAP/APAP  from day 91 and beyond. The policy requires that patients use a PAP device 4 hours per 24 hour period, at least 70% of the time over a 30 day period. This data must be downloaded as a report direct from the PAP devices. This is called a compliance download. Your PAP supplier will assist you in this matter.      Reminder:  Please bring a copy of the compliance download to your next office visit or have your supplier fax it to our office prior to your office visit. Note:  Where applicable, we will utilize PAP device efficiency reports, additional testing, and face-to-face  clinical evaluation subsequent to any treatment, changes in treatment, and continued treatment. Caution:  Please abstain from driving or engaging in other activities which may be hazardous in the presence of diminished alertness or daytime drowsiness. And avoid the use of sedatives or alcohol, which can worsen sleep apnea and daytime drowsiness. Mask suggestions:  -     Resmed Airfit N20 (Nasal) or F20 (Full face mask). They conform to your face, thus decreasing the potential for mask leakage. You might like the AirTouch F20(full face mask). It has a \"memory foam\" like cushion. The AirFit F30 is a smaller style full face mask designed to sit low on and cover less of your face for fewer facial marks. AirFit N30i has a top of the head tube with a nasal mask. AirFit P10 reported to be the most comfortable nasal pillow mask. Resmed Mirage FX reported to be the most comfortable nasal mask. Resmed Mirage Pacifica reported to be the most comfortable hybrid mask. AirTouch N20-memory foam nasal mask. Respironics: You might also like to try a nasal mask called a Dreamwear nasal mask or the Dreamwear nasal pillow. Another suggestion is the [facility], it is a minimal contact full face mask. The Melanie Lr incredible under the nose design makes it the only full face mask that won't cause red marks on the bridge of your nose when compared to other full face masks. The Dreamwear full face mask has a  soft feel, unique in-frame air-flow, and innovative air tube connection at the top of the head for the ultimate in sleep comfort. Comfort Gel Blue. Dreamwear gel pillows. [employer]iaz [employer] Gabriele: Brevida nasal pillow mask and Simplus FFM    The use of a memory foam CPAP pillow supports the head and neck throughout the night.

## 2021-06-18 NOTE — PROGRESS NOTES
[facility]  [address] [place] [address] Drive, [address] [address]  Juan A Sweeney  Phone [phone]  Fax [phone]       [facility] Sleep Follow Up Encounter      Information:   Patient Name: Maynor Baldwin  :   1970  Age:   45 y.o. MRN:   [id number]  Account #:  [de-identified]  Today:                21    Provider:  Pavel Ortiz PA-C    Chief Complaint   Patient presents with    Sleep Apnea     follow up        Subjective:   Maynor Baldwin is a 45 y.o. female  with a history of severe DORITA who comes in for an annual sleep clinic follow up. The PSG, 18 revealed an AHI of 106.6 with O2 desaturations as low as 71%. She is prescribed BiPAP therapy with a pressure of 18cm/14cm. The compliance report indicates that she is averaging 9-10 hours of BiPAP use per day. She reports that consistent BiPAP use has alleviated the previous DORITA symptoms. The excessive daytime somnolence has improved greatly. She no longer falls asleep when drowsy.      Location or symptom:  DORITA  Onset:  PS2018   Timing:  q hs  Severity:  Severe  Associated:  Snoring, witnessed apneas, and excessive daytime somnolence  Alleviated: BiPAP    She had an MRI brain that was normal after she started having headaches. She is perimenopausal and plans to follow up with GYN.        Objective:     Past Medical History:   Diagnosis Date    BiPAP (biphasic positive airway pressure) dependence     18cm/14cm    Hyperlipidemia     Hypertension     Obstructive sleep apnea     AHI: 106. 6 per PSG, 2018    Osteoarthritis     Psoriasis        Past Surgical History:   Procedure Laterality Date    CARPAL TUNNEL RELEASE       SECTION      CHOLECYSTECTOMY      COLONOSCOPY N/A 2020    Dr Topher Dasilva AP x 2, HP x 1, 5 yr recall    EPIDURAL STEROID INJECTION N/A 2019    LUMBAR EPIDURAL STEROID INJECTION L4-5 performed by Eulalio Joy at [facility] N/A 2019    LUMBAR EPIDURAL STEROID INJECTION L5-S1 performed by Eulalio Joy at [address] [place] [address] HC INJECTION PROCEDURE FOR SACROILIAC JOINT Right 2019    SACROILIAC JOINT INJECTION performed by Hernando Palm at [address] [place] [address] Rd N/A 2019    LUMBAR INTER LAMINAR KACEY L4-5 performed by Hernando Palm at [facility] Bilateral 2019    LUMBAR FACET BILATERAL L4-5 L5-S1 performed by Eulalio Joy at [facility] N/A 2019    LUMBAR INTER LAMINAR KACEY L4-5 performed by Eulalio Joy at [address] [place] [address] [place] INJECT SI JOINT ARTHRGRPHY&/ANES/STEROID W/IMAGE Right 10/9/2018    SACROILIAC JOINT INJECTION performed by Hernando Palm at [address] DX/THER SBST INTRLMNR LMBR/SAC W/IMG GDN N/A 2018    LUMBAR INTER LAMINAR KACEY L4-5 performed by Hernando Palm at [facility]       Recent Hospitalizations  ·     Significant Injuries  ·     Family History   Problem Relation Age of Onset    Colon Cancer Neg Hx     Colon Polyps Neg Hx        Social History  Social History     Tobacco Use   Smoking Status Former Smoker    Types: Cigarettes    Quit date:     Years since quittin.4   Smokeless Tobacco Never Used     Social History     Substance and Sexual Activity   Alcohol Use No     Social History     Substance and Sexual Activity   Drug Use No         Current Outpatient Medications   Medication Sig Dispense Refill    Guselkumab (TREMFYA) 100 MG/ML SOPN Inject into the skin      metoprolol succinate (TOPROL XL) 25 MG extended release tablet TAKE 1 TABLET NIGHTLY 90 tablet 3    metFORMIN (GLUCOPHAGE) 500 MG tablet Take 500 mg by mouth 2 times daily (with meals)      citalopram (CELEXA) 20 MG tablet TK 1 T PO QD      lisinopril-hydrochlorothiazide (PRINZIDE;ZESTORETIC) 20-12.5 MG per tablet Take 1 tablet by mouth daily      diclofenac (VOLTAREN) 50 MG EC tablet Take 50 mg by mouth 2 times daily  2    ezetimibe-simvastatin (VYTORIN) 10-40 MG per tablet Take 1 NAD, well developed, pleasant and cooperative with exam; body habitus obese as indicated by BMI  HEENT- Conjunctiva normal.  No scars, masses, or lesions over external nose or ears, hearing intact, no neck masses noted, no jugular vein distension, no bruit  Cardiac- Regular rate and rhythm  Pulmonary- Clear to auscultation, good expansion, normal effort without use of accessory muscles  Musculoskeletal  No significant wasting of muscles noted, no bony deformities  Extremities - No clubbing, cyanosis or edema  Skin  Warm, dry, and intact. No rash, erythema, or pallor  Psychiatric  Mood, affect, and behavior appear normal      Neurologic:  Extraocular movements are intact without nystagmus. PERRL. Visual fields are full to confrontation. Facial movements are symmetrical and normal.  Speech is precise. Extremity strength is normal in both uppers and lowers. Deep tendon reflexes are intact and symmetrical.  Rapid alternating movements are unimpaired. Finger-to-nose testing is performed well, without dysmetria. Gait is normal.    I reviewed the following studies:       []  :  Clinical laboratory test results     []  :  Radiology reports                    [x]  :  Review and summarization of medical records and/or obtain medical records        []  :  Previous/recent polysomnogram report(s)     []  :  [place] Sleepiness Scale       [x]  :  Compliance download: The BiPAP is set at a pressure of 18cm/14cm. Compliance download shows that she uses device: 97% of the time;  percentage of days with usage >=4 hours: 97%. AHI: 2.2    Assessment:       ICD-10-CM    1. Obstructive sleep apnea  G47.33    2.  BiPAP (biphasic positive airway pressure) dependence  Z99.89           []  :  Stable     []  :  Improved                       [x]  :  Well controlled              []  :  Resolving     []  :  Resolved     []  :  Inadequately controlled     []  :  Worsening     []  :  Additional workup planned    Patient is compliant and benefiting from therapy as indicated by compliance evaluation and patient report. Plan:     No orders of the defined types were placed in this encounter. 1.   Patient advised of the etiology,  pathophysiology, diagnosis, treatment options, and risks of untreated DORITA. Risks may include, but are not limited to  hypertension, coronary artery disease, diabetes, stroke, weight gain, impaired cognition, daytime somnolence,  and motor vehicle accidents. Advised to abstain from driving or operating heavy machinery when drowsy and the use of respiratory suppressants. Will evaluate for clinical benefit and and compliance during a 30 day period within the preceding 90 days. 2.  The following educational material has been included in this visit after visit summary for your review: DOIRTA/PAP guidelines-Discussed with the patient and all questions fully answered. 3.  Continue BiPAP  4. Follow up with DME supplier: Respironics device, possible recall on your device.   5.  Follow up in 1 year

## 2021-10-06 ENCOUNTER — OFFICE VISIT (OUTPATIENT)
Dept: CARDIOLOGY CLINIC | Age: 51
End: 2021-10-06
Payer: COMMERCIAL

## 2021-10-06 VITALS
OXYGEN SATURATION: 99 % | WEIGHT: 293 LBS | BODY MASS INDEX: 51.91 KG/M2 | HEART RATE: 86 BPM | SYSTOLIC BLOOD PRESSURE: 120 MMHG | DIASTOLIC BLOOD PRESSURE: 72 MMHG | HEIGHT: 63 IN

## 2021-10-06 DIAGNOSIS — R06.02 SHORTNESS OF BREATH: ICD-10-CM

## 2021-10-06 DIAGNOSIS — I10 ESSENTIAL HYPERTENSION: ICD-10-CM

## 2021-10-06 DIAGNOSIS — R00.0 TACHYCARDIA: Primary | ICD-10-CM

## 2021-10-06 DIAGNOSIS — R07.9 CHEST PAIN, UNSPECIFIED TYPE: ICD-10-CM

## 2021-10-06 DIAGNOSIS — I47.1 SVT (SUPRAVENTRICULAR TACHYCARDIA) (HCC): ICD-10-CM

## 2021-10-06 DIAGNOSIS — E78.5 HYPERLIPIDEMIA, UNSPECIFIED HYPERLIPIDEMIA TYPE: ICD-10-CM

## 2021-10-06 DIAGNOSIS — R06.09 DOE (DYSPNEA ON EXERTION): ICD-10-CM

## 2021-10-06 PROCEDURE — 93000 ELECTROCARDIOGRAM COMPLETE: CPT | Performed by: NURSE PRACTITIONER

## 2021-10-06 PROCEDURE — 99214 OFFICE O/P EST MOD 30 MIN: CPT | Performed by: NURSE PRACTITIONER

## 2021-10-06 RX ORDER — ESTRADIOL 0.07 MG/D
1 PATCH TRANSDERMAL
COMMUNITY
Start: 2021-09-30

## 2021-10-06 RX ORDER — MEDROXYPROGESTERONE ACETATE 10 MG/1
10 TABLET ORAL
COMMUNITY
Start: 2021-07-19

## 2021-10-06 NOTE — PATIENT INSTRUCTIONS
rate.  [facility] is administered to simulate stress on the heart. Your heart rhythm will then be monitored for the next few minutes. Your blood pressure will also be monitored throughout the exercise portion. [place] through the exercise portion, a second round of radioactive tracer is injected into your body. Your heart rate and EKG will be monitored for another few minutes after administering the drug. Test Preparation:     Bring a list of your current medications. Do not take any of your medications the morning of the test, but bring all morning medications with you as you will take them after the stress portion of the test is completed.  Do not eat Bananas 24 hours prior to test.     No caffeine 24 hours prior to the testing. This includes: coffee, pop/soda, chocolate, cold medications, etc.  Any product that might contain caffeine.  No nicotine or alcohol 12 hours prior to your test.    Nothing to eat or drink 6-8 hours prior to appointment time. It is okay to drink small amounts of water during the four hours prior to the test.   Nitroglycerin patches must be taken off 1 hour before testing.  Wear comfortable clothing.  Please refrain from any strenuous exercise or activities the day before your test, or the day of your test.   The Nuclear Lexiscan Stress test takes about 2 ½ to 3 hours to complete. If for any reason you are unable to keep this appointment, please contact Outpatient Scheduling, [phone], as soon as possible to reschedule.

## 2021-10-06 NOTE — PROGRESS NOTES
Dear Dr. Kandi Hooker MD,    Thank you for allowing me to participate in the care of Ms. Leonila Cast. She presents today at the [address] [place] [address] . As you know, Ms. Israel Mckinley is a 45 y.o. female with history of hypertension, hyperlipidemia, diabetes, DORITA-Bipap, SVT, and tachycardia who presents with the chief complaint of chest pain and shortness of breath. She tells me that for the past 3 to 4 months she is having been having worsening shortness of breath occurs with exertion. She also complains of a heartburn sensation has been going on for 3 to 4 months as well. She states will be a burning sensation in her chest as well as nausea. It happens randomly and on average 3-4 times a week. She is also noted her heart racing more. She is also had weight gain over the past year. She has a family history of heart disease. She is a former smoker and quit 6 years ago. She is recently been diagnosed with diabetes and started on Metformin. She tells me that her hemoglobin A1c recently was 9.she is sleeping on 1 pillow at night. she is not waking gasping for air. she denies any swelling. she has been compliant with her medications. her BP has been controlled. PCP follows labs and lipids. She otherwise denies PND, orthopnea, near-syncope, or syncope She has no other complaints. Review of Systems   Constitutional: Positive for weight gain. Negative for fever, chills, diaphoresis, activity change, appetite change, fatigue and unexpected weight change. Eyes: Negative for photophobia, pain, redness and visual disturbance. Respiratory: Positive for shortness of breath and GRAYSON. Negative for apnea, cough, chest tightness, wheezing and stridor. Cardiovascular: Positive for chest pain and heart racing. Negative for  palpitations and leg swelling. Gastrointestinal: Negative for abdominal distention. Genitourinary: Negative for dysuria, urgency and frequency.    Musculoskeletal: Negative for myalgias, arthralgias and gait problem. Skin: Negative for color change, pallor, rash and wound. Neurological: Negative for dizziness, tremors, speech difficulty, weakness and numbness. Hematological: Does not bruise/bleed easily. Psychiatric/Behavioral: Negative. Past Medical History:   Diagnosis Date    BiPAP (biphasic positive airway pressure) dependence     18cm/14cm    Hyperlipidemia     Hypertension     Obstructive sleep apnea     AHI: 106. 6 per PSG, 2018    Osteoarthritis     Psoriasis        Past Surgical History:   Procedure Laterality Date    BACK SURGERY      CARPAL TUNNEL RELEASE       SECTION      CHOLECYSTECTOMY      COLONOSCOPY N/A 2020    Dr Mary Okeefe AP x 2, HP x 1, 5 yr recall    EPIDURAL STEROID INJECTION N/A 2019    LUMBAR EPIDURAL STEROID INJECTION L4-5 performed by Eulalio Joy at . [place] [address] N/A 2019    LUMBAR EPIDURAL STEROID INJECTION L5-S1 performed by Elder Avalos at [address] [place] [address] OF [place], [facility]. INJECTION PROCEDURE FOR SACROILIAC JOINT Right 2019    SACROILIAC JOINT INJECTION performed by Elder Avalos at [address] N/A 2019    LUMBAR INTER LAMINAR KACEY L4-5 performed by Eulalio Joy at [facility] Bilateral 2019    LUMBAR FACET BILATERAL L4-5 L5-S1 performed by Eulalio Joy at [facility] N/A 2019    LUMBAR INTER LAMINAR KACEY L4-5 performed by Eulalio Joy at [address] [facility] SI JOINT ARTHRGRPHY&/ANES/STEROID W/IMAGE Right 10/9/2018    SACROILIAC JOINT INJECTION performed by Elder Avalos at [address] DX/THER SBST INTRLMNR LMBR/SAC W/IMG GDN N/A 2018    LUMBAR INTER LAMINAR KACEY L4-5 performed by Eulalio Joy at [facility] OR       Family History   Problem Relation Age of Onset    Colon Cancer Neg Hx     Colon Polyps Neg Hx        Social History     Socioeconomic History    Marital status:      Spouse name: Not on file    Number of children: Not on file    Years of education: Not on file    Highest education level: Not on file   Occupational History    Not on file   Tobacco Use    Smoking status: Former Smoker     Types: Cigarettes     Quit date:      Years since quittin.7    Smokeless tobacco: Never Used   Vaping Use    Vaping Use: Never used   Substance and Sexual Activity    Alcohol use: No    Drug use: No    Sexual activity: Yes     Partners: Male   Other Topics Concern    Not on file   Social History Narrative    Not on file     Social Determinants of Health     Financial Resource Strain:     Difficulty of Paying Living Expenses:    Food Insecurity:     Worried About [address] in the Last Year:     [address] [employer] in the Last Year:    Transportation Needs:     Lack of Transportation (Medical):  Lack of Transportation (Non-Medical):    Physical Activity:     Days of Exercise per Week:     Minutes of Exercise per Session:    Stress:     Feeling of Stress :    Social Connections:     Frequency of Communication with Friends and Family:     Frequency of Social Gatherings with Friends and Family:     Attends [employer] Services:     Active Member of Clubs or Organizations:     Attends Club or Organization Meetings:     Marital Status:    Intimate Partner Violence:     Fear of Current or Ex-Partner:     Emotionally Abused:     Physically Abused:     Sexually Abused: Allergies   Allergen Reactions    Cethexonium      \"eye drops\"    Ciprofloxacin     Penicillins          Current Outpatient Medications:     estradiol (VIVELLE) 0.05 MG/24HR, APPLY 1 PATCH TO SKIN TWICE WEEKLY AS DIRECTED, Disp: , Rfl:     medroxyPROGESTERone (PROVERA) 5 MG tablet, TAKE 1 TABLET BY MOUTH DAILY FOR 10 DAYS EACH MONTH.  TAKE THE SAME DAYS EVERY MONTH, Disp: , Rfl:     Guselkumab (TREMFYA) 100 MG/ML SOPN, Inject into the skin Every 8 weeks, Disp: , Rfl:    normal heart sounds. No murmur ascultated. No gallop and no friction rub. No carotid bruits. No peripheral edema. Pulmonary/Chest:  Lungs clear to auscultation bilaterally without evidence of respiratory distress. She without wheezes. She without rales or ronchi. Musculoskeletal: Normal range of motion. Gait is normal.  Head is normocephalic and atraumatic. Skin: Skin is warm and dry without rash or pallor. Psychiatric:She is alert and oriented to person, place, and time. She has a normal mood and affect. Her behavior is normal. Thought content normal.       No results found for: CREATININE, HGB, PROBNP    EKG 10/6/21  Sinus RHythm, RBBB, HR 86 bpm       Assessment, Recommendations, & Plan:  45 y.o. female with HTN, Brief SVT, Tachycardia, HLD    SVT/tachycardia-she recently has had some episodes of heart racing on Toprol. Continue to monitor at this time    HTN-controlled on lisinopril, HCTZ, & Toprol. Continue current regimen    HLD-followed by PCP, on Vytorin, continue current regimen    GRAYSON/Shortness of Breath-TTE and Lexiscan    Chest pain-Lexiscan      Disposition - RTC in 1 month or sooner if needed      Please do not hesitate to contact me for any questions or concerns.     Sincerely yours,    ERMIAS Bernardo

## 2021-10-22 ENCOUNTER — HOSPITAL ENCOUNTER (OUTPATIENT)
Dept: NON INVASIVE DIAGNOSTICS | Age: 51
Discharge: HOME | End: 2021-10-22
Payer: COMMERCIAL

## 2021-10-22 ENCOUNTER — HOSPITAL ENCOUNTER (OUTPATIENT)
Dept: NUCLEAR MEDICINE | Age: 51
Discharge: HOME | End: 2021-10-24
Payer: COMMERCIAL

## 2021-10-22 DIAGNOSIS — R07.9 CHEST PAIN, UNSPECIFIED TYPE: ICD-10-CM

## 2021-10-22 DIAGNOSIS — R06.09 DOE (DYSPNEA ON EXERTION): ICD-10-CM

## 2021-10-22 DIAGNOSIS — R06.02 SHORTNESS OF BREATH: ICD-10-CM

## 2021-10-22 LAB
LV EF: 63 %
LV EF: 64 %
LVEF MODALITY: NORMAL
LVEF MODALITY: NORMAL

## 2021-10-22 PROCEDURE — 6360000004 HC RX CONTRAST MEDICATION: Performed by: INTERNAL MEDICINE

## 2021-10-22 PROCEDURE — C8929 TTE W OR WO FOL WCON,DOPPLER: HCPCS

## 2021-10-22 PROCEDURE — A9500 TC99M SESTAMIBI: HCPCS | Performed by: NURSE PRACTITIONER

## 2021-10-22 PROCEDURE — 3430000000 HC RX DIAGNOSTIC RADIOPHARMACEUTICAL: Performed by: NURSE PRACTITIONER

## 2021-10-22 PROCEDURE — 6360000002 HC RX W HCPCS: Performed by: NURSE PRACTITIONER

## 2021-10-22 PROCEDURE — 78452 HT MUSCLE IMAGE SPECT MULT: CPT

## 2021-10-22 RX ADMIN — REGADENOSON 0.4 MG: 0.08 INJECTION, SOLUTION INTRAVENOUS at 11:55

## 2021-10-22 RX ADMIN — TETRAKIS(2-METHOXYISOBUTYLISOCYANIDE)COPPER(I) TETRAFLUOROBORATE 10 MILLICURIE: 1 INJECTION, POWDER, LYOPHILIZED, FOR SOLUTION INTRAVENOUS at 11:38

## 2021-10-22 RX ADMIN — PERFLUTREN 1.65 MG: 6.52 INJECTION, SUSPENSION INTRAVENOUS at 09:00

## 2021-10-22 RX ADMIN — TETRAKIS(2-METHOXYISOBUTYLISOCYANIDE)COPPER(I) TETRAFLUOROBORATE 30 MILLICURIE: 1 INJECTION, POWDER, LYOPHILIZED, FOR SOLUTION INTRAVENOUS at 11:39

## 2021-10-26 ENCOUNTER — TELEPHONE (OUTPATIENT)
Dept: CARDIOLOGY CLINIC | Age: 51
End: 2021-10-26

## 2021-10-26 NOTE — TELEPHONE ENCOUNTER
Called and spoke with Shala Peres, gave results, verbally understood. ----- Message from ERMIAS Chase sent at 10/26/2021  8:41 AM CDT -----  Please call let her know that her stress test was normal.  Echo results following this message.

## 2021-10-26 NOTE — TELEPHONE ENCOUNTER
Called and spoke with Mortimer Gentles, gave results, verbally understood.      ----- Message from ERMIAS Francis sent at 10/26/2021  8:42 AM CDT -----  Left ventricle normal size and function with EF 65-65%Right ventricle normal size and functionAortic valve without any leakage or stenosisMitral valve structurally normal with trace leakage

## 2021-11-08 ENCOUNTER — OFFICE VISIT (OUTPATIENT)
Dept: OBSTETRICS AND GYNECOLOGY | Facility: CLINIC | Age: 51
End: 2021-11-08

## 2021-11-08 VITALS
SYSTOLIC BLOOD PRESSURE: 152 MMHG | DIASTOLIC BLOOD PRESSURE: 94 MMHG | WEIGHT: 293 LBS | BODY MASS INDEX: 51.91 KG/M2 | HEIGHT: 63 IN

## 2021-11-08 DIAGNOSIS — N95.1 MENOPAUSAL SYMPTOMS: Primary | ICD-10-CM

## 2021-11-08 PROCEDURE — 99203 OFFICE O/P NEW LOW 30 MIN: CPT | Performed by: OBSTETRICS & GYNECOLOGY

## 2021-11-08 RX ORDER — MEDROXYPROGESTERONE ACETATE 5 MG/1
TABLET ORAL
COMMUNITY
Start: 2021-10-07 | End: 2021-11-08

## 2021-11-08 RX ORDER — MEDROXYPROGESTERONE ACETATE 10 MG/1
10 TABLET ORAL DAILY
Qty: 10 TABLET | Refills: 6 | Status: SHIPPED | OUTPATIENT
Start: 2021-11-08 | End: 2022-12-29

## 2021-11-08 RX ORDER — CITALOPRAM 20 MG/1
TABLET ORAL
COMMUNITY
Start: 2021-09-03 | End: 2022-03-07

## 2021-11-08 RX ORDER — ESTRADIOL 0.07 MG/D
1 FILM, EXTENDED RELEASE TRANSDERMAL 2 TIMES WEEKLY
Qty: 8 EACH | Refills: 6 | Status: SHIPPED | OUTPATIENT
Start: 2021-11-08 | End: 2022-11-07

## 2021-11-08 RX ORDER — ESTRADIOL 0.05 MG/D
FILM, EXTENDED RELEASE TRANSDERMAL
COMMUNITY
Start: 2021-10-23 | End: 2021-11-08

## 2021-11-08 RX ORDER — GUSELKUMAB 100 MG/ML
INJECTION SUBCUTANEOUS
COMMUNITY
Start: 2021-09-30

## 2021-11-08 NOTE — PROGRESS NOTES
"Ayla Echevarria is a 51 y.o. female here today for management of menopausal symptoms.  Over the past year she has had significant hot flashes, night sweats, mood swings, fatigue, and irregular bleeding.  She has tried cyclic Provera for the past 6 months without any significant improvement in her symptoms.  About 5 weeks ago she was started on the Vivelle-Dot patch 0.05 mg and has noted mild improvement in her symptoms.  She has only had light spotting over the last 2 months.  She is diabetic and has noted that her blood sugars have been fluctuating.    Visit Vitals  /94 (BP Location: Left arm, Patient Position: Sitting)   Ht 160 cm (63\")   Wt 134 kg (296 lb)   LMP 08/08/2021 (Approximate)   BMI 52.43 kg/m²     Pleasant female no acute distress  Mood and affect normal  Breathing unlabored    Assessment: Menopausal symptoms, poorly controlled    I have given her a new prescription for a higher dose of the Vivelle-Dot patch, 0.075 mg.  I have also increased her Provera to 10 mg for 10 days each month.  If she develops irregular bleeding then we may try Provera x3 weeks each month.  She will follow-up in 4 weeks to recheck her symptoms, and call in the meantime if she has questions or concerns.      "

## 2021-11-10 ENCOUNTER — OFFICE VISIT (OUTPATIENT)
Dept: CARDIOLOGY CLINIC | Age: 51
End: 2021-11-10
Payer: COMMERCIAL

## 2021-11-10 VITALS
HEIGHT: 63 IN | OXYGEN SATURATION: 97 % | WEIGHT: 293 LBS | BODY MASS INDEX: 51.91 KG/M2 | HEART RATE: 78 BPM | SYSTOLIC BLOOD PRESSURE: 122 MMHG | DIASTOLIC BLOOD PRESSURE: 74 MMHG

## 2021-11-10 DIAGNOSIS — E78.5 HYPERLIPIDEMIA, UNSPECIFIED HYPERLIPIDEMIA TYPE: ICD-10-CM

## 2021-11-10 DIAGNOSIS — R06.09 DOE (DYSPNEA ON EXERTION): ICD-10-CM

## 2021-11-10 DIAGNOSIS — I10 ESSENTIAL HYPERTENSION: Primary | ICD-10-CM

## 2021-11-10 DIAGNOSIS — I47.1 SVT (SUPRAVENTRICULAR TACHYCARDIA) (HCC): ICD-10-CM

## 2021-11-10 DIAGNOSIS — R00.0 TACHYCARDIA: ICD-10-CM

## 2021-11-10 PROCEDURE — 99213 OFFICE O/P EST LOW 20 MIN: CPT | Performed by: NURSE PRACTITIONER

## 2021-11-10 NOTE — PATIENT INSTRUCTIONS
Hypertension  (High Blood Pressure)  Most people with high blood pressure (hypertension) have no symptoms. High blood pressure can be a dangerous problem. Hypertension is dangerous because you may have it and not know it. High blood pressure may mean that your heart needs to work harder to pump blood. Your blood pressure is measured with 2 numbers. The first number is when your heart flexes (contracts), and the second number is when your heart relaxes. The higher the numbers are, the more you are at risk for problems. Write down your blood pressure today. The best blood pressure for adults is 120/80 (mmHg) or lower. It is likely that your blood pressure was recorded at least 2 times today. It is important for you to give these numbers to your doctor. If you do not have a doctor, try to get follow-up care at a hospital or community clinic. You may need to start high blood pressure medicine. You may also need to adjust your medicines as told by your doctor. Even mild high blood pressure increases long-term health risks. One high reading does not mean you have hypertension. · Your blood pressure should be taken when you are relaxed. It is also important to sit for about 10 minutes before being tested. · Things that can increase your blood pressure are:  Injury, Illness, Stress, Caffeine, and some medicines (like decongestants). · High blood pressure does not usually need emergency treatment. HOME CARE  · Lifestyle and medicine changes may be needed, including:   · Weight loss. · Exercise. · Limit the use of salt. · Stop smoking. · If using decongestants or birth control pills, talk to your doctor. These medicines might make blood pressure higher. · Do not use street drugs. · Females should not drink more than 1 alcoholic drink per day. Males should not drink more than 2 alcoholic drinks per day. · Take your blood pressure medicine. You will need to take it every day.  If you do not get treated, there are risks, including:   · Heart disease. · Stroke. · Kidney failure. · See your doctor as told. GET HELP RIGHT AWAY IF:  · You get a very bad headache. · You get blurred or changing vision. · You feel confused. · You feel weak, numb, or faint. · You get chest or belly (abdominal) pain. · You throw up (vomit). · You cannot breathe very well. If you have a blood pressure reading with a top number of 180 or higher, you need to see your doctor right away. This is especially true if you are having any of the problems listed above. Hyperlipidemia   (Dyslipidemia; High Triglycerides; Triglycerides, High)     Definition   Hyperlipidemia is a high level of fats in the blood. These fats, called lipids, include cholesterol and triglycerides. There are five types of hyperlipidemia. The type depends on which lipid in the blood is high. Causes   Causes may include:   A family history of hyperlipidemia   A diet high in total fat, saturated fat, or cholesterol   Obesity   Excess alcohol intake   Certain conditions, including:   Diabetes   Low thyroid   Kidney problems   Liver disease   Cushing's syndrome   Certain drugs, such as:   Hormones or birth control pills   Beta-blockers   Some diuretics   Cortisone drugs   Isotretinoin (for acne )   Some anti- HIV drugs   Risk Factors   These factors increase your chance of developing this condition. Tell your doctor if you have any of these:   Advancing age   Sex: male   Postmenopause   Lack of exercise   Smoking   Stress   Overuse of alcohol   Symptoms   Hyperlipidemia usually does not cause symptoms. Very high levels of lipids or triglycerides can cause:   Fat deposits in the skin or tendons ( xanthomas )   Pain, enlargement, or swelling of organs such as the liver, spleen, or pancreas ( pancreatitis )   Obstruction of blood vessels in heart and brain   Hyperlipidemia can increase your risk of atherosclerosis .  This is a dangerous hardening of the arteries. It can end up blocking blood flow. In some cases, this may result in:   Angina   Heart attack   Stroke   Other serious complications   Blood Vessel with Atherosclerosis       2011 [address] [facility].   Diagnosis   This condition is diagnosed with blood tests. These tests measure the levels of lipids in the blood. The National Cholesterol Education Program advises that you have your lipids checked at least once every five years, starting at age 21. Also, the American Academy of Pediatrics recommends lipid screening for children at risk (eg, a family history of hyperlipidemia). Testing may consist of a fasting blood test for:   Total cholesterol   LDL (bad cholesterol)   HDL (good cholesterol)   Triglycerides   Your doctor may recommend more frequent or earlier testing if you have:   Family history of hyperlipidemia   Risk factor or disease that may cause hyperlipidemia   Complication that may result from hyperlipidemia   Treatment   Treatment is not only aimed at correcting your cholesterol levels, but also at lowering your overall risk for heart disease and strokes. Diet Changes   Eat a diet low in total fat, saturated fat, and cholesterol . Reduce or eliminate the amount of alcohol you drink. Eat more high-fiber foods. Lifestyle Changes   If you are overweight, lose weight . If you smoke, quit . Exercise regularly . Talk to you doctor before starting an exercise program. Adele Webster may already have hardening of the arteries or heart disease. These conditions increase your risk of having a heart attack while exercising. Make sure other medical conditions such as high blood pressure and diabetes are being treated and controlled. Medications   There are a number of drugs available, such as statins , to treat this condition and help lower your risk for heart disease. Talk to your doctor. Statins have been shown to reduce mortality (death), heart attacks , and stroke. These medicines are best used as additions to diet and exercise and should not replace healthy lifestyle changes. Prevention   To help reduce your chance of getting hyperlipidemia, take the following steps:   Starting at age 21, get cholesterol tests. Eat a diet low in total fat, saturated fat, and cholesterol. If you smoke, quit. Drink alcohol in moderation (two drinks per day for men, one drink per day for women). If you are overweight, lose weight. Exercise regularly. Talk with your doctor first.   If you have diabetes , control your blood sugar. Talk to your doctor about medications you are taking. They may have side effects that cause hyperlipidemia.      Last Reviewed: September 2010 Gabriela Fernández DO   Updated: 11/9/2010

## 2021-11-10 NOTE — PROGRESS NOTES
Dear Dr. Hola Tierney MD,    Thank you for allowing me to participate in the care of Ms. Sanjuana Gonzales. She presents today at the [address] [place] [address] . As you know, Ms. Amelia May is a 45 y.o. female with history of hypertension, hyperlipidemia, diabetes, DORITA-Bipap, SVT, and tachycardia who presents with the chief complaint of lexiscan and echocardiogram results. She is a patient of Dr. Arely Ramirez. Since last seen, she has not noticed any more chest pain. She continues with GRAYSON. She is wanting to try to start doing some light exercise. She notices her heart racing at times however this is infrequent. she is sleeping on 1 pillow at night. she is not waking gasping for air. she denies any swelling. she has been compliant with her medications. her BP has been controlled. PCP follows labs and lipids. She otherwise denies chest pain, PND, orthopnea, near-syncope, or syncope She has no other complaints. Review of Systems   Constitutional: Positive for weight gain. Negative for fever, chills, diaphoresis, activity change, appetite change, fatigue and unexpected weight change. Eyes: Negative for photophobia, pain, redness and visual disturbance. Respiratory: Positive for shortness of breath and GRAYSON. Negative for apnea, cough, chest tightness, wheezing and stridor. Cardiovascular: Positive for heart racing (improved). Negative for  palpitations and leg swelling. Gastrointestinal: Negative for abdominal distention. Genitourinary: Negative for dysuria, urgency and frequency. Musculoskeletal: Negative for myalgias, arthralgias and gait problem. Skin: Negative for color change, pallor, rash and wound. Neurological: Negative for dizziness, tremors, speech difficulty, weakness and numbness. Hematological: Does not bruise/bleed easily. Psychiatric/Behavioral: Negative.         Past Medical History:   Diagnosis Date    BiPAP (biphasic positive airway pressure) dependence     18cm/14cm    Hyperlipidemia     Hypertension     Obstructive sleep apnea     AHI: 106. 6 per PSG, 2018    Osteoarthritis     Psoriasis        Past Surgical History:   Procedure Laterality Date    BACK SURGERY      CARPAL TUNNEL RELEASE       SECTION      CHOLECYSTECTOMY      COLONOSCOPY N/A 2020    Dr Caroline New AP x 2, HP x 1, 5 yr recall    EPIDURAL STEROID INJECTION N/A 2019    LUMBAR EPIDURAL STEROID INJECTION L4-5 performed by Eulalio Joy at . [address] N/A 2019    LUMBAR EPIDURAL STEROID INJECTION L5-S1 performed by Eulalio Joy at [address] [place] [address] OF [facility]. INJECTION PROCEDURE FOR SACROILIAC JOINT Right 2019    SACROILIAC JOINT INJECTION performed by Inga Joel at [facility] N/A 2019    LUMBAR INTER LAMINAR KACEY L4-5 performed by Eulalio Joy at [facility] Bilateral 2019    LUMBAR FACET BILATERAL L4-5 L5-S1 performed by Eulalio Joy at [facility] N/A 2019    LUMBAR INTER LAMINAR KACEY L4-5 performed by Eulalio Joy at [address] [facility] SI JOINT ARTHRGRPHY&/ANES/STEROID W/IMAGE Right 10/9/2018    SACROILIAC JOINT INJECTION performed by Inga Joel at [address] DX/THER SBST INTRLMNR LMBR/SAC W/IMG GDN N/A 2018    LUMBAR INTER LAMINAR KACEY L4-5 performed by Eulalio Joy at [facility] OR       Family History   Problem Relation Age of Onset    Colon Cancer Neg Hx     Colon Polyps Neg Hx        Social History     Socioeconomic History    Marital status:      Spouse name: Not on file    Number of children: Not on file    Years of education: Not on file    Highest education level: Not on file   Occupational History    Not on file   Tobacco Use    Smoking status: Former Smoker     Types: Cigarettes     Quit date:      Years since quittin.8    Smokeless tobacco: Never Used   Vaping Use    Vaping Use: Never used   Substance and Sexual Activity    Alcohol use: No    Drug use: No    Sexual activity: Yes     Partners: Male   Other Topics Concern    Not on file   Social History Narrative    Not on file     Social Determinants of Health     Financial Resource Strain:     Difficulty of Paying Living Expenses: Not on file   Food Insecurity:     Worried About Running Out of Food in the Last Year: Not on file    Jelena of Food in the Last Year: Not on file   Transportation Needs:     Lack of Transportation (Medical): Not on file    Lack of Transportation (Non-Medical):  Not on file   Physical Activity:     Days of Exercise per Week: Not on file    Minutes of Exercise per Session: Not on file   Stress:     Feeling of Stress : Not on file   Social Connections:     Frequency of Communication with Friends and Family: Not on file    Frequency of Social Gatherings with Friends and Family: Not on file    Attends Sikh Services: Not on file    Active Member of [employer] or Organizations: Not on file    Attends Club or Organization Meetings: Not on file    Marital Status: Not on file   Intimate Partner Violence:     Fear of Current or Ex-Partner: Not on file    Emotionally Abused: Not on file    Physically Abused: Not on file    Sexually Abused: Not on file   Housing Stability:     Unable to Pay for Housing in the Last Year: Not on file    Number of [place] in the Last Year: Not on file    Unstable Housing in the Last Year: Not on file       Allergies   Allergen Reactions    Cethexonium      \"eye drops\"    Ciprofloxacin     Penicillins          Current Outpatient Medications:     estradiol (CLIMARA) 0.075 MG/24HR, Place 1 patch onto the skin Twice a Week , Disp: , Rfl:     medroxyPROGESTERone (PROVERA) 10 MG tablet, Take 10 mg by mouth , Disp: , Rfl:     Guselkumab (TREMFYA) 100 MG/ML SOPN, Inject into the skin Every 8 weeks, Disp: , Rfl:     metoprolol succinate (TOPROL XL) 25 MG extended release tablet, TAKE 1 TABLET NIGHTLY, Disp: 90 tablet, Rfl: 3    metFORMIN (GLUCOPHAGE) 500 MG tablet, Take by mouth 2 times daily (with meals) Take 1 and 1/2 tablets BID, Disp: , Rfl:     citalopram (CELEXA) 20 MG tablet, TK 1 T PO QD, Disp: , Rfl:     lisinopril-hydrochlorothiazide (PRINZIDE;ZESTORETIC) 20-12.5 MG per tablet, Take 1 tablet by mouth daily, Disp: , Rfl:     diclofenac (VOLTAREN) 50 MG EC tablet, Take 50 mg by mouth 2 times daily, Disp: , Rfl: 2    ezetimibe-simvastatin (VYTORIN) 10-40 MG per tablet, Take 1 tablet by mouth daily , Disp: , Rfl:     PE:  Vitals:    11/10/21 0757   BP: 122/74   Pulse: 78   SpO2: 97%       Estimated body mass index is 52.97 kg/m² as calculated from the following:    Height as of this encounter: 5' 3\" (1.6 m). Weight as of this encounter: 299 lb (135.6 kg). Constitutional: She is oriented to person, place, and time. She appears well-developed and well-nourished in no acute distress. Neck:  Neck supple without JVD present. No trachea deviation present. No thyromegaly present. Eyes:Conjunctivae and EOM are normal. Pupils equal and reactive to light. ENT:Hearing appears normal.conjunctiva and lids are normal, ears and nose appear normal.  Cardiovascular: Normal rate, Normal rhythm, normal heart sounds. No murmur ascultated. No gallop and no friction rub. No carotid bruits. No peripheral edema. Pulmonary/Chest:  Lungs clear to auscultation bilaterally without evidence of respiratory distress. She without wheezes. She without rales or ronchi. Musculoskeletal: Normal range of motion. Gait is normal.  Head is normocephalic and atraumatic. Skin: Skin is warm and dry without rash or pallor. Psychiatric:She is alert and oriented to person, place, and time. She has a normal mood and affect. Her behavior is normal. Thought content normal.       No results found for: CREATININE, HGB, PROBNP    TTE-10/22/21  LV is normal in size with normal systolic function.  LV ejection fraction estimated at 60 to 65%. Diastolic function appears preserved. RV is normal in size with normal systolic function. Mild left atrial enlargement. Normal right atrial size. Aortic valve leaflets not well visualized but probably trileaflet. No   significant stenosis or regurgitation. Mitral valve appears structurally normal with normal leaflet mobility. Trace mitral regurgitation. No stenosis. Trace tricuspid regurgitation. No significant pericardial effusion. Lexiscan-10/22/21  There is no obvious infarct or ischemia, with a calculated ejection   fraction of 64 %. Suggest: Clinical correlation with consideration for medical   management. Assessment, Recommendations, & Plan:  45 y.o. female with HTN, Brief SVT, Tachycardia, HLD    SVT/tachycardia-episodes are infrequent on Toprol. Monitor    HTN-controlled on lisinopril, HCTZ, & Toprol. Continue current regimen    HLD-followed by PCP, on Vytorin, continue current regimen    GRAYSON-Lexiscan and echocardiogram were essentially normal. No more episodes of chest pain. Monitor GRAYSON. Disposition - RTC in 1 year or sooner if needed      Please do not hesitate to contact me for any questions or concerns.     Sincerely yours,    ERMIAS Downing

## 2021-11-29 RX ORDER — METOPROLOL SUCCINATE 25 MG/1
TABLET, EXTENDED RELEASE ORAL
Qty: 90 TABLET | Refills: 3 | Status: SHIPPED | OUTPATIENT
Start: 2021-11-29

## 2021-12-06 ENCOUNTER — OFFICE VISIT (OUTPATIENT)
Dept: OBSTETRICS AND GYNECOLOGY | Facility: CLINIC | Age: 51
End: 2021-12-06

## 2021-12-06 VITALS
SYSTOLIC BLOOD PRESSURE: 152 MMHG | HEIGHT: 63 IN | WEIGHT: 293 LBS | BODY MASS INDEX: 51.91 KG/M2 | DIASTOLIC BLOOD PRESSURE: 84 MMHG

## 2021-12-06 DIAGNOSIS — N95.1 MENOPAUSAL SYMPTOMS: Primary | ICD-10-CM

## 2021-12-06 PROCEDURE — 99212 OFFICE O/P EST SF 10 MIN: CPT | Performed by: OBSTETRICS & GYNECOLOGY

## 2021-12-06 NOTE — PROGRESS NOTES
"Ayla Echevarria is a 51 y.o. female here today for follow-up of menopausal symptoms.  A month ago I increased the dose of her Vivelle-Dot patch and changed her Provera regimen.  She reports decreased hot flashes and night sweats.  She has not had any irregular bleeding.  She is happy with the progress in her symptoms.    I have reviewed records, and she had a Pap smear in November 2020 that was normal.  She has a mammogram scheduled in the near future.    Visit Vitals  /84 (BP Location: Left arm, Patient Position: Sitting)   Ht 160 cm (63\")   Wt 134 kg (296 lb)   LMP 11/08/2021 (Exact Date)   BMI 52.43 kg/m²     Pleasant female no acute distress  Mood and affect normal  Breathing unlabored    Assessment: Menopausal symptoms, improved    She will continue her current hormone replacement therapy regimen, and contact the office if her symptoms worsen.  Otherwise she will follow-up when she is due for annual exam.  In the meantime if she has questions or concerns she will contact the office.      "

## 2022-01-16 ENCOUNTER — OFFICE VISIT (OUTPATIENT)
Age: 52
End: 2022-01-16
Payer: COMMERCIAL

## 2022-01-16 VITALS
WEIGHT: 290.6 LBS | HEART RATE: 76 BPM | HEIGHT: 63 IN | TEMPERATURE: 98 F | OXYGEN SATURATION: 97 % | DIASTOLIC BLOOD PRESSURE: 61 MMHG | BODY MASS INDEX: 51.49 KG/M2 | SYSTOLIC BLOOD PRESSURE: 104 MMHG

## 2022-01-16 DIAGNOSIS — R09.89 CHEST CONGESTION: ICD-10-CM

## 2022-01-16 DIAGNOSIS — R09.81 HEAD CONGESTION: ICD-10-CM

## 2022-01-16 DIAGNOSIS — Z11.59 SCREENING FOR VIRAL DISEASE: ICD-10-CM

## 2022-01-16 DIAGNOSIS — R52 BODY ACHES: Primary | ICD-10-CM

## 2022-01-16 LAB
INFLUENZA A ANTIBODY: NEGATIVE
INFLUENZA B ANTIBODY: NEGATIVE

## 2022-01-16 PROCEDURE — 99213 OFFICE O/P EST LOW 20 MIN: CPT | Performed by: NURSE PRACTITIONER

## 2022-01-16 PROCEDURE — 87804 INFLUENZA ASSAY W/OPTIC: CPT | Performed by: NURSE PRACTITIONER

## 2022-01-16 RX ORDER — ESTRADIOL 0.07 MG/D
FILM, EXTENDED RELEASE TRANSDERMAL
COMMUNITY
Start: 2021-11-09

## 2022-01-16 ASSESSMENT — ENCOUNTER SYMPTOMS
SORE THROAT: 1
VOMITING: 0
CHANGE IN BOWEL HABIT: 0
WHEEZING: 0
EYE PAIN: 0
CHEST TIGHTNESS: 0
BACK PAIN: 0
SHORTNESS OF BREATH: 0
RHINORRHEA: 0
NAUSEA: 1
COLOR CHANGE: 0
ABDOMINAL DISTENTION: 0
SWOLLEN GLANDS: 0
ABDOMINAL PAIN: 0
VISUAL CHANGE: 0
EYE DISCHARGE: 0
COUGH: 1

## 2022-01-16 NOTE — PATIENT INSTRUCTIONS
Your covid test results will be called to you in 24 to 48 hours  Quarantine per CDC guidelines (as discussed)  Take tylenol for pain or fever  Gargle salt water or use OTC chloraseptic throat spray for sore throat  May use delsym for cough is needed  Increase hydration and rest  Go to ER if covid test is positive and you develop chest pain, shortness of breath or fever over 103 uncontrolled by tylenol/ibuprofen  Follow up with your primary care provider  Call the clinic with any question  Patient Education        10 Things to Do When You Have COVID-19      Stay home. Don't go to school, work, or public areas. And don't use public transportation, ride-shares, or taxis unless you have no choice. Leave your home only if you need to get medical care. But call the doctor's office first so they know you're coming. And wear a mask. Ask before leaving isolation. Follow your doctor's advice about when it is safe for you to leave isolation. Wear a mask when you are around other people. It can help stop the spread of the virus. Limit contact with people in your home. If possible, stay in a separate bedroom and use a separate bathroom. Avoid contact with pets and other animals. If possible, have a friend or family member care for them while you're sick. Cover your mouth and nose with a tissue when you cough or sneeze. Then throw the tissue in the trash right away. Wash your hands often, especially after you cough or sneeze. Use soap and water, and scrub for at least 20 seconds. If soap and water aren't available, use an alcohol-based hand . Don't share personal household items. These include bedding, towels, cups and glasses, and eating utensils. Clean and disinfect your home every day. Use household  or disinfectant wipes or sprays. If needed, take acetaminophen (Tylenol) or ibuprofen (Advil, Motrin) to relieve fever and body aches.   Read and follow all instructions on the label. Current as of: March 26, 2021               Content Version: 13.1  © 3092-6955 [employer]wise, Incorporated. Care instructions adapted under license by [facility] ([facility]). If you have questions about a medical condition or this instruction, always ask your healthcare professional. [address] any warranty or liability for your use of this information.

## 2022-01-16 NOTE — PROGRESS NOTES
[facility]  [address] [address] [place] [address]  Dept: [phone]  Dept Fax: [phone]  Loc: [phone]  Bettie Forde is a 45 y.o. female who presents today for her medical conditions/complaintsas noted below. Bettie Forde is c/o of Pharyngitis, Head Congestion, Generalized Body Aches, and Chills      HPI:     Pharyngitis  This is a new problem. The current episode started in the past 7 days. The problem occurs intermittently. The problem has been gradually improving. Associated symptoms include chills, congestion, coughing, fatigue, headaches, myalgias, nausea and a sore throat. Pertinent negatives include no abdominal pain, anorexia, arthralgias, change in bowel habit, chest pain, fever, joint swelling, neck pain, numbness, rash, swollen glands, vertigo, visual change, vomiting or weakness. The symptoms are aggravated by swallowing. She has tried acetaminophen for the symptoms. The treatment provided mild relief. Past Medical History:   Diagnosis Date    BiPAP (biphasic positive airway pressure) dependence     18cm/14cm    Hyperlipidemia     Hypertension     Obstructive sleep apnea     AHI: 106. 6 per PSG, 2018    Osteoarthritis     Psoriasis       Past Surgical History:   Procedure Laterality Date    BACK SURGERY      CARPAL TUNNEL RELEASE       SECTION      CHOLECYSTECTOMY      COLONOSCOPY N/A 2020    Dr Jean Paul Franco AP x 2, HP x 1, 5 yr recall    EPIDURAL STEROID INJECTION N/A 2019    LUMBAR EPIDURAL STEROID INJECTION L4-5 performed by Eulalio Joy at . [address] N/A 2019    LUMBAR EPIDURAL STEROID INJECTION L5-S1 performed by Eulalio Joy at [address] [place] [address] OF [facility]. INJECTION PROCEDURE FOR SACROILIAC JOINT Right 2019    SACROILIAC JOINT INJECTION performed by Davis Alvarez at [address] [place] [address] N/A 2019    LUMBAR INTER LAMINAR KACEY L4-5 performed by Eulalio Joy at [facility] Bilateral 2019    LUMBAR FACET BILATERAL L4-5 L5-S1 performed by Eulalio Joy at [facility] N/A 2019    LUMBAR INTER LAMINAR KACEY L4-5 performed by Eulalio Joy at [address] [place] [address] DE INJECT SI JOINT ARTHRGRPHY&/ANES/STEROID W/IMAGE Right 10/9/2018    SACROILIAC JOINT INJECTION performed by Richelle Reich at [address] DX/THER SBST INTRLMNR LMBR/SAC W/IMG GDN N/A 2018    LUMBAR INTER LAMINAR KACEY L4-5 performed by Richelle Reich at [facility] OR       Family History   Problem Relation Age of Onset    Colon Cancer Neg Hx     Colon Polyps Neg Hx        Social History     Tobacco Use    Smoking status: Former Smoker     Types: Cigarettes     Quit date:      Years since quittin.0    Smokeless tobacco: Never Used   Substance Use Topics    Alcohol use: No      Current Outpatient Medications   Medication Sig Dispense Refill    timolol (BETIMOL) 0.5 % ophthalmic solution 1 drop 2 times daily      metoprolol succinate (TOPROL XL) 25 MG extended release tablet TAKE 1 TABLET NIGHTLY 90 tablet 3    estradiol (CLIMARA) 0.075 MG/24HR Place 1 patch onto the skin Twice a Week       Guselkumab (TREMFYA) 100 MG/ML SOPN Inject into the skin Every 8 weeks      metFORMIN (GLUCOPHAGE) 500 MG tablet Take by mouth 2 times daily (with meals) Take 1 and 1/2 tablets BID      citalopram (CELEXA) 20 MG tablet TK 1 T PO QD      lisinopril-hydrochlorothiazide (PRINZIDE;ZESTORETIC) 20-12.5 MG per tablet Take 1 tablet by mouth daily      diclofenac (VOLTAREN) 50 MG EC tablet Take 50 mg by mouth 2 times daily  2    ezetimibe-simvastatin (VYTORIN) 10-40 MG per tablet Take 1 tablet by mouth daily       estradiol (VIVELLE) 0.075 MG/24HR APPLY ONE PATCH ON THE SKIN TWICE WEEKLY AS DIRECTED      medroxyPROGESTERone (PROVERA) 10 MG tablet Take 10 mg by mouth  (Patient not taking: Reported on 2022)       No current facility-administered medications for this visit. Allergies   Allergen Reactions    Cethexonium      \"eye drops\"    Ciprofloxacin     Penicillins        Health Maintenance   Topic Date Due    Potassium monitoring  Never done    Creatinine monitoring  Never done    Hepatitis C screen  Never done    COVID-19 Vaccine (1) Never done    Lipid screen  Never done    Depression Screen  Never done    HIV screen  Never done    DTaP/Tdap/Td vaccine (1 - Tdap) Never done    Cervical cancer screen  Never done    Diabetes screen  Never done    Breast cancer screen  Never done    Shingles Vaccine (1 of 2) Never done    Flu vaccine (1) Never done    Colon cancer screen colonoscopy  01/08/2025    Hepatitis A vaccine  Aged Out    Hepatitis B vaccine  Aged Out    Hib vaccine  Aged Out    Meningococcal (ACWY) vaccine  Aged Out    Pneumococcal 0-64 years Vaccine  Aged Out       Subjective:     Review of Systems   Constitutional: Positive for chills and fatigue. Negative for appetite change and fever. HENT: Positive for congestion and sore throat. Negative for ear pain, mouth sores, postnasal drip, rhinorrhea and sneezing. Eyes: Negative for pain and discharge. Respiratory: Positive for cough. Negative for chest tightness, shortness of breath and wheezing. Cardiovascular: Negative for chest pain, palpitations and leg swelling. Gastrointestinal: Positive for nausea. Negative for abdominal distention, abdominal pain, anorexia, change in bowel habit and vomiting. Musculoskeletal: Positive for myalgias. Negative for arthralgias, back pain, joint swelling, neck pain and neck stiffness. Skin: Negative for color change and rash. Neurological: Positive for headaches. Negative for dizziness, vertigo, weakness, light-headedness and numbness. Psychiatric/Behavioral: Negative for confusion. Objective:     Physical Exam  Vitals reviewed.    Constitutional:       General: She is not in acute distress. Appearance: She is not ill-appearing. HENT:      Head: Normocephalic and atraumatic. Right Ear: Tympanic membrane, ear canal and external ear normal. There is no impacted cerumen. Left Ear: Tympanic membrane, ear canal and external ear normal. There is no impacted cerumen. Nose: Rhinorrhea present. No congestion. Mouth/Throat:      Mouth: Mucous membranes are moist.      Pharynx: No oropharyngeal exudate or posterior oropharyngeal erythema. Eyes:      General:         Right eye: No discharge. Left eye: No discharge. Extraocular Movements: Extraocular movements intact. Conjunctiva/sclera: Conjunctivae normal.      Pupils: Pupils are equal, round, and reactive to light. Cardiovascular:      Rate and Rhythm: Normal rate and regular rhythm. Pulses: Normal pulses. Heart sounds: Normal heart sounds. No murmur heard. Pulmonary:      Effort: Pulmonary effort is normal. No respiratory distress. Breath sounds: Normal breath sounds. No wheezing or rhonchi. Abdominal:      General: Bowel sounds are normal. There is no distension. Palpations: Abdomen is soft. Tenderness: There is no abdominal tenderness. There is no right CVA tenderness or left CVA tenderness. Musculoskeletal:         General: Normal range of motion. Cervical back: Normal range of motion. No rigidity. Lymphadenopathy:      Cervical: No cervical adenopathy. Skin:     General: Skin is warm and dry. Neurological:      Mental Status: She is alert and oriented to person, place, and time. Psychiatric:         Behavior: Behavior normal.       /61   Pulse 76   Temp 98 °F (36.7 °C)   Ht 5' 3\" (1.6 m)   Wt 290 lb 9.6 oz (131.8 kg)   SpO2 97%   BMI 51.48 kg/m²     Assessment:      Diagnosis Orders   1. Body aches  POCT Influenza A/B   2. Screening for viral disease  COVID-19   3. Chest congestion     4.  Head congestion  COVID-19       Plan:      Orders Placed This Encounter   Procedures    COVID-19     Scheduling Instructions:      1) Due to current limited availability of the COVID-19 test, tests will be prioritized based on responses to questions above. Testing may be delayed due to volume. 2) Print and instruct patient to adhere to CDC home isolation program. (Link Above)              3) Set up or refer patient for a monitoring program.              4) Have patient sign up for and leverage MyChart (if not previously done). Order Specific Question:   Is this test for diagnosis or screening? Answer:   Screening     Order Specific Question:   Symptomatic for COVID-19 as defined by CDC? Answer:   No     Order Specific Question:   Date of Symptom Onset     Answer:   N/A     Order Specific Question:   Hospitalized for COVID-19? Answer:   No     Order Specific Question:   Admitted to ICU for COVID-19? Answer:   No     Order Specific Question:   Employed in healthcare setting? Answer:   Unknown     Order Specific Question:   Resident in a congregate (group) care setting? Answer:   Unknown     Order Specific Question:   Pregnant? Answer:   No     Order Specific Question:   Previously tested for COVID-19? Answer:   No    COVID-19    COVID-19    POCT Influenza A/B     Results for orders placed or performed in visit on 01/16/22   POCT Influenza A/B   Result Value Ref Range    Influenza A Ab Negative     Influenza B Ab Negative      Discussed negative flu test with pt and father  Most likely viral infection (common cold or covid)  covid test recommended  Discuss CDC quarantine / isolation guidelines if covid test is positive  Discussed over the counter treatment  Verbalized understanding  covid vaccine recommended    No follow-ups on file. No orders of the defined types were placed in this encounter. Patient given educationalmaterials - see patient instructions.   Discussed use, benefit, and side effectsof prescribed medications. All patient questions answered. Pt voiced understanding. Reviewed health maintenance. Instructed to continue current medications, diet andexercise. Patient agreed with treatment plan. Follow up as directed. There are no Patient Instructions on file for this visit.       Electronically signed by ERMIAS Black CNP on 1/16/2022 at 3:46 PM

## 2022-01-17 ENCOUNTER — TELEPHONE (OUTPATIENT)
Age: 52
End: 2022-01-17

## 2022-01-17 LAB — SARS-COV-2, PCR: DETECTED

## 2022-01-17 NOTE — TELEPHONE ENCOUNTER
Patient verified . Patient notified of positive COVID test results. Advised rest and hydration. Advised to be quarantined at home for 10 days and local health department would be contacting her. If patient is getting worse to let us know. Patient verbalized understanding.

## 2022-02-14 ENCOUNTER — TELEPHONE (OUTPATIENT)
Dept: OBSTETRICS AND GYNECOLOGY | Facility: CLINIC | Age: 52
End: 2022-02-14

## 2022-02-14 NOTE — TELEPHONE ENCOUNTER
Pt calls with c/o irregular bleeding the last few months. Patient states she did not have a period in December, had bleeding 1/18-1/23 and then started again 2/5 and is still currently bleeding. Patient uses hormone patch twice weekly and takes Provera the last 10 days of the month. Patient denies bleeding to the point of changing a pad or tampon in less than an hour. Patient encouraged to continue to monitor bleeding and should is continue to make an apt to be seen. Should she start bleeding and filling a pad or tampon in less than an hour, have dizziness, fatigue or weakness she is to go to the ER.

## 2022-03-03 ENCOUNTER — TRANSCRIBE ORDERS (OUTPATIENT)
Dept: ADMINISTRATIVE | Facility: HOSPITAL | Age: 52
End: 2022-03-03

## 2022-03-03 DIAGNOSIS — M54.50 LUMBAR SPINE PAIN: Primary | ICD-10-CM

## 2022-03-07 ENCOUNTER — OFFICE VISIT (OUTPATIENT)
Dept: OBSTETRICS AND GYNECOLOGY | Facility: CLINIC | Age: 52
End: 2022-03-07

## 2022-03-07 VITALS
BODY MASS INDEX: 51.56 KG/M2 | WEIGHT: 291 LBS | HEIGHT: 63 IN | SYSTOLIC BLOOD PRESSURE: 138 MMHG | DIASTOLIC BLOOD PRESSURE: 78 MMHG

## 2022-03-07 DIAGNOSIS — N95.1 MENOPAUSAL SYMPTOMS: Primary | ICD-10-CM

## 2022-03-07 PROCEDURE — 99213 OFFICE O/P EST LOW 20 MIN: CPT | Performed by: OBSTETRICS & GYNECOLOGY

## 2022-03-07 RX ORDER — TIMOLOL MALEATE 5 MG/ML
SOLUTION/ DROPS OPHTHALMIC
COMMUNITY
Start: 2022-01-11

## 2022-03-07 NOTE — PROGRESS NOTES
"Ayla Echevarria is a 51 y.o. female here today for follow-up of menopausal symptoms.  She is currently on Vivelle dot patch and Provera for 10 days each month.  In January she had some vaginal bleeding that lasted about a week, and then bled for about 2 weeks last month.  Currently she denies any vaginal bleeding, vaginal discharge, or pelvic pain.  Her menopausal symptoms are fairly well controlled and she is sleeping decently.    Additionally today, she has questions about hemoglobin A1c.  She reports that it was recently checked and was around 9.  She has checked her blood sugar a few times at home and has had elevated readings over 200.  She is taking Metformin 1000 mg twice a day.    Visit Vitals  /78   Ht 160 cm (63\")   Wt 132 kg (291 lb)   LMP 02/05/2022 (Exact Date)   BMI 51.55 kg/m²     Pleasant female no acute distress  Mood and affect normal  Breathing unlabored    Pap records received and reviewed, last Pap 11/2020 normal    Assessment: Menopausal symptoms, type 2 diabetes    I recommend that she follow-up with her PCP for medication management since the Metformin does not appear to be controlling her blood sugars.  Her symptoms are well controlled, therefore I do not believe any change in dosing of her transdermal estrogen therapy is necessary.  However, if she continues to have irregular bleeding then we may change her Provera to daily for 3 weeks each month instead of for 10 days each month.  Call with questions or concerns.          "

## 2022-03-25 ENCOUNTER — HOSPITAL ENCOUNTER (OUTPATIENT)
Dept: MRI IMAGING | Facility: HOSPITAL | Age: 52
Discharge: HOME | End: 2022-03-25

## 2022-03-25 ENCOUNTER — HOSPITAL ENCOUNTER (OUTPATIENT)
Dept: CT IMAGING | Facility: HOSPITAL | Age: 52
Discharge: HOME | End: 2022-03-25

## 2022-03-25 DIAGNOSIS — M54.50 LUMBAR SPINE PAIN: ICD-10-CM

## 2022-03-25 PROCEDURE — 72148 MRI LUMBAR SPINE W/O DYE: CPT

## 2022-03-25 PROCEDURE — 72131 CT LUMBAR SPINE W/O DYE: CPT

## 2022-03-28 ENCOUNTER — OFFICE VISIT (OUTPATIENT)
Age: 52
End: 2022-03-28
Payer: COMMERCIAL

## 2022-03-28 VITALS
TEMPERATURE: 95.9 F | HEART RATE: 76 BPM | BODY MASS INDEX: 49.68 KG/M2 | HEIGHT: 64 IN | OXYGEN SATURATION: 97 % | DIASTOLIC BLOOD PRESSURE: 70 MMHG | WEIGHT: 291 LBS | SYSTOLIC BLOOD PRESSURE: 112 MMHG

## 2022-03-28 DIAGNOSIS — J10.1 INFLUENZA A: Primary | ICD-10-CM

## 2022-03-28 DIAGNOSIS — J02.9 SORE THROAT: ICD-10-CM

## 2022-03-28 DIAGNOSIS — R52 BODY ACHES: ICD-10-CM

## 2022-03-28 LAB
INFLUENZA A ANTIBODY: POSITIVE
INFLUENZA B ANTIBODY: NEGATIVE
S PYO AG THROAT QL: NORMAL

## 2022-03-28 PROCEDURE — 87880 STREP A ASSAY W/OPTIC: CPT | Performed by: NURSE PRACTITIONER

## 2022-03-28 PROCEDURE — 99213 OFFICE O/P EST LOW 20 MIN: CPT | Performed by: NURSE PRACTITIONER

## 2022-03-28 PROCEDURE — 87804 INFLUENZA ASSAY W/OPTIC: CPT | Performed by: NURSE PRACTITIONER

## 2022-03-28 ASSESSMENT — ENCOUNTER SYMPTOMS
COUGH: 1
NAUSEA: 0
SORE THROAT: 1
SINUS COMPLAINT: 1
VOMITING: 0

## 2022-03-28 NOTE — PROGRESS NOTES
[phone]  [address] [facility] [address] [address]  Dept: [phone]  Dept Fax: [phone]  Loc: [phone]    Irma Miguel is a 45 y.o. female who presents today for her medical conditions/complaintsas noted below. Irma Miguel is c/o of Pharyngitis (My chest is heavy and my throat is sore and I have alot of head cold. ) and Sinus Problem        HPI:     Pharyngitis  This is a new problem. Episode onset: Thursday night. The problem occurs constantly. The problem has been unchanged. Associated symptoms include congestion, coughing and a sore throat. Pertinent negatives include no chills, fever, myalgias, nausea or vomiting. Nothing aggravates the symptoms. Treatments tried: otc medicine. The treatment provided mild relief. Sinus Problem  Associated symptoms include congestion, coughing and a sore throat. Pertinent negatives include no chills. Past Medical History:   Diagnosis Date    BiPAP (biphasic positive airway pressure) dependence     18cm/14cm    Hyperlipidemia     Hypertension     Obstructive sleep apnea     AHI: 106. 6 per PSG, 2018    Osteoarthritis     Psoriasis      Past Surgical History:   Procedure Laterality Date    BACK SURGERY      CARPAL TUNNEL RELEASE       SECTION      CHOLECYSTECTOMY      COLONOSCOPY N/A 2020    Dr Vidal Fierro AP x 2, HP x 1, 5 yr recall    EPIDURAL STEROID INJECTION N/A 2019    LUMBAR EPIDURAL STEROID INJECTION L4-5 performed by Eulalio Joy at [address] N/A 2019    LUMBAR EPIDURAL STEROID INJECTION L5-S1 performed by Eulalio Joy at [address] [place] [address]. INJECTION PROCEDURE FOR SACROILIAC JOINT Right 2019    SACROILIAC JOINT INJECTION performed by Dora Sahu at [address] [place] [address] N/A 2019    LUMBAR INTER LAMINAR KACEY L4-5 performed by Eulalio Joy at [facility] 2019    LUMBAR FACET BILATERAL L4-5 L5-S1 performed by Eulalio Joy at [facility] N/A 2019    LUMBAR INTER LAMINAR KACEY L4-5 performed by Eulalio Joy at [address] [place] [address] [place] INJECT SI JOINT ARTHRGRPHY&/ANES/STEROID W/IMAGE Right 10/9/2018    SACROILIAC JOINT INJECTION performed by Butch Jean at [address] DX/THER SBST INTRLMNR LMBR/SAC W/IMG GDN N/A 2018    LUMBAR INTER LAMINAR KACEY L4-5 performed by Butch Jean at [facility] OR       Family History   Problem Relation Age of Onset    Colon Cancer Neg Hx     Colon Polyps Neg Hx        Social History     Tobacco Use    Smoking status: Former Smoker     Types: Cigarettes     Quit date:      Years since quittin.2    Smokeless tobacco: Never Used   Substance Use Topics    Alcohol use: No      Current Outpatient Medications   Medication Sig Dispense Refill    estradiol (VIVELLE) 0.075 MG/24HR APPLY ONE PATCH ON THE SKIN TWICE WEEKLY AS DIRECTED      timolol (BETIMOL) 0.5 % ophthalmic solution 1 drop 2 times daily      metoprolol succinate (TOPROL XL) 25 MG extended release tablet TAKE 1 TABLET NIGHTLY 90 tablet 3    medroxyPROGESTERone (PROVERA) 10 MG tablet Take 10 mg by mouth       Guselkumab (TREMFYA) 100 MG/ML SOPN Inject into the skin Every 8 weeks      metFORMIN (GLUCOPHAGE) 500 MG tablet Take 500 mg by mouth 2 times daily (with meals) Take 1 and 1/2 tablets BID      lisinopril-hydrochlorothiazide (PRINZIDE;ZESTORETIC) 20-12.5 MG per tablet Take 1 tablet by mouth daily      diclofenac (VOLTAREN) 50 MG EC tablet Take 50 mg by mouth 2 times daily  2    ezetimibe-simvastatin (VYTORIN) 10-40 MG per tablet Take 1 tablet by mouth daily       estradiol (CLIMARA) 0.075 MG/24HR Place 1 patch onto the skin Twice a Week  (Patient not taking: Reported on 3/28/2022)      citalopram (CELEXA) 20 MG tablet TK 1 T PO QD (Patient not taking: Reported on 3/28/2022)       No current facility-administered medications for this visit. Allergies   Allergen Reactions    Cethexonium      \"eye drops\"    Ciprofloxacin     Penicillins        Health Maintenance   Topic Date Due    Potassium monitoring  Never done    Creatinine monitoring  Never done    Hepatitis C screen  Never done    COVID-19 Vaccine (1) Never done    Lipid screen  Never done    Depression Screen  Never done    HIV screen  Never done    DTaP/Tdap/Td vaccine (1 - Tdap) Never done    Cervical cancer screen  Never done    Diabetes screen  Never done    Breast cancer screen  Never done    Shingles Vaccine (1 of 2) Never done    Flu vaccine (1) Never done    Colorectal Cancer Screen  01/08/2025    Hepatitis A vaccine  Aged Out    Hepatitis B vaccine  Aged Out    Hib vaccine  Aged Out    Meningococcal (ACWY) vaccine  Aged Out    Pneumococcal 0-64 years Vaccine  Aged Out       Subjective:     Review of Systems   Constitutional: Negative for chills and fever. HENT: Positive for congestion and sore throat. Respiratory: Positive for cough. Gastrointestinal: Negative for nausea and vomiting. Musculoskeletal: Negative for myalgias. All other systems reviewed and are negative.      :Objective      Physical Exam  Vitals and nursing note reviewed. Constitutional:       General: She is not in acute distress. Appearance: Normal appearance. She is well-developed. She is ill-appearing. She is not diaphoretic. HENT:      Head: Normocephalic and atraumatic. Right Ear: Tympanic membrane, ear canal and external ear normal.      Left Ear: Tympanic membrane, ear canal and external ear normal.      Nose: Congestion present. Mouth/Throat:      Mouth: Mucous membranes are moist.      Pharynx: Oropharynx is clear. Posterior oropharyngeal erythema present. Eyes:      Conjunctiva/sclera: Conjunctivae normal.      Pupils: Pupils are equal, round, and reactive to light.    Cardiovascular:      Rate and Rhythm: Normal rate and regular rhythm. Heart sounds: Normal heart sounds. No murmur heard. Pulmonary:      Effort: Pulmonary effort is normal. No respiratory distress. Breath sounds: Normal breath sounds. No wheezing. Abdominal:      Palpations: Abdomen is soft. Musculoskeletal:      Cervical back: Normal range of motion. Skin:     General: Skin is warm and dry. Findings: No rash. Neurological:      Mental Status: She is alert and oriented to person, place, and time. Psychiatric:         Mood and Affect: Mood normal.         Behavior: Behavior normal.       /70   Pulse 76   Temp 95.9 °F (35.5 °C) (Temporal)   Ht 5' 4\" (1.626 m)   Wt 291 lb (132 kg)   SpO2 97%   BMI 49.95 kg/m²     :Assessment       Diagnosis Orders   1. Influenza A     2. Sore throat  POCT Influenza A/B    POCT rapid strep A   3. Body aches  POCT Influenza A/B    POCT rapid strep A       :Plan    1. Rest and increase fluid intake. 2. Continue symptomatic and supportive treatment  3. Monitor for fever and treat as needed with tylenol or ibuprofen  4. If patient is not improving or developing any new/worsening symptoms then return to clinic as needed or follow up with PCP  Orders Placed This Encounter   Procedures    POCT Influenza A/B    POCT rapid strep A     Results for orders placed or performed in visit on 03/28/22   POCT Influenza A/B   Result Value Ref Range    Influenza A Ab Positive     Influenza B Ab Negative    POCT rapid strep A   Result Value Ref Range    Strep A Ag None Detected None Detected         No follow-ups on file. No orders of the defined types were placed in this encounter. Patient given educational materials- see patient instructions. Discussed use, benefit, and side effects of prescribedmedications. All patient questions answered. Pt voiced understanding.      Patient Instructions       Patient Education        Influenza (Flu): Care Instructions  Overview     Influenza (flu) is an infection in the lungs and breathing passages. It is caused by the influenza virus. There are different strains, or types, of the flu virus from year to year. Unlike the common cold, the flu comes on suddenly and the symptoms can be more severe. These symptoms include a cough, congestion, fever, chills, fatigue, aches, and pains. These symptoms may last up to 10 days. Although the flu can make you feel very sick, it usually doesn't cause serious health problems. Home treatment is usually all you need for flu symptoms. But your doctor may prescribe antiviral medicine to prevent other health problems, such as pneumonia, from developing. The risk of other health problems from the flu is highest for young children (under 2), older adults (over 72), pregnant women, and people with long-term health conditions. Follow-up care is a key part of your treatment and safety. Be sure to make and go to all appointments, and call your doctor if you are having problems. It's also a good idea to know your test results and keep a list of the medicines you take. How can you care for yourself at home? · Get plenty of rest.  · Drink plenty of fluids. If you have to limit fluids because of a health problem, talk with your doctor before you increase the amount of fluids you drink. · Take an over-the-counter pain medicine if needed, such as acetaminophen (Tylenol), ibuprofen (Advil, Motrin), or naproxen (Aleve), to relieve fever, headache, and muscle aches. Be safe with medicines. Read and follow all instructions on the label. · No one younger than 20 should take aspirin. It has been linked to Reye syndrome, a serious illness. · Take any prescribed medicine exactly as directed. · Do not smoke. Smoking can make the flu worse. If you need help quitting, talk to your doctor about stop-smoking programs and medicines. These can increase your chances of quitting for good.   · If the skin around your nose and lips becomes sore, put some petroleum jelly (such as Vaseline) on the area. · To ease coughing:  ? Suck on cough drops or plain, hard candy. ? Try an over-the-counter cough or cold medicine. Read and follow all instructions on the label. ? Raise your head at night with an extra pillow. This may help you rest if coughing keeps you awake. To avoid spreading the flu  · Wash your hands regularly, and keep your hands away from your face. · Stay home from school, work, and other public places until you are feeling better and your fever has been gone for at least 24 hours. The fever needs to have gone away on its own without the help of medicine. · Ask people living with you to talk to their doctors about preventing the flu. They may get antiviral medicine to keep from getting the flu from you. · To prevent the flu in the future, get the flu vaccine every fall. Encourage people living with you to get the vaccine. · Cover your mouth when you cough or sneeze. If you can, cough or sneeze into the bend of your elbow, not your hands. When should you call for help? Call 911 anytime you think you may need emergency care. For example, call if:    · You have severe trouble breathing.     · You have a seizure. Call your doctor now or seek immediate medical care if:    · You have new or worse trouble breathing.     · You have pain or pressure in your chest or belly.     · You have a fever or cough that returns after getting better.     · You feel very sleepy, dizzy, or confused.     · You are not urinating.     · You have severe muscle pain.     · You have severe weakness, or you are unsteady.     · You have medical conditions that are getting worse. Watch closely for changes in your health, and be sure to contact your doctor if:    · You do not get better as expected.     · You are having a problem with your medicine. Where can you learn more? Go to [email or web address]://[email or web address]. org and sign in to your [employer] account.  Enter A225 in the [address] Information box to learn more about \"Influenza (Flu): Care Instructions. \"     If you do not have an account, please click on the \"Sign Up Now\" link. Current as of: July 6, 2021               Content Version: 13.1  © 5475-3447 Healthwise, Incorporated. Care instructions adapted under license by [facility] ([facility]). If you have questions about a medical condition or this instruction, always ask your healthcare professional. [address] any warranty or liability for your use of this information. 1. Rest and increase fluid intake. 2. Continue symptomatic and supportive treatment  3. Monitor for fever and treat as needed with tylenol or ibuprofen  4.  If patient is not improving or developing any new/worsening symptoms then return to clinic as needed or follow up with PCP          Electronically signed by ERMIAS Medrano on 3/28/2022 at 8:36 AM

## 2022-06-06 ENCOUNTER — TELEPHONE (OUTPATIENT)
Dept: BARIATRICS/WEIGHT MGMT | Facility: CLINIC | Age: 52
End: 2022-06-06

## 2022-06-07 ENCOUNTER — OFFICE VISIT (OUTPATIENT)
Dept: BARIATRICS/WEIGHT MGMT | Facility: CLINIC | Age: 52
End: 2022-06-07

## 2022-06-07 ENCOUNTER — PATIENT ROUNDING (BHMG ONLY) (OUTPATIENT)
Dept: BARIATRICS/WEIGHT MGMT | Facility: CLINIC | Age: 52
End: 2022-06-07

## 2022-06-07 VITALS
WEIGHT: 290.6 LBS | DIASTOLIC BLOOD PRESSURE: 73 MMHG | OXYGEN SATURATION: 95 % | SYSTOLIC BLOOD PRESSURE: 119 MMHG | HEIGHT: 64 IN | TEMPERATURE: 96.9 F | BODY MASS INDEX: 49.61 KG/M2 | HEART RATE: 82 BPM

## 2022-06-07 DIAGNOSIS — E66.01 CLASS 3 SEVERE OBESITY DUE TO EXCESS CALORIES WITH SERIOUS COMORBIDITY AND BODY MASS INDEX (BMI) OF 45.0 TO 49.9 IN ADULT: Primary | ICD-10-CM

## 2022-06-07 DIAGNOSIS — E78.5 HYPERLIPIDEMIA, UNSPECIFIED HYPERLIPIDEMIA TYPE: ICD-10-CM

## 2022-06-07 DIAGNOSIS — I10 PRIMARY HYPERTENSION: ICD-10-CM

## 2022-06-07 DIAGNOSIS — G47.33 OBSTRUCTIVE SLEEP APNEA SYNDROME: ICD-10-CM

## 2022-06-07 DIAGNOSIS — K21.9 GERD WITHOUT ESOPHAGITIS: Chronic | ICD-10-CM

## 2022-06-07 PROCEDURE — 99204 OFFICE O/P NEW MOD 45 MIN: CPT | Performed by: NURSE PRACTITIONER

## 2022-06-07 RX ORDER — PREGABALIN 75 MG/1
1 CAPSULE ORAL EVERY 12 HOURS
COMMUNITY
Start: 2022-04-26

## 2022-06-07 RX ORDER — MEDROXYPROGESTERONE ACETATE 10 MG/1
1 TABLET ORAL DAILY
COMMUNITY
End: 2022-12-29

## 2022-06-07 RX ORDER — MELOXICAM 15 MG/1
TABLET ORAL
COMMUNITY

## 2022-06-07 NOTE — ASSESSMENT & PLAN NOTE
Patient's (Body mass index is 49.88 kg/m².) indicates that they are morbidly obese (BMI > 40 or > 35 with obesity - related health condition) with health conditions that include obstructive sleep apnea, hypertension, dyslipidemias and GERD . Weight is worsening. BMI is is above average; BMI management plan is completed. We discussed portion control and increasing exercise.

## 2022-06-07 NOTE — PROGRESS NOTES
June 7, 2022    Hello, may I speak with Ayla Echevarria?    My name is Idania       I am  with [facility]  [address], [address]  [facility] [facility] [address] [phone].    Before we get started may I verify your date of birth? 1970    I am calling to officially welcome you to our practice and ask about your recent visit. Is this a good time to talk? Ye    Tell me about your visit with us. What things went well?  I thought it went very well. I am encouraged that this is going to help me.       We're always looking for ways to make our patients' experiences even better. Do you have recommendations on ways we may improve?  No ma'am    Overall were you satisfied with your first visit to our practice? Yes I was.       I appreciate you taking the time to speak with me today. Is there anything else I can do for you? No ma'am       Thank you, and have a great day.

## 2022-06-07 NOTE — PROGRESS NOTES
Patient Care Team:  Malcolm Hernandes MD as PCP - General (Family Medicine)      Subjective     Patient is a 51 y.o. female presents with morbid obesity and her Body mass index is 49.88 kg/m².     She is here for discussion of medicalweight loss options.  She stated she has been with the disease of obesity for year(s).  She stated she suffers from diabetes, HTN, joint pain, hyperlipidemia and morbid obesity due to her weight gain.  She stated that weight loss helps alleviate these symptoms.   She stated that she has tried other diet regimens such as KETO to help with weight loss.  She stated that she has attempted these conservative methods for weight loss without maintaining long term success.  Today she would like to discuss medical weight loss options.     Review of Systems   Constitutional: Positive for fatigue and unexpected weight gain.   Respiratory: Negative.    Cardiovascular: Negative.    Gastrointestinal: Negative.    Endocrine: Negative.    Genitourinary: Positive for urinary incontinence.   Musculoskeletal: Positive for arthralgias and myalgias.   Skin: Positive for rash.   Psychiatric/Behavioral: Negative.  Positive for depressed mood.        History  Past Medical History:   Diagnosis Date   • Carpal tunnel syndrome    • Chronic bilateral low back pain with bilateral sciatica 2020   • Disc degeneration, lumbosacral 2020   • Hyperlipidemia    • Hypertension    • Nerve pain    • Psoriasis    • Sleep apnea       Past Surgical History:   Procedure Laterality Date   • ANTERIOR LUMBAR EXPOSURE N/A 2020    Procedure: ANTERIOR LUMBAR EXPOSURE;  Surgeon: Salvatore Villalta DO;  Location:  [facility] OR;  Service: Vascular   • CARPAL TUNNEL RELEASE Bilateral    •  SECTION      x2   • CHOLECYSTECTOMY     • LUMBAR FUSION N/A 2020    Procedure: ANTERIOR DECOMPRESSION, ANTERIOR LUMBAR INTERBODY FUSION WITH INSTRUMENTATION L5-S1;  Surgeon: PAMELA Bang MD;  Location:  [facility] OR;   Service: Orthopedic Spine   • LUMBAR FUSION Right 1/22/2020    Procedure: RIGHT  LATERAL LUMBAR  INTERBODY FUSION WITH INSTRUMENTATION L4-5;  Surgeon: PAMELA Bang MD;  Location:  [facility] OR;  Service: Orthopedic Spine   • LUMBAR LAMINECTOMY WITH FUSION N/A 1/24/2020    Procedure: POSTERIOR SPINAL FUSION WITH INSTRUMENTATION L4-S1;  Surgeon: PAMELA Bang MD;  Location:  [facility] OR;  Service: Orthopedic Spine      Social History     Socioeconomic History   • Marital status:    Tobacco Use   • Smoking status: Former Smoker   • Smokeless tobacco: Never Used   • Tobacco comment: quit 5 years ago total of 20 years smoking   Substance and Sexual Activity   • Alcohol use: Never   • Drug use: Never   • Sexual activity: Not Currently     Partners: Male     Birth control/protection: Other     Comment:  vasectomy       History reviewed. No pertinent family history.   Allergies   Allergen Reactions   • Ciprofloxacin Swelling     Throat swelling   • Penicillins Swelling     Throat swelling   • Cethexonium Unknown - Low Severity     Patient stated eye burning          Current Outpatient Medications:   •  estradiol (MINIVELLE, VIVELLE-DOT) 0.075 MG/24HR patch, Place 1 patch on the skin as directed by provider 2 (Two) Times a Week., Disp: 8 each, Rfl: 6  •  ezetimibe-simvastatin (VYTORIN) 10-40 MG per tablet, Take 1 tablet by mouth Every Night., Disp: , Rfl:   •  lisinopril-hydrochlorothiazide (PRINZIDE,ZESTORETIC) 20-12.5 MG per tablet, Take 1 tablet by mouth Daily., Disp: , Rfl:   •  medroxyPROGESTERone (Provera) 10 MG tablet, Take 1 tablet by mouth Daily. For 10 days each month, Disp: 10 tablet, Rfl: 6  •  meloxicam (MOBIC) 15 MG tablet, , Disp: , Rfl:   •  metFORMIN (GLUCOPHAGE) 500 MG tablet, Take 500 mg by mouth 2 (Two) Times a Day With Meals. Take 2 tablets with meal BID, Disp: , Rfl:   •  metoprolol succinate XL (TOPROL-XL) 25 MG 24 hr tablet, Take 25 mg by mouth Every Night., Disp: , Rfl:   •   pregabalin (LYRICA) 75 MG capsule, Take 1 capsule by mouth Every 12 (Twelve) Hours., Disp: , Rfl:   •  timolol (TIMOPTIC) 0.5 % ophthalmic solution, , Disp: , Rfl:   •  Tremfya 100 MG/ML solution pen-injector, , Disp: , Rfl:   •  medroxyPROGESTERone (Provera) 10 MG tablet, Take 1 tablet by mouth Daily., Disp: , Rfl:   •  metFORMIN (GLUCOPHAGE) 500 MG tablet, Take 500 mg by mouth., Disp: , Rfl:     Objective     Vital Signs  Temp:  [96.9 °F (36.1 °C)] 96.9 °F (36.1 °C)  Heart Rate:  [82] 82  BP: (119)/(73) 119/73  Body mass index is 49.88 kg/m².      06/07/22  0907   Weight: 132 kg (290 lb 9.6 oz)       Physical Exam  Vitals reviewed.   Constitutional:       Appearance: She is obese.   Cardiovascular:      Rate and Rhythm: Normal rate and regular rhythm.   Pulmonary:      Effort: Pulmonary effort is normal.   Abdominal:      Comments: C section scar that goes above umbilicus   Skin:     General: Skin is warm and dry.   Neurological:      Mental Status: She is alert and oriented to person, place, and time.   Psychiatric:         Mood and Affect: Mood normal.         Behavior: Behavior normal.          Results Review:   I reviewed the patient's new clinical results.      Class 3 Severe Obesity (BMI >=40). Obesity-related health conditions include the following: hypertension, diabetes mellitus and dyslipidemias. Obesity is improving with treatment. BMI is is above average; BMI management plan is completed. We discussed portion control and increasing exercise.      Assessment & Plan   Diagnoses and all orders for this visit:    1. Class 3 severe obesity due to excess calories with serious comorbidity and body mass index (BMI) of 45.0 to 49.9 in adult (HCC) (Primary)  Assessment & Plan:  Patient's (Body mass index is 49.88 kg/m².) indicates that they are morbidly obese (BMI > 40 or > 35 with obesity - related health condition) with health conditions that include obstructive sleep apnea, hypertension, dyslipidemias and  GERD . Weight is worsening. BMI is is above average; BMI management plan is completed. We discussed portion control and increasing exercise.     Orders:  -     Bariatric Nutritional Counseling    2. Primary hypertension  Comments:  Continue current regimen.  Nutritional counseling provided.  Patient will meet with dietitian today.    3. Hyperlipidemia, unspecified hyperlipidemia type  Comments:  Nutritional counseling provided.  Prescription meal plan reviewed today  Assessment & Plan:  Lipid abnormalities are unchanged.  Nutritional counseling was provided. and The patient was referred to the dietician.  Lipids will be reassessed with PCP.      4. GERD without esophagitis    5. Obstructive sleep apnea syndrome  Comments:  On BiPAP    She has been provided a structured dietary regimen based off of her behavior.  I discussed with the patient the etiology of the disease of obesity and the potential comorbid conditions associated with this disease.  She was instructed to follow the dietary regimen and follow-up with our program in 1 month's time with any additional questions as they may arise during this time.  We emphasized on focusing on proteins and meals high in fiber as well as adequate hydration that exceed 64 ounces of water daily.    I explained that I anticipate the patient to lose weight prior to her next monthly visit.  I encouraged patient to have a reward day once a month.    I discussed the patient's findings and my recommendations with patient.   1. Today the patient  received the 4 meals/day diet prescription, which was explained to patient.  Patient will see the dietitian today to further discuss goals for diet, exercise, and lifestyle. Patient has received intensive behavioral therapy for obesity today. I explained the pathophysiology of the disease and its storage component. We also discussed Dr. Chand' pearls of the program. Nutrition counseling ordered today.     2. Current comorbid condition of   hypertension, hyperlipidemia, sleep apnea associated with her morbid obesity is reported to be stable on her current treatment regimen and medications. We anticipate the comorbid condition to improve as we address her morbid obesity.     This time patient wishes to remain in our medical weight loss program by choice.  Prescription meal plan reviewed.  Patient will meet with dietitian today.  I will see patient back in 1 month for nutritional counseling and weight management follow-up.    JOLENE Pleitez     06/07/22  13:21 CDT

## 2022-06-07 NOTE — PROGRESS NOTES
"Metabolic and Bariatric Surgery Adult Nutrition Assessment    Patient Name: Ayla Echevarria   YOB: 1970   MRN: [id number]     Assessment Date:  2022     Reason for Visit: Initial Nutrition Assessment     Treatment Pathway: Medical Weight Loss    Assessment    Anthropometrics   Wt Readings from Last 1 Encounters:   22 132 kg (290 lb 9.6 oz)     Ht Readings from Last 1 Encounters:   22 162.6 cm (64\")     BMI Readings from Last 1 Encounters:   22 49.88 kg/m²        Initial Weight/Date: 290.6 lbs (22)  Weight Changes since last visit: n/a  Net Weight Change: n/a    Past Medical History:   Diagnosis Date   • Carpal tunnel syndrome    • Chronic bilateral low back pain with bilateral sciatica 2020   • Disc degeneration, lumbosacral 2020   • Hyperlipidemia    • Hypertension    • Nerve pain    • Psoriasis    • Sleep apnea       Past Surgical History:   Procedure Laterality Date   • ANTERIOR LUMBAR EXPOSURE N/A 2020    Procedure: ANTERIOR LUMBAR EXPOSURE;  Surgeon: Salvatore Villalta DO;  Location:  [facility] OR;  Service: Vascular   • CARPAL TUNNEL RELEASE Bilateral    •  SECTION      x2   • CHOLECYSTECTOMY     • LUMBAR FUSION N/A 2020    Procedure: ANTERIOR DECOMPRESSION, ANTERIOR LUMBAR INTERBODY FUSION WITH INSTRUMENTATION L5-S1;  Surgeon: PAMELA Bang MD;  Location:  [facility] OR;  Service: Orthopedic Spine   • LUMBAR FUSION Right 2020    Procedure: RIGHT  LATERAL LUMBAR  INTERBODY FUSION WITH INSTRUMENTATION L4-5;  Surgeon: PAMELA Bang MD;  Location:  [facility];  Service: Orthopedic Spine   • LUMBAR LAMINECTOMY WITH FUSION N/A 2020    Procedure: POSTERIOR SPINAL FUSION WITH INSTRUMENTATION L4-S1;  Surgeon: PAMELA Bang MD;  Location:  [facility];  Service: Orthopedic Spine      Current Outpatient Medications   Medication Sig Dispense Refill   • estradiol (MINIVELLE, VIVELLE-DOT) 0.075 MG/24HR patch Place 1 patch on the skin as " directed by provider 2 (Two) Times a Week. 8 each 6   • ezetimibe-simvastatin (VYTORIN) 10-40 MG per tablet Take 1 tablet by mouth Every Night.     • lisinopril-hydrochlorothiazide (PRINZIDE,ZESTORETIC) 20-12.5 MG per tablet Take 1 tablet by mouth Daily.     • medroxyPROGESTERone (Provera) 10 MG tablet Take 1 tablet by mouth Daily. For 10 days each month 10 tablet 6   • medroxyPROGESTERone (Provera) 10 MG tablet Take 1 tablet by mouth Daily.     • meloxicam (MOBIC) 15 MG tablet      • metFORMIN (GLUCOPHAGE) 500 MG tablet Take 500 mg by mouth 2 (Two) Times a Day With Meals. Take 2 tablets with meal BID     • metFORMIN (GLUCOPHAGE) 500 MG tablet Take 500 mg by mouth.     • metoprolol succinate XL (TOPROL-XL) 25 MG 24 hr tablet Take 25 mg by mouth Every Night.     • pregabalin (LYRICA) 75 MG capsule Take 1 capsule by mouth Every 12 (Twelve) Hours.     • timolol (TIMOPTIC) 0.5 % ophthalmic solution      • Tremfya 100 MG/ML solution pen-injector        No current facility-administered medications for this visit.      Allergies   Allergen Reactions   • Ciprofloxacin Swelling     Throat swelling   • Penicillins Swelling     Throat swelling   • Cethexonium Unknown - Low Severity     Patient stated eye burning          Motivation for weight loss includes: Feel better- wants to reduce meds for pains.     Pertinent Social/Behavior/Environmental History: Lives with , has joined [employer]. Has grown children.  is retired and is primary cook. Currently employed at the [employer]. Utilizing smart watch to set timers for activity.     Nutrition Recall   Eating 3 meals daily   (M1) 2 sausage patties, 2 pieces cheer OR 1/2 omelet (egg, cheese, and ham)  (M2) salad OR lunch meat OR ckn breast  (M3) ckn cooked on lindsay with cauli-rice and grilled vegetables. Mostly grilled meals and vegetables nightly.   (M4) n/a  Snacking - Pork rinds, celery, or KETO bars occasionally.   Monitoring portions- no.   Calculating Protein-  no  Drinking sugary/carbonated beverages- No  Fluid Intake- drinks water and black coffee, occasionally carbonated water.       Success this Month: Is over sugar-cravings with KETO.   Barriers: Feeling like getting in adequate exercise (knee pain).     Exercise: Walking some twice a day about 10 minutes and then pool time at home. House work.       Nutrition Intervention  Nutrition education and nutrition coaching for behavior change provided.  Strategies used included Comprehensive education, Motivational Interviewing , Problem Solving, Skill Development for meal planning, Provided sample menus and Provided samples of protein supplement   Review of medical weight loss prescription 4 meal/day plan and reviewed nutritional needs for Medical Weight Loss.    Diet Changes  Eat 4 meals per day with protein and vegetables at each meal, no carbs after meal 2., Protein goal: 65 gms., Eat vegetables first at each meal., Discussed protein guidelines for shakes and bars., Reduce snacking -use foods from free foods list only., Eliminate snacks., Reduce fat, sugar, and/or salt in food choices., Choose more nutrient dense foods., Choose foods with increased fiber., Monitor portion sizes using a food scale and/or measuring cup., Eliminate soda and sugar-sweetened beverages and Increase fluid intake to 64 ounces per day      Goals  1. Measure portions of all food and continue to track intake.   2. Work on adjusting schedule to get in 4 meals/d. Eat breakfast at home, have an earlier lunch, 3pm meal, then dinner at home.   3. 5,000 steps daily.     Self-monitoring strategies such as keeping a food journal (on paper or electronically) and calculating fluid/protein intake were discussed.    Monitoring/Evaluation Plan  Anticipate follow up per program protocol. Continue collaboration of care with physician and treatment team.     Electronically signed by  Felicitas Briceno RDN, LD  06/07/2022 10:07 CDT.

## 2022-07-06 ENCOUNTER — OFFICE VISIT (OUTPATIENT)
Dept: BARIATRICS/WEIGHT MGMT | Facility: CLINIC | Age: 52
End: 2022-07-06

## 2022-07-06 VITALS
SYSTOLIC BLOOD PRESSURE: 135 MMHG | DIASTOLIC BLOOD PRESSURE: 75 MMHG | WEIGHT: 277.8 LBS | HEIGHT: 64 IN | TEMPERATURE: 98.4 F | BODY MASS INDEX: 47.43 KG/M2 | OXYGEN SATURATION: 96 % | HEART RATE: 76 BPM

## 2022-07-06 DIAGNOSIS — I10 PRIMARY HYPERTENSION: ICD-10-CM

## 2022-07-06 DIAGNOSIS — G47.33 OBSTRUCTIVE SLEEP APNEA SYNDROME: ICD-10-CM

## 2022-07-06 DIAGNOSIS — E66.01 CLASS 3 SEVERE OBESITY DUE TO EXCESS CALORIES WITH SERIOUS COMORBIDITY AND BODY MASS INDEX (BMI) OF 45.0 TO 49.9 IN ADULT: Primary | ICD-10-CM

## 2022-07-06 DIAGNOSIS — E78.5 HYPERLIPIDEMIA, UNSPECIFIED HYPERLIPIDEMIA TYPE: ICD-10-CM

## 2022-07-06 PROCEDURE — 99213 OFFICE O/P EST LOW 20 MIN: CPT | Performed by: NURSE PRACTITIONER

## 2022-07-06 NOTE — ASSESSMENT & PLAN NOTE
Patient's (Body mass index is 47.68 kg/m².) indicates that they are morbidly obese (BMI > 40 or > 35 with obesity - related health condition) with health conditions that include hypertension and dyslipidemias . Weight is improved. BMI is is above average; BMI management plan is completed. We discussed portion control and increasing exercise.

## 2022-07-06 NOTE — PROGRESS NOTES
"Patient Care Team:  Malcolm Hernandes MD as PCP - General (Family Medicine)    Reason for Visit:  Medical Weight loss, V2    Subjective   Ayla Echevarria is a 51 y.o. female.     Ayla is here for follow-up and continued medical management of her morbid obesity.  She is currently on a prescription diet.  Ayla previously was to apply dietary changes such as following the meal plan as directed.  She admits to eating 4 meals per day.  As a result she lost weight since her last visit.    She has lost 13 lbs since the last appointment with us.  She states that she is drinking 64 to 120 ounces of fluid each day.  She is eating around 65 g of protein each day.  She states that she is exercising when able for 10 to 30-minute intervals by walking, pool exercise, and doing housework.  She denies soda intake and denies nicotine use.    Review Of Systems:  Review of Systems   Constitutional: Positive for fatigue.   Respiratory: Negative.    Cardiovascular: Negative.    Gastrointestinal: Negative.    Endocrine: Negative.    Musculoskeletal: Positive for arthralgias, back pain and myalgias.   Psychiatric/Behavioral: Negative.  Positive for depressed mood.         The following portions of the patient's history were reviewed and updated as appropriate: allergies, current medications, past family history, past medical history, past social history, past surgical history, and problem list.    Objective   /75 (BP Location: Right arm, Patient Position: Sitting, Cuff Size: Adult)   Pulse 76   Temp 98.4 °F (36.9 °C)   Ht 162.6 cm (64\")   Wt 126 kg (277 lb 12.8 oz)   SpO2 96%   BMI 47.68 kg/m²       07/06/22  0811   Weight: 126 kg (277 lb 12.8 oz)       Physical Exam  Vitals reviewed.   Constitutional:       Appearance: She is obese.   Cardiovascular:      Rate and Rhythm: Normal rate and regular rhythm.   Pulmonary:      Effort: Pulmonary effort is normal.   Abdominal:      General: Bowel sounds are normal.      " Palpations: Abdomen is soft.   Musculoskeletal:         General: Normal range of motion.   Skin:     General: Skin is warm and dry.   Neurological:      Mental Status: She is alert and oriented to person, place, and time.   Psychiatric:         Mood and Affect: Mood normal.         Behavior: Behavior normal.         Class 3 Severe Obesity (BMI >=40). Obesity-related health conditions include the following: obstructive sleep apnea, hypertension and dyslipidemias. Obesity is improving with treatment. BMI is is above average; BMI management plan is completed. We discussed portion control and increasing exercise.     Assessment & Plan   Diagnoses and all orders for this visit:    1. Class 3 severe obesity due to excess calories with serious comorbidity and body mass index (BMI) of 45.0 to 49.9 in adult (HCC) (Primary)  Assessment & Plan:  Patient's (Body mass index is 47.68 kg/m².) indicates that they are morbidly obese (BMI > 40 or > 35 with obesity - related health condition) with health conditions that include hypertension and dyslipidemias . Weight is improved. BMI is is above average; BMI management plan is completed. We discussed portion control and increasing exercise.       2. Hyperlipidemia, unspecified hyperlipidemia type  Comments:  Continue current regimen.  Nutritional counseling provided.    3. Primary hypertension  Assessment & Plan:  Hypertension is unchanged.  Continue current treatment regimen.  Dietary sodium restriction.  Weight loss.  Blood pressure will be reassessed at the next regular appointment.      4. Obstructive sleep apnea syndrome  Comments:  Continue CPAP.       Ayla Echevarria was seen today for follow-up, obesity, nutrition counseling and weight loss.  She has lost weight since her last visit.  Today we discussed healthy changes in lifestyle, diet, and exercise. Dietician consultation obtained.  Ayla Echevarria had received handouts to her explaining the recommendation on portion  sizes/appetite control/reading nutrition labels.   Intensive behavioral therapy for obesity was done today as well.     Goals for this month are:   1.  Continue following prescription meal plan.  Overall patient is doing very well.  I encouraged patient to bring a food journal into her next appointment and she will see the dietitian.  Patient has been encouraged to attend our monthly support groups as well.      Follow up in one month for a weight recheck.

## 2022-07-15 ENCOUNTER — OFFICE VISIT (OUTPATIENT)
Dept: NEUROLOGY | Age: 52
End: 2022-07-15
Payer: COMMERCIAL

## 2022-07-15 VITALS
SYSTOLIC BLOOD PRESSURE: 124 MMHG | OXYGEN SATURATION: 98 % | BODY MASS INDEX: 47.46 KG/M2 | HEIGHT: 64 IN | WEIGHT: 278 LBS | HEART RATE: 72 BPM | DIASTOLIC BLOOD PRESSURE: 74 MMHG

## 2022-07-15 DIAGNOSIS — G47.33 OBSTRUCTIVE SLEEP APNEA: Primary | ICD-10-CM

## 2022-07-15 DIAGNOSIS — Z99.89 BIPAP (BIPHASIC POSITIVE AIRWAY PRESSURE) DEPENDENCE: ICD-10-CM

## 2022-07-15 PROCEDURE — 99214 OFFICE O/P EST MOD 30 MIN: CPT | Performed by: PHYSICIAN ASSISTANT

## 2022-07-15 NOTE — PATIENT INSTRUCTIONS

## 2022-07-15 NOTE — PROGRESS NOTES
[address] [facility] Neurology and Sleep Medicine  [address] [place] [address], [address] [place] [address],[address]  Juan A Sweeney  Phone [phone]  Fax [phone]       [facility] Sleep Follow Up Encounter      Information:   Patient Name: Lavern Trinidad  :   1970  Age:   45 y.o. MRN:   [id number]  Account #:  [de-identified]  Today:                7/15/22    Provider:  Andrew Freeman PA-C    Chief Complaint   Patient presents with    Sleep Apnea        Subjective:   Lavern Trinidad is a 45 y.o. female  with a history of severe DORITA who comes in for asleep clinic follow up. The PSG, 18 revealed an AHI of 106.6 with O2 desaturations as low as 71%. She is prescribed BiPAP therapy with a pressure of 18cm/14cm. She had a [employer] device and it has been replaced. She is sleeping 6-8 hours per night along with BiPAP. Her sleep has not been restorative since obtaining the new BiPAP. She has had recurrent daytime somnolence. She can fall asleep when sedentary. It is hard to stay awake after 2:00 pm. The compliance report indicates that she is averaging close to 9 hours of BiPAP use per day. Location or symptom:  DORITA  Onset:  PS2018  Timing:  q hs  Severity:  Severe  Associated:  Snoring, witnessed apneas, and excessive daytime somnolence  Alleviated: BiPAP      Objective:     Past Medical History:   Diagnosis Date    BiPAP (biphasic positive airway pressure) dependence     18cm/14cm    Hyperlipidemia     Hypertension     Obstructive sleep apnea     AHI: 106. 6 per PSG, 2018    Osteoarthritis     Psoriasis        Past Surgical History:   Procedure Laterality Date    BACK SURGERY      CARPAL TUNNEL RELEASE       SECTION      CHOLECYSTECTOMY      COLONOSCOPY N/A 2020    Dr Saloni Reyes AP x 2, HP x 1, 5 yr recall    EPIDURAL STEROID INJECTION N/A 2019    LUMBAR EPIDURAL STEROID INJECTION L4-5 performed by Eulalio Joy at [address] N/A 2019    LUMBAR EPIDURAL STEROID INJECTION L5-S1 performed by Krystyna Riding at [address] Right 2019    SACROILIAC JOINT INJECTION performed by Krystyna Riding at [address] N/A 2019    LUMBAR INTER LAMINAR KACEY L4-5 performed by Cristóbalkimberly Riding at [address] [place] [address] Avenue Bilateral 2019    LUMBAR FACET BILATERAL L4-5 L5-S1 performed by Eulalio Joy at [address] [facility] N/A 2019    LUMBAR INTER LAMINAR KACEY L4-5 performed by Eulalio Joy at [address] Extension SI JOINT ARTHRGRPHY&/ANES/STEROID W/IMAGE Right 10/9/2018    SACROILIAC JOINT INJECTION performed by Krystyna Riding at [facility] DX/THER SBST INTRLMNR LMBR/SAC W/IMG GDN N/A 2018    LUMBAR INTER LAMINAR KACEY L4-5 performed by Cristóbalkimberly Riding at [facility] OR       Recent Hospitalizations      Significant Injuries      Family History   Problem Relation Age of Onset    Colon Cancer Neg Hx     Colon Polyps Neg Hx        Social History  Social History     Tobacco Use   Smoking Status Former    Types: Cigarettes    Quit date:     Years since quittin.5   Smokeless Tobacco Never     Social History     Substance and Sexual Activity   Alcohol Use No     Social History     Substance and Sexual Activity   Drug Use No         Current Outpatient Medications   Medication Sig Dispense Refill    estradiol (CLIMARA) 0.075 MG/24HR Place 1 patch onto the skin Twice a Week      estradiol (VIVELLE) 0.075 MG/24HR APPLY ONE PATCH ON THE SKIN TWICE WEEKLY AS DIRECTED      timolol (BETIMOL) 0.5 % ophthalmic solution 1 drop 2 times daily      metoprolol succinate (TOPROL XL) 25 MG extended release tablet TAKE 1 TABLET NIGHTLY 90 tablet 3    medroxyPROGESTERone (PROVERA) 10 MG tablet Take 10 mg by mouth       Guselkumab (TREMFYA) 100 MG/ML SOPN Inject into the skin Every 8 weeks      metFORMIN (GLUCOPHAGE) 500 MG tablet Take 500 mg by mouth 2 times daily (with meals) Take 1 and 1/2 tablets BID citalopram (CELEXA) 20 MG tablet TK 1 T PO QD (Patient not taking: No sig reported)      lisinopril-hydrochlorothiazide (PRINZIDE;ZESTORETIC) 20-12.5 MG per tablet Take 1 tablet by mouth daily      diclofenac (VOLTAREN) 50 MG EC tablet Take 50 mg by mouth 2 times daily  2    ezetimibe-simvastatin (VYTORIN) 10-40 MG per tablet Take 1 tablet by mouth daily        No current facility-administered medications for this visit. Allergies:  Cethexonium, Ciprofloxacin, and Penicillins    REVIEW OF SYSTEMS     Constitutional: []Fever []Sweats []Chills [] Recent Injury   [x] Denies all unless marked  HENT:[]Headache  [] Head Injury  [] Sore Throat  [] Ear Pain  [] Dizziness [] Hearing Loss   [x] Denies all unless marked  Musculoskeletal: [] Arthralgia  [] Myalgias [] Muscle cramps  [] Muscle twitches   [x] Denies all unless marked  Spine:  [] Neck pain  [] Back pain  [] Sciaticia  [x] Denies all unless marked  Neurological:[] Visual Disturbance [] Double Vision [] Slurred Speech [] Trouble swallowing  [] Vertigo [] Tingling [] Numbness [] Weakness [] Loss of Balance   [] Loss of Consciousness [] Memory Loss [] Seizures  [x] Denies all unless marked  Psychiatric/Behavioral:[] Depression [] Anxiety  [x] Denies all unless marked  Sleep: []  Insomnia [] Sleep Disturbance [] Snoring [] Restless Legs [x] Daytime Sleepiness [x] Sleep Apnea  [] Denies all unless marked    The MA has completed the ROS with the patient. I have reviewed it in its' entirety with the patient and agree with the documentation.      PHYSICAL EXAM  /74   Pulse 72   Ht 5' 4\" (1.626 m)   Wt 278 lb (126.1 kg)   SpO2 98%   BMI 47.72 kg/m²     Constitutional -  Alert in NAD, well developed, pleasant and cooperative with exam  HEENT- Conjunctiva normal.  No scars, masses, or lesions over external nose or ears, hearing intact, no neck masses noted, no jugular vein distension, no bruit  Cardiac- Regular rate and rhythm  Pulmonary- Clear to auscultation, good expansion, normal effort without use of accessory muscles  Musculoskeletal - No significant wasting of muscles noted. No bony deformities  Extremities - No clubbing, cyanosis or edema  Skin - Warm, dry, and intact. No rash, erythema, or pallor  Psychiatric - Mood, affect, and behavior appear normal      Neurological exam  Awake, alert, fluent oriented  appropriate affect  Attention and concentration appear appropriate  Recent and remote memory appears unremarkable  Speech normal without dysarthria  No clear issues with language of fund of knowledge    Cranial Nerve Exam     CN III, IV,VI-EOMI, No nystagmus, conjugate eye movements, no ptosis  CN VII-No facial assymetry    Motor Exam    Antigravity throughout upper and lower extremities bilaterally    Tremors and coordination    No tremors in hands or head noted     Gait    Normal base and speed  No ataxia    I reviewed the following studies:       []  :  Clinical laboratory test results     []  :  Radiology reports                    [x]  :  Review and summarization of medical records-compliance report      []     Request for medical records       []  :  Reviewed previous/recent polysomnogram report(s)      []  :  Dixon Sleepiness Scale       [x]  :  Compliance report : The BiPAP is set at a pressure of 18cm/14cm. Compliance download shows that she uses device: 96% of the time;  percentage of days with usage >=4 hours: 96%. AHI: 1.3    Assessment:       ICD-10-CM    1. Obstructive sleep apnea  G47.33       2.  BiPAP (biphasic positive airway pressure) dependence  Z99.89              []  :  Stable     []  :  Improved                       [x]  :  Well controlled-as indicated on the compliance report; low residual AHI; she has had recurrent daytime somnolence with the BiPAP that was recently replaced by [employer]; the BiPAP pressure is 18cm/14cm as initially ordered             []  :  Resolving     []  :  Resolved     []  : Inadequately controlled     []  :  Worsening     []  :  Additional workup planned    Efrain Arreola is compliant and benefiting from therapy as indicated by compliance evaluation and patient report. Plan:     No orders of the defined types were placed in this encounter. 1.   Previously advised of the etiology,  pathophysiology, diagnosis, treatment options, and risks of untreated DORITA. Risks may include, but are not limited to  hypertension, coronary artery disease, atrial fibrillation, CHF, diabetes, stroke, weight gain, impaired cognition, daytime somnolence, and motor vehicle accidents. Advised to abstain from driving or operating heavy machinery when drowsy and the use of respiratory suppressants. Discussed diagnostic studies and potential treatment plan. 2.  Will evaluate for PAP clinical benefit and and compliance during a 30 day period within the preceding 90 days PRN. 3.  The following educational material has been included in this visit after visit summary for your review: DORITA/PAP guidelines-Discussed with the patient and all questions fully answered. 4.  Continue PAP  therapy. The patient voices understanding and recognizes the need for adherence to the prescribed therapy  5. Order-supplies-[employer]   6.  Obtain compliance report from [facility] msg-received and reviewed   7. Increase the BiPAP pressure to 20cm/16cm  8. Consider a repeat PSG or titration study  9.   Follow up in 3 months      Replinishible PAP Supplies, 1 year supply  Item HPCPS Code Frequency   Mask of choice  or  1 per 3 months   Nasal Mask cushion/pillows  or  2 per 30 days   Full Face Mask Interface  1 per 30 days   Headgear  1 per 6 months   Tubing, length of choice  or  1 per 3 months   Water Chamber  1 per 6 months   Chinstrap  1 per 6 months   Disposable Filters  2 per 30 days   Reusable Filters  1 per 6 months     Diagnoses:  Obstructive sleep apnea (G47.33)  Length of Need: Lifetime, 99    Ordering Provider: Andrew Freeman PA-C  NPI:  [id number]

## 2022-08-09 ENCOUNTER — OFFICE VISIT (OUTPATIENT)
Dept: BARIATRICS/WEIGHT MGMT | Facility: CLINIC | Age: 52
End: 2022-08-09

## 2022-08-09 VITALS — BODY MASS INDEX: 46.57 KG/M2 | HEIGHT: 64 IN | WEIGHT: 272.8 LBS

## 2022-08-09 RX ORDER — CITALOPRAM 20 MG/1
20 TABLET ORAL DAILY
COMMUNITY

## 2022-08-09 NOTE — PROGRESS NOTES
"Metabolic and Bariatric Surgery Adult Nutrition Assessment    Patient Name: Ayla Echevarria   YOB: 1970   MRN: [id number]     Assessment Date:  2022     Reason for Visit: Follow-up Nutrition Assessment     Treatment Pathway: Medical Weight Loss    Assessment    Anthropometrics   Wt Readings from Last 1 Encounters:   22 124 kg (272 lb 12.8 oz)     Ht Readings from Last 1 Encounters:   22 162.6 cm (64\")     BMI Readings from Last 1 Encounters:   22 46.83 kg/m²        Initial Weight/Date: 290.6 lbs (22)  Weight Changes since last visit: -5 lbs   Net Weight Change: -17.8 lbs    Past Medical History:   Diagnosis Date   • Carpal tunnel syndrome    • Chronic bilateral low back pain with bilateral sciatica 2020   • Disc degeneration, lumbosacral 2020   • Hyperlipidemia    • Hypertension    • Nerve pain    • Psoriasis    • Sleep apnea       Past Surgical History:   Procedure Laterality Date   • ANTERIOR LUMBAR EXPOSURE N/A 2020    Procedure: ANTERIOR LUMBAR EXPOSURE;  Surgeon: Salvatore Villalta DO;  Location:  [facility] OR;  Service: Vascular   • CARPAL TUNNEL RELEASE Bilateral    •  SECTION      x2   • CHOLECYSTECTOMY     • LUMBAR FUSION N/A 2020    Procedure: ANTERIOR DECOMPRESSION, ANTERIOR LUMBAR INTERBODY FUSION WITH INSTRUMENTATION L5-S1;  Surgeon: PAMELA Bang MD;  Location: [facility] OR;  Service: Orthopedic Spine   • LUMBAR FUSION Right 2020    Procedure: RIGHT  LATERAL LUMBAR  INTERBODY FUSION WITH INSTRUMENTATION L4-5;  Surgeon: PAMELA Bang MD;  Location:  [facility] OR;  Service: Orthopedic Spine   • LUMBAR LAMINECTOMY WITH FUSION N/A 2020    Procedure: POSTERIOR SPINAL FUSION WITH INSTRUMENTATION L4-S1;  Surgeon: PAMELA Bang MD;  Location:  [facility] OR;  Service: Orthopedic Spine      Current Outpatient Medications   Medication Sig Dispense Refill   • citalopram (CeleXA) 20 MG tablet Take 20 mg by mouth Daily.     • " estradiol (MINIVELLE, VIVELLE-DOT) 0.075 MG/24HR patch Place 1 patch on the skin as directed by provider 2 (Two) Times a Week. 8 each 6   • ezetimibe-simvastatin (VYTORIN) 10-40 MG per tablet Take 1 tablet by mouth Every Night.     • lisinopril-hydrochlorothiazide (PRINZIDE,ZESTORETIC) 20-12.5 MG per tablet Take 1 tablet by mouth Daily.     • medroxyPROGESTERone (PROVERA) 10 MG tablet Take 1 tablet by mouth Daily.     • meloxicam (MOBIC) 15 MG tablet      • metFORMIN (GLUCOPHAGE) 500 MG tablet Take 500 mg by mouth 2 (Two) Times a Day With Meals. Take 2 tablets with meal BID     • metoprolol succinate XL (TOPROL-XL) 25 MG 24 hr tablet Take 25 mg by mouth Every Night.     • pregabalin (LYRICA) 75 MG capsule Take 1 capsule by mouth Every 12 (Twelve) Hours.     • timolol (TIMOPTIC) 0.5 % ophthalmic solution      • Tremfya 100 MG/ML solution pen-injector      • medroxyPROGESTERone (Provera) 10 MG tablet Take 1 tablet by mouth Daily. For 10 days each month 10 tablet 6   • metFORMIN (GLUCOPHAGE) 500 MG tablet Take 500 mg by mouth.       No current facility-administered medications for this visit.      Allergies   Allergen Reactions   • Ciprofloxacin Swelling     Throat swelling   • Penicillins Swelling     Throat swelling   • Cethexonium Unknown - Low Severity     Patient stated eye burning          Motivation for weight loss includes: Wants to feel better and reduce pain on joints/knees.    Pertinent Social/Behavior/Environmental History: She is struggling with depression secondary to the pain in her knees. Has joined [employer] and really enjoys the accountability and support she receives from that group.     Nutrition Recall  Eating 3meals daily. Reviewed written food log.   (M1) HB with cheese, beets, carrots, corn, toast   (M2) HB with cheese, vegetables, and corn   (M3) Protein shake with vegetables.   (M4) meat and vegetables.   Snacking - pork rinds. Usually craves salty. Snack cravings are typically associated with PMS  symptoms.   Monitoring portions- Yes for vegetables, usually weighs meat but not always. Estimates  Calculating Protein- No   Drinking sugary/carbonated beverages- No  Fluid Intake- averages 64 ounces fluid, sometimes more sometimes less.     Success this Month: Bought an outfit that was a size smaller; walking more in pool. Reduced A1C to <8. Blood Sugars are now 110-120  Barriers: Several weddings, birthdays, and celebrations this month made it very hard to stay on track. Knee injections are not helping pain; working toward surgery.    Exercise: Walking more in the pool (less outside because of heat and knee pain).       Nutrition Intervention  Nutrition education and nutrition coaching for behavior change provided.  Strategies used included Motivational Interviewing , Problem Solving and Ongoing reinforcement. Did provide contact information to counseling services and encouraged   Review of medical weight loss prescription 4 meal/day plan and reviewed nutritional needs for Medical Weight Loss.  Self-monitoring strategies such as keeping a food journal (on paper or electronically) and calculating fluid/protein intake were discussed.    Recommended Diet Changes  Eat 4 meals per day with protein and vegetables at each meal, no carbs after meal 2., Protein goal: 65 gms., Eat vegetables first at each meal., Discussed protein guidelines for shakes and bars., Reduce snacking -use foods from free foods list only., Choose more nutrient dense foods., Choose foods with increased fiber., Monitor portion sizes using a food scale and/or measuring cup. and Increase fluid intake to 64 ounces per day      Goals  1. This week will go by [facility] to get info on their pool, fees, and class schedules to allow an increase in exercise.   2. Measure all foods, specifically protein, and continue to track intake in food journal.   3. Set out four water bottles (16oz ea.) daily in fridge as a visual reminder for fluid intake.      Monitoring/Evaluation Plan  Anticipate follow up per program protocol. Continue collaboration of care with physician and treatment team.     Electronically signed by  Felicitas Briceno RDN, LD  08/09/2022 08:39 CDT.

## 2022-09-07 ENCOUNTER — OFFICE VISIT (OUTPATIENT)
Dept: BARIATRICS/WEIGHT MGMT | Facility: CLINIC | Age: 52
End: 2022-09-07

## 2022-09-07 VITALS
BODY MASS INDEX: 42.28 KG/M2 | HEIGHT: 67 IN | WEIGHT: 269.4 LBS | OXYGEN SATURATION: 98 % | TEMPERATURE: 97.1 F | HEART RATE: 67 BPM | SYSTOLIC BLOOD PRESSURE: 106 MMHG | DIASTOLIC BLOOD PRESSURE: 89 MMHG

## 2022-09-07 DIAGNOSIS — K21.9 GERD WITHOUT ESOPHAGITIS: Chronic | ICD-10-CM

## 2022-09-07 DIAGNOSIS — E11.69 TYPE 2 DIABETES MELLITUS WITH OTHER SPECIFIED COMPLICATION, WITHOUT LONG-TERM CURRENT USE OF INSULIN: ICD-10-CM

## 2022-09-07 DIAGNOSIS — E66.01 CLASS 3 SEVERE OBESITY DUE TO EXCESS CALORIES WITH SERIOUS COMORBIDITY AND BODY MASS INDEX (BMI) OF 45.0 TO 49.9 IN ADULT: Primary | ICD-10-CM

## 2022-09-07 DIAGNOSIS — I10 PRIMARY HYPERTENSION: ICD-10-CM

## 2022-09-07 DIAGNOSIS — E78.5 HYPERLIPIDEMIA, UNSPECIFIED HYPERLIPIDEMIA TYPE: ICD-10-CM

## 2022-09-07 DIAGNOSIS — G47.33 OBSTRUCTIVE SLEEP APNEA SYNDROME: ICD-10-CM

## 2022-09-07 PROBLEM — E11.9 DIABETES MELLITUS (HCC): Status: ACTIVE | Noted: 2021-01-01

## 2022-09-07 PROCEDURE — 99213 OFFICE O/P EST LOW 20 MIN: CPT | Performed by: NURSE PRACTITIONER

## 2022-09-07 NOTE — PROGRESS NOTES
"Patient Care Team:  Malcolm Hernandes MD as PCP - General (Family Medicine)    Reason for Visit:  Medical Weight loss, V2    Subjective   Ayla Echevarria is a 52 y.o. female.     Ayla is here for follow-up and continued medical management of her morbid obesity.  She is currently on a prescription diet.  Ayla previously was to apply dietary changes such as following the meal plan as directed.  She admits to eating 4 meals per day.  As a result she lost weight since her last visit.    She has lost 4 lbs since the last appointment with us.  She states that she is drinking 64 to 120 ounces of fluid each day.  She is eating around 40-80 g of protein each day.  She states exercising has been difficult recently due to joint pain. She states her A1C has dropped from 9 to 7.8    Review Of Systems:  Review of Systems   Constitutional: Positive for fatigue.   Respiratory: Negative.    Cardiovascular: Negative.    Gastrointestinal: Negative.    Endocrine: Negative.    Musculoskeletal: Positive for arthralgias, back pain and myalgias.   Psychiatric/Behavioral: Negative.  Positive for depressed mood.       The following portions of the patient's history were reviewed and updated as appropriate: allergies, current medications, past family history, past medical history, past social history, past surgical history, and problem list.    Objective   /89   Pulse 67   Temp 97.1 °F (36.2 °C)   Ht 169 cm (66.54\")   Wt 122 kg (269 lb 6.4 oz)   SpO2 98%   BMI 42.79 kg/m²       09/07/22  0850   Weight: 122 kg (269 lb 6.4 oz)       Physical Exam  Vitals reviewed.   Constitutional:       Appearance: She is obese.   Cardiovascular:      Rate and Rhythm: Normal rate and regular rhythm.   Pulmonary:      Effort: Pulmonary effort is normal.   Abdominal:      General: Bowel sounds are normal.      Palpations: Abdomen is soft.   Musculoskeletal:         General: Normal range of motion.   Skin:     General: Skin is warm and dry. "   Neurological:      Mental Status: She is alert and oriented to person, place, and time.   Psychiatric:         Mood and Affect: Mood normal.         Behavior: Behavior normal.         Class 3 Severe Obesity (BMI >=40). Obesity-related health conditions include the following: obstructive sleep apnea, hypertension and dyslipidemias. Obesity is improving with treatment. BMI is is above average; BMI management plan is completed. We discussed portion control and increasing exercise.     Assessment & Plan   Diagnoses and all orders for this visit:    1. Class 3 severe obesity due to excess calories with serious comorbidity and body mass index (BMI) of 45.0 to 49.9 in adult ([facility]) (Primary)  Assessment & Plan:  Patient's (Body mass index is 42.79 kg/m².) indicates that they are morbidly obese (BMI > 40 or > 35 with obesity - related health condition) with health conditions that include hypertension and diabetes mellitus . Weight is improving with treatment. BMI is is above average; BMI management plan is completed. We discussed portion control and increasing exercise.       2. Primary hypertension  Comments:  Continue current regimen.  Nutritional counseling provided.    3. Type 2 diabetes mellitus with other specified complication, without long-term current use of insulin ([facility])  Comments:  A1C has dropped per patient   Overview:  Pre diabetes      4. Hyperlipidemia, unspecified hyperlipidemia type    5. GERD without esophagitis    6. Obstructive sleep apnea syndrome  Comments:  Continue CPAP.       Ayla Echevarria was seen today for follow-up, obesity, nutrition counseling and weight loss.  She has lost weight since her last visit.  Today we discussed healthy changes in lifestyle, diet, and exercise. Dietician consultation obtained.  Ayla Echevarria had received handouts to her explaining the recommendation on portion sizes/appetite control/reading nutrition labels.   Intensive behavioral therapy for obesity was done  today as well.     Goals for this month are:   1.  Continue following prescription meal plan.  Patient did have some changes that cause some difficulty with following the prescription meal plan but states she is aware of changes that need to be made.  Patient has been encouraged to continue increasing vegetable intake.  2. Patient will see the dietitian in 3 months and has been encouraged to attend monthly support groups.   She is working towards lowering her A1C and weight to have knee surgery soon     Follow up in 3 monthsfor a weight recheck.

## 2022-09-07 NOTE — ASSESSMENT & PLAN NOTE
Patient's (Body mass index is 42.79 kg/m².) indicates that they are morbidly obese (BMI > 40 or > 35 with obesity - related health condition) with health conditions that include hypertension and diabetes mellitus . Weight is improving with treatment. BMI is is above average; BMI management plan is completed. We discussed portion control and increasing exercise.

## 2022-10-04 ENCOUNTER — TELEPHONE (OUTPATIENT)
Dept: NEUROLOGY | Age: 52
End: 2022-10-04

## 2022-10-04 NOTE — TELEPHONE ENCOUNTER
Called and spoke with patient to let her know that her appointment for 10-17-22 with MIGUEL Martinez had to be rescheduled due to the provider is out of the office. Patient is aware of when I have her appointment rescheduled too.

## 2022-11-07 DIAGNOSIS — N95.1 MENOPAUSAL SYMPTOMS: ICD-10-CM

## 2022-11-07 RX ORDER — ESTRADIOL 0.07 MG/D
FILM, EXTENDED RELEASE TRANSDERMAL
Qty: 24 PATCH | Refills: 3 | Status: SHIPPED | OUTPATIENT
Start: 2022-11-07

## 2022-11-07 RX ORDER — MEDROXYPROGESTERONE ACETATE 10 MG/1
TABLET ORAL
Qty: 30 TABLET | Refills: 3 | Status: SHIPPED | OUTPATIENT
Start: 2022-11-07

## 2022-11-08 ENCOUNTER — OFFICE VISIT (OUTPATIENT)
Dept: NEUROLOGY | Age: 52
End: 2022-11-08
Payer: COMMERCIAL

## 2022-11-08 VITALS
WEIGHT: 258 LBS | OXYGEN SATURATION: 99 % | SYSTOLIC BLOOD PRESSURE: 108 MMHG | HEIGHT: 63 IN | DIASTOLIC BLOOD PRESSURE: 65 MMHG | BODY MASS INDEX: 45.71 KG/M2 | HEART RATE: 61 BPM

## 2022-11-08 DIAGNOSIS — Z99.89 BIPAP (BIPHASIC POSITIVE AIRWAY PRESSURE) DEPENDENCE: ICD-10-CM

## 2022-11-08 DIAGNOSIS — G47.33 OBSTRUCTIVE SLEEP APNEA: Primary | ICD-10-CM

## 2022-11-08 PROCEDURE — 3078F DIAST BP <80 MM HG: CPT | Performed by: PHYSICIAN ASSISTANT

## 2022-11-08 PROCEDURE — 99213 OFFICE O/P EST LOW 20 MIN: CPT | Performed by: PHYSICIAN ASSISTANT

## 2022-11-08 PROCEDURE — 3074F SYST BP LT 130 MM HG: CPT | Performed by: PHYSICIAN ASSISTANT

## 2022-11-08 RX ORDER — METOPROLOL SUCCINATE 25 MG/1
TABLET, EXTENDED RELEASE ORAL
Qty: 90 TABLET | Refills: 0 | Status: SHIPPED | OUTPATIENT
Start: 2022-11-08 | End: 2022-11-16 | Stop reason: SDUPTHER

## 2022-11-08 NOTE — PROGRESS NOTES
[facility] Neurology and Sleep Medicine  [address] [place] [address] Drive, [address] [facility] [address]  Juan A Sweeney  Phone [phone]  Fax [phone]       [facility] Sleep Follow Up Encounter      Information:   Patient Name: Naina Hudson  :   1970  Age:   [address] y.o. MRN:   [id number]  Account #:  [de-identified]  Today:                22    Provider:  Genet Carter PA-C    Chief Complaint   Patient presents with    Sleep Apnea        Subjective:   Naina Hudson is a [address] y.o. female  with a history of severe DORITA who comes in for asleep clinic follow up. The PSG, 18 revealed an AHI of 106.6 with O2 desaturations as low as 71%. She is prescribed BiPAP therapy with a pressure of 20cm/16cm. It was replaced last year because it was a recalled device. The compliance report indicates that she is averaging 10 hours of BIPAP use per day. Her sleep is restorative. The daytime somnolence has improved. She is sleeping 6-8 hours per night along with BiPAP. She has lost 50 lbs since May, 2022. Her HgA1C has dropped to 6.2. Location or symptom:  DORITA  Onset:  PS2018  Timing:  q hs  Severity:  Severe  Associated:  Snoring, witnessed apneas, and excessive daytime somnolence  Alleviated: BiPAP (replaced, )    Objective:     Past Medical History:   Diagnosis Date    BiPAP (biphasic positive airway pressure) dependence     Hyperlipidemia     Hypertension     Obstructive sleep apnea     AHI: 106. 6 per PSG, 2018    Osteoarthritis     Psoriasis        Past Surgical History:   Procedure Laterality Date    BACK SURGERY      CARPAL TUNNEL RELEASE       SECTION      CHOLECYSTECTOMY      COLONOSCOPY N/A 2020    Dr Fortunato Glover AP x 2, HP x 1, 5 yr recall    EPIDURAL STEROID INJECTION N/A 2019    LUMBAR EPIDURAL STEROID INJECTION L4-5 performed by Eulalio Joy at [address] N/A 2019    LUMBAR EPIDURAL STEROID INJECTION L5-S1 performed by Eulalio Joy at [address] INJECTION PROCEDURE FOR SACROILIAC JOINT Right 2019    SACROILIAC JOINT INJECTION performed by Trae Matias at [address] N/A 2019    LUMBAR INTER LAMINAR KACEY L4-5 performed by Trae Matias at [address] OR    NERVE SURGERY Bilateral 2019    LUMBAR FACET BILATERAL L4-5 L5-S1 performed by Eulalio Joy at [address] N/A 2019    LUMBAR INTER LAMINAR KACEY L4-5 performed by Eulalio Joy at [address] SI JOINT ARTHRGRPHY&/ANES/STEROID W/IMAGE Right 10/9/2018    SACROILIAC JOINT INJECTION performed by Trae Matias at [facility] DX/THER SBST INTRLMNR LMBR/SAC W/IMG GDN N/A 2018    LUMBAR INTER LAMINAR KACEY L4-5 performed by Trae Matias at [facility] OR       Recent Hospitalizations      Significant Injuries      Family History   Problem Relation Age of Onset    Colon Cancer Neg Hx     Colon Polyps Neg Hx        Social History  Social History     Tobacco Use   Smoking Status Former    Types: Cigarettes    Quit date:     Years since quittin.8   Smokeless Tobacco Never     Social History     Substance and Sexual Activity   Alcohol Use No     Social History     Substance and Sexual Activity   Drug Use No         Current Outpatient Medications   Medication Sig Dispense Refill    estradiol (VIVELLE) 0.075 MG/24HR APPLY ONE PATCH ON THE SKIN TWICE WEEKLY AS DIRECTED      timolol (BETIMOL) 0.5 % ophthalmic solution 1 drop 2 times daily      metoprolol succinate (TOPROL XL) 25 MG extended release tablet TAKE 1 TABLET NIGHTLY 90 tablet 3    estradiol (CLIMARA) 0.075 MG/24HR Place 1 patch onto the skin Twice a Week      medroxyPROGESTERone (PROVERA) 10 MG tablet Take 10 mg by mouth       Guselkumab (TREMFYA) 100 MG/ML SOPN Inject into the skin Every 8 weeks      metFORMIN (GLUCOPHAGE) 500 MG tablet Take 500 mg by mouth 2 times daily (with meals) Take 1 and 1/2 tablets BID      lisinopril-hydrochlorothiazide (PRINZIDE;ZESTORETIC) 20-12.5 MG per tablet Take 1 tablet by mouth daily      diclofenac (VOLTAREN) 50 MG EC tablet Take 50 mg by mouth 2 times daily  2    ezetimibe-simvastatin (VYTORIN) 10-40 MG per tablet Take 1 tablet by mouth daily        No current facility-administered medications for this visit. Allergies:  Cethexonium, Ciprofloxacin, and Penicillins    REVIEW OF SYSTEMS     Constitutional: []Fever []Sweats []Chills [] Recent Injury   [x] Denies all unless marked  HENT:[]Headache  [] Head Injury  [] Sore Throat  [] Ear Pain  [] Dizziness [] Hearing Loss   [x] Denies all unless marked  Musculoskeletal: [] Arthralgia  [] Myalgias [] Muscle cramps  [] Muscle twitches   [x] Denies all unless marked   Spine:  [] Neck pain  [] Back pain  [] Sciatica  [x] Denies all unless marked  Neurological:[] Visual Disturbance [] Double Vision [] Slurred Speech [] Trouble swallowing  [] Vertigo [] Tingling [] Numbness [] Weakness [] Loss of Balance   [] Loss of Consciousness [] Memory Loss [] Seizures  [x] Denies all unless marked  Psychiatric/Behavioral:[] Depression [] Anxiety  [x] Denies all unless marked  Sleep: []  Insomnia [] Sleep Disturbance [] Snoring [] Restless Legs [] Daytime Sleepiness [x] Sleep Apnea  [] Denies all unless marked    The MA has completed the ROS with the patient. I have reviewed it in its' entirety with the patient and agree with the documentation. PHYSICAL EXAM  /65   Pulse 61   Ht 5' 3\" (1.6 m)   Wt 258 lb (117 kg)   SpO2 99%   BMI 45.70 kg/m²     Constitutional -  Alert in NAD, well developed, pleasant and cooperative with exam  HEENT- Conjunctiva normal.  No scars, masses, or lesions over external nose or ears, hearing intact, no neck masses noted, no jugular vein distension, no bruit  Cardiac- Regular rate and rhythm  Pulmonary- Clear to auscultation, good expansion, normal effort without use of accessory muscles  Musculoskeletal - No significant wasting of muscles noted.  No bony deformities  Extremities - No clubbing, cyanosis or edema  Skin - Warm, dry, and intact. No rash, erythema, or pallor  Psychiatric - Mood, affect, and behavior appear normal      Neurological exam  Awake, alert, fluent oriented  appropriate affect  Attention and concentration appear appropriate  Recent and remote memory appears unremarkable  Speech normal without dysarthria  No clear issues with language of fund of knowledge    Cranial Nerve Exam     CN III, IV,VI-EOMI, No nystagmus, conjugate eye movements, no ptosis  CN VII-No facial assymetry    Motor Exam    Antigravity throughout upper and lower extremities bilaterally    Tremors and coordination    No tremors in hands or head noted     Gait    Normal base and speed  No ataxia    I reviewed the following studies:       []  :  Clinical laboratory test results     []  :  Radiology reports                    [x]  :  Review and summarization of medical records-compliance report      []     Request for medical records       []  :  Reviewed previous/recent polysomnogram report(s)      []  :  [place] Sleepiness Scale       [x]  :  Compliance report : The BiPAP is set at a pressure of 20cm/16cm. Compliance download shows that she uses device: 100% of the time;  percentage of days with usage >=4 hours: 100%. AHI: 1.7    Assessment:       ICD-10-CM    1. Obstructive sleep apnea  G47.33       2. BiPAP (biphasic positive airway pressure) dependence  Z99.89              []  :  Stable     []  :  Improved                       [x]  :  Well controlled              []  :  Resolving     []  :  Resolved     []  :  Inadequately controlled     []  :  Worsening     []  :  Additional workup planned    Varun Taylor is compliant and benefiting from therapy as indicated by compliance evaluation and patient report. Plan:     No orders of the defined types were placed in this encounter.       1.   Previously advised of the etiology,  pathophysiology, diagnosis, treatment options, and risks of untreated DORITA. Risks may include, but are not limited to  hypertension, coronary artery disease, atrial fibrillation, CHF, diabetes, stroke, weight gain, impaired cognition, daytime somnolence, and motor vehicle accidents. Advised to abstain from driving or operating heavy machinery when drowsy and the use of respiratory suppressants. Discussed diagnostic studies and potential treatment plan. Counseled on multimodal approach to treatment of sleep apnea to include but not limited to diet, exercise, sleep hygiene, and compliance with pap therapy. 2.  Will evaluate for PAP clinical benefit and compliance during a 30 day period within the preceding 90 days PRN. 3.  The following educational material has been included in this visit after visit summary for your review: DORITA/PAP guidelines-Discussed with the patient and all questions fully answered. 4.  Continue PAP therapy. The patient voices understanding and recognizes the need for adherence to the prescribed therapy  5. Order-supplies-[employer]  6.   Follow up in 1 year     Replinishible PAP Supplies, 1 year supply  Item HPCPS Code Frequency   Mask of choice  or  1 per 3 months   Nasal Mask cushion/pillows  or  2 per 30 days   Full Face Mask Interface  1 per 30 days   Headgear  1 per 6 months   Tubing, length of choice  or  1 per 3 months   Water Chamber  1 per 6 months   Chinstrap  1 per 6 months   Disposable Filters  2 per 30 days   Reusable Filters  1 per 6 months     Diagnoses:  Obstructive sleep apnea (G47.33)  Length of Need: Lifetime, 99    Ordering Provider: Celestino Izaguirre PA-C  NPI:  [id number]

## 2022-11-08 NOTE — LETTER
[facility] Neurology and Sleep Medicine  [address] [place] [address] [address], [address] [place] [address]  Phone [phone]  Fax [phone]             Re:  Gregg Figueredo    22  :  1970  Address: [address] [address]       Replinishible PAP Supplies, 1 year supply  Item HPCPS Code Frequency   Mask of choice  or  1 per 3 months   Nasal Mask cushion/pillows  or  2 per 30 days   Full Face Mask Interface  1 per 30 days   Headgear  1 per 6 months   Tubing, length of choice  or  1 per 3 months   Water Chamber  1 per 6 months   Chinstrap  1 per 6 months   Disposable Filters  2 per 30 days   Reusable Filters  1 per 6 months     Diagnoses:  Obstructive sleep apnea (G47.33)  Length of Need: Lifetime, 99    Ordering Provider: Mookie Keane PA-C  NPI:  [id number]        Signature: [unfilled]        Date: 2022      Electronically Signed by Mookie Keane PA-C  on 2022 at 9:08 AM

## 2022-11-08 NOTE — PROGRESS NOTES

## 2022-11-08 NOTE — PATIENT INSTRUCTIONS

## 2022-11-16 ENCOUNTER — OFFICE VISIT (OUTPATIENT)
Dept: CARDIOLOGY CLINIC | Age: 52
End: 2022-11-16
Payer: COMMERCIAL

## 2022-11-16 VITALS
WEIGHT: 255 LBS | BODY MASS INDEX: 43.54 KG/M2 | HEIGHT: 64 IN | SYSTOLIC BLOOD PRESSURE: 108 MMHG | HEART RATE: 73 BPM | OXYGEN SATURATION: 97 % | DIASTOLIC BLOOD PRESSURE: 62 MMHG

## 2022-11-16 DIAGNOSIS — R00.0 TACHYCARDIA: ICD-10-CM

## 2022-11-16 DIAGNOSIS — E78.5 HYPERLIPIDEMIA, UNSPECIFIED HYPERLIPIDEMIA TYPE: ICD-10-CM

## 2022-11-16 DIAGNOSIS — I47.1 SVT (SUPRAVENTRICULAR TACHYCARDIA) (HCC): ICD-10-CM

## 2022-11-16 DIAGNOSIS — I10 ESSENTIAL HYPERTENSION: Primary | ICD-10-CM

## 2022-11-16 PROCEDURE — 99214 OFFICE O/P EST MOD 30 MIN: CPT | Performed by: NURSE PRACTITIONER

## 2022-11-16 PROCEDURE — 93000 ELECTROCARDIOGRAM COMPLETE: CPT | Performed by: NURSE PRACTITIONER

## 2022-11-16 PROCEDURE — 3078F DIAST BP <80 MM HG: CPT | Performed by: NURSE PRACTITIONER

## 2022-11-16 PROCEDURE — 3074F SYST BP LT 130 MM HG: CPT | Performed by: NURSE PRACTITIONER

## 2022-11-16 RX ORDER — PREGABALIN 75 MG/1
75 CAPSULE ORAL 2 TIMES DAILY
COMMUNITY
Start: 2022-11-08

## 2022-11-16 RX ORDER — METOPROLOL SUCCINATE 25 MG/1
TABLET, EXTENDED RELEASE ORAL
Qty: 90 TABLET | Refills: 3 | Status: SHIPPED | OUTPATIENT
Start: 2022-11-16

## 2022-11-16 RX ORDER — TRAMADOL HYDROCHLORIDE 50 MG/1
50 TABLET ORAL EVERY 8 HOURS PRN
COMMUNITY
Start: 2022-10-31

## 2022-11-16 RX ORDER — CITALOPRAM 20 MG/1
TABLET ORAL
COMMUNITY
Start: 2022-10-14

## 2022-11-16 RX ORDER — MELOXICAM 15 MG/1
15 TABLET ORAL DAILY
COMMUNITY
Start: 2022-11-04

## 2022-11-16 NOTE — PATIENT INSTRUCTIONS
Hypertension  (High Blood Pressure)  Most people with high blood pressure (hypertension) have no symptoms. High blood pressure can be a dangerous problem. Hypertension is dangerous because you may have it and not know it. High blood pressure may mean that your heart needs to work harder to pump blood. Your blood pressure is measured with 2 numbers. The first number is when your heart flexes (contracts), and the second number is when your heart relaxes. The higher the numbers are, the more you are at risk for problems. Write down your blood pressure today. The best blood pressure for adults is 120/80 (mmHg) or lower. It is likely that your blood pressure was recorded at least 2 times today. It is important for you to give these numbers to your doctor. If you do not have a doctor, try to get follow-up care at a hospital or community clinic. You may need to start high blood pressure medicine. You may also need to adjust your medicines as told by your doctor. Even mild high blood pressure increases long-term health risks. One high reading does not mean you have hypertension. Your blood pressure should be taken when you are relaxed. It is also important to sit for about 10 minutes before being tested. Things that can increase your blood pressure are:  Injury, Illness, Stress, Caffeine, and some medicines (like decongestants). High blood pressure does not usually need emergency treatment. HOME CARE  Lifestyle and medicine changes may be needed, including:   Weight loss. Exercise. Limit the use of salt. Stop smoking. If using decongestants or birth control pills, talk to your doctor. These medicines might make blood pressure higher. Do not use street drugs. Females should not drink more than 1 alcoholic drink per day. Males should not drink more than 2 alcoholic drinks per day. Take your blood pressure medicine. You will need to take it every day.  If you do not get treated, there are risks, including:   Heart disease. Stroke. Kidney failure. See your doctor as told. GET HELP RIGHT AWAY IF:  You get a very bad headache. You get blurred or changing vision. You feel confused. You feel weak, numb, or faint. You get chest or belly (abdominal) pain. You throw up (vomit). You cannot breathe very well. If you have a blood pressure reading with a top number of 180 or higher, you need to see your doctor right away. This is especially true if you are having any of the problems listed above.

## 2022-11-16 NOTE — PROGRESS NOTES
Dear Dr. Anirudh Teresa MD,    Thank you for allowing me to participate in the care of Ms. Paul Pringle. She presents today at the [address] [place] [address] . As you know, Ms. Arleth Little is a 47 y.o. female with history of hypertension, hyperlipidemia, diabetes, DORITA-CPAP, SVT, and tachycardia who presents with the chief complaint of follow-up for chronic cardiac conditions. She is a patient of [facility]. She otherwise denies chest pain, PND, orthopnea, near-syncope, or syncope She has no other complaints. Review of Systems   Constitutional:   Negative for fever, chills, diaphoresis, activity change, appetite change, fatigue and unexpected weight change. Eyes: Negative for photophobia, pain, redness and visual disturbance. Respiratory:  Negative for apnea, cough, chest tightness, shortness of breath, wheezing and stridor. Cardiovascular: Positive for heart racing (stable). Negative for  palpitations and leg swelling. Gastrointestinal: Negative for abdominal distention. Genitourinary: Negative for dysuria, urgency and frequency. Musculoskeletal: Negative for myalgias, arthralgias and gait problem. Skin: Negative for color change, pallor, rash and wound. Neurological: Negative for dizziness, tremors, speech difficulty, weakness and numbness. Hematological: Does not bruise/bleed easily. Psychiatric/Behavioral: Negative. Past Medical History:   Diagnosis Date    BiPAP (biphasic positive airway pressure) dependence     Hyperlipidemia     Hypertension     Obstructive sleep apnea     AHI: 106. 6 per PSG, 2018    Osteoarthritis     Psoriasis        Past Surgical History:   Procedure Laterality Date    BACK SURGERY      CARPAL TUNNEL RELEASE       SECTION      CHOLECYSTECTOMY      COLONOSCOPY N/A 2020    Dr Lucía Bocanegra AP x 2, HP x 1, 5 yr recall    EPIDURAL STEROID INJECTION N/A 2019    LUMBAR EPIDURAL STEROID INJECTION L4-5 performed by Alvarado Vogel at [facility] EPIDURAL STEROID INJECTION N/A 2019    LUMBAR EPIDURAL STEROID INJECTION L5-S1 performed by Calvin Denis at [address] Right 2019    SACROILIAC JOINT INJECTION performed by Calvin Denis at [address] N/A 2019    LUMBAR INTER LAMINAR KACEY L4-5 performed by Eulalio Joy at [address] Bilateral 2019    LUMBAR FACET BILATERAL L4-5 L5-S1 performed by Eulalio oJy at [address] N/A 2019    LUMBAR INTER LAMINAR KACEY L4-5 performed by Eulalio Joy at [address] ARTHRGRPHY&/ANES/STEROID W/IMAGE Right 10/9/2018    SACROILIAC JOINT INJECTION performed by Calvin Denis at [facility] DX/THER SBST INTRLMNR LMBR/SAC W/IMG GDN N/A 2018    LUMBAR INTER LAMINAR KACEY L4-5 performed by Eulalio Joy at [facility]       Family History   Problem Relation Age of Onset    Colon Cancer Neg Hx     Colon Polyps Neg Hx        Social History     Socioeconomic History    Marital status:      Spouse name: Not on file    Number of children: Not on file    Years of education: Not on file    Highest education level: Not on file   Occupational History    Not on file   Tobacco Use    Smoking status: Former     Types: Cigarettes     Quit date:      Years since quittin.8    Smokeless tobacco: Never   Vaping Use    Vaping Use: Never used   Substance and Sexual Activity    Alcohol use: No    Drug use: No    Sexual activity: Yes     Partners: Male   Other Topics Concern    Not on file   Social History Narrative    Not on file     Social Determinants of Health     Financial Resource Strain: Not on file   Food Insecurity: Not on file   Transportation Needs: Not on file   Physical Activity: Not on file   Stress: Not on file   Social Connections: Not on file   Intimate Partner Violence: Not on file   Housing Stability: Not on file       Allergies   Allergen Reactions    Cethexonium      \"eye drops\"    Ciprofloxacin     Penicillins          Current Outpatient Medications:     metoprolol succinate (TOPROL XL) 25 MG extended release tablet, TAKE 1 TABLET NIGHTLY, Disp: 90 tablet, Rfl: 0    estradiol (VIVELLE) 0.075 MG/24HR, APPLY ONE PATCH ON THE SKIN TWICE WEEKLY AS DIRECTED, Disp: , Rfl:     timolol (BETIMOL) 0.5 % ophthalmic solution, 1 drop 2 times daily, Disp: , Rfl:     estradiol (CLIMARA) 0.075 MG/24HR, Place 1 patch onto the skin Twice a Week, Disp: , Rfl:     medroxyPROGESTERone (PROVERA) 10 MG tablet, Take 10 mg by mouth , Disp: , Rfl:     Guselkumab (TREMFYA) 100 MG/ML SOPN, Inject into the skin Every 8 weeks, Disp: , Rfl:     metFORMIN (GLUCOPHAGE) 500 MG tablet, Take 500 mg by mouth 2 times daily (with meals) Take 1 and 1/2 tablets BID, Disp: , Rfl:     lisinopril-hydrochlorothiazide (PRINZIDE;ZESTORETIC) 20-12.5 MG per tablet, Take 1 tablet by mouth daily, Disp: , Rfl:     diclofenac (VOLTAREN) 50 MG EC tablet, Take 50 mg by mouth 2 times daily, Disp: , Rfl: 2    ezetimibe-simvastatin (VYTORIN) 10-40 MG per tablet, Take 1 tablet by mouth daily , Disp: , Rfl:     PE:  Vitals:    11/16/22 0752   Pulse: 73   SpO2: 97%       Estimated body mass index is 43.77 kg/m² as calculated from the following:    Height as of this encounter: 5' 4\" (1.626 m). Weight as of this encounter: 255 lb (115.7 kg). Constitutional: She is oriented to person, place, and time. She appears well-developed and well-nourished in no acute distress. Neck:  Neck supple without JVD present. No trachea deviation present. No thyromegaly present. Eyes:Conjunctivae and EOM are normal. Pupils equal and reactive to light. ENT:Hearing appears normal.conjunctiva and lids are normal, ears and nose appear normal.  Cardiovascular: Normal rate, Normal rhythm, normal heart sounds. No murmur ascultated. No gallop and no friction rub. No carotid bruits. No peripheral edema.     Pulmonary/Chest: Lungs clear to auscultation bilaterally without evidence of respiratory distress. She without wheezes. She without rales or ronchi. Musculoskeletal: Normal range of motion. Gait is normal.  Head is normocephalic and atraumatic. Skin: Skin is warm and dry without rash or pallor. Psychiatric:She is alert and oriented to person, place, and time. She has a normal mood and affect. Her behavior is normal. Thought content normal.       No results found for: CREATININE, HGB, PROBNP    EKG-11/16/22  Sinus Rhythm, HR 63 bpm         Assessment, Recommendations, & Plan:  47 y.o. female with HTN, SVT, Tachycardia, & HLD    SVT/tachycardia-episodes are infrequent on Toprol. Continue current regimen    HTN-controlled on lisinopril, HCTZ, & Toprol. Continue current regimen    HLD-followed by PCP, on Vytorin, continue current regimen    Disposition - RTC in 1 year with Dr. Laura Cruz or sooner if needed      Please do not hesitate to contact me for any questions or concerns.     Sincerely yours,    ERMIAS Brennan

## 2022-11-25 ENCOUNTER — APPOINTMENT (OUTPATIENT)
Dept: CT IMAGING | Facility: HOSPITAL | Age: 52
End: 2022-11-25

## 2022-11-25 ENCOUNTER — HOSPITAL ENCOUNTER (EMERGENCY)
Facility: HOSPITAL | Age: 52
Discharge: HOME | End: 2022-11-25
Attending: STUDENT IN AN ORGANIZED HEALTH CARE EDUCATION/TRAINING PROGRAM | Admitting: STUDENT IN AN ORGANIZED HEALTH CARE EDUCATION/TRAINING PROGRAM

## 2022-11-25 ENCOUNTER — APPOINTMENT (OUTPATIENT)
Dept: GENERAL RADIOLOGY | Facility: HOSPITAL | Age: 52
End: 2022-11-25

## 2022-11-25 ENCOUNTER — HOSPITAL ENCOUNTER (EMERGENCY)
Age: 52
Discharge: HOME | End: 2022-11-25

## 2022-11-25 VITALS
HEIGHT: 63 IN | TEMPERATURE: 98.5 F | OXYGEN SATURATION: 98 % | WEIGHT: 255 LBS | RESPIRATION RATE: 18 BRPM | BODY MASS INDEX: 45.18 KG/M2 | HEART RATE: 60 BPM | DIASTOLIC BLOOD PRESSURE: 66 MMHG | SYSTOLIC BLOOD PRESSURE: 116 MMHG

## 2022-11-25 DIAGNOSIS — M54.50 ACUTE LEFT-SIDED LOW BACK PAIN WITHOUT SCIATICA: Primary | ICD-10-CM

## 2022-11-25 DIAGNOSIS — Z98.890 HISTORY OF LUMBOSACRAL SPINE SURGERY: ICD-10-CM

## 2022-11-25 DIAGNOSIS — T14.8XXA MUSCLE STRAIN: ICD-10-CM

## 2022-11-25 DIAGNOSIS — M25.552 LEFT HIP PAIN: ICD-10-CM

## 2022-11-25 DIAGNOSIS — M48.00 CENTRAL STENOSIS OF SPINAL CANAL: ICD-10-CM

## 2022-11-25 PROCEDURE — 72131 CT LUMBAR SPINE W/O DYE: CPT

## 2022-11-25 PROCEDURE — 99283 EMERGENCY DEPT VISIT LOW MDM: CPT

## 2022-11-25 PROCEDURE — 73502 X-RAY EXAM HIP UNI 2-3 VIEWS: CPT

## 2022-11-25 RX ORDER — TIZANIDINE HYDROCHLORIDE 4 MG/1
4 CAPSULE, GELATIN COATED ORAL 3 TIMES DAILY
Qty: 12 CAPSULE | Refills: 0 | Status: SHIPPED | OUTPATIENT
Start: 2022-11-25 | End: 2022-11-29

## 2022-11-25 RX ORDER — LIDOCAINE 50 MG/G
1 PATCH TOPICAL EVERY 24 HOURS
Qty: 15 PATCH | Refills: 0 | Status: SHIPPED | OUTPATIENT
Start: 2022-11-25 | End: 2022-12-10

## 2022-11-25 RX ORDER — METHYLPREDNISOLONE 4 MG/1
TABLET ORAL
Qty: 21 TABLET | Refills: 0 | Status: SHIPPED | OUTPATIENT
Start: 2022-11-25 | End: 2022-12-29

## 2022-11-25 RX ORDER — OXYCODONE HYDROCHLORIDE 5 MG/1
5 TABLET ORAL ONCE
Status: COMPLETED | OUTPATIENT
Start: 2022-11-25 | End: 2022-11-25

## 2022-11-25 RX ORDER — IBUPROFEN 400 MG/1
600 TABLET ORAL ONCE
Status: COMPLETED | OUTPATIENT
Start: 2022-11-25 | End: 2022-11-25

## 2022-11-25 RX ORDER — ACETAMINOPHEN 500 MG
1000 TABLET ORAL ONCE
Status: COMPLETED | OUTPATIENT
Start: 2022-11-25 | End: 2022-11-25

## 2022-11-25 RX ORDER — TIZANIDINE 4 MG/1
4 TABLET ORAL ONCE
Status: COMPLETED | OUTPATIENT
Start: 2022-11-25 | End: 2022-11-25

## 2022-11-25 RX ADMIN — TIZANIDINE 4 MG: 4 TABLET ORAL at 21:51

## 2022-11-25 RX ADMIN — OXYCODONE HYDROCHLORIDE 5 MG: 5 TABLET ORAL at 23:37

## 2022-11-25 RX ADMIN — ACETAMINOPHEN 1000 MG: 500 TABLET ORAL at 21:15

## 2022-11-25 RX ADMIN — IBUPROFEN 600 MG: 400 TABLET, FILM COATED ORAL at 21:15

## 2022-12-29 ENCOUNTER — OFFICE VISIT (OUTPATIENT)
Dept: BARIATRICS/WEIGHT MGMT | Facility: CLINIC | Age: 52
End: 2022-12-29

## 2022-12-29 VITALS — BODY MASS INDEX: 43.64 KG/M2 | WEIGHT: 255.6 LBS | HEIGHT: 64 IN

## 2022-12-29 RX ORDER — OXYCODONE AND ACETAMINOPHEN 7.5; 325 MG/1; MG/1
TABLET ORAL
COMMUNITY
Start: 2022-12-16

## 2022-12-29 RX ORDER — TIZANIDINE 4 MG/1
TABLET ORAL
COMMUNITY
Start: 2022-11-30

## 2022-12-29 NOTE — PROGRESS NOTES
"Metabolic and Bariatric Surgery Adult Nutrition Assessment    Patient Name: Ayla Echevarria   YOB: 1970   MRN: [id number]     Assessment Date:  2022     Reason for Visit: Follow-up Nutrition Assessment     Treatment Pathway: Medical Weight Loss    Assessment    Anthropometrics   Wt Readings from Last 1 Encounters:   22 116 kg (255 lb 9.6 oz)     Ht Readings from Last 1 Encounters:   22 162.6 cm (64\")     BMI Readings from Last 1 Encounters:   22 43.87 kg/m²        Initial Weight/Date: 290.6 lbs (2022)  Weight Changes since last visit: -13.8 lbs  Net Weight Change: -35 lbs    Past Medical History:   Diagnosis Date   • Asthma 2019    Sleep apnea   • Carpal tunnel syndrome    • Chronic bilateral low back pain with bilateral sciatica 2020   • Depression 2019    Back problems   • Diabetes mellitus (HCC)     Pre diabetes   • Disc degeneration, lumbosacral 2020   • Glaucoma 2018   • Hyperlipidemia    • Hypertension    • Nerve pain    • Psoriasis    • Sleep apnea       Past Surgical History:   Procedure Laterality Date   • ANTERIOR LUMBAR EXPOSURE N/A 2020    Procedure: ANTERIOR LUMBAR EXPOSURE;  Surgeon: Salvatore Villalta DO;  Location:  [facility] OR;  Service: Vascular   • CARPAL TUNNEL RELEASE Bilateral    •  SECTION      x2   • CHOLECYSTECTOMY     • LUMBAR FUSION N/A 2020    Procedure: ANTERIOR DECOMPRESSION, ANTERIOR LUMBAR INTERBODY FUSION WITH INSTRUMENTATION L5-S1;  Surgeon: PAMELA Bang MD;  Location:  [facility] OR;  Service: Orthopedic Spine   • LUMBAR FUSION Right 2020    Procedure: RIGHT  LATERAL LUMBAR  INTERBODY FUSION WITH INSTRUMENTATION L4-5;  Surgeon: PAMELA Bang MD;  Location:  [facility] OR;  Service: Orthopedic Spine   • LUMBAR LAMINECTOMY WITH FUSION N/A 2020    Procedure: POSTERIOR SPINAL FUSION WITH INSTRUMENTATION L4-S1;  Surgeon: PAMELA Bang MD;  Location:  [facility] OR;  Service: Orthopedic Spine "      Current Outpatient Medications   Medication Sig Dispense Refill   • citalopram (CeleXA) 20 MG tablet Take 20 mg by mouth Daily.     • estradiol (MINIVELLE, VIVELLE-DOT) 0.075 MG/24HR patch PLACE 1 PATCH ON THE SKIN 2 TIMES A WEEK 24 patch 3   • ezetimibe-simvastatin (VYTORIN) 10-40 MG per tablet Take 1 tablet by mouth Every Night.     • lisinopril-hydrochlorothiazide (PRINZIDE,ZESTORETIC) 20-12.5 MG per tablet Take 1 tablet by mouth Daily.     • medroxyPROGESTERone (PROVERA) 10 MG tablet TAKE 1 TABLET DAILY FOR 10 DAYS EACH MONTH 30 tablet 3   • meloxicam (MOBIC) 15 MG tablet      • metFORMIN (GLUCOPHAGE) 500 MG tablet Take 500 mg by mouth 2 (Two) Times a Day With Meals. Take 2 tablets with meal BID     • metoprolol succinate XL (TOPROL-XL) 25 MG 24 hr tablet Take 25 mg by mouth Every Night.     • oxyCODONE-acetaminophen (PERCOCET) 7.5-325 MG per tablet      • pregabalin (LYRICA) 75 MG capsule Take 1 capsule by mouth Every 12 (Twelve) Hours.     • timolol (TIMOPTIC) 0.5 % ophthalmic solution      • tiZANidine (ZANAFLEX) 4 MG tablet      • Tremfya 100 MG/ML solution pen-injector        No current facility-administered medications for this visit.      Allergies   Allergen Reactions   • Ciprofloxacin Swelling     Throat swelling   • Penicillins Swelling     Throat swelling   • Cethexonium Unknown - Low Severity     Patient stated eye burning          Nutrition Recall  Eating 3-4 meals daily. Has been missing protein shake as consistently.   (M1) sausage/deli meat with leftover vegetable with toast or oatmeal and a fruit.  (M2) leftovers taken to work   (M3) has been missing mid-afternoon meal at work- has been getting raw vegetables   (M4) ground hamburger with mixed vegetables.   Snacking - limited to none, occasionally a protein bar or vegetables.   Monitoring portions- weighing meat using measuring cups for most other meals.   Calculating Protein- ~30+ grams. But has not been keeping food journal  Drinking  sugary/carbonated beverages- none  Fluid Intake- 28 oz cup at work x 1-2, uses 30 oz at home x 1-2/d, then x1-2 cups of coffee.       Success this Month: Going to [employer] is helpful for accountability. Got smaller size clothing this month. Able to being over and tie shoes. Thought process change- fixing cirilo pie was enjoyable to make for others rather than eating it.  A1C from 9 to 6.2.   Barriers: Measures at home typically, but if at work, doesn't have measuring     Exercise: limited due to back, has been walking ~10 minute daily. Doing housework as well. Does want to join the gym.   Recommended increasing physical activity, beyond normal daily habits, gradually working to reach ~30 minutes daily.     Nutrition Intervention  Nutrition education and nutrition coaching for behavior change provided.  Strategies used included Motivational Interviewing , Problem Solving, Skill Development for planning meal times and setting environment up to be successful  and Ongoing reinforcement  Review of medical weight loss prescription 4 meal/day plan and reviewed nutritional needs for Medical Weight Loss.  Self-monitoring strategies such as keeping a food journal (on paper or electronically) and calculating fluid/protein intake were discussed.    Recommended Diet Changes  Eat 4 meals per day with protein and vegetables at each meal, no carbs after meal 2., Protein goal: 65 gms., Eat vegetables first at each meal., Discussed protein guidelines for shakes and bars., Reduce snacking -use foods from free foods list only., Reduce fat, sugar, and/or salt in food choices., Choose more nutrient dense foods., Choose foods with increased fiber., Monitor portion sizes using a food scale and/or measuring cup., Eliminate soda and sugar-sweetened beverages and Increase fluid intake to 64 ounces per day      Goals  1. Have meal four consistently- planning to utilize a protein shake.   2. Obtain measuring cups for the office.   3. Keep food  journal and calculate protein using an nati.     Monitoring/Evaluation Plan  Anticipate follow up per program protocol. Continue collaboration of care with physician and treatment team.     Electronically signed by  Felicitas Briceno RDN, LD  12/29/2022 09:22 CST.

## 2023-03-02 ENCOUNTER — HOSPITAL ENCOUNTER (OUTPATIENT)
Dept: PREADMISSION TESTING | Age: 53
Discharge: HOME | End: 2023-03-06
Payer: COMMERCIAL

## 2023-03-02 VITALS — HEIGHT: 63 IN | BODY MASS INDEX: 43.41 KG/M2 | WEIGHT: 245 LBS

## 2023-03-02 LAB
ANION GAP SERPL CALCULATED.3IONS-SCNC: 10 MMOL/L (ref 7–19)
APTT: 33.6 SEC (ref 26–36.2)
BASOPHILS ABSOLUTE: 0.1 K/UL (ref 0–0.2)
BASOPHILS RELATIVE PERCENT: 1.3 % (ref 0–1)
BUN BLDV-MCNC: 26 MG/DL (ref 6–20)
CALCIUM SERPL-MCNC: 10.2 MG/DL (ref 8.6–10)
CHLORIDE BLD-SCNC: 99 MMOL/L (ref 98–111)
CO2: 26 MMOL/L (ref 22–29)
CREAT SERPL-MCNC: 0.6 MG/DL (ref 0.5–0.9)
EOSINOPHILS ABSOLUTE: 0.3 K/UL (ref 0–0.6)
EOSINOPHILS RELATIVE PERCENT: 4.9 % (ref 0–5)
GFR SERPL CREATININE-BSD FRML MDRD: >60 ML/MIN/{1.73_M2}
GLUCOSE BLD-MCNC: 96 MG/DL (ref 74–109)
HBA1C MFR BLD: 5.8 % (ref 4–6)
HCT VFR BLD CALC: 39.1 % (ref 37–47)
HEMOGLOBIN: 12.9 G/DL (ref 12–16)
IMMATURE GRANULOCYTES #: 0 K/UL
INR BLD: 1.04 (ref 0.88–1.18)
LYMPHOCYTES ABSOLUTE: 2.5 K/UL (ref 1.1–4.5)
LYMPHOCYTES RELATIVE PERCENT: 36.9 % (ref 20–40)
MCH RBC QN AUTO: 29.5 PG (ref 27–31)
MCHC RBC AUTO-ENTMCNC: 33 G/DL (ref 33–37)
MCV RBC AUTO: 89.3 FL (ref 81–99)
MONOCYTES ABSOLUTE: 0.7 K/UL (ref 0–0.9)
MONOCYTES RELATIVE PERCENT: 10.6 % (ref 0–10)
MRSA SCREEN RT-PCR: NOT DETECTED
NEUTROPHILS ABSOLUTE: 3.1 K/UL (ref 1.5–7.5)
NEUTROPHILS RELATIVE PERCENT: 46 % (ref 50–65)
PDW BLD-RTO: 13.8 % (ref 11.5–14.5)
PLATELET # BLD: 322 K/UL (ref 130–400)
PMV BLD AUTO: 9.5 FL (ref 9.4–12.3)
POTASSIUM SERPL-SCNC: 4.5 MMOL/L (ref 3.5–5)
PROTHROMBIN TIME: 13.5 SEC (ref 12–14.6)
RBC # BLD: 4.38 M/UL (ref 4.2–5.4)
SODIUM BLD-SCNC: 135 MMOL/L (ref 136–145)
WBC # BLD: 6.8 K/UL (ref 4.8–10.8)

## 2023-03-02 PROCEDURE — 87641 MR-STAPH DNA AMP PROBE: CPT

## 2023-03-02 PROCEDURE — 85730 THROMBOPLASTIN TIME PARTIAL: CPT

## 2023-03-02 PROCEDURE — 83036 HEMOGLOBIN GLYCOSYLATED A1C: CPT

## 2023-03-02 PROCEDURE — 80048 BASIC METABOLIC PNL TOTAL CA: CPT

## 2023-03-02 PROCEDURE — 85025 COMPLETE CBC W/AUTO DIFF WBC: CPT

## 2023-03-02 PROCEDURE — 85610 PROTHROMBIN TIME: CPT

## 2023-03-02 PROCEDURE — 93005 ELECTROCARDIOGRAM TRACING: CPT | Performed by: ORTHOPAEDIC SURGERY

## 2023-03-02 RX ORDER — TRIAMCINOLONE ACETONIDE 1 MG/G
CREAM TOPICAL 2 TIMES DAILY
COMMUNITY

## 2023-03-02 NOTE — DISCHARGE INSTRUCTIONS
PREOPERATIVE GUIDELINES WHEN RECEIVING ANESTHESIA    Do not eat or drink anything after midnight, the night before your surgery. This is extremely important for your safety. Take a bath (or shower) the night before your surgery and you may brush your teeth the morning of your surgery. You will be scheduled to arrive at the hospital 2 hours before your surgery, or follow your surgeon's instructions. Dress comfortably. Wear loose clothing that will be easy to remove and comfortable for your trip home. You may wear eyeglasses or contacts but bring your cases with you as they must be remove before your surgery. Hearing aids and dentures will need to be removed before your surgery. Do not wear any jewelry, including body jewelry. All jewelry will need to be removed prior to your surgery. Do not wear fingernail polish or make-up. It is best not to bring any valuables with you. If you are to stay in the hospital overnight, bring your robe, slippers and personal toiletries that you may need. POSTOPERATIVE GUIDELINES AFTER RECEIVING ANESTHESIA    If you are to go home after your surgery, you will need a responsible adult to drive you home. You will not be able to take public transportation after your discharge from the Operative Care Unit unless you are accompanied by a        responsible adult. On returning home, be sure to follow your physician's orders regarding diet, activity and medications. Remember, surgery with general anesthesia or sedation may leave you sleepy, very tired and with a decreased appetite for 12 to 24 hours. If you develop any post-surgical complications or problems, call your surgeon or [facility] Emergency Department ([phone]). The day before surgery you will receive a phone call from the surgery nurse to let you know what time to arrive on the day of surgery.  This call will usually be between 2-4 PM. If you do not receive a phone call by 4 PM the day before your surgery please call [phone] and let them know you have not received an arrival time. If your surgery is on Monday, your call will be on the Friday before your Monday surgery. MEDICATION INSTRUCTIONS PRIOR TO YOUR SURGERY    Night before surgery:      ________Do not take Metformin (you will not be eating or drinking after midnight)        The morning of surgery:  do not take lisinopril/hctz    You can take all your usual prescribed medications with a small sip of water. DO NOT TAKE ANY DIABETIC MEDICATIONS the morning of your surgery. DO NOT TAKE ANY SUPPLEMENTS or over the counter medications the morning of  surgery. [place] for the [facility]    A script for Bactroban ointment has been call to your pharmacy or was given to you in written form by your surgeon. The guidelines for the ointment use are as follows:    1)  Start using the ointment 7 days before your surgery date    2)  Use the ointment two times a day - morning and night    3)  Place the ointment on a Q-tip and swirl up in your nose making sure you cover completely       the skin just inside of each nostril. Use one end of the Q-tip for each nostril. Chlorhexidine Gluconate 4% Solution    Patient should shower with this soap a minimum of 3 consecutive showers (2 nights before surgery, the night before surgery and the morning of surgery) washing from the neck down (avoiding contact with genitalia). DO NOT [address] YOUR HAIR OR FACE WITH THIS SOAP. When washing with this soap, apply enough to suds up the body thoroughly, turn the water away from your body and allow the soap suds to remain on the body for 2 full minutes, then rinse body completely. After using this soap on the body, please do not apply powders or lotions to your body. After the shower the night before surgery, please dry off with a clean towel, sleep in freshly laundered pj's, and change your bed linen before going to sleep. [address] [place] [address] for Surgery Patients-Revised 6-    Visitors for surgery patients are essential for the patient's emotional well-being and care       post operatively. 2.   Visitor Expectations and Limitations      3. One visitor allowed with patients in the preop/postop rooms. 4.  A second visitor may sit in the waiting area. 5.  No children under 13 allowed in the pre-post op areas unless they are the patient. 6.  Two people may be with an underage surgical/procedural patient in preop/postop        room. 7.  If you are admitted to the hospital post operatively, there are NO RESTRICTIONS on       the floor at this time. 8.  If you are admitted to ICU postoperatively, you may have one visitor in the room from        7A-7P. A second visitor may sit in the ICU waiting room. No overnight visitors in         ICU waiting room.

## 2023-03-03 LAB
EKG P AXIS: 31 DEGREES
EKG P-R INTERVAL: 146 MS
EKG Q-T INTERVAL: 426 MS
EKG QRS DURATION: 104 MS
EKG QTC CALCULATION (BAZETT): 434 MS
EKG T AXIS: 14 DEGREES

## 2023-03-03 PROCEDURE — 93010 ELECTROCARDIOGRAM REPORT: CPT | Performed by: INTERNAL MEDICINE

## 2023-03-14 ENCOUNTER — HOSPITAL ENCOUNTER (OUTPATIENT)
Age: 53
Setting detail: OUTPATIENT SURGERY
Discharge: HOME | End: 2023-03-14
Attending: ORTHOPAEDIC SURGERY | Admitting: ORTHOPAEDIC SURGERY
Payer: COMMERCIAL

## 2023-03-14 ENCOUNTER — ANESTHESIA (OUTPATIENT)
Dept: OPERATING ROOM | Age: 53
End: 2023-03-14
Payer: COMMERCIAL

## 2023-03-14 ENCOUNTER — ANESTHESIA EVENT (OUTPATIENT)
Dept: OPERATING ROOM | Age: 53
End: 2023-03-14
Payer: COMMERCIAL

## 2023-03-14 VITALS
SYSTOLIC BLOOD PRESSURE: 142 MMHG | OXYGEN SATURATION: 98 % | TEMPERATURE: 97.8 F | HEIGHT: 63 IN | HEART RATE: 78 BPM | RESPIRATION RATE: 16 BRPM | WEIGHT: 249 LBS | BODY MASS INDEX: 44.12 KG/M2 | DIASTOLIC BLOOD PRESSURE: 78 MMHG

## 2023-03-14 DIAGNOSIS — M17.12 PRIMARY OSTEOARTHRITIS OF LEFT KNEE: Primary | ICD-10-CM

## 2023-03-14 LAB
GLUCOSE BLD-MCNC: 154 MG/DL (ref 70–99)
HCG UR QL: NEGATIVE
PERFORMED ON: ABNORMAL

## 2023-03-14 PROCEDURE — 6360000002 HC RX W HCPCS: Performed by: ANESTHESIOLOGY

## 2023-03-14 PROCEDURE — A4217 STERILE WATER/SALINE, 500 ML: HCPCS | Performed by: ORTHOPAEDIC SURGERY

## 2023-03-14 PROCEDURE — 2709999900 HC NON-CHARGEABLE SUPPLY: Performed by: ORTHOPAEDIC SURGERY

## 2023-03-14 PROCEDURE — 6370000000 HC RX 637 (ALT 250 FOR IP): Performed by: ORTHOPAEDIC SURGERY

## 2023-03-14 PROCEDURE — 2580000003 HC RX 258: Performed by: ANESTHESIOLOGY

## 2023-03-14 PROCEDURE — 64447 NJX AA&/STRD FEMORAL NRV IMG: CPT | Performed by: ANESTHESIOLOGY

## 2023-03-14 PROCEDURE — 3600000015 HC SURGERY LEVEL 5 ADDTL 15MIN: Performed by: ORTHOPAEDIC SURGERY

## 2023-03-14 PROCEDURE — 2580000003 HC RX 258: Performed by: ORTHOPAEDIC SURGERY

## 2023-03-14 PROCEDURE — 7100000010 HC PHASE II RECOVERY - FIRST 15 MIN: Performed by: ORTHOPAEDIC SURGERY

## 2023-03-14 PROCEDURE — 97116 GAIT TRAINING THERAPY: CPT

## 2023-03-14 PROCEDURE — 2720000010 HC SURG SUPPLY STERILE: Performed by: ORTHOPAEDIC SURGERY

## 2023-03-14 PROCEDURE — 82962 GLUCOSE BLOOD TEST: CPT

## 2023-03-14 PROCEDURE — 3700000000 HC ANESTHESIA ATTENDED CARE: Performed by: ORTHOPAEDIC SURGERY

## 2023-03-14 PROCEDURE — 2500000003 HC RX 250 WO HCPCS: Performed by: ORTHOPAEDIC SURGERY

## 2023-03-14 PROCEDURE — C1776 JOINT DEVICE (IMPLANTABLE): HCPCS | Performed by: ORTHOPAEDIC SURGERY

## 2023-03-14 PROCEDURE — C1713 ANCHOR/SCREW BN/BN,TIS/BN: HCPCS | Performed by: ORTHOPAEDIC SURGERY

## 2023-03-14 PROCEDURE — 7100000000 HC PACU RECOVERY - FIRST 15 MIN: Performed by: ORTHOPAEDIC SURGERY

## 2023-03-14 PROCEDURE — 97161 PT EVAL LOW COMPLEX 20 MIN: CPT

## 2023-03-14 PROCEDURE — 2500000003 HC RX 250 WO HCPCS: Performed by: ANESTHESIOLOGY

## 2023-03-14 PROCEDURE — 3700000001 HC ADD 15 MINUTES (ANESTHESIA): Performed by: ORTHOPAEDIC SURGERY

## 2023-03-14 PROCEDURE — 7100000011 HC PHASE II RECOVERY - ADDTL 15 MIN: Performed by: ORTHOPAEDIC SURGERY

## 2023-03-14 PROCEDURE — 84703 CHORIONIC GONADOTROPIN ASSAY: CPT

## 2023-03-14 PROCEDURE — 6360000002 HC RX W HCPCS: Performed by: ORTHOPAEDIC SURGERY

## 2023-03-14 PROCEDURE — A4216 STERILE WATER/SALINE, 10 ML: HCPCS | Performed by: ANESTHESIOLOGY

## 2023-03-14 PROCEDURE — 7100000001 HC PACU RECOVERY - ADDTL 15 MIN: Performed by: ORTHOPAEDIC SURGERY

## 2023-03-14 PROCEDURE — 3600000005 HC SURGERY LEVEL 5 BASE: Performed by: ORTHOPAEDIC SURGERY

## 2023-03-14 DEVICE — COMPONENT PAT DIA35MM THK9MM KNEE POLY CEM CONVENTIONAL: Type: IMPLANTABLE DEVICE | Site: KNEE | Status: FUNCTIONAL

## 2023-03-14 DEVICE — PSN TIB STM 5 DEG SZ D L: Type: IMPLANTABLE DEVICE | Site: KNEE | Status: FUNCTIONAL

## 2023-03-14 DEVICE — COMPONENT ARTC SURF PS 6-9 CD 12 MM LT TIB FIX BEAR: Type: IMPLANTABLE DEVICE | Site: KNEE | Status: FUNCTIONAL

## 2023-03-14 DEVICE — COMPONENT FEM SZ 6 L KNEE CO CHROM NAR POST STBL CEM: Type: IMPLANTABLE DEVICE | Site: KNEE | Status: FUNCTIONAL

## 2023-03-14 DEVICE — EXTENSION STEM L30MM DIA14MM KNEE TAPR CEM PERSONA: Type: IMPLANTABLE DEVICE | Site: KNEE | Status: FUNCTIONAL

## 2023-03-14 DEVICE — CEMENT BONE 40GM HI VISC PALACOS R: Type: IMPLANTABLE DEVICE | Site: KNEE | Status: FUNCTIONAL

## 2023-03-14 RX ORDER — SODIUM CHLORIDE 9 MG/ML
INJECTION, SOLUTION INTRAVENOUS PRN
Status: DISCONTINUED | OUTPATIENT
Start: 2023-03-14 | End: 2023-03-14 | Stop reason: HOSPADM

## 2023-03-14 RX ORDER — MIDAZOLAM HYDROCHLORIDE 1 MG/ML
INJECTION INTRAMUSCULAR; INTRAVENOUS PRN
Status: DISCONTINUED | OUTPATIENT
Start: 2023-03-14 | End: 2023-03-14 | Stop reason: SDUPTHER

## 2023-03-14 RX ORDER — SODIUM CHLORIDE 0.9 % (FLUSH) 0.9 %
5-40 SYRINGE (ML) INJECTION EVERY 12 HOURS SCHEDULED
Status: DISCONTINUED | OUTPATIENT
Start: 2023-03-14 | End: 2023-03-14 | Stop reason: HOSPADM

## 2023-03-14 RX ORDER — ROPIVACAINE HYDROCHLORIDE 5 MG/ML
INJECTION, SOLUTION EPIDURAL; INFILTRATION; PERINEURAL
Status: COMPLETED | OUTPATIENT
Start: 2023-03-14 | End: 2023-03-14

## 2023-03-14 RX ORDER — FENTANYL CITRATE 50 UG/ML
50 INJECTION, SOLUTION INTRAMUSCULAR; INTRAVENOUS
Status: DISCONTINUED | OUTPATIENT
Start: 2023-03-14 | End: 2023-03-14 | Stop reason: HOSPADM

## 2023-03-14 RX ORDER — PROCHLORPERAZINE EDISYLATE 5 MG/ML
5 INJECTION INTRAMUSCULAR; INTRAVENOUS
Status: DISCONTINUED | OUTPATIENT
Start: 2023-03-14 | End: 2023-03-14 | Stop reason: HOSPADM

## 2023-03-14 RX ORDER — ROCURONIUM BROMIDE 10 MG/ML
INJECTION, SOLUTION INTRAVENOUS PRN
Status: DISCONTINUED | OUTPATIENT
Start: 2023-03-14 | End: 2023-03-14 | Stop reason: SDUPTHER

## 2023-03-14 RX ORDER — SODIUM CHLORIDE 0.9 % (FLUSH) 0.9 %
5-40 SYRINGE (ML) INJECTION PRN
Status: DISCONTINUED | OUTPATIENT
Start: 2023-03-14 | End: 2023-03-14 | Stop reason: HOSPADM

## 2023-03-14 RX ORDER — VANCOMYCIN HYDROCHLORIDE 1 G/20ML
INJECTION, POWDER, LYOPHILIZED, FOR SOLUTION INTRAVENOUS PRN
Status: DISCONTINUED | OUTPATIENT
Start: 2023-03-14 | End: 2023-03-14 | Stop reason: HOSPADM

## 2023-03-14 RX ORDER — FENTANYL CITRATE 50 UG/ML
25 INJECTION, SOLUTION INTRAMUSCULAR; INTRAVENOUS EVERY 5 MIN PRN
Status: DISCONTINUED | OUTPATIENT
Start: 2023-03-14 | End: 2023-03-14 | Stop reason: HOSPADM

## 2023-03-14 RX ORDER — SCOLOPAMINE TRANSDERMAL SYSTEM 1 MG/1
1 PATCH, EXTENDED RELEASE TRANSDERMAL ONCE
Status: DISCONTINUED | OUTPATIENT
Start: 2023-03-14 | End: 2023-03-14 | Stop reason: HOSPADM

## 2023-03-14 RX ORDER — PROPOFOL 10 MG/ML
INJECTION, EMULSION INTRAVENOUS PRN
Status: DISCONTINUED | OUTPATIENT
Start: 2023-03-14 | End: 2023-03-14 | Stop reason: SDUPTHER

## 2023-03-14 RX ORDER — OXYCODONE HCL 10 MG/1
10 TABLET, FILM COATED, EXTENDED RELEASE ORAL
Status: COMPLETED | OUTPATIENT
Start: 2023-03-14 | End: 2023-03-14

## 2023-03-14 RX ORDER — ACETAMINOPHEN 500 MG
1000 TABLET ORAL ONCE
Status: COMPLETED | OUTPATIENT
Start: 2023-03-14 | End: 2023-03-14

## 2023-03-14 RX ORDER — DEXAMETHASONE SODIUM PHOSPHATE 10 MG/ML
8 INJECTION, SOLUTION INTRAMUSCULAR; INTRAVENOUS ONCE
Status: DISCONTINUED | OUTPATIENT
Start: 2023-03-14 | End: 2023-03-14 | Stop reason: HOSPADM

## 2023-03-14 RX ORDER — MEPERIDINE HYDROCHLORIDE 25 MG/ML
12.5 INJECTION INTRAMUSCULAR; INTRAVENOUS; SUBCUTANEOUS EVERY 5 MIN PRN
Status: DISCONTINUED | OUTPATIENT
Start: 2023-03-14 | End: 2023-03-14 | Stop reason: HOSPADM

## 2023-03-14 RX ORDER — ONDANSETRON 2 MG/ML
4 INJECTION INTRAMUSCULAR; INTRAVENOUS
Status: COMPLETED | OUTPATIENT
Start: 2023-03-14 | End: 2023-03-14

## 2023-03-14 RX ORDER — ROPIVACAINE HYDROCHLORIDE 5 MG/ML
INJECTION, SOLUTION EPIDURAL; INFILTRATION; PERINEURAL
Status: COMPLETED
Start: 2023-03-14 | End: 2023-03-14

## 2023-03-14 RX ORDER — SODIUM CHLORIDE, SODIUM LACTATE, POTASSIUM CHLORIDE, CALCIUM CHLORIDE 600; 310; 30; 20 MG/100ML; MG/100ML; MG/100ML; MG/100ML
INJECTION, SOLUTION INTRAVENOUS CONTINUOUS
Status: DISCONTINUED | OUTPATIENT
Start: 2023-03-14 | End: 2023-03-14 | Stop reason: HOSPADM

## 2023-03-14 RX ORDER — TRANEXAMIC ACID 650 MG/1
1950 TABLET ORAL
Status: COMPLETED | OUTPATIENT
Start: 2023-03-14 | End: 2023-03-14

## 2023-03-14 RX ORDER — ROPIVACAINE HYDROCHLORIDE 2 MG/ML
INJECTION, SOLUTION EPIDURAL; INFILTRATION; PERINEURAL PRN
Status: DISCONTINUED | OUTPATIENT
Start: 2023-03-14 | End: 2023-03-14 | Stop reason: HOSPADM

## 2023-03-14 RX ORDER — OXYCODONE HYDROCHLORIDE 5 MG/1
5 TABLET ORAL
Status: COMPLETED | OUTPATIENT
Start: 2023-03-14 | End: 2023-03-14

## 2023-03-14 RX ORDER — LIDOCAINE HYDROCHLORIDE 10 MG/ML
INJECTION, SOLUTION INFILTRATION; PERINEURAL PRN
Status: DISCONTINUED | OUTPATIENT
Start: 2023-03-14 | End: 2023-03-14 | Stop reason: SDUPTHER

## 2023-03-14 RX ORDER — OXYCODONE HYDROCHLORIDE 5 MG/1
5 TABLET ORAL EVERY 4 HOURS PRN
Qty: 40 TABLET | Refills: 0 | Status: SHIPPED | OUTPATIENT
Start: 2023-03-14 | End: 2023-03-17

## 2023-03-14 RX ORDER — LIDOCAINE HYDROCHLORIDE 10 MG/ML
1 INJECTION, SOLUTION EPIDURAL; INFILTRATION; INTRACAUDAL; PERINEURAL
Status: DISCONTINUED | OUTPATIENT
Start: 2023-03-14 | End: 2023-03-14 | Stop reason: HOSPADM

## 2023-03-14 RX ORDER — DIPHENHYDRAMINE HYDROCHLORIDE 50 MG/ML
12.5 INJECTION INTRAMUSCULAR; INTRAVENOUS
Status: DISCONTINUED | OUTPATIENT
Start: 2023-03-14 | End: 2023-03-14 | Stop reason: HOSPADM

## 2023-03-14 RX ORDER — MIDAZOLAM HYDROCHLORIDE 2 MG/2ML
2 INJECTION, SOLUTION INTRAMUSCULAR; INTRAVENOUS
Status: COMPLETED | OUTPATIENT
Start: 2023-03-14 | End: 2023-03-14

## 2023-03-14 RX ORDER — FENTANYL CITRATE 50 UG/ML
INJECTION, SOLUTION INTRAMUSCULAR; INTRAVENOUS PRN
Status: DISCONTINUED | OUTPATIENT
Start: 2023-03-14 | End: 2023-03-14 | Stop reason: SDUPTHER

## 2023-03-14 RX ORDER — CEPHALEXIN 500 MG/1
500 CAPSULE ORAL 4 TIMES DAILY
Qty: 28 CAPSULE | Refills: 0 | Status: SHIPPED | OUTPATIENT
Start: 2023-03-14

## 2023-03-14 RX ORDER — CELECOXIB 200 MG/1
200 CAPSULE ORAL ONCE
Status: COMPLETED | OUTPATIENT
Start: 2023-03-14 | End: 2023-03-14

## 2023-03-14 RX ORDER — FENTANYL CITRATE 50 UG/ML
50 INJECTION, SOLUTION INTRAMUSCULAR; INTRAVENOUS EVERY 5 MIN PRN
Status: DISCONTINUED | OUTPATIENT
Start: 2023-03-14 | End: 2023-03-14 | Stop reason: HOSPADM

## 2023-03-14 RX ORDER — HYDROMORPHONE HYDROCHLORIDE 1 MG/ML
0.5 INJECTION, SOLUTION INTRAMUSCULAR; INTRAVENOUS; SUBCUTANEOUS EVERY 5 MIN PRN
Status: COMPLETED | OUTPATIENT
Start: 2023-03-14 | End: 2023-03-14

## 2023-03-14 RX ORDER — ONDANSETRON 2 MG/ML
INJECTION INTRAMUSCULAR; INTRAVENOUS PRN
Status: DISCONTINUED | OUTPATIENT
Start: 2023-03-14 | End: 2023-03-14 | Stop reason: SDUPTHER

## 2023-03-14 RX ORDER — FENTANYL CITRATE 50 UG/ML
25 INJECTION, SOLUTION INTRAMUSCULAR; INTRAVENOUS
Status: DISCONTINUED | OUTPATIENT
Start: 2023-03-14 | End: 2023-03-14 | Stop reason: HOSPADM

## 2023-03-14 RX ADMIN — MIDAZOLAM 2 MG: 1 INJECTION INTRAMUSCULAR; INTRAVENOUS at 08:09

## 2023-03-14 RX ADMIN — HYDROMORPHONE HYDROCHLORIDE 0.5 MG: 1 INJECTION, SOLUTION INTRAMUSCULAR; INTRAVENOUS; SUBCUTANEOUS at 10:23

## 2023-03-14 RX ADMIN — LIDOCAINE HYDROCHLORIDE 50 MG: 10 INJECTION, SOLUTION INFILTRATION; PERINEURAL at 08:15

## 2023-03-14 RX ADMIN — FENTANYL CITRATE 100 MCG: 0.05 INJECTION, SOLUTION INTRAMUSCULAR; INTRAVENOUS at 08:15

## 2023-03-14 RX ADMIN — FAMOTIDINE 20 MG: 10 INJECTION, SOLUTION INTRAVENOUS at 07:26

## 2023-03-14 RX ADMIN — CEFAZOLIN 2000 MG: 2 INJECTION, POWDER, FOR SOLUTION INTRAMUSCULAR; INTRAVENOUS at 08:24

## 2023-03-14 RX ADMIN — ROCURONIUM BROMIDE 50 MG: 10 INJECTION, SOLUTION INTRAVENOUS at 08:15

## 2023-03-14 RX ADMIN — HYDROMORPHONE HYDROCHLORIDE 0.5 MG: 1 INJECTION, SOLUTION INTRAMUSCULAR; INTRAVENOUS; SUBCUTANEOUS at 10:53

## 2023-03-14 RX ADMIN — ACETAMINOPHEN 1000 MG: 500 TABLET ORAL at 06:58

## 2023-03-14 RX ADMIN — OXYCODONE HYDROCHLORIDE 10 MG: 10 TABLET, FILM COATED, EXTENDED RELEASE ORAL at 06:58

## 2023-03-14 RX ADMIN — SODIUM CHLORIDE, POTASSIUM CHLORIDE, SODIUM LACTATE AND CALCIUM CHLORIDE: 600; 310; 30; 20 INJECTION, SOLUTION INTRAVENOUS at 06:55

## 2023-03-14 RX ADMIN — HYDROMORPHONE HYDROCHLORIDE 0.5 MG: 1 INJECTION, SOLUTION INTRAMUSCULAR; INTRAVENOUS; SUBCUTANEOUS at 10:31

## 2023-03-14 RX ADMIN — OXYCODONE HYDROCHLORIDE 5 MG: 5 TABLET ORAL at 11:51

## 2023-03-14 RX ADMIN — ONDANSETRON 4 MG: 2 INJECTION INTRAMUSCULAR; INTRAVENOUS at 09:42

## 2023-03-14 RX ADMIN — SUGAMMADEX 226 MG: 100 INJECTION, SOLUTION INTRAVENOUS at 09:45

## 2023-03-14 RX ADMIN — TRANEXAMIC ACID 1950 MG: 650 TABLET ORAL at 06:57

## 2023-03-14 RX ADMIN — ROPIVACAINE HYDROCHLORIDE 20 ML: 5 INJECTION, SOLUTION EPIDURAL; INFILTRATION; PERINEURAL at 08:00

## 2023-03-14 RX ADMIN — HYDROMORPHONE HYDROCHLORIDE 0.5 MG: 1 INJECTION, SOLUTION INTRAMUSCULAR; INTRAVENOUS; SUBCUTANEOUS at 10:43

## 2023-03-14 RX ADMIN — MIDAZOLAM HYDROCHLORIDE 2 MG: 2 INJECTION, SOLUTION INTRAMUSCULAR; INTRAVENOUS at 08:00

## 2023-03-14 RX ADMIN — ONDANSETRON 4 MG: 2 INJECTION INTRAMUSCULAR; INTRAVENOUS at 12:38

## 2023-03-14 RX ADMIN — CELECOXIB 200 MG: 200 CAPSULE ORAL at 06:58

## 2023-03-14 RX ADMIN — PROPOFOL 200 MG: 10 INJECTION, EMULSION INTRAVENOUS at 08:15

## 2023-03-14 ASSESSMENT — PAIN DESCRIPTION - DESCRIPTORS
DESCRIPTORS: DISCOMFORT
DESCRIPTORS: ACHING
DESCRIPTORS: ACHING
DESCRIPTORS: DISCOMFORT
DESCRIPTORS: ACHING

## 2023-03-14 ASSESSMENT — PAIN SCALES - GENERAL
PAINLEVEL_OUTOF10: 8
PAINLEVEL_OUTOF10: 7
PAINLEVEL_OUTOF10: 5
PAINLEVEL_OUTOF10: 9
PAINLEVEL_OUTOF10: 5
PAINLEVEL_OUTOF10: 9
PAINLEVEL_OUTOF10: 4

## 2023-03-14 ASSESSMENT — PAIN DESCRIPTION - LOCATION
LOCATION: KNEE

## 2023-03-14 ASSESSMENT — PAIN DESCRIPTION - PAIN TYPE: TYPE: SURGICAL PAIN

## 2023-03-14 ASSESSMENT — PAIN DESCRIPTION - ORIENTATION
ORIENTATION: LEFT
ORIENTATION: LEFT;POSTERIOR
ORIENTATION: LEFT

## 2023-03-14 ASSESSMENT — PAIN - FUNCTIONAL ASSESSMENT
PAIN_FUNCTIONAL_ASSESSMENT: PREVENTS OR INTERFERES SOME ACTIVE ACTIVITIES AND ADLS
PAIN_FUNCTIONAL_ASSESSMENT: PREVENTS OR INTERFERES SOME ACTIVE ACTIVITIES AND ADLS

## 2023-03-14 ASSESSMENT — ENCOUNTER SYMPTOMS: SHORTNESS OF BREATH: 0

## 2023-03-14 ASSESSMENT — LIFESTYLE VARIABLES: SMOKING_STATUS: 0

## 2023-03-14 NOTE — ANESTHESIA POSTPROCEDURE EVALUATION
Department of Anesthesiology  Postprocedure Note    Patient: Barbie Hester  MRN: [id number]  YOB: 1970  Date of evaluation: 3/14/2023      Procedure Summary     Date: 03/14/23 Room / Location: [address]    Anesthesia Start: 0809 Anesthesia Stop: 1004    Procedure: ROBOTIC LEFT KNEE TOTAL ARTHROPLASTY (Left: Knee) Diagnosis:       Primary osteoarthritis of left knee      (Primary osteoarthritis of left knee [M17.12])    Surgeons: Orlin Wyman MD Responsible Provider: Jeyson Tena MD    Anesthesia Type: general ASA Status: 3          Anesthesia Type: No value filed.     Landon Phase I: Landon Score: 10    Landon Phase II:        Anesthesia Post Evaluation    Patient location during evaluation: bedside  Patient participation: complete - patient participated  Level of consciousness: awake and alert  Pain score: 0  Airway patency: patent  Nausea & Vomiting: no nausea and no vomiting  Complications: no  Cardiovascular status: hemodynamically stable  Respiratory status: acceptable  Hydration status: stable

## 2023-03-14 NOTE — ANESTHESIA PRE PROCEDURE
Department of Anesthesiology  Preprocedure Note       Name:  Carina Gasca   Age:  47 y.o.  :  1970                                          MRN:  [id number]         Date:  3/14/2023      Surgeon: Tim Batista):  Guilherme Correa MD    Procedure: Procedure(s):  ROBOTIC LEFT KNEE TOTAL ARTHROPLASTY    Medications prior to admission:   Prior to Admission medications    Medication Sig Start Date End Date Taking? Authorizing Provider   triamcinolone (KENALOG) 0.1 % cream Apply topically 2 times daily Apply topically 2 times daily. Historical Provider, MD   meloxicam (MOBIC) 15 MG tablet Take 15 mg by mouth daily 22   Historical Provider, MD   pregabalin (LYRICA) 75 MG capsule Take 75 mg by mouth 2 times daily. 22   Historical Provider, MD   citalopram (CELEXA) 20 MG tablet Take 20 mg by mouth daily 10/14/22   Historical Provider, MD   traMADol (ULTRAM) 50 MG tablet Take 50 mg by mouth every 8 hours as needed for Pain (1-2 TID prn).  10/31/22   Historical Provider, MD   metoprolol succinate (TOPROL XL) 25 MG extended release tablet TAKE 1 TABLET NIGHTLY  Patient taking differently: Take 25 mg by mouth nightly 22   ERMIAS Hernandez   timolol (BETIMOL) 0.5 % ophthalmic solution Place 1 drop into both eyes 2 times daily    Historical Provider, MD   estradiol (CLIMARA) 0.075 MG/24HR Place 1 patch onto the skin Twice a Week Tuesday and 21   Historical Provider, MD   medroxyPROGESTERone (PROVERA) 10 MG tablet Take 10 mg by mouth daily For the last 10 days of each month 21   Historical Provider, MD   Guselkumab (TREMFYA) 100 MG/ML SOPN Inject into the skin Every 8 weeks    Historical Provider, MD   metFORMIN (GLUCOPHAGE) 500 MG tablet Take 1,000 mg by mouth 2 times daily (with meals)    Historical Provider, MD   lisinopril-hydrochlorothiazide (PRINZIDE;ZESTORETIC) 20-12.5 MG per tablet Take 1 tablet by mouth daily    Historical Provider, MD   ezetimibe-simvastatin (Nithyaice Pointer) 10-40 MG per tablet Take 1 tablet by mouth daily  3/29/18   Historical Provider, MD       Current medications:    Current Facility-Administered Medications   Medication Dose Route Frequency Provider Last Rate Last Admin    scopolamine (TRANSDERM-SCOP) transdermal patch 1 patch  1 patch TransDERmal Once Maryse Rodriguez MD        dexamethasone (PF) (DECADRON) injection 8 mg  8 mg IntraVENous Once Maryse Rodriguez MD        lactated ringers IV soln infusion   IntraVENous Continuous Maryse Rodriguez  mL/hr at 03/14/23 0655 New Bag at 03/14/23 0655    sodium chloride flush 0.9 % injection 5-40 mL  5-40 mL IntraVENous 2 times per day Maryse Rodriguez MD        sodium chloride flush 0.9 % injection 5-40 mL  5-40 mL IntraVENous PRN Maryse Rodriguez MD        0.9 % sodium chloride infusion   IntraVENous PRN Maryse Rodriguez MD        ceFAZolin (ANCEF) 2,000 mg in sterile water 20 mL IV syringe  2,000 mg IntraVENous On Call to [address], [place]           Allergies: Allergies   Allergen Reactions    Ciprofloxacin Itching     Itchy throat     Penicillins Itching     Itchy throat       Problem List:    Patient Active Problem List   Diagnosis Code    Obstructive sleep apnea G47.33    Somnolence, daytime R40.0    Snoring R06.83    Witnessed apneic spells R06.81    Sleep disturbance G47.9    Morbid obesity with BMI of 50.0-59.9, adult (HCC) E66.01, Z68.43    BiPAP (biphasic positive airway pressure) dependence Z99.89    Essential hypertension I10    Tachycardia R00.0       Past Medical History:        Diagnosis Date    [facility]th[facility] with [facility] ([facility]     BiPAP (biphasic positive airway pressure) dependence     Hyperlipidemia     Hypertension     Increased pressure in the eye, bilateral     \"but not glaucoma\"    Knee pain     Obstructive sleep apnea     AHI: 106. 6 per PSG, 7/2018    Osteoarthritis     Pain management     [facility]       Past Surgical History:        Procedure Laterality Date    BACK SURGERY      L4,L5,S1    CARPAL TUNNEL RELEASE       SECTION      CHOLECYSTECTOMY      COLONOSCOPY N/A 2020    Dr Esperanza Lofton AP x 2, HP x 1, 5 yr recall    EPIDURAL STEROID INJECTION N/A 2019    LUMBAR EPIDURAL STEROID INJECTION L4-5 performed by Eulalio Joy at [address] N/A 2019    LUMBAR EPIDURAL STEROID INJECTION L5-S1 performed by Eulalio Joy at [address] [place] [address] INJECTION PROCEDURE FOR SACROILIAC JOINT Right 2019    SACROILIAC JOINT INJECTION performed by Hood Bose at [address] [facility] N/A 2019    LUMBAR INTER LAMINAR KACEY L4-5 performed by Eulalio Joy at [facility] Bilateral 2019    LUMBAR FACET BILATERAL L4-5 L5-S1 performed by Eulalio Joy at [facility] N/A 2019    LUMBAR INTER LAMINAR KACEY L4-5 performed by Eulalio Joy at [address] [facility] SI JOINT ARTHRGRPHY&/ANES/STEROID W/LILIA Right 10/09/2018    SACROILIAC JOINT INJECTION performed by Roselia Joy at [address] DX/THER SBST INTRLMNR LMBR/SAC W/IMG GDN N/A 2018    LUMBAR INTER LAMINAR KACEY L4-5 performed by Hood Bose at [facility]       Social History:    Social History     Tobacco Use    Smoking status: Former     Packs/day: 0.50     Years: 25.00     Pack years: 12.50     Types: Cigarettes     Quit date:      Years since quittin.2    Smokeless tobacco: Never   Substance Use Topics    Alcohol use:  No                                Counseling given: Not Answered      Vital Signs (Current):   Vitals:    23 0652   BP: 127/67   Pulse: 91   Resp: 18   Temp: 97 °F (36.1 °C)   TempSrc: Temporal   SpO2: 99%   Weight: 249 lb (112.9 kg)   Height: 5' 3\" (1.6 m)                                              BP Readings from Last 3 Encounters:   23 127/67   22 108/62   22 108/65 NPO Status: Time of last liquid consumption: 0000                        Time of last solid consumption: 0000                        Date of last liquid consumption: 03/13/23                        Date of last solid food consumption: 03/13/23    BMI:   Wt Readings from Last 3 Encounters:   03/14/23 249 lb (112.9 kg)   03/02/23 245 lb (111.1 kg)   11/16/22 255 lb (115.7 kg)     Body mass index is 44.11 kg/m².     CBC:   Lab Results   Component Value Date/Time    WBC 6.8 03/02/2023 10:45 AM    RBC 4.38 03/02/2023 10:45 AM    HGB 12.9 03/02/2023 10:45 AM    HCT 39.1 03/02/2023 10:45 AM    MCV 89.3 03/02/2023 10:45 AM    RDW 13.8 03/02/2023 10:45 AM     03/02/2023 10:45 AM       CMP:   Lab Results   Component Value Date/Time     03/02/2023 10:45 AM    K 4.5 03/02/2023 10:45 AM    CL 99 03/02/2023 10:45 AM    CO2 26 03/02/2023 10:45 AM    BUN 26 03/02/2023 10:45 AM    CREATININE 0.6 03/02/2023 10:45 AM    LABGLOM >60 03/02/2023 10:45 AM    GLUCOSE 96 03/02/2023 10:45 AM    CALCIUM 10.2 03/02/2023 10:45 AM       POC Tests:   Recent Labs     03/14/23  0650   POCGLU 154*       Coags:   Lab Results   Component Value Date/Time    PROTIME 13.5 03/02/2023 10:45 AM    INR 1.04 03/02/2023 10:45 AM    APTT 33.6 03/02/2023 10:45 AM       HCG (If Applicable):   Lab Results   Component Value Date    PREGTESTUR Negative 03/14/2023        ABGs: No results found for: PHART, PO2ART, KEA7HRD, NDT3JMJ, BEART, U5NCBYHB     Type & Screen (If Applicable):  No results found for: LABABO, LABRH    Drug/Infectious Status (If Applicable):  No results found for: HIV, HEPCAB    COVID-19 Screening (If Applicable):   Lab Results   Component Value Date/Time    COVID19 DETECTED 01/16/2022 01:37 PM           Anesthesia Evaluation  Patient summary reviewed and Nursing notes reviewed no history of anesthetic complications:   Airway: Mallampati: II  TM distance: >3 FB   Neck ROM: full  Mouth opening: > = 3 FB   Dental: normal exam Pulmonary:   (+) sleep apnea: on CPAP,      (-) shortness of breath and not a current smoker (former smoker)                           Cardiovascular:  Exercise tolerance: good (>4 METS),   (+) hypertension:, hyperlipidemia    (-) pacemaker, valvular problems/murmurs, past MI, CAD, CABG/stent, dysrhythmias and  angina    ECG reviewed      Echocardiogram reviewed         Beta Blocker:  Dose within 24 Hrs      ROS comment: Echo 2021:  Summary   LV is normal in size with normal systolic function. LV ejection fraction   estimated at 60 to 65%. Diastolic function appears preserved. RV is normal in size with normal systolic function. Mild left atrial enlargement. Normal right atrial size. Aortic valve leaflets not well visualized but probably trileaflet. No   significant stenosis or regurgitation. Mitral valve appears structurally normal with normal leaflet mobility. Trace mitral regurgitation. No stenosis. Trace tricuspid regurgitation. No significant pericardial effusion. Neuro/Psych:   (+) psychiatric history:   (-) seizures and CVA           GI/Hepatic/Renal:   (+) morbid obesity     (-) GERD, liver disease and no renal disease       Endo/Other:    (+) DiabetesType II DM, no interval change, , no malignancy/cancer. (-) blood dyscrasia, no malignancy/cancer               Abdominal:             Vascular:     - DVT and PE. Other Findings:           Anesthesia Plan      general     ASA 3     (Left adductor canal block. Pepcid in preop.  )  Induction: intravenous. MIPS: Postoperative opioids intended and Prophylactic antiemetics administered. Anesthetic plan and risks discussed with patient. Use of blood products discussed with patient whom consented to blood products.                      Judy Moore DO   3/14/2023

## 2023-03-14 NOTE — DISCHARGE INSTR - DIET
Good nutrition is important when healing from an illness, injury, or surgery. Follow any nutrition recommendations given to you during your hospital stay. If you were given an oral nutrition supplement while in the hospital, continue to take this supplement at home. You can take it with meals, in-between meals, and/or before bedtime. These supplements can be purchased at most local grocery stores, pharmacies, and chain [employer]-stores. If you have any questions about your diet or nutrition, call the hospital and ask for the dietitian.              Low carb / High protein

## 2023-03-14 NOTE — DISCHARGE INSTRUCTIONS
[facility]  Dr. Hilaria Browne      Total Knee and Uni Knee Replacement  Discharge Instructions     To prevent blood clots, you have been placed on the following medication:  Aspirin 81 mg twice a day for four weeks    Surgical Site Care: Showering is permitted on post op day 2 - Thursday  No submersion in a bath, swimming pool, whirlpool, etc    Physical Therapy:  You were given a prescription for outpatient therapy. Please schedule with [facility]  if convenient by calling [phone]. Otherwise, schedule with a therapist closer to your home  Work on range of motion. Goal for your first office visit is for you to fully straighten and bend your knee to at least a right angle   Don't walk for exercise (it will make you more sore)  Weight Bearing Status:  Full weight bearing with a walker     Pain Medications  You were given a prescription to fill at your pharmacy  Wean off pain medications as you deem appropriate as long as pain is under control  Take tylenol instead of the prescribed pain medicine as your pain improves    Cold packs                                                                                                               FEVER .5 or less        May be used as necessary                                                                    Take Tylenol x 2                                                                                                            Deep breath x 10   Please take a stool softener such as dulcolax or colace daily                                 Cough, cough, cough                                                                                                                        Recheck in 1 - 11/2                                                                                                                                            hours  Do not drive for three weeks  DO NOT SMOKE, VAPE OR CHEW!!!     Contact office if  Increased redness, swelling, drainage of any kind, and/or severe pain at surgery site. As well as new onset fevers and or chills. These could signify an infection. Calf or thigh tenderness to touch as well as increased swelling or redness. This could signify a clot. Any rash appears, increased  or new onset nausea/vomiting occur. This may indicate a reaction to a medication. Phone # [phone]  ext [phone]. Leave message for my assistant Galina Tejada. She will promptly return your call. If you have an absolute emergency, text me with your name and problem at [phone].  (Dr. Sienna Singh)  Or contact Ortho Navigator at 1 [phone]. Thomas Joy)    Follow up with Surgeon at scheduled appointment time. Thanks for the opportunity to care for you!

## 2023-03-14 NOTE — PROGRESS NOTES
Patient discharged home today with outpatient therapy script. Went over all discharge instructions and new medications with patient's spouse and provided a copy of all new medications to take home. Patient's spouse verbalized understanding. Patient's stability will be assessed by PT and Op Care RN before discharging home.    Electronically signed by Zaida Adam RN on 3/14/2023 at 9:50 AM

## 2023-03-14 NOTE — PROGRESS NOTES
Physical Therapy  Physical Therapy     Facility/Department: [facility] OR  Initial Assessment  PHYSICAL THERAPY EVALUATION      Lorne Flores    : 1970  MRN: [id number]   PHYSICIAN:  Micah Gasca,*  Primary Problem    Patient Active Problem List   Diagnosis    Obstructive sleep apnea    Somnolence, daytime    Snoring    Witnessed apneic spells    Sleep disturbance    Morbid obesity with BMI of 50.0-59.9, adult (HCC)    BiPAP (biphasic positive airway pressure) dependence    Essential hypertension    Tachycardia       Rehabilitation Diagnosis:     Primary osteoarthritis of left knee [M17.12]       SERVICE DATE: 3/14/2023  L TKA      SUBJECTIVE: Patient states that they are planning on discharging home today    Pain Screening  Patient Currently in Pain: No  Pt. feeling nauseated. Meds have been given. RNAzra, assisting. Pt. Needing A donning pants and underwear in standing initially. PRIOR LEVEL OF FUNCTION:    [x] Independent in community no assistive device     [] Independent in community with assistive device     [] Independent in the house with assistive device     [] Transfer only    []     OBJECTIVE:  Orientation: Within functional Limits      ROM - Passive, Non-operative side     [] Left lower extremity  [x] Right lower extremity       Within functional limits    ROM - Passive, operative side    [x] Left lower extremity  [] Right lower extremity      Hip - extension to 0 degrees and flexion to 80 in sitting    Knee - extension to 0 degrees in supine and flexion to 80 in sitting        STRENGTH - Non-operative side    [] Left lower extremity  [x] Right lower extremity       Within functional limits    STRENGTH - operative side    [x] Left lower extremity  [] Right lower extremity    Grossly  3-/5        TRANSFERS   Sit to stand     [] CGA [x] Minimum   From stretcher and from toilet level.       Bed to chair     [] CGA [] Minimum    Bed mobility   Supine to sit      [] CGA [x] Minimum      Scoot  [x] Side to side  [x] Up and down     [x] CGA [] Minimum    AMBULATION   Weight bearing: - WBAT      Distance: 50', 3'     Device: Rolling Walker - The patient has been trained on the use of this equipment     Assistance:       [x] CGA [] Modified Independent [] Stand by / Supervision [x] Minimum     Initially Min A and then CGA with v. Cues. Pt. Issued gait belt and family instructed on use, especially with stairs to enter home. BALANCE   Sitting    Good    Standing    Good     ASSESSMENT   Activity limitations: Decreased functional mobility   Patient will benefit from continuing skilled physical therapy to improve mobility    Activity Tolerance  Activity Tolerance: Patient Tolerated treatment well     PLAN        Physical therapy to see 7 X/ week for 2 weeks then reassess. Plan of care to include:   Current Treatment Recommendations: Functional Mobility Training, Transfer Training  Gait Training, Safety Education & Training, Patient/Caregiver Education & Training    PT Education     Precautions; Transfer Training; General Safety; Family Education; Equipment; Weight-bearing Education; Gait Training; Functional Mobility Training  No pillow directly under knee, FWBAT LLE, use hands to press down on RW to offload LLE in case of increased pain during gait, stair training (verbal instruction). GOALS    Short term goals  Time Frame for Short term goals: 2 weeks  Short term goal 1: Independent with bed mobility and transfers  Short term goal 2: Ambulate 400 feet independently  Short term goal 3: Verbalize understanding of ascending and descending steps    Discharge Recommendations:  Home with assist PRN, Patient would benefit from continued therapy after discharge     Comments:  Patient seen in same day surgery and nursing is actively managing pain  Patient instructed to request assistance before getting up, but pt left seated in w/c, RN present.         Electronically signed by Clayborn Cheadle Alexy Ash on 3/14/2023 at 1:33 PM

## 2023-03-14 NOTE — ANESTHESIA PROCEDURE NOTES
Peripheral Block    Patient location during procedure: holding area  Reason for block: post-op pain management  Start time: 3/14/2023 8:00 AM  End time: 3/14/2023 8:02 AM  Staffing  Performed: anesthesiologist   Anesthesiologist: Parish Glasgow MD  Preanesthetic Checklist  Completed: patient identified, IV checked, site marked, risks and benefits discussed, surgical/procedural consents, equipment checked, pre-op evaluation, timeout performed, anesthesia consent given, oxygen available, monitors applied/VS acknowledged, fire risk safety assessment completed and verbalized and blood product R/B/A discussed and consented  Peripheral Block   Patient position: supine  Prep: ChloraPrep  Provider prep: mask and sterile gloves  Patient monitoring: continuous pulse ox and frequent blood pressure checks  Block type: Femoral  Adductor canal  Laterality: left  Injection technique: single-shot  Guidance: ultrasound guided  Local infiltration: lidocaine  Local infiltration: lidocaine    Needle   Needle type: Quincke   Needle gauge: 20 G  Needle localization: ultrasound guidance  Needle length: 10 cm  Assessment   Injection assessment: negative aspiration for heme, no paresthesia on injection, local visualized surrounding nerve on ultrasound and no intravascular symptoms  Paresthesia pain: none  Slow fractionated injection: yes  Hemodynamics: stable  Real-time US image taken/store: yes  Outcomes: uncomplicated and patient tolerated procedure well    Medications Administered  ropivacaine (NAROPIN) injection 0.5% - Perineural   20 mL - 3/14/2023 8:00:00 AM

## 2023-03-14 NOTE — OP NOTE
TOTAL KNEE ARTHROPLASTY OPERATIVE NOTE    NAME OF SURGEON / : Nhi Thomas MD  PATIENT:   Lavern Gottron  Date: 3/14/2023        Time: 9:35 AM   Referring Physician: ________________________    PREOP DIAGNOSIS:  left Knee  Primary osteoarthritis   POSTOP DIAGNOSIS:  Same     PROCEDURE:    Left  Cemented Posterior Stabilized Knee arthroplasty, RAY robot assisted  Bmi 44. Complex Primary, please add modifier -22. This procedure required 50 % more effort from the surgeon and staff. The thick adipose layer made the approach to the joint and exposure much more difficult than normal.  Extra deep retractors and more effort were required to maintain the joint in position and allow visualization to perform the procedure. The added weight of the extremity made it extremely difficult to manipulate the joint and to check range of motion and stability. The closure was prolonged due to the added length and depth of the wound. IMPLANTS:   Implant Name Type Inv.  Item Serial No.  Lot No. LRB No. Used Action   CEMENT BONE 40GM HI VISC PALACOS R - [id number]  CEMENT BONE 40GM HI VISC PALACOS R  [facility] [id number] Left 1 Implanted   CEMENT BONE 40GM HI VISC PALACOS R - [id number]  CEMENT BONE 40GM HI VISC PALACOS R  [facility] [id number] Left 1 Implanted   COMPONENT PAT [id number] THK9MM KNEE POLY BILL CONVENTIONAL - [id number]  COMPONENT PAT [id number] THK9MM KNEE POLY BILL CONVENTIONAL  [employer]- [id number] Left 1 Implanted   EXTENSION STEM L30MM [id number] KNEE TAPR BILL PERSONA - [id number]  EXTENSION STEM L30MM [id number] KNEE TAPR BILL PERSONA  [employer]- [id number] Left 1 Implanted   PSN TIB STM 5 DEG SZ D L - [id number]  PSN TIB STM 5 DEG SZ D L  [employer]- [id number] Left 1 Implanted   COMPONENT FEM SZ 6 L KNEE CO CHROM GILBERT POST STBL BILL - [id number]  COMPONENT FEM SZ 6 L KNEE CO CHROM GILBERT POST STBL BILL  [employer]- [id number] Left 1 Implanted   [facility]F [facility] 6-9 CD 12 MM LT TIB FIX BEAR - [id number]  COMPONENT [facility] SURF PS 6-9 CD 12 MM LT TIB FIX BEAR  [employer]- [id number] Left 1 Implanted       FINDINGS:  Preop ROM:  Full except for   -  full  extension  Alignment:    varus  Bone quality:   normal  Cartilage wear:  Medial - severe  Lateral - mild  Pat-fem -mild  ACL -Intact    ASSISTANT: Melba Vazquez, certified first assistant. Helped with draping, exposure, retraction, essential steps of the procedure, and with wound closure. ANESTHESIA:  General  EBL:  500 mL  TOURNIQUET:  one hour  FLUIDS: See anesthesia record  BLOOD PRODUCTS:  None  COMPLICATIONS:  None  SPECIMEN:  None            INDICATIONS:  Patient presents for the above procedure having failed conservative treatment. Patient consents to the procedure above understanding the risks of bleeding, infection, anesthesia, nerve injury, stiffness, and blood clots. PROCEDURE:  The patient was brought to the operating room and placed in a supine position on the operating table. Anesthesia as specified above was placed. An antiobiotic was given IV. The unoperated extremities were well padded. A tourniquet was placed around the proximal thigh. The lower extremity was prepped with chlorhexidene/alcohol and draped sterilely using ioband barriers. A time out was performed. An anterior knee incision was made and fasciocutaneous flaps were developed. A mini midvastus approach was made and the patella subluxed. The prepatellar fat pad, ACL, and the anterior horns of the menisci were excised. A threaded Steinmann pin was placed 2 fingerbreadths proximal to the proximal aspect of the femoral articular cartilage along the anteromedial aspect of the distal femur. Pin was aimed at the eyes of the [address] [place] [address]. The sensor array was placed over the pin and used as a pin guide for a second pin was placed a centimeter proximal to the first pin and parallel to it. The array was then screwed tightly to the pins.  Same procedure was performed over the proximal medial tibia with the most proximal pin being 3 fingerbreadths distal to the tibial articular surface.     The femoral hip center rotation was then registered.  The distal femoral and proximal tibial anatomy and the epicondylar axis and the malleolar axes were identified with the sensor pin.  The Brittany robot was then set up to make our distal femoral and proximal tibial cuts.  The preop plan consisted of a a 1 degree external rotation relative to the AP axis of the distal femur  and  0 degrees to the mechanical axis of the lower extremity.  Depth of the femoral cut was set at 10 mm off the intact side. The tibial cut was set for 0 degrees in the coronal plane relative to the mechanical axis and 7 degrees posterior slope.  The depth of resection was set at 10 mm off the intact side.    The knee was placed in extension for patellar preparation.  The maximum patella thickness was 23 mm.   The patella as resected with an oscillating saw and consistent thickness verified with caliper.  The trial patella was placed and the overall thickness was restored to 23 mm.         The knee was flexed up and the patella was subluxed.  Retractors were placed around the distal femur.  The robot arm was brought in to do the distal femoral cut.  A trial femoral component was placed over the distal femur to help determine sizing.  The robot arm was then used to drill pins for the 4-in-1 block.  Once appropriate external rotation was confirmed the drill holes were made for the 4-in-1 block.    The robot arm with the tibial cutting guide was then brought into position and the pin holes drilled.  The robot was then moved to the head of the table.  Retractors were placed around the proximal tibia.  The pins for the tibial cutting guide were then lightly impacted into  tibial drill holes.  The tibial cutting guide from the manual instruments tray  was then placed over the pins. The tibial cut was made. The tibial fragment and osteophytes were removed. Posterior meniscal horns were resected. The gap gauge was then used to verify that the knee would go to full extension and there was appropriate alignment of the tibial cut. The knee was flexed and retractors placed around the distal femur. The 4-in-1 femoral guide was then impacted on the distal femur and a drill used to verify we would not notch anteriorly. The guide was then fixed with 2 screws and the femoral cuts made. Posterior femoral osteophytes were removed with a 3/4 inch curved osteotome and rongeur. The gap gauge was used to verify appropriate flexion gap tension. The knee was  flexed up and retractors placed around the proximal tibia. The appropriate sized tibial tray was then pinned to the proximal tibia in appropriate rotation without overhang. The femoral trial was impacted onto the femur. The box cut was made first with a recip saw and finished with regular saw. The insert for the box was placed. A 10 mm thick, posterior stabilized tibial liner was snapped in place and ROM and stability were checked. The knee had full ROM and symmetric varus/valgus stability in flexion and extension after    *release of  NO RELEASES  * no extra resection was needed   *the appropriate size tibial liner was placed (see above)    Note: Stability to varus/valgus stress(mm):  Extension ( 2  ,  1 ) Mid Flexion (  1  ,  2  ) Flexion (  1  ,  1  )  *A CPS poly was notused to improve stability       The femoral lug holes drilled and the trials were removed. The sensor arrays and Steinmann pins were removed. The tibia was reamed and punched to accept the stem/keel. The tibial trial was removed. The surfaces were rinsed with pulse lavage and dried. Two batches of cement  were mixed.   Appropriate sized implants were cemented in place, a trial tibial liner placed, and the knee held in full extension until cement hardened. The capsule was injected with Ropivacaine. Excess cement was removed, and the actual tibial liner was snapped in place. No thumbs patella tracking was checked. No release was needed. Hemostasis was achieved with electrocautery. The wound was copiously irrigated with antibiotic irrigation. The capsule was closed with interrupted #2 vicryl. Subcutaneous tissue was closed with running 2-0 vicryl. Skin was closed with 3-0 vicryl and Prineo. A sterile dressing was applied. The patient was awakened, extubated and transferred to the recovery room in stable condition.             PLAN:  Full weight bearing, ROM, dvt prophylaxis        Electronically signed by Jessica Suárez MD on 3/14/2023 at 9:35 AM

## 2023-03-14 NOTE — H&P
[address] Pre-Operative History and Physical    Patient Name: Flower Perez  : 1970        Chief Complaint: Left knee pain  History of Present Illness: This patient has had ongoing pain for several months that has been unresponsive to conservative care which has included injection, therapy, activity modification and presents now for surgery. She is a diabetic, but her diabetes is well-managed. She has lost over 50 pounds. Past Medical History:       Diagnosis Date    Arthritis with psoriasis (HCC)     BiPAP (biphasic positive airway pressure) dependence     Hyperlipidemia     Hypertension     Increased pressure in the eye, bilateral     \"but not glaucoma\"    Knee pain     Obstructive sleep apnea     AHI: 106. 6 per PSG, 2018    Osteoarthritis     Pain management     OIWK     Past Surgical History:       Procedure Laterality Date    BACK SURGERY      L4,L5,S1    CARPAL TUNNEL RELEASE       SECTION      CHOLECYSTECTOMY      COLONOSCOPY N/A 2020    Dr Perez Session AP x 2, HP x 1, 5 yr recall    EPIDURAL STEROID INJECTION N/A 2019    LUMBAR EPIDURAL STEROID INJECTION L4-5 performed by Eulalio Joy at [address] N/A 2019    LUMBAR EPIDURAL STEROID INJECTION L5-S1 performed by Eulalio Joy at [address] Right 2019    SACROILIAC JOINT INJECTION performed by Grey Arroyo at [facility] N/A 2019    LUMBAR INTER LAMINAR KACEY L4-5 performed by Eulalio Joy at [address] [place] [address] Avenue Bilateral 2019    LUMBAR FACET BILATERAL L4-5 L5-S1 performed by Eulalio Joy at [address] [place] [address] Avenue N/A 2019    LUMBAR INTER LAMINAR KACEY L4-5 performed by Eulalio Joy at [address] ARTHRGRPHY&/ANES/STEROID W/LILIA Right 10/09/2018    SACROILIAC JOINT INJECTION performed by Jerrica Joy at [facility] DX/THER SBST INTRLMNR LMBR/SAC W/IMG GDN N/A 09/04/2018    LUMBAR INTER LAMINAR KACEY L4-5 performed by Eulalio Joy at [facility]       Medications:   Prior to Admission medications    Medication Sig Start Date End Date Taking? Authorizing Provider   triamcinolone (KENALOG) 0.1 % cream Apply topically 2 times daily Apply topically 2 times daily. Historical Provider, MD   meloxicam (MOBIC) 15 MG tablet Take 15 mg by mouth daily 11/4/22   Historical Provider, MD   pregabalin (LYRICA) 75 MG capsule Take 75 mg by mouth 2 times daily. 11/8/22   Historical Provider, MD   citalopram (CELEXA) 20 MG tablet Take 20 mg by mouth daily 10/14/22   Historical Provider, MD   traMADol (ULTRAM) 50 MG tablet Take 50 mg by mouth every 8 hours as needed for Pain (1-2 TID prn). 10/31/22   Historical Provider, MD   metoprolol succinate (TOPROL XL) 25 MG extended release tablet TAKE 1 TABLET NIGHTLY  Patient taking differently: Take 25 mg by mouth nightly 11/16/22   ERMIAS Pak   timolol (BETIMOL) 0.5 % ophthalmic solution Place 1 drop into both eyes 2 times daily    Historical Provider, MD   estradiol (CLIMARA) 0.075 MG/24HR Place 1 patch onto the skin Twice a Week Tuesday and Friday 9/30/21   Historical Provider, MD   medroxyPROGESTERone (PROVERA) 10 MG tablet Take 10 mg by mouth daily For the last 10 days of each month 7/19/21   Historical Provider, MD   Guselkumab (TREMFYA) 100 MG/ML SOPN Inject into the skin Every 8 weeks    Historical Provider, MD   metFORMIN (GLUCOPHAGE) 500 MG tablet Take 1,000 mg by mouth 2 times daily (with meals)    Historical Provider, MD   lisinopril-hydrochlorothiazide (PRINZIDE;ZESTORETIC) 20-12.5 MG per tablet Take 1 tablet by mouth daily    Historical Provider, MD   ezetimibe-simvastatin (VYTORIN) 10-40 MG per tablet Take 1 tablet by mouth daily  3/29/18   Historical Provider, MD       Allergies:  Ciprofloxacin and Penicillins    Social History:   Tobacco:  reports that she quit smoking about 8 years ago.  Her smoking use included cigarettes. She has a 12.50 pack-year smoking history. She has never used smokeless tobacco.   Alcohol:  reports no history of alcohol use. Review of Systems:  General: Denies any fever or chills  EYES: Denies any diplopia  ENT: Tinnitus or vertigo  Resp: Denies any shortness of breath, cough or wheezing  Cardiac: Denies any chest pain, palpitations, claudication or edema  GI: Denies any melena, hematochezia, hematemesis or pyrosis  : Denies any frequency, urgency, hesitancy or incontinence  Musculoskeletal: Denies back pain, myalgias. Reports knee pain  Heme: Denies bruising or bleeding easily  Endocrine: Denies any history of diabetes or thyroid disease  Psych: Denies anxiety or depression  Neuro: Denies any focal motor or sensory deficits      Physical Exam:  Vitals: /67   Pulse 91   Temp 97 °F (36.1 °C) (Temporal)   Resp 18   Ht 5' 3\" (1.6 m)   Wt 249 lb (112.9 kg)   LMP 03/07/2023   SpO2 99%   BMI 44.11 kg/m²   CONSTITUTIONAL: Alert, appropriate, no acute distress. PSYCH: mood and affect are normal with a normal rate and tone of speech  EYES: Non icteric, EOM intact, pupils equal round and reactive to light  ENT: Mucus membranes moist, no oral pharyngeal lesions, nares patent   NECK: Supple, no masses, no JVD, trachea mid line   CHEST/LUNGS: CTA bilaterally, normal respiratory effort   CARDIOVASCULAR: RRR, no murmurs,  2+ DP and radial pulses bilaterally  ABDOMEN: soft, nontender  EXTREMITIES: warm, well perfused, no edema. Left knee joint with mildly reduced range of motion and tenderness about medial aspect of both knees. Mild varus alignment.   Neurovascular exam normal  SKIN: warm, dry with no rashes or lesions  LYMPH: No cervical or inguinal lymphadenopathy    RADIOLOGY: xrays of left knee show severe knee arthritis  LABORATORY:    CBC :    Lab Results   Component Value Date/Time    WBC 6.8 03/02/2023 10:45 AM    HGB 12.9 03/02/2023 10:45 AM    HCT 39.1 03/02/2023 10:45 AM     03/02/2023 10:45 AM     BMP:   Lab Results   Component Value Date/Time     03/02/2023 10:45 AM    K 4.5 03/02/2023 10:45 AM    CL 99 03/02/2023 10:45 AM    CO2 26 03/02/2023 10:45 AM    BUN 26 03/02/2023 10:45 AM    CREATININE 0.6 03/02/2023 10:45 AM    CALCIUM 10.2 03/02/2023 10:45 AM    LABGLOM >60 03/02/2023 10:45 AM    GLUCOSE 96 03/02/2023 10:45 AM     PT/INR:    Lab Results   Component Value Date/Time    PROTIME 13.5 03/02/2023 10:45 AM    INR 1.04 03/02/2023 10:45 AM     U/A: No results found for: NITRITE, WBCUA, RBCUA, BACTERIA  HgBA1c:  No components found for: HGBA1C    IMPRESSION:   Left primary knee osteoarthritis. Most recent office note reviewed and there has been no change in health status. PLAN: Left knee replacement. I explained to the patient/family the patient's diagnosis and operative procedure in detail. They said they understood basically what was wrong and how I planned to fix it. They understand the expected recovery and the risks which include excessive bleeding, infection, reaction to anesthesia, nerve injury, stiffness, fracture, deformity and dislocation. They then signed an operative consent form. Provider:  Carlos Disla MD  Date: 3/14/2023

## 2023-03-17 ENCOUNTER — TELEPHONE (OUTPATIENT)
Dept: INPATIENT UNIT | Age: 53
End: 2023-03-17

## 2023-04-10 ENCOUNTER — OFFICE VISIT (OUTPATIENT)
Dept: BARIATRICS/WEIGHT MGMT | Facility: CLINIC | Age: 53
End: 2023-04-10
Payer: COMMERCIAL

## 2023-04-10 VITALS
TEMPERATURE: 98.2 F | HEIGHT: 64 IN | HEART RATE: 83 BPM | WEIGHT: 250.6 LBS | BODY MASS INDEX: 42.78 KG/M2 | SYSTOLIC BLOOD PRESSURE: 133 MMHG | DIASTOLIC BLOOD PRESSURE: 65 MMHG | OXYGEN SATURATION: 98 %

## 2023-04-10 DIAGNOSIS — E78.5 HYPERLIPIDEMIA, UNSPECIFIED HYPERLIPIDEMIA TYPE: ICD-10-CM

## 2023-04-10 DIAGNOSIS — E11.69 TYPE 2 DIABETES MELLITUS WITH OTHER SPECIFIED COMPLICATION, WITHOUT LONG-TERM CURRENT USE OF INSULIN: ICD-10-CM

## 2023-04-10 DIAGNOSIS — E66.01 CLASS 3 SEVERE OBESITY DUE TO EXCESS CALORIES WITH SERIOUS COMORBIDITY AND BODY MASS INDEX (BMI) OF 40.0 TO 44.9 IN ADULT: Primary | ICD-10-CM

## 2023-04-10 DIAGNOSIS — K21.9 GERD WITHOUT ESOPHAGITIS: Chronic | ICD-10-CM

## 2023-04-10 DIAGNOSIS — I10 PRIMARY HYPERTENSION: ICD-10-CM

## 2023-04-10 DIAGNOSIS — G47.33 OBSTRUCTIVE SLEEP APNEA SYNDROME: ICD-10-CM

## 2023-04-10 PROCEDURE — 99213 OFFICE O/P EST LOW 20 MIN: CPT | Performed by: NURSE PRACTITIONER

## 2023-04-10 RX ORDER — TRIAMCINOLONE ACETONIDE 1 MG/G
CREAM TOPICAL
COMMUNITY

## 2023-04-10 NOTE — ASSESSMENT & PLAN NOTE
Patient's (Body mass index is 43.02 kg/m².) indicates that they are morbidly/severely obese (BMI > 40 or > 35 with obesity - related health condition) with health conditions that include obstructive sleep apnea and GERD . Weight is improving with treatment. BMI  is above average; BMI management plan is completed. We discussed portion control and increasing exercise.

## 2023-04-10 NOTE — PROGRESS NOTES
"Patient Care Team:  Malcolm Hernandes MD as PCP - General (Family Medicine)    Reason for Visit:  Medical Weight loss    Subjective   Ayla Echevarria is a 52 y.o. female.     Ayla is here for follow-up and continued medical management of her morbid obesity.  She is currently on a prescription diet.  Ayla previously was to apply dietary changes such as following the meal plan as directed.  Patient admits to eating around 3 and 4 meals per day and admits to grazing in between meals.  she admits to drinking at least 64 ounces or more of fluid each day and 80 grams of protein. is unsure how much protein she is in taking.  she is currently exercising walking 20 minutes/day  5 days/week.  she states that she has gone with soda intake and denies denies  nicotine use.  Patient has lost 5 pounds since her last appointment with us.     Review Of Systems:  Review of Systems   Constitutional: Negative.    Respiratory: Negative.    Cardiovascular: Negative.    Gastrointestinal: Negative.    Endocrine: Negative.    Musculoskeletal: Negative.    Psychiatric/Behavioral: Negative.          The following portions of the patient's history were reviewed and updated as appropriate: allergies, current medications, past family history, past medical history, past social history, past surgical history, and problem list.    Objective   /65 (BP Location: Right arm, Patient Position: Sitting, Cuff Size: Adult)   Pulse 83   Temp 98.2 °F (36.8 °C)   Ht 162.6 cm (64\")   Wt 114 kg (250 lb 9.6 oz)   SpO2 98%   BMI 43.02 kg/m²       04/10/23  1114   Weight: 114 kg (250 lb 9.6 oz)       Physical Exam  Vitals reviewed.   Constitutional:       Appearance: She is obese.   Cardiovascular:      Rate and Rhythm: Normal rate and regular rhythm.   Pulmonary:      Effort: Pulmonary effort is normal.   Abdominal:      General: Bowel sounds are normal.      Palpations: Abdomen is soft.   Musculoskeletal:         General: Normal range of " motion.   Skin:     General: Skin is warm and dry.   Neurological:      Mental Status: She is alert and oriented to person, place, and time.   Psychiatric:         Mood and Affect: Mood normal.         Behavior: Behavior normal.           Assessment & Plan   Diagnoses and all orders for this visit:    1. Class 3 severe obesity due to excess calories with serious comorbidity and body mass index (BMI) of 40.0 to 44.9 in adult (Primary)  Assessment & Plan:  Patient's (Body mass index is 43.02 kg/m².) indicates that they are morbidly/severely obese (BMI > 40 or > 35 with obesity - related health condition) with health conditions that include obstructive sleep apnea and GERD . Weight is improving with treatment. BMI  is above average; BMI management plan is completed. We discussed portion control and increasing exercise.       2. GERD without esophagitis  Comments:  States it is well controlled and she has no symptoms     3. Obstructive sleep apnea syndrome  Comments:  Continue CPAP     4. Primary hypertension  Assessment & Plan:  Hypertension is unchanged.  Continue current treatment regimen.  Dietary sodium restriction.  Weight loss.  Blood pressure will be reassessed at the next regular appointment.      5. Hyperlipidemia, unspecified hyperlipidemia type  Comments:  States she will have cholesterol checked on 4/18     6. Type 2 diabetes mellitus with other specified complication, without long-term current use of insulin  Comments:  Reviewed A1C and it is now 5.8  Overview:  Pre diabetes         Ayla NAYE Echevarria was seen today for follow-up, obesity, nutrition counseling and weight loss.    Today we discussed healthy changes in lifestyle, diet, and exercise. Dietician consultation obtained.  Ayla Echevarria had received handouts to her explaining the recommendation on portion sizes/appetite control/reading nutrition labels.   Intensive behavioral therapy for obesity was done today as well.     Goals for this month are:    1. Patient has recently had surgery on her left knee. She states she plans to begin more walking more soon. She also admits her eating schedule was off due to surgery.   2. Encouraged to increase meals to 4 per day.     HEMOGLOBIN A1C (03/02/2023 11:45 EST)      Follow up in one month for a weight recheck.A total of 20 minutes was spent face to face with this patient and over half of the time was spent on counseling and coordination of care for the disease of obesity. We specifically reviewed the dietary prescription and I made recommendations toward increasing exercise as tolerated as well as focusing on training their behavior toward storing less.

## 2023-04-25 ENCOUNTER — TRANSCRIBE ORDERS (OUTPATIENT)
Dept: ADMINISTRATIVE | Facility: HOSPITAL | Age: 53
End: 2023-04-25
Payer: COMMERCIAL

## 2023-04-25 DIAGNOSIS — M25.552 PAIN IN LEFT HIP: Primary | ICD-10-CM

## 2023-04-25 DIAGNOSIS — Z96.652 PRESENCE OF LEFT ARTIFICIAL KNEE JOINT: ICD-10-CM

## 2023-04-25 DIAGNOSIS — Z96.652 PRESENCE OF LEFT ARTIFICIAL KNEE JOINT: Primary | ICD-10-CM

## 2023-04-25 DIAGNOSIS — M25.552 PAIN IN LEFT HIP: ICD-10-CM

## 2023-04-26 ENCOUNTER — HOSPITAL ENCOUNTER (OUTPATIENT)
Dept: ULTRASOUND IMAGING | Facility: HOSPITAL | Age: 53
Discharge: HOME | End: 2023-04-26
Payer: COMMERCIAL

## 2023-04-26 DIAGNOSIS — M25.552 PAIN IN LEFT HIP: ICD-10-CM

## 2023-04-26 DIAGNOSIS — Z96.652 PRESENCE OF LEFT ARTIFICIAL KNEE JOINT: ICD-10-CM

## 2023-04-26 PROCEDURE — 93971 EXTREMITY STUDY: CPT

## 2023-05-19 ENCOUNTER — OFFICE VISIT (OUTPATIENT)
Dept: OBSTETRICS AND GYNECOLOGY | Facility: CLINIC | Age: 53
End: 2023-05-19
Payer: COMMERCIAL

## 2023-05-19 VITALS
HEIGHT: 64 IN | BODY MASS INDEX: 43.54 KG/M2 | DIASTOLIC BLOOD PRESSURE: 70 MMHG | SYSTOLIC BLOOD PRESSURE: 118 MMHG | WEIGHT: 255 LBS

## 2023-05-19 DIAGNOSIS — R10.32 LEFT LOWER QUADRANT PAIN: Primary | ICD-10-CM

## 2023-05-19 PROCEDURE — 99213 OFFICE O/P EST LOW 20 MIN: CPT | Performed by: OBSTETRICS & GYNECOLOGY

## 2023-05-19 RX ORDER — DICLOFENAC SODIUM 75 MG/1
75 TABLET, DELAYED RELEASE ORAL 2 TIMES DAILY
COMMUNITY
Start: 2023-04-28

## 2023-05-19 NOTE — PROGRESS NOTES
"Ayla Echevarria is a 52 y.o. female here today for evaluation of left lower quadrant pain.  Over the past 2 months she has had episodic left-sided pelvic pain that radiates towards the left flank.  This pain began after knee replacement surgery and occurs 2-3 times a week.  It lasts for a few hours but is somewhat worse around the time of her menstrual cycle.    Visit Vitals  /70 (BP Location: Right arm, Patient Position: Sitting, Cuff Size: Large Adult)   Ht 162.6 cm (64\")   Wt 116 kg (255 lb)   LMP 05/09/2023 (Approximate)   Breastfeeding No   BMI 43.77 kg/m²      Pleasant female no acute distress  Mood and affect normal  Breathing unlabored  There is some mild tenderness on palpation of the abdominal wall in the left lower quadrant.  There is no rebound or guarding.  There is no flank tenderness.  Normal external genitalia.  The vaginal mucosa and cervix appear normal.  Bimanual exam reveals no cervical motion or uterine tenderness.  There are no palpable adnexal masses or tenderness.    Assessment: Left lower quadrant pain    I have reassured her that I find no concerning findings on today's exam.  If her symptoms persist or worsen, then we could proceed with a pelvic ultrasound to ensure that the pelvic structures appeared normal.  She declines that at this time but will return if symptoms persist.  Otherwise she plans to follow-up with her PCP.  Call with questions or concerns.        "

## 2023-08-14 ENCOUNTER — NUTRITION (OUTPATIENT)
Dept: BARIATRICS/WEIGHT MGMT | Facility: CLINIC | Age: 53
End: 2023-08-14
Payer: COMMERCIAL

## 2023-08-14 VITALS — BODY MASS INDEX: 45.16 KG/M2 | HEIGHT: 64 IN | WEIGHT: 264.5 LBS

## 2023-08-14 DIAGNOSIS — E66.01 CLASS 3 SEVERE OBESITY DUE TO EXCESS CALORIES WITH SERIOUS COMORBIDITY AND BODY MASS INDEX (BMI) OF 40.0 TO 44.9 IN ADULT: Primary | ICD-10-CM

## 2023-08-14 DIAGNOSIS — E11.69 TYPE 2 DIABETES MELLITUS WITH OTHER SPECIFIED COMPLICATION, WITHOUT LONG-TERM CURRENT USE OF INSULIN: ICD-10-CM

## 2023-08-14 PROCEDURE — 97803 MED NUTRITION INDIV SUBSEQ: CPT

## 2023-08-14 RX ORDER — HYDROCODONE BITARTRATE AND ACETAMINOPHEN 10; 325 MG/1; MG/1
1 TABLET ORAL EVERY 6 HOURS PRN
COMMUNITY

## 2023-08-14 NOTE — PROGRESS NOTES
"Metabolic and Bariatric Surgery Adult Nutrition Assessment    Patient Name: Ayla Echevarria   YOB: 1970   MRN: [id number]     Assessment Date:  2023     Reason for Visit: Follow-up Nutrition Assessment     Treatment Pathway: Medical Weight Loss    Assessment    Anthropometrics   Wt Readings from Last 1 Encounters:   23 120 kg (264 lb 8 oz)     Ht Readings from Last 1 Encounters:   23 162.6 cm (64\")     BMI Readings from Last 1 Encounters:   23 45.40 kg/mý        Initial Weight/Date: 290.6 lbs (2022)  Weight Changes since last visit: + 13.9 lbs  Net Weight Change: - 26.1 lbs    Past Medical History:   Diagnosis Date    Asthma 2019    Sleep apnea    Carpal tunnel syndrome     Chronic bilateral low back pain with bilateral sciatica 2020    Depression 2019    Back problems    Diabetes mellitus     Pre diabetes    Disc degeneration, lumbosacral 2020    Glaucoma 2018    Hyperlipidemia     Hypertension     Mononucleosis     In high school    Nerve pain     PONV (postoperative nausea and vomiting)     Psoriasis     Sleep apnea     Uses CPAP      Past Surgical History:   Procedure Laterality Date    ANTERIOR LUMBAR EXPOSURE N/A 2020    Procedure: ANTERIOR LUMBAR EXPOSURE;  Surgeon: Salvatore Villalta DO;  Location:  [facility];  Service: Vascular    CARPAL TUNNEL RELEASE Bilateral      SECTION      x2    CHOLECYSTECTOMY      LUMBAR FUSION N/A 2020    Procedure: ANTERIOR DECOMPRESSION, ANTERIOR LUMBAR INTERBODY FUSION WITH INSTRUMENTATION L5-S1;  Surgeon: PAMELA Bang MD;  Location:  [facility];  Service: Orthopedic Spine    LUMBAR FUSION Right 2020    Procedure: RIGHT  LATERAL LUMBAR  INTERBODY FUSION WITH INSTRUMENTATION L4-5;  Surgeon: PAMELA Bang MD;  Location:  [facility];  Service: Orthopedic Spine    LUMBAR FUSION Left 2023    Procedure: LEFT LATERAL LUMBAR INTERBODY WITH INSTRUMENTATION L3-4;  Surgeon: PAMELA Bang" MD Sharif;  Location:  [facility];  Service: Orthopedic Spine;  Laterality: Left;    LUMBAR LAMINECTOMY WITH FUSION N/A 01/24/2020    Procedure: POSTERIOR SPINAL FUSION WITH INSTRUMENTATION L4-S1;  Surgeon: PAMELA Bang MD;  Location:  [facility];  Service: Orthopedic Spine    LUMBAR LAMINECTOMY WITH FUSION N/A 7/7/2023    Procedure: REMOVAL OF INSTRUMENTATION, EXPLORATION OF FUSION L4-S1, POSTERIOR SPINAL FUSION L3-4 WITH INSTRUMENTATION L3-S1;  Surgeon: PAMELA Bang MD;  Location:  [facility];  Service: Orthopedic Spine;  Laterality: N/A;    REPLACEMENT TOTAL KNEE Left 03/14/2023      Current Outpatient Medications   Medication Sig Dispense Refill    citalopram (CeleXA) 40 MG tablet Take 1 tablet by mouth Daily.      docusate sodium (COLACE) 50 MG capsule Take  by mouth 2 (Two) Times a Day.      estradiol (CLIMARA) 0.075 MG/24HR patch Place 1 patch on the skin as directed by provider 2 (Two) Times a Week. On tuesdays and fridays      ezetimibe-simvastatin (VYTORIN) 10-40 MG per tablet Take 1 tablet by mouth Every Night.      HYDROcodone-acetaminophen (NORCO)  MG per tablet Take 1 tablet by mouth Every 6 (Six) Hours As Needed for Moderate Pain.      levocetirizine (XYZAL) 5 MG tablet Take 1 tablet by mouth Every Evening.      lisinopril-hydrochlorothiazide (PRINZIDE,ZESTORETIC) 20-12.5 MG per tablet Take 1 tablet by mouth Daily.      loratadine (CLARITIN) 10 MG tablet Take 1 tablet by mouth Daily.      medroxyPROGESTERone (PROVERA) 10 MG tablet Take 1 tablet by mouth Daily. For the last 10 days of the month      metFORMIN (GLUCOPHAGE) 500 MG tablet Take 2 tablets by mouth 2 (Two) Times a Day With Meals.      metoprolol succinate XL (TOPROL-XL) 25 MG 24 hr tablet Take 1 tablet by mouth Every Night.      multivitamin with minerals tablet tablet Take 1 tablet by mouth Daily.      ondansetron (ZOFRAN) 4 MG tablet Take 1 tablet by mouth Every 6 (Six) Hours As Needed for Nausea or Vomiting. 60 tablet 0     pregabalin (LYRICA) 75 MG capsule Take 1 capsule by mouth Every 12 (Twelve) Hours.      prenatal vitamin (prenatal, CLASSIC, vitamin) tablet Take 1 tablet by mouth Daily.      timolol (TIMOPTIC) 0.5 % ophthalmic solution Administer 1 drop to both eyes Daily.      tiZANidine (ZANAFLEX) 4 MG tablet Take 2 tablets by mouth 2 (Two) Times a Day.      triamcinolone (KENALOG) 0.1 % cream Apply 1 application  topically to the appropriate area as directed 2 (Two) Times a Day.      Tremfya 100 MG/ML solution pen-injector Inject 1 dose as directed Every 2 (Two) Months. (Patient not taking: Reported on 8/14/2023)       No current facility-administered medications for this visit.      Allergies   Allergen Reactions    Ciprofloxacin Swelling     Throat swelling    Penicillins Swelling     Throat swelling    Cethexonium Unknown - Low Severity     Patient stated eye burning        Pertinent Social/Behavior/Environmental History: Knee sx in March; x2 Back surgeries in July; expecting second knee sx in Nov.  Spouse and neighbors have been preparing meals. Runs sisters portable business.     Nutrition Recall  Eating 3-4 meals daily. Did have a routine but no longer. Has gone back to work and is expecting to get back into a routine.   24 hr recall:   (M1) 9:30 am fruit & cereal bar. With water.   (M2) ~12/1 pm [employer] fried chicken sandwich with fries   (M3) ~ 8 pm Healthy choice steamer bowl with beef and broccoli/carrots.  No rice.   (M4) n/a  Snacking - yogurt, tries to keep cut up vegetables, turkey ham pieces, cheese stick, SF cirilo candies (has quit buying these), protein bars   Monitoring portions- not consistently  Calculating Protein- estimates 25-30 grams.   Drinking sugary/carbonated beverages- 1-2 sodas/month   Fluid Intake- 64 oz daily      Success this Month: Is feeling more confident about getting back on a schedule/in routine now that she's started back to work.  Barriers: Multiple surgeries.     Exercise: limited to  none secondary to multiple surgical procedures.  Recommended increasing physical activity, beyond normal daily habits, gradually working to reach ~30 minutes daily.     Nutrition Intervention  Nutrition education for morbid obesity & diabetes and nutrition coaching for behavior change provided.  Strategies used included Comprehensive education, Motivational Interviewing , Problem Solving, and Ongoing reinforcement  Review of medical weight loss prescription 4 meal/day plan and reviewed nutritional needs for Medical Weight Loss.  Self-monitoring strategies such as keeping a food journal (on paper or electronically) and calculating fluid/protein intake were discussed.    Recommended Diet Changes- Green Prescription Meal Plan  Eat 4 meals per day with protein and vegetables at each meal, no carbs after meal 2., Protein goal: 65 gms., Eat vegetables first at each meal., Discussed protein guidelines for shakes and bars., Reduce snacking -use foods from free foods list only., Reduce fat, sugar, and/or salt in food choices., Choose more nutrient dense foods., Choose foods with increased fiber., Monitor portion sizes using a food scale and/or measuring cup., Eliminate soda and sugar-sweetened beverages, and Increase fluid intake to 64 ounces per day      Goals  1. Rework schedule to get in 4 meals daily. Meal 1 ~7:30/8 am; Meal 2 ~11/12 pm; Meal 3 ~ 3pm; Meal 4 ~7/8pm  2. Free foods only for snacks-- we discussed she can use the protein foods she's been reaching for as snacks + vegetables to make compliant meals.  3. Utilize food journal consistently for self accountability. States she is aware this helped her stay on track previously.    Monitoring/Evaluation Plan  Anticipate follow in 2 months. Continue collaboration of care with physician and treatment team.     Time Spent 20 minutes    Electronically signed by  Felicitas Briceno RDN, LD  08/14/2023 08:41 CDT.

## 2023-09-26 ENCOUNTER — OFFICE VISIT (OUTPATIENT)
Dept: NEUROLOGY | Age: 53
End: 2023-09-26
Payer: COMMERCIAL

## 2023-09-26 VITALS
SYSTOLIC BLOOD PRESSURE: 101 MMHG | DIASTOLIC BLOOD PRESSURE: 66 MMHG | HEIGHT: 63 IN | BODY MASS INDEX: 46.95 KG/M2 | WEIGHT: 265 LBS | OXYGEN SATURATION: 98 % | HEART RATE: 72 BPM

## 2023-09-26 DIAGNOSIS — R40.0 SOMNOLENCE, DAYTIME: ICD-10-CM

## 2023-09-26 DIAGNOSIS — G47.33 OBSTRUCTIVE SLEEP APNEA: Primary | ICD-10-CM

## 2023-09-26 DIAGNOSIS — Z99.89 BIPAP (BIPHASIC POSITIVE AIRWAY PRESSURE) DEPENDENCE: ICD-10-CM

## 2023-09-26 PROCEDURE — 3074F SYST BP LT 130 MM HG: CPT | Performed by: PHYSICIAN ASSISTANT

## 2023-09-26 PROCEDURE — 3078F DIAST BP <80 MM HG: CPT | Performed by: PHYSICIAN ASSISTANT

## 2023-09-26 PROCEDURE — 99214 OFFICE O/P EST MOD 30 MIN: CPT | Performed by: PHYSICIAN ASSISTANT

## 2023-09-26 NOTE — PROGRESS NOTES
[facility]  [address]  [facility], [address] [place] [address]  Phone [phone]  Fax [phone] [address] [facility] Follow Up Encounter      Information:   Patient Name: Tanvi Low  :   1970  Age:   46 y.o. MRN:   [id number]  Account #:  [de-identified]  Today:                23    Provider:  Ivett Lee PA-C    Chief Complaint   Patient presents with    Follow-up    Sleep Apnea    CPAP/BiPAP        Subjective:   Tanvi Low is a 46 y.o. female who  has a past medical history of Arthritis with psoriasis ([address]), BiPAP (biphasic positive airway pressure) dependence, Hyperlipidemia, Hypertension, Increased pressure in the eye, bilateral, Knee pain, Obstructive sleep apnea, Osteoarthritis, and Pain management. Tanvi Low comes in for an annual sleep clinic follow up. The PSG, 18 revealed an AHI of 106.6 with O2 desaturations as low as 71%. She is prescribed BiPAP therapy with a pressure of 20cm/16cm. It is a replaced [employer] device. It does not have a modem. She was told that she can call [employer] and obtain a modem. Today she reports that she is doing well with the use of PAP but she reports that her sleep quality is poor. She has frequent awakenings. She has a hard time returning to sleep. She denies snoring over therapy. She persists to have excessive daytime somnolence. The ESS score is 19/24. This has worsened since . She had knee surgery in March and back surgery in July. She is scheduled for another knee replacement in November. She is not on sedating medications. She is diabetic but she has lost weight and the HgA1C was 5 at her recent lab evaluation. She had to stop her OA medications due to surgery. She has psoriatic arthritis and it has worsened off medications. She has worsening arthralgia. She has been able to restart diclofenac. The compliance report indicates that she is averaging 8.5 hours of PAP use per day.  She has

## 2023-09-26 NOTE — PATIENT INSTRUCTIONS
long-term follow-up should be established. Annual visits are reasonable, with more frequent visits in between if new issues arise. The purpose of long-term follow-up is to assess usage and monitor for recurrent DORITA, new side effects, air leakage, and fluctuations in body weight. WHERE TO GET MORE INFORMATION -- Your healthcare provider is the best source of information for questions and concerns related to your medical problem. Organizations  American Sleep [employer]nea Association  Provides information about sleep apnea to the public, publishes a newsletter, and serves as an advocate for people with the disorder. [address], [address] [facility]   [address], [address] [facility]   [email or web address]   [email or web address]   Tel: [phone]   Fax: [phone] [facility] [address] organization that works to [employer] and safety by promoting public understanding of sleep and sleep disorders. Supports sleep-related education, research, and advocacy; produces and distributes educational materials to the public and healthcare professionals; and offers postdoctoral fellowships and grants for sleep researchers. [address] [place] [address]   [email or web address]   [email or web address]   Tel: [phone]   Fax: [phone]    Important information:  Medicare/private insurance CPAP/BiPAP/APAP requirements:  Medicare/private insurance has specific requirements for PAP compliance that must be met during the first 90 days of use to continue coverage for CPAP/BiPAP/APAP  from day 91 and beyond. The policy requires that patients use a PAP device 4 hours per 24 hour period, at least 70% of the time over a 30 day period. This data must be downloaded as a report direct from the PAP devices. This is called a compliance download. Your PAP supplier will assist you in this matter.      Note:  Where applicable, we will utilize PAP device efficiency

## 2023-10-09 DIAGNOSIS — N95.1 MENOPAUSAL SYMPTOMS: ICD-10-CM

## 2023-10-09 RX ORDER — ESTRADIOL 0.07 MG/D
FILM, EXTENDED RELEASE TRANSDERMAL
Qty: 24 PATCH | Refills: 3 | OUTPATIENT
Start: 2023-10-09

## 2023-10-31 RX ORDER — ESTRADIOL 0.07 MG/D
1 PATCH TRANSDERMAL 2 TIMES WEEKLY
Qty: 24 PATCH | Refills: 1 | Status: SHIPPED | OUTPATIENT
Start: 2023-11-02

## 2023-10-31 RX ORDER — MEDROXYPROGESTERONE ACETATE 10 MG/1
TABLET ORAL
Qty: 30 TABLET | Refills: 3 | Status: SHIPPED | OUTPATIENT
Start: 2023-10-31

## 2023-11-09 ENCOUNTER — OFFICE VISIT (OUTPATIENT)
Dept: OBSTETRICS AND GYNECOLOGY | Facility: CLINIC | Age: 53
End: 2023-11-09
Payer: COMMERCIAL

## 2023-11-09 VITALS
HEIGHT: 65 IN | WEIGHT: 178 LBS | BODY MASS INDEX: 29.66 KG/M2 | SYSTOLIC BLOOD PRESSURE: 110 MMHG | DIASTOLIC BLOOD PRESSURE: 64 MMHG

## 2023-11-09 DIAGNOSIS — Z12.4 ENCOUNTER FOR PAPANICOLAOU SMEAR FOR CERVICAL CANCER SCREENING: ICD-10-CM

## 2023-11-09 DIAGNOSIS — Z12.31 SCREENING MAMMOGRAM, ENCOUNTER FOR: ICD-10-CM

## 2023-11-09 DIAGNOSIS — G47.33 OBSTRUCTIVE SLEEP APNEA SYNDROME: ICD-10-CM

## 2023-11-09 DIAGNOSIS — M51.37 DISC DEGENERATION, LUMBOSACRAL: ICD-10-CM

## 2023-11-09 DIAGNOSIS — Z01.419 ENCOUNTER FOR GYNECOLOGICAL EXAMINATION: Primary | ICD-10-CM

## 2023-11-09 DIAGNOSIS — R53.83 FATIGUE, UNSPECIFIED TYPE: ICD-10-CM

## 2023-11-09 DIAGNOSIS — E55.9 VITAMIN D DEFICIENCY: ICD-10-CM

## 2023-11-09 PROCEDURE — G0123 SCREEN CERV/VAG THIN LAYER: HCPCS

## 2023-11-09 PROCEDURE — 87624 HPV HI-RISK TYP POOLED RSLT: CPT

## 2023-11-09 RX ORDER — ESTRADIOL 0.07 MG/D
1 PATCH TRANSDERMAL 2 TIMES WEEKLY
Qty: 24 PATCH | Refills: 1 | Status: SHIPPED | OUTPATIENT
Start: 2023-11-09

## 2023-11-09 RX ORDER — MEDROXYPROGESTERONE ACETATE 10 MG/1
10 TABLET ORAL DAILY
Qty: 30 TABLET | Refills: 3 | Status: SHIPPED | OUTPATIENT
Start: 2023-11-09

## 2023-11-09 RX ORDER — DICLOFENAC SODIUM 75 MG/1
TABLET, DELAYED RELEASE ORAL
COMMUNITY
Start: 2023-09-19

## 2023-11-09 NOTE — PROGRESS NOTES
"Subjective     Ayla Echevarria is a 53 y.o. female    History of Present Illness  The patient is here today for her annual exam. Patient is due for her cervical screening pap smear. She reports doing well with her Climara and Provera and requests refills today. She does state she is experiencing more fatigue than normal and altered sleep despite wearing her cpap machine at nighttime.   Gynecologic Exam  The patient's pertinent negatives include no genital itching, genital lesions, genital odor, genital rash, missed menses, pelvic pain, vaginal bleeding or vaginal discharge. The patient is experiencing no pain. She is not pregnant. Pertinent negatives include no abdominal pain, anorexia, back pain, chills, constipation, diarrhea, discolored urine, dysuria, fever, flank pain, frequency, headaches, hematuria, joint pain, joint swelling, nausea, painful intercourse, rash, sore throat, urgency or vomiting. Nothing aggravates the symptoms. She has tried nothing for the symptoms. She is not sexually active. She uses nothing for contraception. Her menstrual history has been regular. Her past medical history is significant for a  section.     /64   Ht 165.1 cm (65\")   Wt 80.7 kg (178 lb)   LMP 10/09/2023 (Approximate)   BMI 29.62 kg/m²     Outpatient Encounter Medications as of 2023   Medication Sig Dispense Refill    citalopram (CeleXA) 40 MG tablet Take 1 tablet by mouth Daily.      diclofenac (VOLTAREN) 75 MG EC tablet       docusate sodium (COLACE) 50 MG capsule Take  by mouth 2 (Two) Times a Day.      estradiol (CLIMARA) 0.075 MG/24HR patch Place 1 patch on the skin as directed by provider 2 (Two) Times a Week. On  and  24 patch 1    ezetimibe-simvastatin (VYTORIN) 10-40 MG per tablet Take 1 tablet by mouth Every Night.      HYDROcodone-acetaminophen (NORCO)  MG per tablet Take 1 tablet by mouth Every 6 (Six) Hours As Needed for Moderate Pain.      levocetirizine (XYZAL) 5 MG " tablet Take 1 tablet by mouth Every Evening.      lisinopril-hydrochlorothiazide (PRINZIDE,ZESTORETIC) 20-12.5 MG per tablet Take 1 tablet by mouth Daily.      medroxyPROGESTERone (PROVERA) 10 MG tablet Take 1 tablet by mouth Daily. Take one tablet daily the last 10 days of the month. 30 tablet 3    metFORMIN (GLUCOPHAGE) 500 MG tablet Take 2 tablets by mouth 2 (Two) Times a Day With Meals.      metoprolol succinate XL (TOPROL-XL) 25 MG 24 hr tablet Take 1 tablet by mouth Every Night.      ondansetron (ZOFRAN) 4 MG tablet Take 1 tablet by mouth Every 6 (Six) Hours As Needed for Nausea or Vomiting. 60 tablet 0    pregabalin (LYRICA) 75 MG capsule Take 1 capsule by mouth Every 12 (Twelve) Hours.      prenatal vitamin (prenatal, CLASSIC, vitamin) tablet Take 1 tablet by mouth Daily.      silver sulfadiazine (SILVADENE, SSD) 1 % cream       timolol (TIMOPTIC) 0.5 % ophthalmic solution Administer 1 drop to both eyes Daily.      tiZANidine (ZANAFLEX) 4 MG tablet Take 2 tablets by mouth 2 (Two) Times a Day.      triamcinolone (KENALOG) 0.1 % cream Apply 1 application  topically to the appropriate area as directed 2 (Two) Times a Day.      [DISCONTINUED] estradiol (CLIMARA) 0.075 MG/24HR patch Place 1 patch on the skin as directed by provider 2 (Two) Times a Week. On tuesdays and fridays 24 patch 1    [DISCONTINUED] medroxyPROGESTERone (PROVERA) 10 MG tablet TAKE 1 TABLET DAILY FOR 10 DAYS EACH MONTH 30 tablet 3    loratadine (CLARITIN) 10 MG tablet Take 1 tablet by mouth Daily. (Patient not taking: Reported on 11/9/2023)      multivitamin with minerals tablet tablet Take 1 tablet by mouth Daily. (Patient not taking: Reported on 11/9/2023)      Tremfya 100 MG/ML solution pen-injector Inject 1 dose as directed Every 2 (Two) Months. (Patient not taking: Reported on 8/14/2023)       No facility-administered encounter medications on file as of 11/9/2023.       Past Medical History  Past Medical History:   Diagnosis Date     Asthma 2019    Carpal tunnel syndrome     Chronic bilateral low back pain with bilateral sciatica 2020    Depression 2019    Diabetes mellitus     Disc degeneration, lumbosacral 2020    Glaucoma 2018    Hyperlipidemia     Hypertension     Mononucleosis     Nerve pain     PONV (postoperative nausea and vomiting)     Psoriasis     Sleep apnea        Surgical History  Past Surgical History:   Procedure Laterality Date    ANTERIOR LUMBAR EXPOSURE N/A 2020    Procedure: ANTERIOR LUMBAR EXPOSURE;  Surgeon: Salvatore Villalta DO;  Location:  [facility] OR;  Service: Vascular    CARPAL TUNNEL RELEASE Bilateral      SECTION      x2    CHOLECYSTECTOMY      LUMBAR FUSION N/A 2020    Procedure: ANTERIOR DECOMPRESSION, ANTERIOR LUMBAR INTERBODY FUSION WITH INSTRUMENTATION L5-S1;  Surgeon: PAMELA Bang MD;  Location:  [facility];  Service: Orthopedic Spine    LUMBAR FUSION Right 2020    Procedure: RIGHT  LATERAL LUMBAR  INTERBODY FUSION WITH INSTRUMENTATION L4-5;  Surgeon: PAMELA Bang MD;  Location:  [facility];  Service: Orthopedic Spine    LUMBAR FUSION Left 2023    Procedure: LEFT LATERAL LUMBAR INTERBODY WITH INSTRUMENTATION L3-4;  Surgeon: PAMELA Bang MD;  Location:  [facility];  Service: Orthopedic Spine;  Laterality: Left;    LUMBAR LAMINECTOMY WITH FUSION N/A 2020    Procedure: POSTERIOR SPINAL FUSION WITH INSTRUMENTATION L4-S1;  Surgeon: PAMELA Bang MD;  Location:  [facility];  Service: Orthopedic Spine    LUMBAR LAMINECTOMY WITH FUSION N/A 2023    Procedure: REMOVAL OF INSTRUMENTATION, EXPLORATION OF FUSION L4-S1, POSTERIOR SPINAL FUSION L3-4 WITH INSTRUMENTATION L3-S1;  Surgeon: PAMELA Bang MD;  Location:  [facility];  Service: Orthopedic Spine;  Laterality: N/A;    REPLACEMENT TOTAL KNEE Left 2023       Family History  Family History   Problem Relation Age of Onset    Hypertension Mother     Arthritis Mother     Hypertension Father      Arthritis Father     Heart disease Maternal Grandmother     Arthritis Maternal Grandmother     Arthritis Maternal Grandfather     Arthritis Paternal Grandmother     No Known Problems Paternal Grandfather        The following portions of the patient's history were reviewed and updated as appropriate: allergies, current medications, past family history, past medical history, past social history, past surgical history, and problem list.    Review of Systems   Constitutional: Negative.  Negative for chills and fever.   HENT: Negative.  Negative for sore throat.    Eyes: Negative.    Respiratory: Negative.     Cardiovascular: Negative.    Gastrointestinal: Negative.  Negative for abdominal pain, anorexia, constipation, diarrhea, nausea and vomiting.   Endocrine: Negative.    Genitourinary: Negative.  Negative for dysuria, flank pain, frequency, hematuria, missed menses, pelvic pain, urgency and vaginal discharge.   Musculoskeletal: Negative.  Negative for back pain and joint pain.   Skin: Negative.  Negative for rash.   Allergic/Immunologic: Negative.    Neurological: Negative.    Hematological: Negative.    Psychiatric/Behavioral: Negative.       Objective   Physical Exam  Vitals and nursing note reviewed. Exam conducted with a chaperone present.   Constitutional:       General: She is not in acute distress.     Appearance: She is well-developed. She is not diaphoretic.   HENT:      Head: Normocephalic.      Right Ear: External ear normal.      Left Ear: External ear normal.      Nose: Nose normal.   Eyes:      General: No scleral icterus.        Right eye: No discharge.         Left eye: No discharge.      Conjunctiva/sclera: Conjunctivae normal.      Pupils: Pupils are equal, round, and reactive to light.   Neck:      Thyroid: No thyromegaly.      Vascular: No carotid bruit.      Trachea: No tracheal deviation.   Cardiovascular:      Rate and Rhythm: Normal rate and regular rhythm.      Heart sounds: Normal heart  sounds. No murmur heard.  Pulmonary:      Effort: Pulmonary effort is normal. No respiratory distress.      Breath sounds: Normal breath sounds. No wheezing.   Chest:   Breasts:     Breasts are symmetrical.      Right: Normal. No swelling, bleeding, inverted nipple, mass, nipple discharge, skin change or tenderness.      Left: Normal. No swelling, bleeding, inverted nipple, mass, nipple discharge, skin change or tenderness.   Abdominal:      General: There is no distension.      Palpations: Abdomen is soft. There is no mass.      Tenderness: There is no abdominal tenderness. There is no right CVA tenderness, left CVA tenderness or guarding.      Hernia: No hernia is present. There is no hernia in the left inguinal area or right inguinal area.   Genitourinary:     General: Normal vulva.      Exam position: Lithotomy position.      Labia:         Right: No rash, tenderness, lesion or injury.         Left: No rash, tenderness, lesion or injury.       Vagina: Normal. No signs of injury and foreign body. No vaginal discharge, erythema, tenderness or bleeding.      Cervix: Normal.      Uterus: Normal. Not enlarged, not fixed and not tender.       Adnexa: Right adnexa normal and left adnexa normal.        Right: No mass, tenderness or fullness.          Left: No mass, tenderness or fullness.        Rectum: Normal. No mass.      Comments: BSU normal  Urethral meatus  Normal  Perineum  Normal  Musculoskeletal:         General: No tenderness. Normal range of motion.      Cervical back: Normal range of motion and neck supple.   Lymphadenopathy:      Head:      Right side of head: No submental, submandibular, tonsillar, preauricular, posterior auricular or occipital adenopathy.      Left side of head: No submental, submandibular, tonsillar, preauricular, posterior auricular or occipital adenopathy.      Cervical: No cervical adenopathy.      Right cervical: No superficial, deep or posterior cervical adenopathy.     Left  cervical: No superficial, deep or posterior cervical adenopathy.      Upper Body:      Right upper body: No supraclavicular, axillary or pectoral adenopathy.      Left upper body: No supraclavicular, axillary or pectoral adenopathy.      Lower Body: No right inguinal adenopathy. No left inguinal adenopathy.   Skin:     General: Skin is warm and dry.      Findings: No bruising, erythema or rash.   Neurological:      Mental Status: She is alert and oriented to person, place, and time.      Coordination: Coordination normal.   Psychiatric:         Mood and Affect: Mood normal.         Behavior: Behavior normal.         Thought Content: Thought content normal.         Judgment: Judgment normal.       PHQ-9 Depression Screening  Little interest or pleasure in doing things? 0-->not at all   Feeling down, depressed, or hopeless? 0-->not at all   Trouble falling or staying asleep, or sleeping too much?     Feeling tired or having little energy?     Poor appetite or overeating?     Feeling bad about yourself - or that you are a failure or have let yourself or your family down?     Trouble concentrating on things, such as reading the newspaper or watching television?     Moving or speaking so slowly that other people could have noticed? Or the opposite - being so fidgety or restless that you have been moving around a lot more than usual?     Thoughts that you would be better off dead, or of hurting yourself in some way?     PHQ-9 Total Score 0   If you checked off any problems, how difficult have these problems made it for you to do your work, take care of things at home, or get along with other people?          Assessment & Plan   Diagnoses and all orders for this visit:    1. Encounter for gynecological examination (Primary)  Comments:  Patient is here today for her annual exam. Due for cervical screening pap smear with this visit. Patient does report increased fatigue and altered sleep.   Orders:  -     Liquid-based Pap  "Smear, Screening    2. Encounter for Papanicolaou smear for cervical cancer screening  Comments:  Per ASCCP guidelines, patient is due for her cervical screening pap smear.  Orders:  -     Liquid-based Pap Smear, Screening    3. Screening mammogram, encounter for  Comments:  Patient recently had screening mammogram completed and was normal. Mammogram will be ordered for 2024 to be completed at [facility]. Patient  will call to schedule.  Orders:  -     Mammo Screening Digital Tomosynthesis Bilateral With CAD; Future    4. Fatigue, unspecified type  Comments:  Patient states she is experiencing an increase in fatigue and waking up \"more tired than when I went to bed\".   Orders:  -     Vitamin D,25-Hydroxy    5. Disc degeneration, lumbosacral  Comments:  Patient with history of DDD. She does state she suffers from chronic back pain and altered sensations to both of her legs. She states she will be undergoing knee surgery soon.  Orders:  -     Vitamin D,25-Hydroxy    6. Obstructive sleep apnea syndrome  Comments:  Patient wears cpap machine and reports that she is due for a new sleep study soon. She reports a change in her sleep and waking up more tired in the morning.  Orders:  -     Vitamin D,25-Hydroxy    Other orders  -     estradiol (CLIMARA) 0.075 MG/24HR patch; Place 1 patch on the skin as directed by provider 2 (Two) Times a Week. On tuesdays and fridays  Dispense: 24 patch; Refill: 1  -     medroxyPROGESTERone (PROVERA) 10 MG tablet; Take 1 tablet by mouth Daily. Take one tablet daily the last 10 days of the month.  Dispense: 30 tablet; Refill: 3    Normal GYN exam. Encouraged SBE, pt is aware how to do self breast exam and the importance of same. Discussed weight management and importance of maintaining a healthy weight. Discussed Vitamin D intake and the importance of adequate vitamin D for both bone health and a healthy immune system.  Discussed daily exercise and the importance of same, in regards to a " healthy heart as well as helping to maintain her weight and improving her mental health.  Colonoscopy  will be discussed at next visit; patient wants to think about this . Mammogram is up to date. Bone density is not indicated. Pap smear is done per ASCCP guidelines. HPV is done. Lab work up is up to date.      Princess Garcia, JOLENE  11/9/2023

## 2023-11-10 LAB — 25(OH)D3+25(OH)D2 SERPL-MCNC: 24.4 NG/ML (ref 30–100)

## 2023-11-10 RX ORDER — ERGOCALCIFEROL 1.25 MG/1
50000 CAPSULE ORAL WEEKLY
Qty: 30 CAPSULE | Refills: 0 | Status: SHIPPED | OUTPATIENT
Start: 2023-11-10

## 2023-11-13 LAB
GEN CATEG CVX/VAG CYTO-IMP: NORMAL
HPV I/H RISK 4 DNA CVX QL PROBE+SIG AMP: NOT DETECTED
LAB AP CASE REPORT: NORMAL
LAB AP GYN ADDITIONAL INFORMATION: NORMAL
Lab: NORMAL
PATH INTERP SPEC-IMP: NORMAL
STAT OF ADQ CVX/VAG CYTO-IMP: NORMAL

## 2023-11-16 ENCOUNTER — HOSPITAL ENCOUNTER (OUTPATIENT)
Dept: PREADMISSION TESTING | Age: 53
Discharge: HOME | End: 2023-11-20
Payer: COMMERCIAL

## 2023-11-16 ENCOUNTER — OFFICE VISIT (OUTPATIENT)
Dept: CARDIOLOGY CLINIC | Age: 53
End: 2023-11-16
Payer: COMMERCIAL

## 2023-11-16 VITALS
HEIGHT: 63 IN | DIASTOLIC BLOOD PRESSURE: 74 MMHG | WEIGHT: 277 LBS | SYSTOLIC BLOOD PRESSURE: 124 MMHG | BODY MASS INDEX: 49.08 KG/M2 | HEART RATE: 71 BPM

## 2023-11-16 DIAGNOSIS — I10 ESSENTIAL HYPERTENSION: ICD-10-CM

## 2023-11-16 DIAGNOSIS — E66.01 MORBID OBESITY WITH BMI OF 45.0-49.9, ADULT (HCC): ICD-10-CM

## 2023-11-16 DIAGNOSIS — E11.69 TYPE 2 DIABETES MELLITUS WITH OTHER SPECIFIED COMPLICATION, WITHOUT LONG-TERM CURRENT USE OF INSULIN (HCC): ICD-10-CM

## 2023-11-16 DIAGNOSIS — E78.5 HYPERLIPIDEMIA, UNSPECIFIED HYPERLIPIDEMIA TYPE: ICD-10-CM

## 2023-11-16 DIAGNOSIS — I47.10 SVT (SUPRAVENTRICULAR TACHYCARDIA): Primary | ICD-10-CM

## 2023-11-16 DIAGNOSIS — Z87.891 HISTORY OF TOBACCO USE: ICD-10-CM

## 2023-11-16 DIAGNOSIS — G47.33 OBSTRUCTIVE SLEEP APNEA: ICD-10-CM

## 2023-11-16 LAB
ANION GAP SERPL CALCULATED.3IONS-SCNC: 12 MMOL/L (ref 7–19)
APTT PPP: 34.9 SEC (ref 26–36.2)
BASOPHILS # BLD: 0.1 K/UL (ref 0–0.2)
BASOPHILS NFR BLD: 0.8 % (ref 0–1)
BUN SERPL-MCNC: 20 MG/DL (ref 6–20)
CALCIUM SERPL-MCNC: 10 MG/DL (ref 8.6–10)
CHLORIDE SERPL-SCNC: 102 MMOL/L (ref 98–111)
CO2 SERPL-SCNC: 26 MMOL/L (ref 22–29)
CREAT SERPL-MCNC: 0.7 MG/DL (ref 0.5–0.9)
EOSINOPHIL # BLD: 0.2 K/UL (ref 0–0.6)
EOSINOPHIL NFR BLD: 2 % (ref 0–5)
ERYTHROCYTE [DISTWIDTH] IN BLOOD BY AUTOMATED COUNT: 14.4 % (ref 11.5–14.5)
GLUCOSE SERPL-MCNC: 143 MG/DL (ref 74–109)
HBA1C MFR BLD: 6.2 % (ref 4–6)
HCT VFR BLD AUTO: 35.7 % (ref 37–47)
HGB BLD-MCNC: 12.2 G/DL (ref 12–16)
IMM GRANULOCYTES # BLD: 0 K/UL
INR PPP: 1.05 (ref 0.88–1.18)
LYMPHOCYTES # BLD: 1.7 K/UL (ref 1.1–4.5)
LYMPHOCYTES NFR BLD: 18.2 % (ref 20–40)
MCH RBC QN AUTO: 30.3 PG (ref 27–31)
MCHC RBC AUTO-ENTMCNC: 34.2 G/DL (ref 33–37)
MCV RBC AUTO: 88.8 FL (ref 81–99)
MONOCYTES # BLD: 0.5 K/UL (ref 0–0.9)
MONOCYTES NFR BLD: 5.5 % (ref 0–10)
MRSA DNA SPEC QL NAA+PROBE: NOT DETECTED
NEUTROPHILS # BLD: 6.6 K/UL (ref 1.5–7.5)
NEUTS SEG NFR BLD: 73.3 % (ref 50–65)
PLATELET # BLD AUTO: 316 K/UL (ref 130–400)
PMV BLD AUTO: 9.9 FL (ref 9.4–12.3)
POTASSIUM SERPL-SCNC: 4.1 MMOL/L (ref 3.5–5)
PROTHROMBIN TIME: 13.4 SEC (ref 12–14.6)
RBC # BLD AUTO: 4.02 M/UL (ref 4.2–5.4)
SODIUM SERPL-SCNC: 140 MMOL/L (ref 136–145)
WBC # BLD AUTO: 9.1 K/UL (ref 4.8–10.8)

## 2023-11-16 PROCEDURE — 3074F SYST BP LT 130 MM HG: CPT | Performed by: CLINICAL NURSE SPECIALIST

## 2023-11-16 PROCEDURE — 80048 BASIC METABOLIC PNL TOTAL CA: CPT

## 2023-11-16 PROCEDURE — 3044F HG A1C LEVEL LT 7.0%: CPT | Performed by: CLINICAL NURSE SPECIALIST

## 2023-11-16 PROCEDURE — 83036 HEMOGLOBIN GLYCOSYLATED A1C: CPT

## 2023-11-16 PROCEDURE — 99214 OFFICE O/P EST MOD 30 MIN: CPT | Performed by: CLINICAL NURSE SPECIALIST

## 2023-11-16 PROCEDURE — 93000 ELECTROCARDIOGRAM COMPLETE: CPT | Performed by: CLINICAL NURSE SPECIALIST

## 2023-11-16 PROCEDURE — 87641 MR-STAPH DNA AMP PROBE: CPT

## 2023-11-16 PROCEDURE — 85025 COMPLETE CBC W/AUTO DIFF WBC: CPT

## 2023-11-16 PROCEDURE — 85610 PROTHROMBIN TIME: CPT

## 2023-11-16 PROCEDURE — 85730 THROMBOPLASTIN TIME PARTIAL: CPT

## 2023-11-16 PROCEDURE — 3078F DIAST BP <80 MM HG: CPT | Performed by: CLINICAL NURSE SPECIALIST

## 2023-11-16 RX ORDER — DICLOFENAC SODIUM 75 MG/1
75 TABLET, DELAYED RELEASE ORAL 2 TIMES DAILY
COMMUNITY
Start: 2023-09-19

## 2023-11-16 RX ORDER — ERGOCALCIFEROL 1.25 MG/1
50000 CAPSULE ORAL WEEKLY
COMMUNITY

## 2023-11-16 RX ORDER — TIZANIDINE 4 MG/1
4 TABLET ORAL NIGHTLY PRN
COMMUNITY

## 2023-11-16 ASSESSMENT — ENCOUNTER SYMPTOMS
FACIAL SWELLING: 0
EYE REDNESS: 0
NAUSEA: 0
WHEEZING: 0
ABDOMINAL PAIN: 0
COUGH: 0
VOMITING: 0
SHORTNESS OF BREATH: 0
CHEST TIGHTNESS: 0

## 2023-11-16 NOTE — PROGRESS NOTES
5.8, well controlled    Morbid obesity-continue to encourage weight loss through dietary changes. She hopes to become more active when she has her knee surgery at the end of the month. Obstructive sleep apnea-stable nurse equipment regularly    History of tobacco use-remains smoke-free    Stable cardiovascular status. No evidence of overt heart failure,angina or dysrhythmia. Plan    Orders Placed This Encounter   Procedures    EKG 12 lead     Order Specific Question:   Reason for Exam?     Answer: Other     Return in about 1 year (around 11/16/2024) for APRGELACIO. Start exercise program once recovered from knee surgery  Healthy heart diet, weight loss    Call with any questionsor concerns  Follow up with Chuck Florez MD for non cardiac problems  Report any new problems  Cardiovascular Fitness-Exercise as tolerated. Strive for 15 minutes of exercise most days of the week. Cardiac / HealthyDiet  Continue current medications as directed  Continue plan of treatment  It is always recommended that you bring your medicationsbottles with you to each visit - this is for your safety!        ERMIAS Mcdaniel

## 2023-11-16 NOTE — PATIENT INSTRUCTIONS
Return in about 1 year (around 11/16/2024) for APRN.   Start exercise program once recovered from knee surgery  Healthy heart diet

## 2023-11-16 NOTE — DISCHARGE INSTRUCTIONS
and care       post operatively. 2.   Visitor Expectations and Limitations          3. One visitor allowed with patients in the preop/postop rooms. 4.  A second visitor may sit in the waiting area. 5.  No children under 13 allowed in the pre-post op areas unless they are the patient. 6.  Two people may be with an underage surgical/procedural patient in preop/postop        room. 7.  If you are admitted to the hospital post operatively, there are NO RESTRICTIONS on       the floor at this time. 8.  If you are admitted to ICU postoperatively, you may have one visitor in the room from        7A-7P. A second visitor may sit in the ICU waiting room. No overnight visitors in         ICU waiting room.

## 2023-11-20 DIAGNOSIS — N95.1 MENOPAUSAL SYMPTOMS: Primary | ICD-10-CM

## 2023-11-20 NOTE — TELEPHONE ENCOUNTER
Good evening,  The patient needs to continue the Vivelle dot with replacing the patch twice weekly. If I need to change the prescription please let me know. Thank you, Princess

## 2023-11-20 NOTE — TELEPHONE ENCOUNTER
Pt was in office 11/09/23. [facility] called requesting clarification on pt's estradiol patches. Pt had been on Vivelle Dot, replacing her patch twice weekly, but they have also received a script for Climara patch twice weekly, which is not recommended because this is considered a once weekly patch. Please advise on correct medication and instructions.

## 2023-11-21 RX ORDER — ESTRADIOL 0.07 MG/D
1 FILM, EXTENDED RELEASE TRANSDERMAL 2 TIMES WEEKLY
Qty: 24 PATCH | Refills: 3 | Status: SHIPPED | OUTPATIENT
Start: 2023-11-23

## 2023-12-05 ENCOUNTER — ANESTHESIA (OUTPATIENT)
Dept: OPERATING ROOM | Age: 53
End: 2023-12-05
Payer: COMMERCIAL

## 2023-12-05 ENCOUNTER — HOSPITAL ENCOUNTER (OUTPATIENT)
Age: 53
Setting detail: OUTPATIENT SURGERY
Discharge: HOME | End: 2023-12-05
Attending: ORTHOPAEDIC SURGERY | Admitting: ORTHOPAEDIC SURGERY
Payer: COMMERCIAL

## 2023-12-05 ENCOUNTER — ANESTHESIA EVENT (OUTPATIENT)
Dept: OPERATING ROOM | Age: 53
End: 2023-12-05
Payer: COMMERCIAL

## 2023-12-05 VITALS
BODY MASS INDEX: 49.08 KG/M2 | SYSTOLIC BLOOD PRESSURE: 112 MMHG | WEIGHT: 277 LBS | OXYGEN SATURATION: 98 % | HEART RATE: 79 BPM | DIASTOLIC BLOOD PRESSURE: 50 MMHG | HEIGHT: 63 IN | TEMPERATURE: 98.8 F | RESPIRATION RATE: 20 BRPM

## 2023-12-05 DIAGNOSIS — M17.11 PRIMARY OSTEOARTHRITIS OF RIGHT KNEE: Primary | ICD-10-CM

## 2023-12-05 LAB
GLUCOSE BLD-MCNC: 164 MG/DL (ref 70–99)
HCG UR QL: NEGATIVE
PERFORMED ON: ABNORMAL

## 2023-12-05 PROCEDURE — 2500000003 HC RX 250 WO HCPCS: Performed by: ORTHOPAEDIC SURGERY

## 2023-12-05 PROCEDURE — 2720000010 HC SURG SUPPLY STERILE: Performed by: ORTHOPAEDIC SURGERY

## 2023-12-05 PROCEDURE — 3600000015 HC SURGERY LEVEL 5 ADDTL 15MIN: Performed by: ORTHOPAEDIC SURGERY

## 2023-12-05 PROCEDURE — 6360000002 HC RX W HCPCS: Performed by: ANESTHESIOLOGY

## 2023-12-05 PROCEDURE — 2580000003 HC RX 258: Performed by: ORTHOPAEDIC SURGERY

## 2023-12-05 PROCEDURE — A4217 STERILE WATER/SALINE, 500 ML: HCPCS | Performed by: ORTHOPAEDIC SURGERY

## 2023-12-05 PROCEDURE — 84703 CHORIONIC GONADOTROPIN ASSAY: CPT

## 2023-12-05 PROCEDURE — 6370000000 HC RX 637 (ALT 250 FOR IP): Performed by: ORTHOPAEDIC SURGERY

## 2023-12-05 PROCEDURE — 7100000010 HC PHASE II RECOVERY - FIRST 15 MIN: Performed by: ORTHOPAEDIC SURGERY

## 2023-12-05 PROCEDURE — 64447 NJX AA&/STRD FEMORAL NRV IMG: CPT | Performed by: NURSE ANESTHETIST, CERTIFIED REGISTERED

## 2023-12-05 PROCEDURE — 2500000003 HC RX 250 WO HCPCS: Performed by: NURSE ANESTHETIST, CERTIFIED REGISTERED

## 2023-12-05 PROCEDURE — 7100000001 HC PACU RECOVERY - ADDTL 15 MIN: Performed by: ORTHOPAEDIC SURGERY

## 2023-12-05 PROCEDURE — 3700000001 HC ADD 15 MINUTES (ANESTHESIA): Performed by: ORTHOPAEDIC SURGERY

## 2023-12-05 PROCEDURE — 6360000002 HC RX W HCPCS: Performed by: ORTHOPAEDIC SURGERY

## 2023-12-05 PROCEDURE — C1713 ANCHOR/SCREW BN/BN,TIS/BN: HCPCS | Performed by: ORTHOPAEDIC SURGERY

## 2023-12-05 PROCEDURE — C1776 JOINT DEVICE (IMPLANTABLE): HCPCS | Performed by: ORTHOPAEDIC SURGERY

## 2023-12-05 PROCEDURE — 6360000002 HC RX W HCPCS: Performed by: NURSE ANESTHETIST, CERTIFIED REGISTERED

## 2023-12-05 PROCEDURE — 2709999900 HC NON-CHARGEABLE SUPPLY: Performed by: ORTHOPAEDIC SURGERY

## 2023-12-05 PROCEDURE — 3700000000 HC ANESTHESIA ATTENDED CARE: Performed by: ORTHOPAEDIC SURGERY

## 2023-12-05 PROCEDURE — 82962 GLUCOSE BLOOD TEST: CPT

## 2023-12-05 PROCEDURE — 7100000011 HC PHASE II RECOVERY - ADDTL 15 MIN: Performed by: ORTHOPAEDIC SURGERY

## 2023-12-05 PROCEDURE — 7100000000 HC PACU RECOVERY - FIRST 15 MIN: Performed by: ORTHOPAEDIC SURGERY

## 2023-12-05 PROCEDURE — 3600000005 HC SURGERY LEVEL 5 BASE: Performed by: ORTHOPAEDIC SURGERY

## 2023-12-05 DEVICE — IMPLANTABLE DEVICE
Type: IMPLANTABLE DEVICE | Site: KNEE | Status: FUNCTIONAL
Brand: PERSONA®

## 2023-12-05 DEVICE — CEMENT BONE 40GM HI VISC PALACOS R: Type: IMPLANTABLE DEVICE | Site: KNEE | Status: FUNCTIONAL

## 2023-12-05 DEVICE — COMPONENT ARTC SURF PS 6-9 CD 10 MM RT TIB KNEE POLYETH: Type: IMPLANTABLE DEVICE | Site: KNEE | Status: FUNCTIONAL

## 2023-12-05 DEVICE — PSN TIB STM 5 DEG SZ D R: Type: IMPLANTABLE DEVICE | Site: KNEE | Status: FUNCTIONAL

## 2023-12-05 DEVICE — COMPONENT PAT DIA35MM THK9MM KNEE POLY CEM CONVENTIONAL: Type: IMPLANTABLE DEVICE | Site: KNEE | Status: FUNCTIONAL

## 2023-12-05 RX ORDER — SODIUM CHLORIDE 0.9 % (FLUSH) 0.9 %
5-40 SYRINGE (ML) INJECTION PRN
Status: DISCONTINUED | OUTPATIENT
Start: 2023-12-05 | End: 2023-12-05 | Stop reason: HOSPADM

## 2023-12-05 RX ORDER — ROPIVACAINE HYDROCHLORIDE 5 MG/ML
INJECTION, SOLUTION EPIDURAL; INFILTRATION; PERINEURAL
Status: COMPLETED | OUTPATIENT
Start: 2023-12-05 | End: 2023-12-05

## 2023-12-05 RX ORDER — ROCURONIUM BROMIDE 10 MG/ML
INJECTION, SOLUTION INTRAVENOUS PRN
Status: DISCONTINUED | OUTPATIENT
Start: 2023-12-05 | End: 2023-12-05 | Stop reason: SDUPTHER

## 2023-12-05 RX ORDER — CELECOXIB 200 MG/1
200 CAPSULE ORAL ONCE
Status: COMPLETED | OUTPATIENT
Start: 2023-12-05 | End: 2023-12-05

## 2023-12-05 RX ORDER — LABETALOL HYDROCHLORIDE 5 MG/ML
10 INJECTION, SOLUTION INTRAVENOUS
Status: DISCONTINUED | OUTPATIENT
Start: 2023-12-05 | End: 2023-12-05 | Stop reason: HOSPADM

## 2023-12-05 RX ORDER — OXYCODONE HYDROCHLORIDE 5 MG/1
5 TABLET ORAL EVERY 4 HOURS PRN
Qty: 50 TABLET | Refills: 0 | Status: SHIPPED | OUTPATIENT
Start: 2023-12-05 | End: 2023-12-13

## 2023-12-05 RX ORDER — HYDROMORPHONE HYDROCHLORIDE 1 MG/ML
0.5 INJECTION, SOLUTION INTRAMUSCULAR; INTRAVENOUS; SUBCUTANEOUS EVERY 5 MIN PRN
Status: COMPLETED | OUTPATIENT
Start: 2023-12-05 | End: 2023-12-05

## 2023-12-05 RX ORDER — SODIUM CHLORIDE 9 MG/ML
INJECTION, SOLUTION INTRAVENOUS PRN
Status: DISCONTINUED | OUTPATIENT
Start: 2023-12-05 | End: 2023-12-05 | Stop reason: HOSPADM

## 2023-12-05 RX ORDER — HYDROMORPHONE HYDROCHLORIDE 1 MG/ML
0.25 INJECTION, SOLUTION INTRAMUSCULAR; INTRAVENOUS; SUBCUTANEOUS EVERY 5 MIN PRN
Status: DISCONTINUED | OUTPATIENT
Start: 2023-12-05 | End: 2023-12-05 | Stop reason: HOSPADM

## 2023-12-05 RX ORDER — ROPIVACAINE HYDROCHLORIDE 2 MG/ML
INJECTION, SOLUTION EPIDURAL; INFILTRATION; PERINEURAL PRN
Status: DISCONTINUED | OUTPATIENT
Start: 2023-12-05 | End: 2023-12-05 | Stop reason: ALTCHOICE

## 2023-12-05 RX ORDER — ONDANSETRON 2 MG/ML
INJECTION INTRAMUSCULAR; INTRAVENOUS PRN
Status: DISCONTINUED | OUTPATIENT
Start: 2023-12-05 | End: 2023-12-05 | Stop reason: SDUPTHER

## 2023-12-05 RX ORDER — MIDAZOLAM HYDROCHLORIDE 1 MG/ML
INJECTION INTRAMUSCULAR; INTRAVENOUS PRN
Status: DISCONTINUED | OUTPATIENT
Start: 2023-12-05 | End: 2023-12-05 | Stop reason: SDUPTHER

## 2023-12-05 RX ORDER — DROPERIDOL 2.5 MG/ML
0.62 INJECTION, SOLUTION INTRAMUSCULAR; INTRAVENOUS
Status: DISCONTINUED | OUTPATIENT
Start: 2023-12-05 | End: 2023-12-05 | Stop reason: HOSPADM

## 2023-12-05 RX ORDER — KETOROLAC TROMETHAMINE 30 MG/ML
INJECTION, SOLUTION INTRAMUSCULAR; INTRAVENOUS PRN
Status: DISCONTINUED | OUTPATIENT
Start: 2023-12-05 | End: 2023-12-05 | Stop reason: SDUPTHER

## 2023-12-05 RX ORDER — LIDOCAINE HYDROCHLORIDE 10 MG/ML
1 INJECTION, SOLUTION EPIDURAL; INFILTRATION; INTRACAUDAL; PERINEURAL
Status: DISCONTINUED | OUTPATIENT
Start: 2023-12-05 | End: 2023-12-05 | Stop reason: HOSPADM

## 2023-12-05 RX ORDER — DIPHENHYDRAMINE HYDROCHLORIDE 50 MG/ML
12.5 INJECTION INTRAMUSCULAR; INTRAVENOUS
Status: DISCONTINUED | OUTPATIENT
Start: 2023-12-05 | End: 2023-12-05 | Stop reason: HOSPADM

## 2023-12-05 RX ORDER — CEPHALEXIN 500 MG/1
500 CAPSULE ORAL 4 TIMES DAILY
Qty: 28 CAPSULE | Refills: 0 | Status: SHIPPED | OUTPATIENT
Start: 2023-12-05

## 2023-12-05 RX ORDER — PROPOFOL 10 MG/ML
INJECTION, EMULSION INTRAVENOUS PRN
Status: DISCONTINUED | OUTPATIENT
Start: 2023-12-05 | End: 2023-12-05 | Stop reason: SDUPTHER

## 2023-12-05 RX ORDER — TRANEXAMIC ACID 650 MG/1
1950 TABLET ORAL
Status: COMPLETED | OUTPATIENT
Start: 2023-12-05 | End: 2023-12-05

## 2023-12-05 RX ORDER — MORPHINE SULFATE 2 MG/ML
2 INJECTION, SOLUTION INTRAMUSCULAR; INTRAVENOUS EVERY 5 MIN PRN
Status: DISCONTINUED | OUTPATIENT
Start: 2023-12-05 | End: 2023-12-05 | Stop reason: HOSPADM

## 2023-12-05 RX ORDER — DEXAMETHASONE SODIUM PHOSPHATE 4 MG/ML
INJECTION, SOLUTION INTRA-ARTICULAR; INTRALESIONAL; INTRAMUSCULAR; INTRAVENOUS; SOFT TISSUE PRN
Status: DISCONTINUED | OUTPATIENT
Start: 2023-12-05 | End: 2023-12-05 | Stop reason: SDUPTHER

## 2023-12-05 RX ORDER — SCOLOPAMINE TRANSDERMAL SYSTEM 1 MG/1
1 PATCH, EXTENDED RELEASE TRANSDERMAL ONCE
Status: DISCONTINUED | OUTPATIENT
Start: 2023-12-05 | End: 2023-12-05 | Stop reason: HOSPADM

## 2023-12-05 RX ORDER — ESMOLOL HYDROCHLORIDE 10 MG/ML
INJECTION INTRAVENOUS PRN
Status: DISCONTINUED | OUTPATIENT
Start: 2023-12-05 | End: 2023-12-05 | Stop reason: SDUPTHER

## 2023-12-05 RX ORDER — OXYCODONE HCL 10 MG/1
10 TABLET, FILM COATED, EXTENDED RELEASE ORAL ONCE
Status: COMPLETED | OUTPATIENT
Start: 2023-12-05 | End: 2023-12-05

## 2023-12-05 RX ORDER — SODIUM CHLORIDE 0.9 % (FLUSH) 0.9 %
5-40 SYRINGE (ML) INJECTION EVERY 12 HOURS SCHEDULED
Status: DISCONTINUED | OUTPATIENT
Start: 2023-12-05 | End: 2023-12-05 | Stop reason: HOSPADM

## 2023-12-05 RX ORDER — ASPIRIN 81 MG/1
81 TABLET, CHEWABLE ORAL 2 TIMES DAILY
Qty: 60 TABLET | Refills: 0 | Status: SHIPPED | OUTPATIENT
Start: 2023-12-05

## 2023-12-05 RX ORDER — FENTANYL CITRATE 50 UG/ML
INJECTION, SOLUTION INTRAMUSCULAR; INTRAVENOUS PRN
Status: DISCONTINUED | OUTPATIENT
Start: 2023-12-05 | End: 2023-12-05 | Stop reason: SDUPTHER

## 2023-12-05 RX ORDER — VANCOMYCIN HYDROCHLORIDE 1 G/20ML
INJECTION, POWDER, LYOPHILIZED, FOR SOLUTION INTRAVENOUS PRN
Status: DISCONTINUED | OUTPATIENT
Start: 2023-12-05 | End: 2023-12-05 | Stop reason: ALTCHOICE

## 2023-12-05 RX ORDER — SODIUM CHLORIDE, SODIUM LACTATE, POTASSIUM CHLORIDE, CALCIUM CHLORIDE 600; 310; 30; 20 MG/100ML; MG/100ML; MG/100ML; MG/100ML
INJECTION, SOLUTION INTRAVENOUS CONTINUOUS
Status: DISCONTINUED | OUTPATIENT
Start: 2023-12-05 | End: 2023-12-05 | Stop reason: HOSPADM

## 2023-12-05 RX ORDER — MIDAZOLAM HYDROCHLORIDE 2 MG/2ML
2 INJECTION, SOLUTION INTRAMUSCULAR; INTRAVENOUS
Status: COMPLETED | OUTPATIENT
Start: 2023-12-05 | End: 2023-12-05

## 2023-12-05 RX ORDER — LIDOCAINE HYDROCHLORIDE 10 MG/ML
INJECTION, SOLUTION INFILTRATION; PERINEURAL PRN
Status: DISCONTINUED | OUTPATIENT
Start: 2023-12-05 | End: 2023-12-05 | Stop reason: SDUPTHER

## 2023-12-05 RX ORDER — METOCLOPRAMIDE HYDROCHLORIDE 5 MG/ML
10 INJECTION INTRAMUSCULAR; INTRAVENOUS
Status: DISCONTINUED | OUTPATIENT
Start: 2023-12-05 | End: 2023-12-05 | Stop reason: HOSPADM

## 2023-12-05 RX ORDER — MORPHINE SULFATE 4 MG/ML
4 INJECTION, SOLUTION INTRAMUSCULAR; INTRAVENOUS EVERY 5 MIN PRN
Status: DISCONTINUED | OUTPATIENT
Start: 2023-12-05 | End: 2023-12-05 | Stop reason: HOSPADM

## 2023-12-05 RX ORDER — IBUPROFEN 400 MG/1
400 TABLET ORAL EVERY 8 HOURS PRN
Qty: 30 TABLET | Refills: 0 | Status: SHIPPED | OUTPATIENT
Start: 2023-12-05

## 2023-12-05 RX ORDER — MEPERIDINE HYDROCHLORIDE 25 MG/ML
12.5 INJECTION INTRAMUSCULAR; INTRAVENOUS; SUBCUTANEOUS EVERY 5 MIN PRN
Status: DISCONTINUED | OUTPATIENT
Start: 2023-12-05 | End: 2023-12-05 | Stop reason: HOSPADM

## 2023-12-05 RX ORDER — DEXAMETHASONE SODIUM PHOSPHATE 10 MG/ML
8 INJECTION, SOLUTION INTRAMUSCULAR; INTRAVENOUS ONCE
Status: DISCONTINUED | OUTPATIENT
Start: 2023-12-05 | End: 2023-12-05 | Stop reason: HOSPADM

## 2023-12-05 RX ORDER — FAMOTIDINE 20 MG/1
20 TABLET, FILM COATED ORAL ONCE
Status: DISCONTINUED | OUTPATIENT
Start: 2023-12-05 | End: 2023-12-05 | Stop reason: HOSPADM

## 2023-12-05 RX ORDER — IPRATROPIUM BROMIDE AND ALBUTEROL SULFATE 2.5; .5 MG/3ML; MG/3ML
1 SOLUTION RESPIRATORY (INHALATION)
Status: DISCONTINUED | OUTPATIENT
Start: 2023-12-05 | End: 2023-12-05 | Stop reason: HOSPADM

## 2023-12-05 RX ORDER — SUCCINYLCHOLINE CHLORIDE 20 MG/ML
INJECTION INTRAMUSCULAR; INTRAVENOUS PRN
Status: DISCONTINUED | OUTPATIENT
Start: 2023-12-05 | End: 2023-12-05 | Stop reason: SDUPTHER

## 2023-12-05 RX ORDER — LORAZEPAM 2 MG/ML
0.5 INJECTION INTRAMUSCULAR
Status: DISCONTINUED | OUTPATIENT
Start: 2023-12-05 | End: 2023-12-05 | Stop reason: HOSPADM

## 2023-12-05 RX ORDER — OXYCODONE HYDROCHLORIDE 5 MG/1
5 TABLET ORAL
Status: COMPLETED | OUTPATIENT
Start: 2023-12-05 | End: 2023-12-05

## 2023-12-05 RX ORDER — ACETAMINOPHEN 500 MG
1000 TABLET ORAL ONCE
Status: COMPLETED | OUTPATIENT
Start: 2023-12-05 | End: 2023-12-05

## 2023-12-05 RX ADMIN — KETOROLAC TROMETHAMINE 30 MG: 30 INJECTION, SOLUTION INTRAMUSCULAR; INTRAVENOUS at 11:00

## 2023-12-05 RX ADMIN — DEXAMETHASONE SODIUM PHOSPHATE 4 MG: 4 INJECTION, SOLUTION INTRAMUSCULAR; INTRAVENOUS at 10:00

## 2023-12-05 RX ADMIN — MORPHINE SULFATE 4 MG: 4 INJECTION, SOLUTION INTRAMUSCULAR; INTRAVENOUS at 11:50

## 2023-12-05 RX ADMIN — OXYCODONE HYDROCHLORIDE 10 MG: 10 TABLET, FILM COATED, EXTENDED RELEASE ORAL at 08:32

## 2023-12-05 RX ADMIN — ROCURONIUM BROMIDE 5 MG: 10 INJECTION, SOLUTION INTRAVENOUS at 09:48

## 2023-12-05 RX ADMIN — MIDAZOLAM 2 MG: 1 INJECTION INTRAMUSCULAR; INTRAVENOUS at 09:13

## 2023-12-05 RX ADMIN — FENTANYL CITRATE 100 MCG: 0.05 INJECTION, SOLUTION INTRAMUSCULAR; INTRAVENOUS at 10:19

## 2023-12-05 RX ADMIN — LIDOCAINE HYDROCHLORIDE 5 ML: 10 INJECTION, SOLUTION INFILTRATION; PERINEURAL at 09:48

## 2023-12-05 RX ADMIN — SUGAMMADEX 250 MG: 100 INJECTION, SOLUTION INTRAVENOUS at 11:02

## 2023-12-05 RX ADMIN — HYDROMORPHONE HYDROCHLORIDE 0.5 MG: 1 INJECTION, SOLUTION INTRAMUSCULAR; INTRAVENOUS; SUBCUTANEOUS at 12:03

## 2023-12-05 RX ADMIN — SUCCINYLCHOLINE CHLORIDE 100 MG: 20 INJECTION, SOLUTION INTRAMUSCULAR; INTRAVENOUS at 09:48

## 2023-12-05 RX ADMIN — TRANEXAMIC ACID 1950 MG: 650 TABLET ORAL at 08:32

## 2023-12-05 RX ADMIN — FENTANYL CITRATE 100 MCG: 0.05 INJECTION, SOLUTION INTRAMUSCULAR; INTRAVENOUS at 09:48

## 2023-12-05 RX ADMIN — HYDROMORPHONE HYDROCHLORIDE 0.5 MG: 1 INJECTION, SOLUTION INTRAMUSCULAR; INTRAVENOUS; SUBCUTANEOUS at 11:58

## 2023-12-05 RX ADMIN — MORPHINE SULFATE 4 MG: 4 INJECTION, SOLUTION INTRAMUSCULAR; INTRAVENOUS at 11:32

## 2023-12-05 RX ADMIN — ACETAMINOPHEN 1000 MG: 500 TABLET ORAL at 08:31

## 2023-12-05 RX ADMIN — ROPIVACAINE HYDROCHLORIDE 20 ML: 5 INJECTION, SOLUTION EPIDURAL; INFILTRATION; PERINEURAL at 09:22

## 2023-12-05 RX ADMIN — DEXAMETHASONE SODIUM PHOSPHATE 4 MG: 4 INJECTION, SOLUTION INTRAMUSCULAR; INTRAVENOUS at 10:13

## 2023-12-05 RX ADMIN — Medication 3000 MG: at 09:54

## 2023-12-05 RX ADMIN — ESMOLOL HYDROCHLORIDE 50 MG: 10 INJECTION, SOLUTION INTRAVENOUS at 10:27

## 2023-12-05 RX ADMIN — PROPOFOL 200 MG: 10 INJECTION, EMULSION INTRAVENOUS at 09:48

## 2023-12-05 RX ADMIN — HYDROMORPHONE HYDROCHLORIDE 0.5 MG: 1 INJECTION, SOLUTION INTRAMUSCULAR; INTRAVENOUS; SUBCUTANEOUS at 12:17

## 2023-12-05 RX ADMIN — MORPHINE SULFATE 4 MG: 4 INJECTION, SOLUTION INTRAMUSCULAR; INTRAVENOUS at 11:42

## 2023-12-05 RX ADMIN — OXYCODONE HYDROCHLORIDE 5 MG: 5 TABLET ORAL at 13:29

## 2023-12-05 RX ADMIN — PROPOFOL 200 MG: 10 INJECTION, EMULSION INTRAVENOUS at 09:43

## 2023-12-05 RX ADMIN — HYDROMORPHONE HYDROCHLORIDE 0.5 MG: 1 INJECTION, SOLUTION INTRAMUSCULAR; INTRAVENOUS; SUBCUTANEOUS at 12:12

## 2023-12-05 RX ADMIN — SODIUM CHLORIDE, SODIUM LACTATE, POTASSIUM CHLORIDE, AND CALCIUM CHLORIDE: 600; 310; 30; 20 INJECTION, SOLUTION INTRAVENOUS at 08:24

## 2023-12-05 RX ADMIN — ONDANSETRON 4 MG: 2 INJECTION INTRAMUSCULAR; INTRAVENOUS at 11:00

## 2023-12-05 RX ADMIN — FENTANYL CITRATE 50 MCG: 0.05 INJECTION, SOLUTION INTRAMUSCULAR; INTRAVENOUS at 10:09

## 2023-12-05 RX ADMIN — MIDAZOLAM 2 MG: 1 INJECTION INTRAMUSCULAR; INTRAVENOUS at 09:43

## 2023-12-05 RX ADMIN — ROCURONIUM BROMIDE 40 MG: 10 INJECTION, SOLUTION INTRAVENOUS at 10:09

## 2023-12-05 RX ADMIN — CELECOXIB 200 MG: 200 CAPSULE ORAL at 08:32

## 2023-12-05 ASSESSMENT — PAIN DESCRIPTION - DESCRIPTORS: DESCRIPTORS: ACHING;NAGGING;DISCOMFORT

## 2023-12-05 ASSESSMENT — PAIN SCALES - GENERAL
PAINLEVEL_OUTOF10: 8
PAINLEVEL_OUTOF10: 6
PAINLEVEL_OUTOF10: 6
PAINLEVEL_OUTOF10: 10
PAINLEVEL_OUTOF10: 7
PAINLEVEL_OUTOF10: 7
PAINLEVEL_OUTOF10: 10
PAINLEVEL_OUTOF10: 10
PAINLEVEL_OUTOF10: 9
PAINLEVEL_OUTOF10: 8
PAINLEVEL_OUTOF10: 8
PAINLEVEL_OUTOF10: 6

## 2023-12-05 ASSESSMENT — LIFESTYLE VARIABLES: SMOKING_STATUS: 0

## 2023-12-05 ASSESSMENT — PAIN DESCRIPTION - LOCATION
LOCATION: KNEE

## 2023-12-05 ASSESSMENT — PAIN - FUNCTIONAL ASSESSMENT: PAIN_FUNCTIONAL_ASSESSMENT: ACTIVITIES ARE NOT PREVENTED

## 2023-12-05 ASSESSMENT — PAIN DESCRIPTION - ORIENTATION
ORIENTATION: RIGHT

## 2023-12-05 ASSESSMENT — ENCOUNTER SYMPTOMS: SHORTNESS OF BREATH: 0

## 2023-12-05 NOTE — DISCHARGE INSTR - DIET
Good nutrition is important when healing from an illness, injury, or surgery. Follow any nutrition recommendations given to you during your hospital stay. If you were given an oral nutrition supplement while in the hospital, continue to take this supplement at home. You can take it with meals, in-between meals, and/or before bedtime. These supplements can be purchased at most local grocery stores, pharmacies, and chain "[employer]-stores. If you have any questions about your diet or nutrition, call the hospital and ask for the dietitian.             Low carb / High protein

## 2023-12-05 NOTE — DISCHARGE INSTRUCTIONS
[facility]  Dr. Ilene Schultz      Total Knee and Uni Knee Replacement  Discharge Instructions     To prevent blood clots, you have been placed on the following medication:  Aspirin 81 mg twice a day for four weeks    Surgical Site Care: Showering is permitted on post op day 2 - Thursday  No submersion in a bath, swimming pool, whirlpool, etc    Physical Therapy:  You were given a prescription for outpatient therapy. Please schedule with [facility]  if convenient by calling [phone]. Otherwise, schedule with a therapist closer to your home  Work on range of motion. Goal for your first office visit is for you to fully straighten and bend your knee to at least a right angle   Don't walk for exercise (it will make you more sore)  Weight Bearing Status:  Full weight bearing with a walker     Pain Medications  You were given a prescription to fill at your pharmacy  Wean off pain medications as you deem appropriate as long as pain is under control  Take tylenol instead of the prescribed pain medicine as your pain improves    Cold packs                                                                                                               FEVER .5 or less        May be used as necessary                                                                    Take Tylenol x 2                                                                                                            Deep breath x 10   Please take a stool softener such as dulcolax or colace daily                                 Cough, cough, cough                                                                                                                        Recheck in 1 - 11/2                                                                                                                                            hours  Do not drive until surgeon releases you  DO NOT SMOKE, VAPE OR CHEW!!!     Contact office

## 2023-12-05 NOTE — PROGRESS NOTES
Patient discharged home today with outpatient therapy script. Went over all discharge instructions and new medications with patient's  and provided a copy of all new medications to take home. Patient's  verbalized understanding. Patient's stability will be assessed by PT and OP Care RN before discharging home.    Electronically signed by Raul Arauz RN on 12/5/2023 at 11:21 AM

## 2023-12-05 NOTE — ANESTHESIA PROCEDURE NOTES
Peripheral Block    Patient location during procedure: holding area  Reason for block: post-op pain management  Start time: 12/5/2023 9:22 AM  End time: 12/5/2023 9:22 AM  Staffing  Performed: anesthesiologist   Anesthesiologist: Chantelle Donaldson MD  Performed by: Chantelle Donaldson MD  Authorized by: Chantelle Donaldson MD    Preanesthetic Checklist  Completed: patient identified, IV checked, site marked, risks and benefits discussed, surgical/procedural consents, equipment checked, pre-op evaluation, timeout performed, anesthesia consent given, oxygen available and monitors applied/VS acknowledged  Peripheral Block   Patient position: supine  Prep: ChloraPrep  Provider prep: mask and sterile gloves  Patient monitoring: cardiac monitor, continuous pulse ox, frequent blood pressure checks and IV access  Block type: Femoral  Adductor canal  Laterality: right  Injection technique: single-shot  Guidance: ultrasound guided    Needle   Needle type: short-bevel   Needle gauge: 20 G  Needle localization: ultrasound guidance  Needle length: 10 cm  Assessment   Injection assessment: negative aspiration for heme, no paresthesia on injection and local visualized surrounding nerve on ultrasound  Paresthesia pain: none  Slow fractionated injection: yes  Hemodynamics: stable    Additional Notes  Needle was introduced in proximal third of thigh in an  jeb-medial to posterior direction. The needle was visualized \" in- plane\" under ultrasound guidance to access the adductor canal in a sub-sartorial fashion. The femoral artery was avoided and 20 cc of 0.5% ropivacaine  was injected and surrounded the nerve plexus. The plexus appeared anatomically normal, no obvious pathology was noted.  A permanent image was obtained   Medications Administered  ropivacaine (NAROPIN) injection 0.5% - Perineural   20 mL - 12/5/2023 9:22:00 AM

## 2023-12-05 NOTE — ANESTHESIA PRE PROCEDURE
Department of Anesthesiology  Preprocedure Note       Name:  Kevyn Hollingsworth   Age:  46 y.o.  :  1970                                          MRN:  [id number]         Date:  2023      Surgeon: Kesha Zuleta):  Larisa Santiago MD    Procedure: Procedure(s):  ROBOTIC RIGHT TOTAL KNEE ARTHROPLASTY R    Medications prior to admission:   Prior to Admission medications    Medication Sig Start Date End Date Taking? Authorizing Provider   diclofenac (VOLTAREN) 75 MG EC tablet Take 1 tablet by mouth in the morning and at bedtime 23   Halina Christie MD   vitamin D (ERGOCALCIFEROL) 1.25 MG (85723 UT) CAPS capsule Take 1 capsule by mouth once a week     Halina Christie MD   tiZANidine (ZANAFLEX) 4 MG tablet Take 1 tablet by mouth nightly as needed    Halina Christie MD   triamcinolone (KENALOG) 0.1 % cream Apply topically 2 times daily Apply topically 2 times daily. Halina Christie MD   pregabalin (LYRICA) 75 MG capsule Take 1 capsule by mouth 2 times daily.  22   Halina Christie MD   citalopram (CELEXA) 20 MG tablet Take 2 tablets by mouth daily 10/14/22   Halina Christie MD   metoprolol succinate (TOPROL XL) 25 MG extended release tablet TAKE 1 TABLET NIGHTLY  Patient taking differently: Take 1 tablet by mouth nightly 22   ERMIAS Rose   timolol (BETIMOL) 0.5 % ophthalmic solution Place 1 drop into both eyes 2 times daily    Halina Christie MD   estradiol (CLIMARA) 0.075 MG/24HR Place 1 patch onto the skin Twice a Week Tuesday and 21   Halina Christie MD   medroxyPROGESTERone (PROVERA) 10 MG tablet Take 1 tablet by mouth daily For the last 10 days of each month 21   Halina Christie MD   Guselkumab (TREMFYA) 100 MG/ML SOPN Inject into the skin Every 8 weeks  Patient not taking: Reported on 2023    Halina Christie MD   metFORMIN (GLUCOPHAGE) 500 MG tablet Take 2 tablets by mouth 2 times daily

## 2023-12-05 NOTE — OP NOTE
TOTAL KNEE ARTHROPLASTY OPERATIVE NOTE    NAME OF SURGEON / : Tayo Sevilla MD  PATIENT:   Adeola Bryant  Date: 12/5/2023        Time: 11:10 AM   Referring Physician: ________________________    PREOP DIAGNOSIS:  right Knee  Primary osteoarthritis   POSTOP DIAGNOSIS:  Same     PROCEDURE:    Right  Cemented Posterior Stabilized Knee arthroplasty, RAY robot assisted    IMPLANTS:   Implant Name Type Inv. Item Serial No.  Lot No. LRB No. Used Action   CEMENT BONE 40GM HI VISC PALACOS R - [id number]  CEMENT BONE 40GM HI VISC PALACOS R  [employer] [id number] Right 1 Implanted   CEMENT BONE 40GM HI VISC PALACOS R - [id number]  CEMENT BONE 40GM HI VISC PALACOS R  [employer]- [id number] Right 1 Implanted   EXTENSION STEM L30MM [id number] KNEE TAPR BILL PERSONA - [id number]  EXTENSION STEM L30MM [id number] KNEE TAPR BILL PERSONA  [employer]- [id number] Right 1 Implanted   COMPONENT FEM SZ 6 GILBERT R KNEE CO CHROM BILL POST STBL COR - [id number]  COMPONENT FEM SZ 6 GILBERT R KNEE CO CHROM BILL POST STBL COR  [employer] [id number] Right 1 Implanted   COMPONENT PAT [id number] THK9MM KNEE POLY BILL CONVENTIONAL - [id number]  COMPONENT PAT BEG07EX THK9MM KNEE POLY BILL CONVENTIONAL  [employer] [id number] Right 1 Implanted   PSN TIB STM 5 DEG SZ D R - [id number]  PSN TIB STM 5 DEG SZ D R  [employer]- [id number] Right 1 Implanted   COMPONENT ARTC SURF PS 6-9 CD 10 MM RT TIB KNEE POLYETH - [id number]  COMPONENT ARTC SURF PS 6-9 CD 10 MM RT TIB KNEE POLYETH  [employer] [id number] Right 1 Implanted       FINDINGS:  Preop ROM:  Full except for   - 5 degrees  extension  Alignment:    varus  Bone quality:   normal  Cartilage wear:  Medial - severe  Lateral - mild  Pat-fem -moderate  ACL -Intact    Deviation from robotic plan:  adjusted the femoral external rotation    ASSISTANT: Kiko Carver, certified first assistant.   Helped with draping, exposure,

## 2023-12-05 NOTE — H&P
[facility] ([facility]) Pre-Operative History and Physical    Patient Name: Godfrey Jj  : 1970        Chief Complaint: Right knee pain  History of Present Illness: This patient has had ongoing pain for several weeks/months that has been unresponsive to conservative care which has included injection, therapy, activity modification and presents now for surgery. Past Medical History:       Diagnosis Date    Arthritis with psoriasis (HCC)     BiPAP (biphasic positive airway pressure) dependence     Hyperlipidemia     Hypertension     Increased pressure in the eye, bilateral     \"but not glaucoma\"    Knee pain     Obstructive sleep apnea     AHI: 106. 6 per [facility], 2018    Osteoarthritis     Pain management     \"not at this time\" 23     Past Surgical History:       Procedure Laterality Date    BACK SURGERY      L4,L5,S1    CARPAL TUNNEL RELEASE       SECTION      CHOLECYSTECTOMY      COLONOSCOPY N/A 2020    Dr Daria Benitez AP x 2, HP x 1, 5 yr recall    EPIDURAL STEROID INJECTION N/A 2019    LUMBAR EPIDURAL STEROID INJECTION L4-5 performed by Eulalio Joy at [facility] N/A 2019    LUMBAR EPIDURAL STEROID INJECTION L5-S1 performed by Eulalio Joy at [address] Right 2019    SACROILIAC JOINT INJECTION performed by Radha Celaya at [address] [facility] N/A 2019    LUMBAR INTER LAMINAR KACEY L4-5 performed by Eulalio Joy at [facility] Bilateral 2019    LUMBAR FACET BILATERAL L4-5 L5-S1 performed by Eulalio Joy at [facility] N/A 2019    LUMBAR INTER LAMINAR KACEY L4-5 performed by Eulalio Joy at [address] [facility] ARTHRGRPHY&/ANES/STEROID W/LILIA Right 10/09/2018    SACROILIAC JOINT INJECTION performed by Eulalio Joy at [address] [facility] DX/THER SBST INTRLMNR LMBR/SAC W/IMG GDN N/A 2018    LUMBAR INTER

## 2023-12-06 ENCOUNTER — TELEPHONE (OUTPATIENT)
Dept: INPATIENT UNIT | Age: 53
End: 2023-12-06

## 2024-01-09 ENCOUNTER — HOSPITAL ENCOUNTER (OUTPATIENT)
Dept: GENERAL RADIOLOGY | Facility: HOSPITAL | Age: 54
Discharge: HOME | End: 2024-01-09
Admitting: ORTHOPAEDIC SURGERY
Payer: COMMERCIAL

## 2024-01-09 ENCOUNTER — TRANSCRIBE ORDERS (OUTPATIENT)
Dept: ADMINISTRATIVE | Facility: HOSPITAL | Age: 54
End: 2024-01-09
Payer: COMMERCIAL

## 2024-01-09 DIAGNOSIS — M54.50 LOW BACK PAIN, UNSPECIFIED BACK PAIN LATERALITY, UNSPECIFIED CHRONICITY, UNSPECIFIED WHETHER SCIATICA PRESENT: Primary | ICD-10-CM

## 2024-01-09 DIAGNOSIS — M54.50 LOW BACK PAIN, UNSPECIFIED BACK PAIN LATERALITY, UNSPECIFIED CHRONICITY, UNSPECIFIED WHETHER SCIATICA PRESENT: ICD-10-CM

## 2024-01-09 PROCEDURE — 72110 X-RAY EXAM L-2 SPINE 4/>VWS: CPT

## 2024-01-09 RX ORDER — METOPROLOL SUCCINATE 25 MG/1
TABLET, EXTENDED RELEASE ORAL
Qty: 90 TABLET | Refills: 3 | Status: SHIPPED | OUTPATIENT
Start: 2024-01-09

## 2024-01-16 ENCOUNTER — FOLLOWUP TELEPHONE ENCOUNTER (OUTPATIENT)
Dept: SLEEP CENTER | Age: 54
End: 2024-01-16

## 2024-01-16 NOTE — PROGRESS NOTES
Reviewed results of sleep study with patient. Orders sent to [facility] for sleep equipment.

## 2024-03-25 DIAGNOSIS — N95.1 MENOPAUSAL SYMPTOMS: ICD-10-CM

## 2024-03-25 RX ORDER — ESTRADIOL 0.07 MG/D
FILM, EXTENDED RELEASE TRANSDERMAL
Qty: 24 PATCH | Refills: 3 | OUTPATIENT
Start: 2024-03-25

## 2024-04-03 ENCOUNTER — TELEPHONE (OUTPATIENT)
Dept: OBSTETRICS AND GYNECOLOGY | Age: 54
End: 2024-04-03
Payer: COMMERCIAL

## 2024-04-03 NOTE — TELEPHONE ENCOUNTER
Caller: Ayla Echevarria    Relationship: Self    Best call back number: [phone]    What orders are you requesting (i.e. lab or imaging): MAMMOGRAM    In what timeframe would the patient need to come in: ASAP    Where will you receive your lab/imaging services: [facility]     Additional notes: PLEASE SEND ORDER.

## 2024-04-10 ENCOUNTER — HOSPITAL ENCOUNTER (EMERGENCY)
Age: 54
Discharge: HOME | End: 2024-04-10
Payer: COMMERCIAL

## 2024-04-10 ENCOUNTER — APPOINTMENT (OUTPATIENT)
Dept: GENERAL RADIOLOGY | Age: 54
End: 2024-04-10
Payer: COMMERCIAL

## 2024-04-10 VITALS
WEIGHT: 275.57 LBS | DIASTOLIC BLOOD PRESSURE: 74 MMHG | HEART RATE: 91 BPM | RESPIRATION RATE: 18 BRPM | BODY MASS INDEX: 48.82 KG/M2 | TEMPERATURE: 98.2 F | OXYGEN SATURATION: 98 % | SYSTOLIC BLOOD PRESSURE: 152 MMHG

## 2024-04-10 DIAGNOSIS — M25.561 ACUTE PAIN OF RIGHT KNEE: Primary | ICD-10-CM

## 2024-04-10 PROCEDURE — 93971 EXTREMITY STUDY: CPT

## 2024-04-10 PROCEDURE — 73560 X-RAY EXAM OF KNEE 1 OR 2: CPT

## 2024-04-10 PROCEDURE — 99284 EMERGENCY DEPT VISIT MOD MDM: CPT

## 2024-04-10 ASSESSMENT — ENCOUNTER SYMPTOMS: COLOR CHANGE: 0

## 2024-04-10 NOTE — PROGRESS NOTES
Vascular lab preliminary.  Right lower extremity venous duplex scan completed.  No evidence for DVT, SVT, or reflux noted at this time.  Final report pending.

## 2024-04-10 NOTE — ED PROVIDER NOTES
[address]  [place] [address]  [phone]            DISCHARGE MEDICATIONS:  Discharge Medication List as of 4/10/2024  5:18 PM             (Please note that portions of this note were completed with a voice recognition program.  Efforts were made to edit thedictations but occasionally words are mis-transcribed.)    MIGUEL To (electronically signed)       Norm Macias PA  04/10/24 [id number]

## 2024-04-11 DIAGNOSIS — Z12.31 SCREENING MAMMOGRAM, ENCOUNTER FOR: ICD-10-CM

## 2024-04-15 DIAGNOSIS — E55.9 VITAMIN D DEFICIENCY: ICD-10-CM

## 2024-04-15 RX ORDER — ERGOCALCIFEROL 1.25 MG/1
50000 CAPSULE ORAL WEEKLY
Qty: 30 CAPSULE | Refills: 3 | Status: SHIPPED | OUTPATIENT
Start: 2024-04-15

## 2024-05-07 DIAGNOSIS — N95.1 MENOPAUSAL SYMPTOMS: ICD-10-CM

## 2024-05-07 RX ORDER — ESTRADIOL 0.07 MG/D
1 FILM, EXTENDED RELEASE TRANSDERMAL 2 TIMES WEEKLY
Qty: 24 PATCH | Refills: 3 | Status: CANCELLED | OUTPATIENT
Start: 2024-05-09

## 2024-08-26 ENCOUNTER — TRANSCRIBE ORDERS (OUTPATIENT)
Dept: ADMINISTRATIVE | Age: 54
End: 2024-08-26

## 2024-08-26 DIAGNOSIS — G56.01 CARPAL TUNNEL SYNDROME, RIGHT: Primary | ICD-10-CM

## 2024-10-03 ENCOUNTER — HOSPITAL ENCOUNTER (OUTPATIENT)
Dept: NEUROLOGY | Age: 54
Discharge: HOME | End: 2024-10-03
Attending: ORTHOPAEDIC SURGERY
Payer: COMMERCIAL

## 2024-10-03 DIAGNOSIS — G56.01 CARPAL TUNNEL SYNDROME, RIGHT: ICD-10-CM

## 2024-10-03 PROCEDURE — 95886 MUSC TEST DONE W/N TEST COMP: CPT

## 2024-10-03 PROCEDURE — 95909 NRV CNDJ TST 5-6 STUDIES: CPT

## 2024-10-09 ENCOUNTER — TRANSCRIBE ORDERS (OUTPATIENT)
Dept: ADMINISTRATIVE | Facility: HOSPITAL | Age: 54
End: 2024-10-09
Payer: COMMERCIAL

## 2024-10-09 ENCOUNTER — HOSPITAL ENCOUNTER (OUTPATIENT)
Dept: GENERAL RADIOLOGY | Facility: HOSPITAL | Age: 54
Discharge: HOME | End: 2024-10-09
Payer: COMMERCIAL

## 2024-10-09 DIAGNOSIS — M47.816 LUMBAR SPONDYLOSIS: ICD-10-CM

## 2024-10-09 DIAGNOSIS — M54.16 LUMBAR RADICULOPATHY: ICD-10-CM

## 2024-10-09 DIAGNOSIS — M54.16 LUMBAR RADICULOPATHY: Primary | ICD-10-CM

## 2024-10-09 DIAGNOSIS — E66.01 MORBID OBESITY: ICD-10-CM

## 2024-10-09 DIAGNOSIS — M46.1 SACROILIITIS, NOT ELSEWHERE CLASSIFIED: ICD-10-CM

## 2024-10-09 PROCEDURE — 72114 X-RAY EXAM L-S SPINE BENDING: CPT

## 2024-10-11 ENCOUNTER — OFFICE VISIT (OUTPATIENT)
Age: 54
End: 2024-10-11

## 2024-10-11 DIAGNOSIS — Z96.652 STATUS POST LEFT KNEE REPLACEMENT: ICD-10-CM

## 2024-10-11 DIAGNOSIS — M25.562 LEFT KNEE PAIN, UNSPECIFIED CHRONICITY: ICD-10-CM

## 2024-10-11 DIAGNOSIS — M25.561 RIGHT KNEE PAIN, UNSPECIFIED CHRONICITY: ICD-10-CM

## 2024-10-11 DIAGNOSIS — Z96.651 STATUS POST TOTAL RIGHT KNEE REPLACEMENT: Primary | ICD-10-CM

## 2024-10-11 ASSESSMENT — ENCOUNTER SYMPTOMS: SHORTNESS OF BREATH: 0

## 2024-10-11 NOTE — PROGRESS NOTES
range of motion bilaterally.  Patient demonstrates good strength.  Valgus varus testing is negative bilaterally.  There is tenderness palpation over the anterior knees bilaterally.  Neurovascular intact distally.   Skin:     Findings: No erythema.   Neurological:      Mental Status: She is alert and oriented to person, place, and time.   Psychiatric:         Mood and Affect: Mood normal.         Thought Content: Thought content normal.             Radiology:  XR KNEE LEFT (MIN 4 VIEWS)  4 views of the left knee taken on x-ray today: AP standing, PA flexion,   sunrise, left lateral.  X-ray shows a total knee arthroplasty.    No fractures dislocations or radiolucent lines.      XR KNEE RIGHT (MIN 4 VIEWS)  4 views of the RIGHT knee taken on x-ray today: AP standing, PA flexion,   sunrise, RIGHT lateral.  X-ray shows a total knee arthroplasty.    No fractures dislocations or radiolucent lines.          There were no vitals taken for this visit.  There is no height or weight on file to calculate BMI.         Assessment:  1. Status post total right knee replacement    2. Left knee pain, unspecified chronicity    3. Right knee pain, unspecified chronicity    4. Status post left knee replacement        Plan:     At this time patient is encouraged to that on x-ray both of her knee replacements look well adhered.  There is no issues with her Ortho prosthesis.  She does have some swelling in bursitis in the front of her knee but that can be taken care of with ice and rest.  She is encouraged to continue seeking care with her spine specialist to find the origin of the weakness of her legs.  Patient to return on as-needed basis for knee pain.  Patient voices understanding agrees with care plan.    No orders of the defined types were placed in this encounter.    Orders Placed This Encounter   Procedures    XR KNEE LEFT (MIN 4 VIEWS)     Standing Status:   Future     Number of Occurrences:   1     Standing Expiration Date:

## 2024-10-15 ENCOUNTER — OFFICE VISIT (OUTPATIENT)
Age: 54
End: 2024-10-15

## 2024-10-15 ENCOUNTER — TRANSCRIBE ORDERS (OUTPATIENT)
Dept: ADMINISTRATIVE | Facility: HOSPITAL | Age: 54
End: 2024-10-15
Payer: COMMERCIAL

## 2024-10-15 VITALS — HEIGHT: 63 IN | BODY MASS INDEX: 48.73 KG/M2 | WEIGHT: 275 LBS

## 2024-10-15 DIAGNOSIS — G56.01 RIGHT CARPAL TUNNEL SYNDROME: Primary | ICD-10-CM

## 2024-10-15 DIAGNOSIS — M54.16 LUMBAR RADICULOPATHY: Primary | ICD-10-CM

## 2024-10-15 DIAGNOSIS — M65.331 TRIGGER FINGER, RIGHT MIDDLE FINGER: ICD-10-CM

## 2024-10-15 RX ORDER — CITALOPRAM HYDROBROMIDE 20 MG
TABLET ORAL
COMMUNITY
Start: 2020-05-01

## 2024-10-15 RX ORDER — TIMOLOL MALEATE 5 MG/ML
SOLUTION/ DROPS OPHTHALMIC
COMMUNITY
Start: 2024-09-05

## 2024-10-15 RX ORDER — LISINOPRIL 40 MG/1
1 TABLET ORAL DAILY
COMMUNITY

## 2024-10-15 RX ORDER — BETAMETHASONE SODIUM PHOSPHATE AND BETAMETHASONE ACETATE 3; 3 MG/ML; MG/ML
3 INJECTION, SUSPENSION INTRA-ARTICULAR; INTRALESIONAL; INTRAMUSCULAR; SOFT TISSUE ONCE
Status: COMPLETED | OUTPATIENT
Start: 2024-10-15 | End: 2024-10-15

## 2024-10-15 RX ORDER — GUSELKUMAB 100 MG/ML
INJECTION SUBCUTANEOUS
COMMUNITY

## 2024-10-15 RX ORDER — BETAMETHASONE SODIUM PHOSPHATE AND BETAMETHASONE ACETATE 3; 3 MG/ML; MG/ML
3 INJECTION, SUSPENSION INTRA-ARTICULAR; INTRALESIONAL; INTRAMUSCULAR; SOFT TISSUE ONCE
Status: DISCONTINUED | OUTPATIENT
Start: 2024-10-15 | End: 2024-10-15

## 2024-10-15 RX ORDER — DAPAGLIFLOZIN 5 MG/1
1 TABLET, FILM COATED ORAL DAILY
COMMUNITY

## 2024-10-15 RX ADMIN — BETAMETHASONE SODIUM PHOSPHATE AND BETAMETHASONE ACETATE 3 MG: 3; 3 INJECTION, SUSPENSION INTRA-ARTICULAR; INTRALESIONAL; INTRAMUSCULAR; SOFT TISSUE at 15:42

## 2024-10-15 NOTE — PROGRESS NOTES
ttl pk-yrs)     Types: Cigarettes     Start date:      Quit date:      Years since quittin.7    Smokeless tobacco: Never   Vaping Use    Vaping status: Never Used   Substance and Sexual Activity    Alcohol use: No    Drug use: No    Sexual activity: Yes     Partners: Male        Tobacco Use      Smoking status: Former        Packs/day: 0.00        Years: 0.5 packs/day for 25.0 years (12.5 ttl pk-yrs)        Types: Cigarettes        Start date:         Quit date:         Years since quittin.7      Smokeless tobacco: Never     Family History   Problem Relation Age of Onset    Colon Cancer Neg Hx     Colon Polyps Neg Hx         Medications  Current Outpatient Medications   Medication Sig Dispense Refill    metoprolol succinate (TOPROL XL) 25 MG extended release tablet TAKE 1 TABLET NIGHTLY 90 tablet 3    aspirin (ASPIRIN CHILDRENS) 81 MG chewable tablet Take 1 tablet by mouth in the morning and at bedtime 60 tablet 0    cephALEXin (KEFLEX) 500 MG capsule Take 1 capsule by mouth 4 times daily 28 capsule 0    ibuprofen (ADVIL;MOTRIN) 400 MG tablet Take 1 tablet by mouth every 8 hours as needed for Pain 30 tablet 0    diclofenac (VOLTAREN) 75 MG EC tablet Take 1 tablet by mouth in the morning and at bedtime      vitamin D (ERGOCALCIFEROL) 1.25 MG (17137 UT) CAPS capsule Take 1 capsule by mouth once a week       tiZANidine (ZANAFLEX) 4 MG tablet Take 1 tablet by mouth nightly as needed      triamcinolone (KENALOG) 0.1 % cream Apply topically 2 times daily Apply topically 2 times daily.      pregabalin (LYRICA) 75 MG capsule Take 1 capsule by mouth 2 times daily.      citalopram (CELEXA) 20 MG tablet Take 2 tablets by mouth daily      timolol (BETIMOL) 0.5 % ophthalmic solution Place 1 drop into both eyes 2 times daily      estradiol (CLIMARA) 0.075 MG/24HR Place 1 patch onto the skin Twice a Week Tuesday and Friday      medroxyPROGESTERone (PROVERA) 10 MG tablet Take 1 tablet by mouth daily

## 2024-11-01 ENCOUNTER — HOSPITAL ENCOUNTER (OUTPATIENT)
Dept: MRI IMAGING | Facility: HOSPITAL | Age: 54
Discharge: HOME | End: 2024-11-01
Admitting: NURSE PRACTITIONER
Payer: COMMERCIAL

## 2024-11-01 DIAGNOSIS — M54.16 LUMBAR RADICULOPATHY: ICD-10-CM

## 2024-11-01 PROCEDURE — 72148 MRI LUMBAR SPINE W/O DYE: CPT

## 2024-11-13 ENCOUNTER — OFFICE VISIT (OUTPATIENT)
Dept: OBSTETRICS AND GYNECOLOGY | Age: 54
End: 2024-11-13
Payer: COMMERCIAL

## 2024-11-13 ENCOUNTER — OFFICE VISIT (OUTPATIENT)
Dept: CARDIOLOGY CLINIC | Age: 54
End: 2024-11-13
Payer: COMMERCIAL

## 2024-11-13 VITALS
WEIGHT: 286 LBS | DIASTOLIC BLOOD PRESSURE: 70 MMHG | SYSTOLIC BLOOD PRESSURE: 130 MMHG | BODY MASS INDEX: 48.83 KG/M2 | HEIGHT: 64 IN | HEART RATE: 87 BPM | OXYGEN SATURATION: 95 %

## 2024-11-13 VITALS
HEIGHT: 63 IN | SYSTOLIC BLOOD PRESSURE: 134 MMHG | BODY MASS INDEX: 50.5 KG/M2 | DIASTOLIC BLOOD PRESSURE: 70 MMHG | WEIGHT: 285 LBS

## 2024-11-13 DIAGNOSIS — E66.01 MORBID OBESITY WITH BMI OF 45.0-49.9, ADULT: ICD-10-CM

## 2024-11-13 DIAGNOSIS — E11.69 TYPE 2 DIABETES MELLITUS WITH OTHER SPECIFIED COMPLICATION, WITHOUT LONG-TERM CURRENT USE OF INSULIN (HCC): ICD-10-CM

## 2024-11-13 DIAGNOSIS — G47.33 OBSTRUCTIVE SLEEP APNEA: ICD-10-CM

## 2024-11-13 DIAGNOSIS — I47.10 SVT (SUPRAVENTRICULAR TACHYCARDIA) (HCC): Primary | ICD-10-CM

## 2024-11-13 DIAGNOSIS — Z01.419 WELL WOMAN EXAM WITH ROUTINE GYNECOLOGICAL EXAM: Primary | ICD-10-CM

## 2024-11-13 DIAGNOSIS — Z12.31 ENCOUNTER FOR SCREENING MAMMOGRAM FOR BREAST CANCER: ICD-10-CM

## 2024-11-13 DIAGNOSIS — E78.5 HYPERLIPIDEMIA, UNSPECIFIED HYPERLIPIDEMIA TYPE: ICD-10-CM

## 2024-11-13 DIAGNOSIS — I10 ESSENTIAL HYPERTENSION: ICD-10-CM

## 2024-11-13 PROCEDURE — 99214 OFFICE O/P EST MOD 30 MIN: CPT | Performed by: CLINICAL NURSE SPECIALIST

## 2024-11-13 PROCEDURE — 3075F SYST BP GE 130 - 139MM HG: CPT | Performed by: CLINICAL NURSE SPECIALIST

## 2024-11-13 PROCEDURE — 3078F DIAST BP <80 MM HG: CPT | Performed by: CLINICAL NURSE SPECIALIST

## 2024-11-13 RX ORDER — M-VIT,TX,IRON,MINS/CALC/FOLIC 27MG-0.4MG
1 TABLET ORAL NIGHTLY PRN
COMMUNITY

## 2024-11-13 RX ORDER — CLINDAMYCIN HYDROCHLORIDE 150 MG/1
CAPSULE ORAL
COMMUNITY
Start: 2024-11-05

## 2024-11-13 RX ORDER — FEZOLINETANT 45 MG/1
45 TABLET, FILM COATED ORAL EVERY 24 HOURS
COMMUNITY

## 2024-11-13 RX ORDER — DAPAGLIFLOZIN 5 MG/1
1 TABLET, FILM COATED ORAL DAILY
COMMUNITY

## 2024-11-13 RX ORDER — HYDROCODONE BITARTRATE AND ACETAMINOPHEN 5; 325 MG/1; MG/1
1 TABLET ORAL EVERY 6 HOURS PRN
COMMUNITY

## 2024-11-13 RX ORDER — LISINOPRIL 40 MG/1
1 TABLET ORAL DAILY
COMMUNITY

## 2024-11-13 RX ORDER — LEVOCETIRIZINE DIHYDROCHLORIDE 5 MG/1
5 TABLET, FILM COATED ORAL NIGHTLY
COMMUNITY

## 2024-11-13 ASSESSMENT — ENCOUNTER SYMPTOMS
ABDOMINAL PAIN: 0
CHEST TIGHTNESS: 0
EYE REDNESS: 0
FACIAL SWELLING: 0
BACK PAIN: 1
NAUSEA: 0
COUGH: 0
SHORTNESS OF BREATH: 0
VOMITING: 0
WHEEZING: 0

## 2024-11-13 NOTE — PROGRESS NOTES
[facility], [facility]  [address] [address]  Phone: [phone]  Fax: [phone]    OFFICE VISIT:  2024    Ginger Dyer - : 1970    Reason For Visit:  Ginger is a 54 y.o. female who is here for Follow-up (Patient states she is having SOB.) and SVT (supraventricular tachycardia)       Diagnosis Orders   1. SVT (supraventricular tachycardia) (HCC)        2. Essential hypertension        3. Hyperlipidemia, unspecified hyperlipidemia type        4. Type 2 diabetes mellitus with other specified complication, without long-term current use of insulin (HCC)        5. Morbid obesity with BMI of 45.0-49.9, adult        6. Obstructive sleep apnea                HPI  Patient with history of hypertension, hyperlipidemia, diabetes, DORITA-CPAP, SVT, and tachycardia, obesity, history of tobacco use, psoriatic arthritis.  Family history of heart disease in grandmother    Patient denies any exertional chest pain, unusual dyspnea, orthopnea, PND, edema, palpitations.    She has lots orthopedic issues and has had several back surgeries in the past.  She is having more issues with worsening back pain.  Knee surgery last year    No progress toward weight loss.    She remains smoke-free and has worked diligently to get her diabetes under better control      Greg Dillon is PCP.  Ginger Dyer has the following history as recorded in [facility]:    Patient Active Problem List    Diagnosis Date Noted    Trigger finger, right middle finger 10/15/2024    Carpal tunnel syndrome, right 10/03/2024    Tachycardia 2019    Essential hypertension 2018    BiPAP (biphasic positive airway pressure) dependence     Obstructive sleep apnea 2018    Somnolence, daytime 2018    Snoring 2018    Witnessed apneic spells 2018    Sleep disturbance 2018    Morbid obesity with BMI of 50.0-59.9, adult 2018     Past Medical History:   Diagnosis Date

## 2024-11-13 NOTE — PROGRESS NOTES
"Subjective     Ayla Echevarria is a 54 y.o. female    History of Present Illness  The patient presents to [facility] office today for her WWE. The patient reports she is still having periods, but reports they are irregular. She reports some recent brown discharge but LMP was in July of this year. She denies vaginal discharge, odor, pelvic pain, or urinary symptoms today.  Gynecologic Exam  The patient's pertinent negatives include no genital itching, genital lesions, genital odor, genital rash, missed menses, pelvic pain, vaginal bleeding or vaginal discharge. The patient is experiencing no pain. Pertinent negatives include no abdominal pain, anorexia, back pain, chills, constipation, diarrhea, discolored urine, dysuria, fever, flank pain, frequency, headaches, hematuria, joint pain, joint swelling, nausea, painful intercourse, rash, sore throat, urgency or vomiting. She is not sexually active. No, her partner does not have an STD. She uses abstinence for contraception. Her menstrual history has been irregular. The maximum temperature recorded prior to her arrival was no fever.         /70   Ht 160 cm (63\")   Wt 129 kg (285 lb)   LMP 07/10/2024 (Approximate)   BMI 50.49 kg/m²     Outpatient Encounter Medications as of 11/13/2024   Medication Sig Dispense Refill    citalopram (CeleXA) 40 MG tablet Take 1 tablet by mouth Daily.      clindamycin (CLEOCIN) 150 MG capsule PRN for dentist appointments      diclofenac (VOLTAREN) 75 MG EC tablet       ezetimibe-simvastatin (VYTORIN) 10-40 MG per tablet Take 1 tablet by mouth Every Night.      Fezolinetant (Veozah) 45 MG tablet Take 1 tablet by mouth Daily.      HYDROcodone-acetaminophen (NORCO) 5-325 MG per tablet Take 1 tablet by mouth Every 6 (Six) Hours As Needed.      Levocetirizine Dihydrochloride (XYZAL PO) Take  by mouth.      lisinopril (PRINIVIL,ZESTRIL) 40 MG tablet Take 1 tablet by mouth Daily.      metFORMIN (GLUCOPHAGE) 500 MG tablet Take 2 tablets " by mouth 2 (Two) Times a Day With Meals.      metoprolol succinate XL (TOPROL-XL) 25 MG 24 hr tablet Take 1 tablet by mouth Every Night.      multivitamin with minerals tablet tablet Take 1 tablet by mouth Daily.      ondansetron (ZOFRAN) 4 MG tablet Take 1 tablet by mouth Every 6 (Six) Hours As Needed for Nausea or Vomiting. 60 tablet 0    prenatal vitamin (prenatal, CLASSIC, vitamin) tablet Take 1 tablet by mouth Daily.      timolol (TIMOPTIC) 0.5 % ophthalmic solution Administer 1 drop to both eyes Daily.      tiZANidine (ZANAFLEX) 4 MG tablet Take 2 tablets by mouth 2 (Two) Times a Day.      Tremfya 100 MG/ML solution pen-injector Inject 1 dose as directed Every 2 (Two) Months.      vitamin D (ERGOCALCIFEROL) 1.25 MG (99095 UT) capsule capsule TAKE 1 CAPSULE ONCE A WEEK 30 capsule 3    Farxiga 5 MG tablet tablet Take 1 tablet by mouth Daily. (Patient not taking: Reported on 11/13/2024)      loratadine (CLARITIN) 10 MG tablet Take 1 tablet by mouth Daily. (Patient not taking: Reported on 11/13/2024)      metoprolol succinate XL (TOPROL-XL) 25 MG 24 hr tablet Take 1 tablet by mouth Every Night. (Patient not taking: Reported on 11/13/2024)      NON FORMULARY Neomycin and Polymyxin b Sulfates and Hydrocortisone otic solution (Patient not taking: Reported on 11/13/2024)      pregabalin (LYRICA) 75 MG capsule Take 1 capsule by mouth Every 12 (Twelve) Hours. (Patient not taking: Reported on 11/13/2024)      tiZANidine (ZANAFLEX) 4 MG tablet Take 1 tablet by mouth. (Patient not taking: Reported on 11/13/2024)      [DISCONTINUED] lisinopril-hydrochlorothiazide (PRINZIDE,ZESTORETIC) 20-12.5 MG per tablet Take 1 tablet by mouth Daily.       No facility-administered encounter medications on file as of 11/13/2024.       Past Medical History  Past Medical History:   Diagnosis Date    Asthma 2019    Carpal tunnel syndrome     Chronic bilateral low back pain with bilateral sciatica 01/17/2020    Depression 2019    Diabetes  mellitus     Disc degeneration, lumbosacral 2020    Glaucoma     Hyperlipidemia     Hypertension     Mononucleosis     Nerve pain     PONV (postoperative nausea and vomiting)     Psoriasis     Sleep apnea        Surgical History  Past Surgical History:   Procedure Laterality Date    ANTERIOR LUMBAR EXPOSURE N/A 2020    Procedure: ANTERIOR LUMBAR EXPOSURE;  Surgeon: Salvatore Villalta DO;  Location:  [facility] OR;  Service: Vascular    CARPAL TUNNEL RELEASE Bilateral      SECTION      x2    CHOLECYSTECTOMY      LUMBAR FUSION N/A 2020    Procedure: ANTERIOR DECOMPRESSION, ANTERIOR LUMBAR INTERBODY FUSION WITH INSTRUMENTATION L5-S1;  Surgeon: PAMELA Bang MD;  Location:  [facility];  Service: Orthopedic Spine    LUMBAR FUSION Right 2020    Procedure: RIGHT  LATERAL LUMBAR  INTERBODY FUSION WITH INSTRUMENTATION L4-5;  Surgeon: PAMELA Bang MD;  Location:  [facility];  Service: Orthopedic Spine    LUMBAR FUSION Left 2023    Procedure: LEFT LATERAL LUMBAR INTERBODY WITH INSTRUMENTATION L3-4;  Surgeon: PAMELA Bang MD;  Location:  [facility];  Service: Orthopedic Spine;  Laterality: Left;    LUMBAR LAMINECTOMY WITH FUSION N/A 2020    Procedure: POSTERIOR SPINAL FUSION WITH INSTRUMENTATION L4-S1;  Surgeon: PAMELA Bang MD;  Location:  [facility];  Service: Orthopedic Spine    LUMBAR LAMINECTOMY WITH FUSION N/A 2023    Procedure: REMOVAL OF INSTRUMENTATION, EXPLORATION OF FUSION L4-S1, POSTERIOR SPINAL FUSION L3-4 WITH INSTRUMENTATION L3-S1;  Surgeon: PAMELA Bang MD;  Location:  [facility];  Service: Orthopedic Spine;  Laterality: N/A;    REPLACEMENT TOTAL KNEE Left 2023    REPLACEMENT TOTAL KNEE Right 2023       Family History  Family History   Problem Relation Age of Onset    Hypertension Mother     Arthritis Mother     Hypertension Father     Arthritis Father     Heart disease Maternal Grandmother     Arthritis Maternal Grandmother      Arthritis Maternal Grandfather     Arthritis Paternal Grandmother     No Known Problems Paternal Grandfather        The following portions of the patient's history were reviewed and updated as appropriate: allergies, current medications, past family history, past medical history, past social history, past surgical history, and problem list.    Review of Systems   Constitutional: Negative.  Negative for chills and fever.   HENT: Negative.  Negative for sore throat.    Eyes: Negative.    Respiratory: Negative.     Cardiovascular: Negative.    Gastrointestinal: Negative.  Negative for abdominal pain, anorexia, constipation, diarrhea, nausea and vomiting.   Endocrine: Negative.    Genitourinary: Negative.  Negative for breast discharge, breast lump, breast pain, dysuria, flank pain, frequency, hematuria, missed menses, pelvic pain, urgency and vaginal discharge.   Musculoskeletal: Negative.  Negative for back pain and joint pain.   Skin: Negative.  Negative for rash.   Allergic/Immunologic: Negative.    Neurological: Negative.    Hematological: Negative.    Psychiatric/Behavioral: Negative.         Objective   Physical Exam  Vitals and nursing note reviewed. Exam conducted with a chaperone present.   Constitutional:       General: She is not in acute distress.     Appearance: She is well-developed. She is obese. She is not diaphoretic.   HENT:      Head: Normocephalic.      Right Ear: External ear normal.      Left Ear: External ear normal.      Nose: Nose normal.   Eyes:      General: No scleral icterus.        Right eye: No discharge.         Left eye: No discharge.      Conjunctiva/sclera: Conjunctivae normal.      Pupils: Pupils are equal, round, and reactive to light.   Neck:      Thyroid: No thyromegaly.      Vascular: No carotid bruit.      Trachea: No tracheal deviation.   Cardiovascular:      Rate and Rhythm: Normal rate and regular rhythm.      Pulses: Normal pulses.      Heart sounds: Normal heart sounds.  No murmur heard.  Pulmonary:      Effort: Pulmonary effort is normal. No respiratory distress.      Breath sounds: Normal breath sounds. No wheezing.   Chest:   Breasts:     Breasts are symmetrical.      Right: Normal. No swelling, bleeding, inverted nipple, mass, nipple discharge, skin change or tenderness.      Left: Normal. No swelling, bleeding, inverted nipple, mass, nipple discharge, skin change or tenderness.   Abdominal:      General: Bowel sounds are normal. There is no distension.      Palpations: Abdomen is soft. There is no mass.      Tenderness: There is no abdominal tenderness. There is no right CVA tenderness, left CVA tenderness or guarding.      Hernia: No hernia is present. There is no hernia in the left inguinal area or right inguinal area.   Genitourinary:     General: Normal vulva.      Exam position: Lithotomy position.      Labia:         Right: No rash, tenderness, lesion or injury.         Left: No rash, tenderness, lesion or injury.       Vagina: Normal. No signs of injury and foreign body. No vaginal discharge, erythema, tenderness or bleeding.      Cervix: Normal.      Uterus: Normal. Not enlarged, not fixed and not tender.       Adnexa: Right adnexa normal and left adnexa normal.        Right: No mass, tenderness or fullness.          Left: No mass, tenderness or fullness.        Rectum: Normal. No mass.      Comments:   BSU normal  Urethral meatus  Normal  Perineum  Normal  Musculoskeletal:         General: No tenderness. Normal range of motion.      Cervical back: Normal range of motion and neck supple.   Lymphadenopathy:      Head:      Right side of head: No submental, submandibular, tonsillar, preauricular, posterior auricular or occipital adenopathy.      Left side of head: No submental, submandibular, tonsillar, preauricular, posterior auricular or occipital adenopathy.      Cervical: No cervical adenopathy.      Right cervical: No superficial, deep or posterior cervical  adenopathy.     Left cervical: No superficial, deep or posterior cervical adenopathy.      Upper Body:      Right upper body: No supraclavicular, axillary or pectoral adenopathy.      Left upper body: No supraclavicular, axillary or pectoral adenopathy.      Lower Body: No right inguinal adenopathy. No left inguinal adenopathy.   Skin:     General: Skin is warm and dry.      Findings: No bruising, erythema or rash.   Neurological:      Mental Status: She is alert and oriented to person, place, and time.      Coordination: Coordination normal.   Psychiatric:         Mood and Affect: Mood normal.         Behavior: Behavior normal.         Thought Content: Thought content normal.         Judgment: Judgment normal.       PHQ-9 Depression Screening  Little interest or pleasure in doing things? Not at all   Feeling down, depressed, or hopeless? Not at all   PHQ-2 Total Score 0   Trouble falling or staying asleep, or sleeping too much?     Feeling tired or having little energy?     Poor appetite or overeating?     Feeling bad about yourself - or that you are a failure or have let yourself or your family down?     Trouble concentrating on things, such as reading the newspaper or watching television?     Moving or speaking so slowly that other people could have noticed? Or the opposite - being so fidgety or restless that you have been moving around a lot more than usual?     Thoughts that you would be better off dead, or of hurting yourself in some way?     PHQ-9 Total Score     If you checked off any problems, how difficult have these problems made it for you to do your work, take care of things at home, or get along with other people?          Assessment & Plan   Diagnoses and all orders for this visit:    1. Well woman exam with routine gynecological exam (Primary)  Comments:  The patient presents to the office today for her WWE. The patient is established with her pcp with screening labs current. The patient's last  cervical screening pap smear was completed on 11/9/2023 with normal results. Discussed ASCCP guidelines with the patient. Pelvic exam normal, pap smear was not completed today. She reports periods are becoming somewhat irregular with longer spacing between cycles. Discussed that she is likely perimenopausal. She does not wish to try options to regulate cycles at this time. SBE discussed with importance of same. She is current on her screening mammogram as this was completed earlier this year. She is current on colon cancer screening colonoscopy. Patient denies having any GYN concerns to address today. Normal GYN exam.     2. Encounter for screening mammogram for breast cancer  Comments:  Patient is current on screening mammogram as this was completed on 4/11/2024. Order placed for this to be completed next year at [facility].  Orders:  -     Mammo Screening Digital Tomosynthesis Bilateral With CAD; Future         Normal GYN exam. Encouraged SBE, pt is aware how to do self breast exam and the importance of same. Discussed weight management and importance of maintaining a healthy weight. Discussed Vitamin D intake and the importance of adequate vitamin D for both bone health and a healthy immune system.  Discussed daily exercise and the importance of same, in regards to a healthy heart as well as helping to maintain her weight and improving her mental health.  Colonoscopy is up to date. Mammogram is up to date. Bone density is not indicated. Pap smear is not done per ASCCP guidelines. HPV is not done. Lab work up is up to date.      Class 3 Severe Obesity (BMI >=40). Obesity-related health conditions include the following: hypertension, diabetes mellitus, and dyslipidemias. Obesity is unchanged. BMI is is above average; BMI management plan is completed. We discussed portion control, increasing exercise, and Information on healthy weight added to patient's after visit summary.      Princess Garcia, APRN  11/13/2024

## 2024-11-13 NOTE — PATIENT INSTRUCTIONS
Return in about 1 year (around 11/13/2025) for APRN.  Healthy heart diet, weight loss  Consider Ozempic for weight loss, discuss with PCP

## 2024-12-09 ENCOUNTER — OFFICE VISIT (OUTPATIENT)
Age: 54
End: 2024-12-09
Payer: COMMERCIAL

## 2024-12-09 VITALS — BODY MASS INDEX: 48.32 KG/M2 | WEIGHT: 283 LBS | HEIGHT: 64 IN

## 2024-12-09 DIAGNOSIS — Z96.651 STATUS POST TOTAL RIGHT KNEE REPLACEMENT: Primary | ICD-10-CM

## 2024-12-09 PROCEDURE — 99213 OFFICE O/P EST LOW 20 MIN: CPT | Performed by: ORTHOPAEDIC SURGERY

## 2024-12-09 RX ORDER — HYDROCODONE BITARTRATE AND ACETAMINOPHEN 5; 325 MG/1; MG/1
1 TABLET ORAL EVERY 6 HOURS PRN
COMMUNITY

## 2024-12-09 RX ORDER — TIRZEPATIDE 2.5 MG/.5ML
INJECTION, SOLUTION SUBCUTANEOUS
COMMUNITY
Start: 2024-11-21

## 2024-12-09 RX ORDER — VENLAFAXINE HYDROCHLORIDE 37.5 MG/1
CAPSULE, EXTENDED RELEASE ORAL
COMMUNITY
Start: 2024-11-20

## 2024-12-09 RX ORDER — CITALOPRAM HYDROBROMIDE 40 MG/1
TABLET ORAL
COMMUNITY
Start: 2024-11-11

## 2024-12-09 NOTE — PROGRESS NOTES
[facility]  [facility]  [address]  formerly [facility] [address] [phone]         Ginger Dyer (: 1970) is a 54 y.o. female, patient, here for evaluation of the following chief complaint(s): Follow-up (R knee/TKR 23)  .         Patient's PCP: Damir Parsons MD     Patient's Last Appointment in this Department was on 10/15/2024      Subjective:     Chief Complaint   Patient presents with    Follow-up     R knee  TKR 23        Patient presents in follow-up after right knee arthroplasty 2023.  She tells me the right knee is doing very well.  Both knees are doing well.  She sees Dr. Craig for low back pain.  Her back made her fall recently and she skinned up her left knee but did not really do any lasting damage.            Medications  Current Outpatient Medications   Medication Sig Dispense Refill    MOUNJARO 2.5 MG/0.5ML SOAJ ADMINISTER 2.5 MG UNDER THE SKIN EVERY WEEK      venlafaxine (EFFEXOR XR) 37.5 MG extended release capsule       HYDROcodone-acetaminophen (NORCO) 5-325 MG per tablet Take 1 tablet by mouth every 6 hours as needed.      citalopram (CELEXA) 40 MG tablet       fezolinetant (VEOZAH) 45 MG TABS Take 45 mg by mouth every 24 hours      Multiple Vitamins-Minerals (THERAPEUTIC MULTIVITAMIN-MINERALS) tablet Take 1 tablet by mouth nightly as needed      Multiple Vitamins-Minerals (HAIR SKIN & NAILS PO) Take by mouth      levocetirizine (XYZAL) 5 MG tablet Take 1 tablet by mouth nightly      TREMFYA 100 MG/ML SOPN Injectio every 8 weeks      lisinopril (PRINIVIL;ZESTRIL) 40 MG tablet Take 1 tablet by mouth daily      timolol (TIMOPTIC) 0.5 % ophthalmic solution       CELEXA 20 MG tablet daily      tiZANidine (ZANAFLEX) 4 MG tablet Take 2 tablets by mouth nightly as needed      metoprolol succinate (TOPROL XL) 25 MG extended release tablet TAKE 1 TABLET NIGHTLY 90 tablet 3    vitamin D

## 2025-01-03 RX ORDER — METOPROLOL SUCCINATE 25 MG/1
TABLET, EXTENDED RELEASE ORAL
Qty: 90 TABLET | Refills: 3 | Status: SHIPPED | OUTPATIENT
Start: 2025-01-03

## 2025-01-23 ENCOUNTER — TELEPHONE (OUTPATIENT)
Dept: NEUROLOGY | Age: 55
End: 2025-01-23

## 2025-01-23 NOTE — TELEPHONE ENCOUNTER
I have called and spoke with patient to let her know that I have her scheduled an appointment with MIGUEL Caicedo regarding her DME sleep machine. Patient is aware of the appointment time/date.

## 2025-01-24 NOTE — PROGRESS NOTES
History    Not on file   Tobacco Use    Smoking status: Former     Current packs/day: 0.00     Average packs/day: 0.5 packs/day for 30.0 years (15.0 ttl pk-yrs)     Types: Cigarettes     Start date:      Quit date: 11/15/2015     Years since quittin.2    Smokeless tobacco: Never   Vaping Use    Vaping status: Never Used   Substance and Sexual Activity    Alcohol use: No    Drug use: No    Sexual activity: Yes     Partners: Male        Tobacco Use      Smoking status: Former        Packs/day: 0.00        Years: 0.5 packs/day for 30.0 years (15.0 ttl pk-yrs)        Types: Cigarettes        Start date:         Quit date: 11/15/2015        Years since quittin.2      Smokeless tobacco: Never     Family History   Problem Relation Age of Onset    Rheumatologic Disease Mother     Rheumatologic Disease Father     Rheumatologic Disease Maternal Grandfather     Rheumatologic Disease Maternal Grandmother     Rheumatologic Disease Paternal Grandmother     Rheumatologic Disease Maternal Aunt     Colon Cancer Neg Hx     Colon Polyps Neg Hx         Medications  Current Outpatient Medications   Medication Sig Dispense Refill    diclofenac (VOLTAREN) 75 MG EC tablet       MOUNJARO 5 MG/0.5ML SOAJ ADMINISTER 5 MG UNDER THE SKIN EVERY WEEK      metoprolol succinate (TOPROL XL) 25 MG extended release tablet TAKE 1 TABLET NIGHTLY 90 tablet 3    venlafaxine (EFFEXOR XR) 37.5 MG extended release capsule       HYDROcodone-acetaminophen (NORCO) 5-325 MG per tablet Take 1 tablet by mouth every 6 hours as needed.      citalopram (CELEXA) 40 MG tablet       Multiple Vitamins-Minerals (THERAPEUTIC MULTIVITAMIN-MINERALS) tablet Take 1 tablet by mouth nightly as needed      Multiple Vitamins-Minerals (HAIR SKIN & NAILS PO) Take by mouth      levocetirizine (XYZAL) 5 MG tablet Take 1 tablet by mouth nightly      TREMFYA 100 MG/ML SOPN Injectio every 8 weeks      lisinopril (PRINIVIL;ZESTRIL) 40 MG tablet Take 1 tablet by mouth daily

## 2025-01-27 ENCOUNTER — OFFICE VISIT (OUTPATIENT)
Age: 55
End: 2025-01-27
Payer: COMMERCIAL

## 2025-01-27 VITALS — HEIGHT: 64 IN | WEIGHT: 283 LBS | BODY MASS INDEX: 48.32 KG/M2

## 2025-01-27 DIAGNOSIS — G56.01 RIGHT CARPAL TUNNEL SYNDROME: ICD-10-CM

## 2025-01-27 DIAGNOSIS — M65.331 TRIGGER FINGER, RIGHT MIDDLE FINGER: Primary | ICD-10-CM

## 2025-01-27 PROCEDURE — 99213 OFFICE O/P EST LOW 20 MIN: CPT | Performed by: ORTHOPAEDIC SURGERY

## 2025-01-27 RX ORDER — TIRZEPATIDE 5 MG/.5ML
INJECTION, SOLUTION SUBCUTANEOUS
COMMUNITY
Start: 2025-01-13

## 2025-01-27 RX ORDER — DICLOFENAC SODIUM 75 MG/1
TABLET, DELAYED RELEASE ORAL
COMMUNITY
Start: 2024-12-20

## 2025-02-11 ENCOUNTER — OFFICE VISIT (OUTPATIENT)
Age: 55
End: 2025-02-11
Payer: COMMERCIAL

## 2025-02-11 VITALS — HEIGHT: 64 IN | WEIGHT: 275 LBS | BODY MASS INDEX: 46.95 KG/M2

## 2025-02-11 DIAGNOSIS — M19.071 OSTEOARTHRITIS OF MIDFOOT, RIGHT: ICD-10-CM

## 2025-02-11 DIAGNOSIS — M19.072 OSTEOARTHRITIS OF LEFT MIDFOOT: Primary | ICD-10-CM

## 2025-02-11 PROCEDURE — 99203 OFFICE O/P NEW LOW 30 MIN: CPT | Performed by: NURSE PRACTITIONER

## 2025-02-11 SDOH — HEALTH STABILITY: PHYSICAL HEALTH: ON AVERAGE, HOW MANY MINUTES DO YOU ENGAGE IN EXERCISE AT THIS LEVEL?: 0 MIN

## 2025-02-11 SDOH — HEALTH STABILITY: PHYSICAL HEALTH: ON AVERAGE, HOW MANY DAYS PER WEEK DO YOU ENGAGE IN MODERATE TO STRENUOUS EXERCISE (LIKE A BRISK WALK)?: 0 DAYS

## 2025-02-11 ASSESSMENT — ENCOUNTER SYMPTOMS
BLOOD IN STOOL: 0
COUGH: 0
COLOR CHANGE: 0
CONSTIPATION: 0
VOMITING: 0
NAUSEA: 0
SHORTNESS OF BREATH: 0
DIARRHEA: 0

## 2025-02-11 NOTE — PROGRESS NOTES
Chief Complaint    Chief Complaint   Patient presents with    Foot Pain     Bilateral foot        Body Part: Foot bilateral    When did the symptoms begin/Date of Onset? 2020    Where did the injury happen? Other: NA    How did the injury happen? NA    If over 55, have you cook an Osteoporosis Screening in the last 2 years? No    Injury Details    Previous similar problems or complaints? No    Severity of Pain: 10 (Severe)    Character of Pain: Sharp and Achy    What makes your symptoms worse? Other: Bending her feet at the ankle joint; pain is on top of the foot    How long does the pain last? Constant    Associated Symptoms: Other: No    What makes your symptoms better? Rest    Previous Treatment for the Problem: Medications tylenol is no help    Any special diagnostic tests or studies done? X-Rays; Where? [facility]    Similar complaints on opposite side? Yes.  Left is worse than right.    Review of Systems    Review of Systems   Constitutional:  Negative for appetite change, chills, fatigue and fever.   Respiratory:  Negative for cough and shortness of breath.    Cardiovascular:  Negative for chest pain, palpitations and leg swelling.   Gastrointestinal:  Negative for blood in stool, constipation, diarrhea, nausea and vomiting.   Musculoskeletal:  Negative for arthralgias, gait problem and joint swelling.   Skin:  Negative for color change and wound.   Neurological:  Negative for weakness.

## 2025-02-11 NOTE — PROGRESS NOTES
[facility]  [facility]  [address]  [facility] [id number] [phone]     Patient: Ginger Dyer   YOB: 1970   Date: 2025   Visit Type:      History of Present Illness  Chief Complaint   Patient presents with    Foot Pain     Bilateral foot       This is a 54 y.o. female presents today complaining of bilateral lower extremity pain.  She denies any trauma or injury.  States the pain to the left is worse than the right.  She has tried shoe changes as well as prescription anti-inflammatory medication.  She states the pain is worse with prolonged standing and walking.  The onset began in .  She describes pain as sharp and aching.  Describes pain is constant better with rest.  She also takes over-the-counter Tylenol as needed for pain as well.    Past Medical History:   Diagnosis Date    Arthritis with psoriasis (HCC)     BiPAP (biphasic positive airway pressure) dependence     Hyperlipidemia     Hypertension     Increased pressure in the eye, bilateral     \"but not glaucoma\"    Knee pain     Obstructive sleep apnea     AHI: 106.6 per [facility], 2018    Osteoarthritis     Pain management     \"not at this time\" 23      Past Surgical History:   Procedure Laterality Date    BACK SURGERY      L4,L5,S1    CARPAL TUNNEL RELEASE       SECTION      CHOLECYSTECTOMY      COLONOSCOPY N/A 2020    Dr KACY Ware-Tubular AP x 2, HP x 1, 5 yr recall    EPIDURAL STEROID INJECTION N/A 2019    LUMBAR EPIDURAL STEROID INJECTION L4-5 performed by Eulalio Joy at [facility] OR    EPIDURAL STEROID INJECTION N/A 2019    LUMBAR EPIDURAL STEROID INJECTION L5-S1 performed by Eulalio Joy at [facility] OR    HC INJECTION PROCEDURE FOR SACROILIAC JOINT Right 2019    SACROILIAC JOINT INJECTION performed by Eulalio Joy at [facility] OR    LUMBAR NERVE BLOCK N/A 2019    LUMBAR INTER LAMINAR KACEY L4-5 performed by

## 2025-03-28 ENCOUNTER — HOSPITAL ENCOUNTER (OUTPATIENT)
Age: 55
Setting detail: OUTPATIENT SURGERY
Discharge: HOME | End: 2025-03-28
Attending: INTERNAL MEDICINE | Admitting: INTERNAL MEDICINE
Payer: COMMERCIAL

## 2025-03-28 ENCOUNTER — ANCILLARY PROCEDURE (OUTPATIENT)
Dept: ENDOSCOPY | Age: 55
End: 2025-03-28
Attending: INTERNAL MEDICINE
Payer: COMMERCIAL

## 2025-03-28 ENCOUNTER — ANESTHESIA EVENT (OUTPATIENT)
Dept: ENDOSCOPY | Age: 55
End: 2025-03-28
Payer: COMMERCIAL

## 2025-03-28 ENCOUNTER — ANESTHESIA (OUTPATIENT)
Dept: ENDOSCOPY | Age: 55
End: 2025-03-28
Payer: COMMERCIAL

## 2025-03-28 VITALS
OXYGEN SATURATION: 96 % | TEMPERATURE: 97.3 F | HEIGHT: 64 IN | BODY MASS INDEX: 45.93 KG/M2 | HEART RATE: 80 BPM | DIASTOLIC BLOOD PRESSURE: 72 MMHG | SYSTOLIC BLOOD PRESSURE: 141 MMHG | WEIGHT: 269 LBS | RESPIRATION RATE: 18 BRPM

## 2025-03-28 DIAGNOSIS — Z12.11 SCREEN FOR COLON CANCER: ICD-10-CM

## 2025-03-28 LAB
GLUCOSE BLD-MCNC: 179 MG/DL (ref 70–99)
PERFORMED ON: ABNORMAL

## 2025-03-28 PROCEDURE — 2709999900 HC NON-CHARGEABLE SUPPLY: Performed by: INTERNAL MEDICINE

## 2025-03-28 PROCEDURE — 7100000010 HC PHASE II RECOVERY - FIRST 15 MIN: Performed by: INTERNAL MEDICINE

## 2025-03-28 PROCEDURE — 2580000003 HC RX 258: Performed by: INTERNAL MEDICINE

## 2025-03-28 PROCEDURE — 3700000001 HC ADD 15 MINUTES (ANESTHESIA): Performed by: INTERNAL MEDICINE

## 2025-03-28 PROCEDURE — 88305 TISSUE EXAM BY PATHOLOGIST: CPT

## 2025-03-28 PROCEDURE — 3700000000 HC ANESTHESIA ATTENDED CARE: Performed by: INTERNAL MEDICINE

## 2025-03-28 PROCEDURE — 7100000011 HC PHASE II RECOVERY - ADDTL 15 MIN: Performed by: INTERNAL MEDICINE

## 2025-03-28 PROCEDURE — 45385 COLONOSCOPY W/LESION REMOVAL: CPT | Performed by: INTERNAL MEDICINE

## 2025-03-28 PROCEDURE — 82962 GLUCOSE BLOOD TEST: CPT

## 2025-03-28 PROCEDURE — 3609010700 HC COLONOSCOPY POLYPECTOMY REMOVAL SNARE/STOMA: Performed by: INTERNAL MEDICINE

## 2025-03-28 PROCEDURE — 6360000002 HC RX W HCPCS: Performed by: NURSE ANESTHETIST, CERTIFIED REGISTERED

## 2025-03-28 RX ORDER — PROPOFOL 10 MG/ML
INJECTION, EMULSION INTRAVENOUS
Status: DISCONTINUED | OUTPATIENT
Start: 2025-03-28 | End: 2025-03-28 | Stop reason: SDUPTHER

## 2025-03-28 RX ORDER — LIDOCAINE HYDROCHLORIDE 10 MG/ML
INJECTION, SOLUTION INFILTRATION; PERINEURAL
Status: DISCONTINUED | OUTPATIENT
Start: 2025-03-28 | End: 2025-03-28 | Stop reason: SDUPTHER

## 2025-03-28 RX ORDER — MIDAZOLAM HYDROCHLORIDE 1 MG/ML
INJECTION, SOLUTION INTRAMUSCULAR; INTRAVENOUS
Status: DISCONTINUED | OUTPATIENT
Start: 2025-03-28 | End: 2025-03-28 | Stop reason: SDUPTHER

## 2025-03-28 RX ORDER — TRAMADOL HYDROCHLORIDE 50 MG/1
50 TABLET ORAL EVERY 6 HOURS PRN
COMMUNITY

## 2025-03-28 RX ORDER — SODIUM CHLORIDE, SODIUM LACTATE, POTASSIUM CHLORIDE, CALCIUM CHLORIDE 600; 310; 30; 20 MG/100ML; MG/100ML; MG/100ML; MG/100ML
INJECTION, SOLUTION INTRAVENOUS CONTINUOUS
Status: DISCONTINUED | OUTPATIENT
Start: 2025-03-28 | End: 2025-03-28 | Stop reason: HOSPADM

## 2025-03-28 RX ORDER — FENTANYL CITRATE 50 UG/ML
INJECTION, SOLUTION INTRAMUSCULAR; INTRAVENOUS
Status: DISCONTINUED | OUTPATIENT
Start: 2025-03-28 | End: 2025-03-28 | Stop reason: SDUPTHER

## 2025-03-28 RX ORDER — ONDANSETRON 2 MG/ML
INJECTION INTRAMUSCULAR; INTRAVENOUS
Status: DISCONTINUED | OUTPATIENT
Start: 2025-03-28 | End: 2025-03-28 | Stop reason: SDUPTHER

## 2025-03-28 RX ADMIN — LIDOCAINE HYDROCHLORIDE 40 MG: 10 INJECTION, SOLUTION INFILTRATION; PERINEURAL at 13:11

## 2025-03-28 RX ADMIN — FENTANYL CITRATE 100 MCG: 50 INJECTION INTRAMUSCULAR; INTRAVENOUS at 12:56

## 2025-03-28 RX ADMIN — SODIUM CHLORIDE, SODIUM LACTATE, POTASSIUM CHLORIDE, AND CALCIUM CHLORIDE: .6; .31; .03; .02 INJECTION, SOLUTION INTRAVENOUS at 11:42

## 2025-03-28 RX ADMIN — MIDAZOLAM 2 MG: 1 INJECTION INTRAMUSCULAR; INTRAVENOUS at 12:56

## 2025-03-28 RX ADMIN — PROPOFOL 200 MG: 10 INJECTION, EMULSION INTRAVENOUS at 13:11

## 2025-03-28 RX ADMIN — ONDANSETRON 4 MG: 2 INJECTION, SOLUTION INTRAMUSCULAR; INTRAVENOUS at 12:57

## 2025-03-28 ASSESSMENT — PAIN DESCRIPTION - DESCRIPTORS: DESCRIPTORS: DISCOMFORT;SORE

## 2025-03-28 ASSESSMENT — PAIN - FUNCTIONAL ASSESSMENT
PAIN_FUNCTIONAL_ASSESSMENT: NONE - DENIES PAIN
PAIN_FUNCTIONAL_ASSESSMENT: NONE - DENIES PAIN
PAIN_FUNCTIONAL_ASSESSMENT: 0-10

## 2025-03-28 NOTE — OP NOTE
Patient: Ginger Dyer : 1970  [facility] Rec#: [id number] Acc#: [id number]   Primary Care Provider Damir Parsons MD    Date of Procedure:  3/28/2025    Endoscopist: Butch Ware MD    Referring Provider: Damir Parsons MD,     Operation Performed: Colonoscopy with snare polypectomy    Indications: screening    Anesthesia:  Sedation was administered by anesthesia who monitored the patient during the procedure.    I met with Ginger Dyer prior to procedure. We discussed the procedure itself, and I have discussed the risks of endoscopy (including-- but not limited to-- pain, discomfort, bleeding potentially requiring second endoscopic procedure and/or blood transfusion, organ perforation requiring operative repair, damage to organs near the colon, infection, aspiration, cardiopulmonary/allergic reaction), benefits, indications to endoscopy. Additionally, we discussed options other than colonoscopy. The patient expressed understanding. All questions answered. The patient decided to proceed with the procedure.  Signed informed consent was placed on the chart.    Blood Loss: minimal    Withdrawal time: > 6 minutes  Bowel Prep: adequate     Complications: no immediate complications    DESCRIPTION OF PROCEDURE:     A time out was performed. After written informed consent was obtained, the patient was placed in the left lateral position.     The perianal area was inspected, and a digital rectal exam was performed. A rectal exam was performed: normal tone, no palpable lesions. At this point, a forward viewing Olympus colonoscope was inserted into the anus and carefully advanced to the cecum.  The cecum was identified by the ileocecal valve and the appendiceal orifice. The colonoscope was then slowly withdrawn with careful inspection of the mucosa in a linear and circumferential fashion. The scope was retroflexed in the rectum. Suction was utilized during the procedure to remove as much air as possible  PROBLEM DIAGNOSES  Problem: Unilateral primary osteoarthritis, left knee  Recommendation: s/p L TKR on 3/11    Problem: S/P total knee replacement, left  Recommendation: Pain control with dilaudid IV PRN and lyrica  - ASA for VTE prophylaxis  Surgical site management by ortho    Problem: Leukocytosis, unspecified type  Recommendation: Likely post-op reactive leukocytosis.  No clinical sign of infection. Continue to monitor CBC.    Problem: Anemia due to blood loss  Recommendation: Likely post-op blood loss.  No clinical indication for PRBC transfusion.  Continue to monitor CBC.    Problem: HTN (hypertension)  Recommendation: Continue diltiazem CD and lisinopril with hold parameter.

## 2025-03-28 NOTE — DISCHARGE INSTRUCTIONS
Recommendations:  1. Repeat colonoscopy -  max of 5 yrs for screening  2. Await biopsy results.     Colonoscopy: What to Expect at Home  Your Recovery    After the test, you may be bloated or have gas pains. You may need to pass gas. If a biopsy was done or a polyp was removed, you may have streaks of blood in your stool (feces) for a few days. Problems such as heavy rectal bleeding may not occur until several weeks after the test. This isn't common. But it can happen after polyps are removed.  This care sheet gives you a general idea about how long it will take for you to recover. But each person recovers at a different pace. Follow the steps below to get better as quickly as possible.    How can you care for yourself at home?  Activity    Rest when you feel tired.     You can do your normal activities when it feels okay to do so.   Diet    You may resume your regular diet.     Drink plenty of fluids. This helps to replace the fluids that were lost during the colon prep.     Do not drink alcohol.   Medicines    Your doctor will tell you if and when you can restart your medicines. You will also be given instructions about taking any new medicines.     If you stopped taking aspirin or some other blood thinner, your doctor will tell you when to start taking it again.     If polyps were removed or a biopsy was done during the test, your doctor may tell you not to take aspirin or other anti-inflammatory medicines for a few days. These include ibuprofen (Advil, Motrin) and naproxen (Aleve).   Other instructions    For your safety, do not drive or operate machinery for 24 hours.     Do not sign legal documents or make major decisions until the medicine wears off and you can think clearly. The anesthesia can make it hard for you to fully understand what you are agreeing to, and cause impaired judgement.     When should you call for help?   Call 911 anytime you think you may need emergency care. For example, call if:    You

## 2025-03-28 NOTE — H&P
Patient Name: Ginger Dyer  : 1970  MRN: [id number]  DATE: 25    Allergies:   Allergies   Allergen Reactions    Penicillins Itching and Swelling     Itchy throat    Throat swelling    Ciprofloxacin Itching and Swelling     Itchy throat        ENDOSCOPY  History and Physical    Procedure:    [] Diagnostic Colonoscopy       [x] Screening Colonoscopy  [] EGD      [] ERCP      [] EUS       [] Other    [x] Previous office notes/History and Physical reviewed from the patients chart. Please see EMR for further details of HPI. I have examined the patient's status immediately prior to the procedure and:      Indications/HPI:       [x] Screening              [] History of Polyps      []Fhx of colon CA/polyps []+Cologard/DNA/Stool Testing      Anesthesia:   [x] MAC [] Moderate Sedation   [] General   [] None     ROS: 12 pt review of systems was negative unless stated above    Medications:   Prior to Admission medications    Medication Sig Start Date End Date Taking? Authorizing Provider   traMADol (ULTRAM) 50 MG tablet Take 1 tablet by mouth every 6 hours as needed for Pain. Max Daily Amount: 200 mg   Yes ProviderHalina MD   venlafaxine (EFFEXOR XR) 37.5 MG extended release capsule  24  Yes Provider, MD Halina   citalopram (CELEXA) 40 MG tablet  24  Yes ProviderHalina MD   Multiple Vitamins-Minerals (THERAPEUTIC MULTIVITAMIN-MINERALS) tablet Take 1 tablet by mouth nightly as needed   Yes ProviderHalina MD   lisinopril (PRINIVIL;ZESTRIL) 40 MG tablet Take 1 tablet by mouth daily   Yes ProviderHalina MD   timolol (TIMOPTIC) 0.5 % ophthalmic solution  24  Yes Provider, MD Halina   triamcinolone (KENALOG) 0.1 % cream Apply topically 2 times daily Apply topically 2 times daily.   Yes ProviderHalina MD   ezetimibe-simvastatin (VYTORIN) 10-40 MG per tablet Take 1 tablet by mouth daily 3/29/18  Yes ProviderHalina MD   diclofenac (VOLTAREN) 75 MG

## 2025-03-28 NOTE — ANESTHESIA POSTPROCEDURE EVALUATION
Department of Anesthesiology  Postprocedure Note    Patient: Ginger Dyer  MRN: [id number]  YOB: 1970  Date of evaluation: 3/28/2025    Procedure Summary       Date: 03/28/25 Room / Location: [place] [address] / [facility]    Anesthesia Start: 1252 Anesthesia Stop:     Procedure: COLONOSCOPY POLYPECTOMY REMOVAL SNARE Diagnosis:       Screen for colon cancer      (Screen for colon cancer [Z12.11])    Surgeons: Butch Ware MD Responsible Provider: Latia Seth APRN - CRNA    Anesthesia Type: general, TIVA ASA Status: 3            Anesthesia Type: No value filed.    Landon Phase I: Landon Score: 10    Landon Phase II:      Anesthesia Post Evaluation    Patient location during evaluation: bedside  Patient participation: complete - patient participated  Level of consciousness: sleepy but conscious  Pain score: 0  Airway patency: patent  Nausea & Vomiting: no nausea and no vomiting  Cardiovascular status: hemodynamically stable and blood pressure returned to baseline  Respiratory status: acceptable and nasal cannula  Hydration status: stable  Pain management: adequate    No notable events documented.

## 2025-03-28 NOTE — ANESTHESIA PRE PROCEDURE
Department of Anesthesiology  Preprocedure Note       Name:  Ginger Dyer   Age:  54 y.o.  :  1970                                          MRN:  [id number]         Date:  3/28/2025      Surgeon: Surgeon(s):  Butch Ware MD    Procedure: Procedure(s):  COLORECTAL CANCER SCREENING, NOT HIGH RISK    Medications prior to admission:   Prior to Admission medications    Medication Sig Start Date End Date Taking? Authorizing Provider   traMADol (ULTRAM) 50 MG tablet Take 1 tablet by mouth every 6 hours as needed for Pain. Max Daily Amount: 200 mg   Yes Halina Christie MD   venlafaxine (EFFEXOR XR) 37.5 MG extended release capsule  24  Yes Halina Christie MD   citalopram (CELEXA) 40 MG tablet  24  Yes Halina Christie MD   Multiple Vitamins-Minerals (THERAPEUTIC MULTIVITAMIN-MINERALS) tablet Take 1 tablet by mouth nightly as needed   Yes Halina Christie MD   lisinopril (PRINIVIL;ZESTRIL) 40 MG tablet Take 1 tablet by mouth daily   Yes Halina Christie MD   timolol (TIMOPTIC) 0.5 % ophthalmic solution  24  Yes Halina Christie MD   triamcinolone (KENALOG) 0.1 % cream Apply topically 2 times daily Apply topically 2 times daily.   Yes Halina Christie MD   ezetimibe-simvastatin (VYTORIN) 10-40 MG per tablet Take 1 tablet by mouth daily 3/29/18  Yes Halina Christie MD   diclofenac (VOLTAREN) 75 MG EC tablet  24   Halina Christie MD MOUNJARO 5 MG/0.5ML SOAJ ADMINISTER 5 MG UNDER THE SKIN EVERY WEEK 25   Halina Christie MD   metoprolol succinate (TOPROL XL) 25 MG extended release tablet TAKE 1 TABLET NIGHTLY 1/3/25   Lizett Huertas APRN - NP   HYDROcodone-acetaminophen (NORCO) 5-325 MG per tablet Take 1 tablet by mouth every 6 hours as needed.  Patient not taking: Reported on 3/28/2025    Halina Christie MD   Multiple Vitamins-Minerals (HAIR SKIN & NAILS PO) Take by mouth    Halina Christie MD   levocetirizine (XYZAL) 5 MG

## 2025-03-28 NOTE — ANESTHESIA POSTPROCEDURE EVALUATION
Department of Anesthesiology  Postprocedure Note    Patient: Ginger Dyer  MRN: [id number]  YOB: 1970  Date of evaluation: 3/28/2025    Procedure Summary       Date: 03/28/25 Room / Location: [place] [address] / [facility]    Anesthesia Start: 1252 Anesthesia Stop: 1331    Procedure: COLONOSCOPY POLYPECTOMY REMOVAL SNARE Diagnosis:       Screen for colon cancer      (Screen for colon cancer [Z12.11])    Surgeons: Butch Ware MD Responsible Provider: Latia Seth APRN - CRNA    Anesthesia Type: general, TIVA ASA Status: 3            Anesthesia Type: No value filed.    Landon Phase I: Landon Score: 10    Landon Phase II:      Anesthesia Post Evaluation    Patient location during evaluation: bedside  Patient participation: complete - patient participated  Level of consciousness: sleepy but conscious  Pain score: 0  Airway patency: patent  Nausea & Vomiting: no nausea and no vomiting  Cardiovascular status: hemodynamically stable and blood pressure returned to baseline  Respiratory status: acceptable and nasal cannula  Hydration status: stable  Pain management: adequate    No notable events documented.

## 2025-03-31 ENCOUNTER — RESULTS FOLLOW-UP (OUTPATIENT)
Dept: GASTROENTEROLOGY | Age: 55
End: 2025-03-31

## 2025-04-01 ENCOUNTER — OFFICE VISIT (OUTPATIENT)
Dept: NEUROLOGY | Age: 55
End: 2025-04-01
Payer: COMMERCIAL

## 2025-04-01 VITALS
BODY MASS INDEX: 46.1 KG/M2 | SYSTOLIC BLOOD PRESSURE: 156 MMHG | HEART RATE: 91 BPM | DIASTOLIC BLOOD PRESSURE: 81 MMHG | WEIGHT: 270 LBS | HEIGHT: 64 IN | OXYGEN SATURATION: 98 %

## 2025-04-01 DIAGNOSIS — Z99.89 BIPAP (BIPHASIC POSITIVE AIRWAY PRESSURE) DEPENDENCE: ICD-10-CM

## 2025-04-01 DIAGNOSIS — G47.33 OBSTRUCTIVE SLEEP APNEA: Primary | ICD-10-CM

## 2025-04-01 PROCEDURE — 99213 OFFICE O/P EST LOW 20 MIN: CPT | Performed by: PHYSICIAN ASSISTANT

## 2025-04-01 PROCEDURE — 3079F DIAST BP 80-89 MM HG: CPT | Performed by: PHYSICIAN ASSISTANT

## 2025-04-01 PROCEDURE — 3077F SYST BP >= 140 MM HG: CPT | Performed by: PHYSICIAN ASSISTANT

## 2025-04-01 RX ORDER — ROSUVASTATIN CALCIUM 20 MG/1
TABLET, COATED ORAL
COMMUNITY
Start: 2025-03-21

## 2025-04-01 NOTE — PATIENT INSTRUCTIONS
Patient education: Sleep apnea in adults       INTRODUCTION -- Normally during sleep, air moves through the throat and in and out of the lungs at a regular rhythm. In a person with sleep apnea, air movement is periodically diminished or stopped. There are two types of sleep apnea: obstructive sleep apnea and central sleep apnea. In obstructive sleep apnea, breathing is abnormal because of narrowing or closure of the throat. In central sleep apnea, breathing is abnormal because of a change in the breathing control and rhythm.  Sleep apnea is a serious condition that can affect a person's ability to safely perform normal daily activities and can affect long term health. Approximately 25 percent of adults are at risk for sleep apnea of some degree.  Men are more commonly affected than women. Other risk factors include middle and older age, being overweight or obese, and having a small mouth and throat.  This topic review focuses on the most common type of sleep apnea in adults, obstructive sleep apnea (DORITA).    HOW SLEEP APNEA OCCURS -- The throat is surrounded by muscles that control the airway for speaking, swallowing, and breathing. During sleep, these muscles are less active, and this causes the throat to narrow.  In most people, this narrowing does not affect breathing. In others, it can cause snoring, sometimes with reduced or completely blocked airflow.  A completely blocked airway without airflow is called an obstructive apnea. Partial obstruction with diminished airflow is called a hypopnea. A person may have apnea and hypopnea during sleep.  Insufficient breathing due to apnea or hypopnea causes oxygen levels to fall and carbon dioxide to rise. Because the airway is blocked, breathing faster or harder does not help to improve oxygen levels until the airway is reopened. Typically, the obstruction requires the person to awaken to activate the upper airway muscles. Once the airway is opened, the person then

## 2025-04-01 NOTE — PROGRESS NOTES
REVIEW OF SYSTEMS    Constitutional: []Fever []Sweats []Chills [] Recent Injury [x] Denies all unless marked  HEENT:[]Headache  [] Head Injury [] Hearing Loss  [] Sore Throat  [] Ear Ache [x] Denies all unless marked  Spine:  [] Neck pain  [] Back pain  [] Sciaticia  [x] Denies all unless marked  Cardiovascular:[]Heart Disease []Palpitations [] Chest Pain   [x] Denies all unless marked  Pulmonary: []Shortness of Breath []Cough   [x] Denies all unless marked  Psychiatric/Behavioral:[] Depression [] Anxiety [x] Denies all unless marked  Gastrointestinal: []Nausea  []Vomiting  []Abdominal Pain  []Constipation  []Diarrhea  [x] Denies all unless marked  Genitourinary:   [] Frequency  [] Urgency  [] Dysuria [] Incontinence  [x] Denies all unless marked  Extremities: []Pain  []Swelling  [x] Denies all unless marked  Musculoskeletal: [] Myalgias  [] Joint Pain  [] Arthritis [] Muscle Cramps [] Muscle Twitches  [x] Denies all unless marked  Sleep: []Insomnia[]Snoring []Restless Legs  [x]Sleep Apnea  []Daytime Sleepiness  [x] Denies all unless marked  Skin:[] Rash [] Color Change [x] Denies all unless marked   Neurological:[]Visual Disturbance [] Memory Loss []Loss of Balance []Slurred Speech []Weakness []Seizures  [] Dizziness [x] Denies all unless marked     
details.                                                                                                                                                                                                                                                                                         Randolph Dyson M.D., [facility]                                                                               Board certified sleep physician               [x]  :  Compliance report : Download shows that she is 100% compliant with auto BiPAP with residual AHI of 2.7      Assessment:       ICD-10-CM    1. Obstructive sleep apnea  G47.33       2. BiPAP (biphasic positive airway pressure) dependence  Z99.89                []  :  Stable     []  :  Improved                       [x]  :  Well controlled              []  :  Resolving     []  :  Resolved     []  :  Inadequately controlled     []  :  Worsening     []  :  Additional workup planned    She is objectively compliant with and clinically benefiting from PAP use.        Plan:     No orders of the defined types were placed in this encounter.      1.   Previously advised of the etiology, pathophysiology, signs, symptoms, diagnosis, treatment options, and risks of untreated DORITA. Risks may include, but are not limited to  hypertension, coronary artery disease, atrial fibrillation, CHF, diabetes, stroke, weight gain, impaired cognition, daytime somnolence, and motor vehicle accidents. Advised to abstain from driving or operating heavy machinery when drowsy and the use of respiratory suppressants. Reviewed current treatment plan.   2.  Will continue to evaluate for PAP clinical benefit and compliance during a 30 day period within the preceding 90 days PRN.  3.  The following educational material has been included in this visit after visit summary for your review: DORITA/PAP guidelines-Discussed with the patient and all questions fully answered.  4.  Continue PAP therapy. The patient voices

## 2025-04-16 DIAGNOSIS — Z12.31 ENCOUNTER FOR SCREENING MAMMOGRAM FOR BREAST CANCER: ICD-10-CM

## 2025-05-05 NOTE — PLAN OF CARE
Problem: Laminectomy/Foraminotomy/Discectomy (Adult)  Goal: Anesthesia/Sedation Recovery  Outcome: Outcome(s) achieved  Flowsheets (Taken 1/17/2020 1956)  Anesthesia/Sedation Recovery: recovered to baseline     
  Problem: Patient Care Overview  Goal: Plan of Care Review  Flowsheets  Taken 1/17/2020 6074 by Jeanie Hughes, OTR/L  Progress: improving  Taken 1/17/2020 1349 by Niesha Gagnon, PT Student  Plan of Care Reviewed With: patient (Pended)  Note:   OT eval completed.  Pt. Performed bed mobility, t/fs, & toileting at S/CGA and LBD & LSO mgmt at [facility].  She reports moderate incisional pain & verbalized her spinal precautions & LSO wearing schedule.  Ms. Echevarria plans to return home with her spouse. No further OT tx needed at this time.      
  Problem: Patient Care Overview  Goal: Plan of Care Review  Flowsheets  Taken 1/17/2020 6733  Progress: improving (Pended)  Plan of Care Reviewed With: patient (Pended)  Taken 1/17/2020 9960  Outcome Summary: PT evaluation completed. Pt demonstrates understanding of spinal precautions and LSO brace after education. Pt expresses increased pain of incision site upon sit-stand-sit transfers which dissipates after 2-3 minutes. Pt ambulates with increased SLS of RLE but no significant gait deviations. Pt requires skilled PT services to address balance and stair training. Recommend discharge home with assist.   (Pended)     
  Problem: Patient Care Overview  Goal: Plan of Care Review  Outcome: Ongoing (interventions implemented as appropriate)  Flowsheets (Taken 1/18/2020 2595)  Progress: improving  Plan of Care Reviewed With: patient; spouse  Outcome Summary: Pt. sitting EOB w/ nsg upon arrival stood and amb to BR w/ SBA and amb 200' x2 in hallway w/ 1 standing rest and CGA. Pt. c//o radicular symptoms in B LE which she states are typical and will hopefully resolve after her next surgery on Wednesday. Pt. asc/desc 3 steps 2x w/  HR L w/ SBA. Pt. does not have a handrail at home but  plans on changing that. We also worked on staic standing and dynammic balancec activities. Pt. did not have any LOB. Pt. Expressed concern for getting in and out of the car so we practiced squat-pivoting while following spinal precautions. Pt. Did well w/ practice and has increased confidence. Pt. does feel weak and would benefit from continued therapy to increase B LE strength and amb.     
A&Ox4. Pain controlled with ordered meds. Dressing c/d/I. LSO when oob. Worked well with therapy. VSS Safety maintained.  
A&Ox4. Pain controlled with ordered meds. Dressing c/d/I. Worked well with therapy. LSO when OOB. VSS Safety maintained.IVs d/c'ed. D/c'ed home.   
Medicated patient for c/o abdomen/back with prn pain med, with relief noted. Dressing to abdominal incision c/d/I. Neuro wnl. VSS. Up x 1 assist. Ambulated in room and to bathroom, wearing LSO brace. SCD's on, . Patient hoping to go home today. Spouse at bedside. Safety maintained.   
ambulatory

## 2025-06-03 ENCOUNTER — PRE-ADMISSION TESTING (OUTPATIENT)
Dept: PREADMISSION TESTING | Facility: HOSPITAL | Age: 55
End: 2025-06-03
Payer: COMMERCIAL

## 2025-06-03 VITALS
RESPIRATION RATE: 18 BRPM | WEIGHT: 283.73 LBS | SYSTOLIC BLOOD PRESSURE: 187 MMHG | BODY MASS INDEX: 48.44 KG/M2 | DIASTOLIC BLOOD PRESSURE: 75 MMHG | HEIGHT: 64 IN | HEART RATE: 93 BPM | OXYGEN SATURATION: 99 %

## 2025-06-03 LAB
ALBUMIN SERPL-MCNC: 4.3 G/DL (ref 3.5–5.2)
ALBUMIN/GLOB SERPL: 1.6 G/DL
ALP SERPL-CCNC: 66 U/L (ref 39–117)
ALT SERPL W P-5'-P-CCNC: 43 U/L (ref 1–33)
ANION GAP SERPL CALCULATED.3IONS-SCNC: 12 MMOL/L (ref 5–15)
APTT PPP: 30 SECONDS (ref 24.5–36)
AST SERPL-CCNC: 60 U/L (ref 1–32)
BACTERIA UR QL AUTO: NORMAL /HPF
BILIRUB SERPL-MCNC: 0.5 MG/DL (ref 0–1.2)
BILIRUB UR QL STRIP: NEGATIVE
BUN SERPL-MCNC: 19.3 MG/DL (ref 6–20)
BUN/CREAT SERPL: 31.6 (ref 7–25)
CALCIUM SPEC-SCNC: 9.8 MG/DL (ref 8.6–10.5)
CHLORIDE SERPL-SCNC: 100 MMOL/L (ref 98–107)
CLARITY UR: CLEAR
CO2 SERPL-SCNC: 25 MMOL/L (ref 22–29)
COLOR UR: YELLOW
CREAT SERPL-MCNC: 0.61 MG/DL (ref 0.57–1)
DEPRECATED RDW RBC AUTO: 42.5 FL (ref 37–54)
EGFRCR SERPLBLD CKD-EPI 2021: 105.7 ML/MIN/1.73
ERYTHROCYTE [DISTWIDTH] IN BLOOD BY AUTOMATED COUNT: 13.3 % (ref 12.3–15.4)
GLOBULIN UR ELPH-MCNC: 2.7 GM/DL
GLUCOSE SERPL-MCNC: 256 MG/DL (ref 65–99)
GLUCOSE UR STRIP-MCNC: ABNORMAL MG/DL
HCT VFR BLD AUTO: 36.1 % (ref 34–46.6)
HGB BLD-MCNC: 11.8 G/DL (ref 12–15.9)
HGB UR QL STRIP.AUTO: NEGATIVE
HYALINE CASTS UR QL AUTO: NORMAL /LPF
INR PPP: 1.02 (ref 0.91–1.09)
KETONES UR QL STRIP: ABNORMAL
LEUKOCYTE ESTERASE UR QL STRIP.AUTO: ABNORMAL
MCH RBC QN AUTO: 28.6 PG (ref 26.6–33)
MCHC RBC AUTO-ENTMCNC: 32.7 G/DL (ref 31.5–35.7)
MCV RBC AUTO: 87.4 FL (ref 79–97)
NITRITE UR QL STRIP: NEGATIVE
PH UR STRIP.AUTO: 6 [PH] (ref 5–8)
PLATELET # BLD AUTO: 260 10*3/MM3 (ref 140–450)
PMV BLD AUTO: 10 FL (ref 6–12)
POTASSIUM SERPL-SCNC: 4.1 MMOL/L (ref 3.5–5.2)
PROT SERPL-MCNC: 7 G/DL (ref 6–8.5)
PROT UR QL STRIP: NEGATIVE
PROTHROMBIN TIME: 13.9 SECONDS (ref 11.8–14.8)
RBC # BLD AUTO: 4.13 10*6/MM3 (ref 3.77–5.28)
RBC # UR STRIP: NORMAL /HPF
REF LAB TEST METHOD: NORMAL
SODIUM SERPL-SCNC: 137 MMOL/L (ref 136–145)
SP GR UR STRIP: 1.02 (ref 1–1.03)
SQUAMOUS #/AREA URNS HPF: NORMAL /HPF
UROBILINOGEN UR QL STRIP: ABNORMAL
WBC # UR STRIP: NORMAL /HPF
WBC NRBC COR # BLD AUTO: 4.94 10*3/MM3 (ref 3.4–10.8)

## 2025-06-03 PROCEDURE — 36415 COLL VENOUS BLD VENIPUNCTURE: CPT

## 2025-06-03 PROCEDURE — 81001 URINALYSIS AUTO W/SCOPE: CPT

## 2025-06-03 PROCEDURE — 85610 PROTHROMBIN TIME: CPT

## 2025-06-03 PROCEDURE — 85730 THROMBOPLASTIN TIME PARTIAL: CPT

## 2025-06-03 PROCEDURE — 93005 ELECTROCARDIOGRAM TRACING: CPT

## 2025-06-03 PROCEDURE — 80053 COMPREHEN METABOLIC PANEL: CPT

## 2025-06-03 PROCEDURE — 85027 COMPLETE CBC AUTOMATED: CPT

## 2025-06-03 RX ORDER — ROSUVASTATIN CALCIUM 20 MG/1
20 TABLET, COATED ORAL DAILY
COMMUNITY
Start: 2025-03-21

## 2025-06-03 RX ORDER — TIRZEPATIDE 5 MG/.5ML
5 INJECTION, SOLUTION SUBCUTANEOUS WEEKLY
COMMUNITY
Start: 2025-03-21

## 2025-06-03 RX ORDER — VENLAFAXINE HYDROCHLORIDE 37.5 MG/1
37.5 CAPSULE, EXTENDED RELEASE ORAL DAILY
COMMUNITY
Start: 2025-05-27

## 2025-06-03 NOTE — DISCHARGE INSTRUCTIONS
Preparing for Surgery  Follow these instructions before the procedure:  Several days or weeks before your procedure  Medication(s) you need to stop  MOUNJARO 2 week prior to surgery:       Ask your health care provider about:  Changing or stopping your regular medicines. This is especially important if you are taking diabetes medicines or blood thinners.  Taking medicines such as aspirin and ibuprofen. These medicines can thin your blood. Do not take these medicines unless your health care provider tells you to take them.  Taking over-the-counter medicines, vitamins, herbs, and supplements.    Contact your surgeon if you:  Develop a fever of more than 100.4°F (38°C) or other feelings of illness during the 48 hours before your surgery.  Have symptoms that get worse.  Have questions or concerns about your surgery.  If you are going home the same day of your surgery you will need to arrange for a responsible adult, age 18 years old or older, to drive you home from the hospital and stay with you for 24 hours. Verification of the  will be made prior to any procedure requiring sedation. You may not go home in a taxi or any form of public transportation by yourself.     Day before your procedure  Medication(s) you need to stop the day before your surgery:LISINOPRIL    24 hours before your procedure DO NOT drink alcoholic beverages or smoke.  You may be asked to shower with a germ-killing soap.  Day of your procedure   You may take the following medication(s) the morning of surgery with a sip of water: NONE      8 hours before your scheduled arrival time, STOP all food, any dairy products, and full liquids. This includes hard candy, chewing gum or mints. This is extremely important to prevent serious complications.     Up to 2 hours before your scheduled arrival time, you may have clear liquids no cream, powder, or pulp of any kind. Safe options are water, black coffee, plain tea, soda, Gatorade/Powerade, clear broth,  apple juice.    2 hours before your scheduled arrival time, STOP drinking clear liquids.    You may need to take another shower with a germ-killing soap before you leave home in the morning. Do not use perfumes, colognes, or body lotions.  Wear comfortable loose-fitting clothing.  Remove all jewelry including body piercing and rings, dark colored nail polish, and make up prior to arrival at the hospital. Leave all valuables at home.   Bring your hearing aids if you rely on them.  Do not wear contact lenses. If you wear eyeglasses remember to bring a case to store them in while you are in surgery.  Do not use denture adhesives since you will be asked to remove them during your surgery.    You do not need to bring your home medications into the hospital.   Bring your sleep apnea device with you on the day of your surgery (if this applies to you).  If you have an Inspire implant for sleep apnea, please bring the remote with you on the day of surgery.  If you wear portable oxygen, bring it with you.   If you are staying overnight, you may bring a bag of items you may need such as slippers, robe and a change of clothes for your discharge. You may want to leave these items in the car until you are ready for them since your family will take your belongings when you leave the pre-operative area.  Arrive at the hospital as scheduled by the office. You will be asked to arrive 2 hours prior to your surgery time in order to prepare for your procedure.  When you arrive at the hospital  Go to the registration desk located at the main entrance of the hospital.  After registration is completed, you will be given a beeper and a sticker sheet. Take the stickers to Outpatient Surgery and place in the tray at the end of the desk to notify the staff that you have arrived and registered.   Return to the lobby to wait. You are not always called back according to the time of arrival but rather the time your doctor will be ready.  When your  beeper lights up and vibrates proceed through the double doors, under the stairs, and a member of the Outpatient Surgery staff will escort you to your preoperative room.

## 2025-06-05 LAB
QT INTERVAL: 406 MS
QTC INTERVAL: 479 MS

## 2025-06-16 ENCOUNTER — ANESTHESIA (OUTPATIENT)
Dept: PERIOP | Facility: HOSPITAL | Age: 55
End: 2025-06-16
Payer: COMMERCIAL

## 2025-06-16 ENCOUNTER — APPOINTMENT (OUTPATIENT)
Dept: GENERAL RADIOLOGY | Facility: HOSPITAL | Age: 55
DRG: 402 | End: 2025-06-16
Payer: COMMERCIAL

## 2025-06-16 ENCOUNTER — ANESTHESIA EVENT (OUTPATIENT)
Dept: PERIOP | Facility: HOSPITAL | Age: 55
End: 2025-06-16
Payer: COMMERCIAL

## 2025-06-16 ENCOUNTER — HOSPITAL ENCOUNTER (INPATIENT)
Facility: HOSPITAL | Age: 55
LOS: 4 days | Discharge: HOME | DRG: 402 | End: 2025-06-20
Attending: ORTHOPAEDIC SURGERY | Admitting: ORTHOPAEDIC SURGERY
Payer: COMMERCIAL

## 2025-06-16 DIAGNOSIS — M54.16 LUMBAR RADICULOPATHY: ICD-10-CM

## 2025-06-16 DIAGNOSIS — M54.42 CHRONIC BILATERAL LOW BACK PAIN WITH BILATERAL SCIATICA: ICD-10-CM

## 2025-06-16 DIAGNOSIS — M54.41 CHRONIC BILATERAL LOW BACK PAIN WITH BILATERAL SCIATICA: ICD-10-CM

## 2025-06-16 DIAGNOSIS — G89.29 CHRONIC BILATERAL LOW BACK PAIN WITH BILATERAL SCIATICA: ICD-10-CM

## 2025-06-16 DIAGNOSIS — Z74.09 IMPAIRED MOBILITY: Primary | ICD-10-CM

## 2025-06-16 LAB
ABO GROUP BLD: NORMAL
BLD GP AB SCN SERPL QL: NEGATIVE
GLUCOSE BLDC GLUCOMTR-MCNC: 240 MG/DL (ref 70–130)
GLUCOSE BLDC GLUCOMTR-MCNC: 258 MG/DL (ref 70–130)
GLUCOSE BLDC GLUCOMTR-MCNC: 269 MG/DL (ref 70–130)
GLUCOSE BLDC GLUCOMTR-MCNC: 287 MG/DL (ref 70–130)
GLUCOSE BLDC GLUCOMTR-MCNC: 345 MG/DL (ref 70–130)
GLUCOSE BLDC GLUCOMTR-MCNC: 352 MG/DL (ref 70–130)
RH BLD: POSITIVE
T&S EXPIRATION DATE: NORMAL

## 2025-06-16 PROCEDURE — 25010000002 CLINDAMYCIN 600 MG/50ML SOLUTION: Performed by: ORTHOPAEDIC SURGERY

## 2025-06-16 PROCEDURE — 86850 RBC ANTIBODY SCREEN: CPT | Performed by: ORTHOPAEDIC SURGERY

## 2025-06-16 PROCEDURE — 25010000002 LIDOCAINE PF 2% 2 % SOLUTION: Performed by: NURSE ANESTHETIST, CERTIFIED REGISTERED

## 2025-06-16 PROCEDURE — 25010000002 PROPOFOL 10 MG/ML EMULSION: Performed by: NURSE ANESTHETIST, CERTIFIED REGISTERED

## 2025-06-16 PROCEDURE — 25010000002 FENTANYL CITRATE (PF) 50 MCG/ML SOLUTION: Performed by: ANESTHESIOLOGY

## 2025-06-16 PROCEDURE — 82948 REAGENT STRIP/BLOOD GLUCOSE: CPT

## 2025-06-16 PROCEDURE — 0ST20ZZ RESECTION OF LUMBAR VERTEBRAL DISC, OPEN APPROACH: ICD-10-PCS | Performed by: ORTHOPAEDIC SURGERY

## 2025-06-16 PROCEDURE — 76000 FLUOROSCOPY <1 HR PHYS/QHP: CPT

## 2025-06-16 PROCEDURE — C1769 GUIDE WIRE: HCPCS | Performed by: ORTHOPAEDIC SURGERY

## 2025-06-16 PROCEDURE — 86900 BLOOD TYPING SEROLOGIC ABO: CPT | Performed by: ORTHOPAEDIC SURGERY

## 2025-06-16 PROCEDURE — 4A11X4G MONITORING OF PERIPHERAL NERVOUS ELECTRICAL ACTIVITY, INTRAOPERATIVE, EXTERNAL APPROACH: ICD-10-PCS | Performed by: ORTHOPAEDIC SURGERY

## 2025-06-16 PROCEDURE — 63710000001 INSULIN LISPRO (HUMAN) PER 5 UNITS: Performed by: FAMILY MEDICINE

## 2025-06-16 PROCEDURE — C1713 ANCHOR/SCREW BN/BN,TIS/BN: HCPCS | Performed by: ORTHOPAEDIC SURGERY

## 2025-06-16 PROCEDURE — 25010000002 DEXAMETHASONE PER 1 MG: Performed by: ANESTHESIOLOGY

## 2025-06-16 PROCEDURE — 25010000002 FAMOTIDINE 10 MG/ML SOLUTION: Performed by: ANESTHESIOLOGY

## 2025-06-16 PROCEDURE — 63710000001 INSULIN REGULAR HUMAN PER 5 UNITS: Performed by: ANESTHESIOLOGY

## 2025-06-16 PROCEDURE — 25010000002 ONDANSETRON PER 1 MG: Performed by: ANESTHESIOLOGY

## 2025-06-16 PROCEDURE — 97165 OT EVAL LOW COMPLEX 30 MIN: CPT

## 2025-06-16 PROCEDURE — 25810000003 SODIUM CHLORIDE 0.9 % SOLUTION: Performed by: ORTHOPAEDIC SURGERY

## 2025-06-16 PROCEDURE — 86901 BLOOD TYPING SEROLOGIC RH(D): CPT | Performed by: ORTHOPAEDIC SURGERY

## 2025-06-16 PROCEDURE — 25010000002 HYDROMORPHONE PER 4 MG: Performed by: ORTHOPAEDIC SURGERY

## 2025-06-16 PROCEDURE — 97161 PT EVAL LOW COMPLEX 20 MIN: CPT | Performed by: PHYSICAL THERAPIST

## 2025-06-16 PROCEDURE — 72100 X-RAY EXAM L-S SPINE 2/3 VWS: CPT

## 2025-06-16 PROCEDURE — 25010000002 FENTANYL CITRATE (PF) 100 MCG/2ML SOLUTION: Performed by: NURSE ANESTHETIST, CERTIFIED REGISTERED

## 2025-06-16 PROCEDURE — 25010000002 CLINDAMYCIN 900 MG/50ML SOLUTION: Performed by: ORTHOPAEDIC SURGERY

## 2025-06-16 PROCEDURE — 25010000002 MIDAZOLAM PER 1MG: Performed by: ANESTHESIOLOGY

## 2025-06-16 PROCEDURE — 0SG00A0 FUSION OF LUMBAR VERTEBRAL JOINT WITH INTERBODY FUSION DEVICE, ANTERIOR APPROACH, ANTERIOR COLUMN, OPEN APPROACH: ICD-10-PCS | Performed by: ORTHOPAEDIC SURGERY

## 2025-06-16 PROCEDURE — 25810000003 LACTATED RINGERS PER 1000 ML: Performed by: ORTHOPAEDIC SURGERY

## 2025-06-16 DEVICE — IDENTITI II LIF 6 X 22 X 55 MM, 10°
Type: IMPLANTABLE DEVICE | Site: SPINE LUMBAR | Status: FUNCTIONAL
Brand: IDENTITI II

## 2025-06-16 DEVICE — ALLOGRFT CORT NMP FIBR FZD 11.1CC LG LNG STRL: Type: IMPLANTABLE DEVICE | Site: SPINE LUMBAR | Status: FUNCTIONAL

## 2025-06-16 DEVICE — LATERAL BONE SCREW, 5.0 X 35MM
Type: IMPLANTABLE DEVICE | Site: SPINE LUMBAR | Status: FUNCTIONAL
Brand: AMP-LTX

## 2025-06-16 DEVICE — AMP II PLATE AND CENTER SCREW, 2-HOLE, 06
Type: IMPLANTABLE DEVICE | Site: SPINE LUMBAR | Status: FUNCTIONAL
Brand: AMP II

## 2025-06-16 RX ORDER — ONDANSETRON 4 MG/1
4 TABLET, ORALLY DISINTEGRATING ORAL EVERY 6 HOURS PRN
Status: DISCONTINUED | OUTPATIENT
Start: 2025-06-16 | End: 2025-06-20 | Stop reason: HOSPADM

## 2025-06-16 RX ORDER — FENTANYL CITRATE 50 UG/ML
INJECTION, SOLUTION INTRAMUSCULAR; INTRAVENOUS AS NEEDED
Status: DISCONTINUED | OUTPATIENT
Start: 2025-06-16 | End: 2025-06-16 | Stop reason: SURG

## 2025-06-16 RX ORDER — SODIUM CHLORIDE 0.9 % (FLUSH) 0.9 %
3-10 SYRINGE (ML) INJECTION AS NEEDED
Status: DISCONTINUED | OUTPATIENT
Start: 2025-06-16 | End: 2025-06-16 | Stop reason: HOSPADM

## 2025-06-16 RX ORDER — BISACODYL 5 MG/1
5 TABLET, DELAYED RELEASE ORAL DAILY PRN
Status: DISCONTINUED | OUTPATIENT
Start: 2025-06-16 | End: 2025-06-20 | Stop reason: HOSPADM

## 2025-06-16 RX ORDER — ACETAMINOPHEN 500 MG
1000 TABLET ORAL ONCE
Status: COMPLETED | OUTPATIENT
Start: 2025-06-16 | End: 2025-06-16

## 2025-06-16 RX ORDER — SODIUM CHLORIDE 9 MG/ML
40 INJECTION, SOLUTION INTRAVENOUS AS NEEDED
Status: DISCONTINUED | OUTPATIENT
Start: 2025-06-16 | End: 2025-06-16 | Stop reason: HOSPADM

## 2025-06-16 RX ORDER — NALOXONE HCL 0.4 MG/ML
0.04 VIAL (ML) INJECTION AS NEEDED
Status: DISCONTINUED | OUTPATIENT
Start: 2025-06-16 | End: 2025-06-16 | Stop reason: HOSPADM

## 2025-06-16 RX ORDER — SODIUM CHLORIDE 0.9 % (FLUSH) 0.9 %
3 SYRINGE (ML) INJECTION EVERY 12 HOURS SCHEDULED
Status: DISCONTINUED | OUTPATIENT
Start: 2025-06-16 | End: 2025-06-16 | Stop reason: HOSPADM

## 2025-06-16 RX ORDER — LIDOCAINE HYDROCHLORIDE 20 MG/ML
INJECTION, SOLUTION EPIDURAL; INFILTRATION; INTRACAUDAL; PERINEURAL AS NEEDED
Status: DISCONTINUED | OUTPATIENT
Start: 2025-06-16 | End: 2025-06-16 | Stop reason: SURG

## 2025-06-16 RX ORDER — ROCURONIUM BROMIDE 10 MG/ML
INJECTION, SOLUTION INTRAVENOUS AS NEEDED
Status: DISCONTINUED | OUTPATIENT
Start: 2025-06-16 | End: 2025-06-16 | Stop reason: SURG

## 2025-06-16 RX ORDER — ERGOCALCIFEROL 1.25 MG/1
50000 CAPSULE, LIQUID FILLED ORAL WEEKLY
COMMUNITY

## 2025-06-16 RX ORDER — HYDROCODONE BITARTRATE AND ACETAMINOPHEN 7.5; 325 MG/1; MG/1
1 TABLET ORAL EVERY 4 HOURS PRN
Status: DISCONTINUED | OUTPATIENT
Start: 2025-06-16 | End: 2025-06-20 | Stop reason: HOSPADM

## 2025-06-16 RX ORDER — AMOXICILLIN 250 MG
2 CAPSULE ORAL 2 TIMES DAILY
Status: DISCONTINUED | OUTPATIENT
Start: 2025-06-16 | End: 2025-06-20 | Stop reason: HOSPADM

## 2025-06-16 RX ORDER — FLUMAZENIL 0.1 MG/ML
0.2 INJECTION INTRAVENOUS AS NEEDED
Status: DISCONTINUED | OUTPATIENT
Start: 2025-06-16 | End: 2025-06-16 | Stop reason: HOSPADM

## 2025-06-16 RX ORDER — HYDROMORPHONE HYDROCHLORIDE 1 MG/ML
0.5 INJECTION, SOLUTION INTRAMUSCULAR; INTRAVENOUS; SUBCUTANEOUS
Status: DISCONTINUED | OUTPATIENT
Start: 2025-06-16 | End: 2025-06-20 | Stop reason: HOSPADM

## 2025-06-16 RX ORDER — SODIUM CHLORIDE 0.9 % (FLUSH) 0.9 %
10 SYRINGE (ML) INJECTION AS NEEDED
Status: DISCONTINUED | OUTPATIENT
Start: 2025-06-16 | End: 2025-06-20 | Stop reason: HOSPADM

## 2025-06-16 RX ORDER — BISACODYL 10 MG
10 SUPPOSITORY, RECTAL RECTAL DAILY PRN
Status: DISCONTINUED | OUTPATIENT
Start: 2025-06-16 | End: 2025-06-20 | Stop reason: HOSPADM

## 2025-06-16 RX ORDER — ACETAMINOPHEN 325 MG/1
650 TABLET ORAL EVERY 4 HOURS PRN
Status: DISCONTINUED | OUTPATIENT
Start: 2025-06-16 | End: 2025-06-20 | Stop reason: HOSPADM

## 2025-06-16 RX ORDER — LISINOPRIL 20 MG/1
40 TABLET ORAL DAILY
Status: DISCONTINUED | OUTPATIENT
Start: 2025-06-16 | End: 2025-06-20 | Stop reason: HOSPADM

## 2025-06-16 RX ORDER — SODIUM CHLORIDE 0.9 % (FLUSH) 0.9 %
3 SYRINGE (ML) INJECTION EVERY 12 HOURS SCHEDULED
Status: DISCONTINUED | OUTPATIENT
Start: 2025-06-16 | End: 2025-06-20 | Stop reason: HOSPADM

## 2025-06-16 RX ORDER — IBUPROFEN 600 MG/1
600 TABLET, FILM COATED ORAL EVERY 6 HOURS PRN
Status: DISCONTINUED | OUTPATIENT
Start: 2025-06-16 | End: 2025-06-16 | Stop reason: HOSPADM

## 2025-06-16 RX ORDER — SODIUM CHLORIDE 9 MG/ML
100 INJECTION, SOLUTION INTRAVENOUS CONTINUOUS
Status: DISPENSED | OUTPATIENT
Start: 2025-06-16 | End: 2025-06-17

## 2025-06-16 RX ORDER — ACETAMINOPHEN 650 MG/1
650 SUPPOSITORY RECTAL EVERY 4 HOURS PRN
Status: DISCONTINUED | OUTPATIENT
Start: 2025-06-16 | End: 2025-06-20 | Stop reason: HOSPADM

## 2025-06-16 RX ORDER — CYCLOBENZAPRINE HCL 10 MG
10 TABLET ORAL 3 TIMES DAILY PRN
Status: DISCONTINUED | OUTPATIENT
Start: 2025-06-16 | End: 2025-06-20 | Stop reason: HOSPADM

## 2025-06-16 RX ORDER — NALOXONE HCL 0.4 MG/ML
0.4 VIAL (ML) INJECTION
Status: DISCONTINUED | OUTPATIENT
Start: 2025-06-16 | End: 2025-06-20 | Stop reason: HOSPADM

## 2025-06-16 RX ORDER — HYDROMORPHONE HYDROCHLORIDE 1 MG/ML
0.5 INJECTION, SOLUTION INTRAMUSCULAR; INTRAVENOUS; SUBCUTANEOUS
Status: DISCONTINUED | OUTPATIENT
Start: 2025-06-16 | End: 2025-06-16 | Stop reason: HOSPADM

## 2025-06-16 RX ORDER — SODIUM CHLORIDE, SODIUM LACTATE, POTASSIUM CHLORIDE, CALCIUM CHLORIDE 600; 310; 30; 20 MG/100ML; MG/100ML; MG/100ML; MG/100ML
1000 INJECTION, SOLUTION INTRAVENOUS CONTINUOUS
Status: DISCONTINUED | OUTPATIENT
Start: 2025-06-16 | End: 2025-06-16

## 2025-06-16 RX ORDER — SODIUM CHLORIDE 0.9 % (FLUSH) 0.9 %
10 SYRINGE (ML) INJECTION AS NEEDED
Status: DISCONTINUED | OUTPATIENT
Start: 2025-06-16 | End: 2025-06-16 | Stop reason: HOSPADM

## 2025-06-16 RX ORDER — LEVOCETIRIZINE DIHYDROCHLORIDE 5 MG/1
5 TABLET, FILM COATED ORAL DAILY PRN
COMMUNITY

## 2025-06-16 RX ORDER — CLINDAMYCIN PHOSPHATE 900 MG/50ML
900 INJECTION, SOLUTION INTRAVENOUS ONCE
Status: COMPLETED | OUTPATIENT
Start: 2025-06-16 | End: 2025-06-16

## 2025-06-16 RX ORDER — OXYCODONE HCL 20 MG/1
20 TABLET, FILM COATED, EXTENDED RELEASE ORAL ONCE
Status: COMPLETED | OUTPATIENT
Start: 2025-06-16 | End: 2025-06-16

## 2025-06-16 RX ORDER — INSULIN LISPRO 100 [IU]/ML
2-7 INJECTION, SOLUTION INTRAVENOUS; SUBCUTANEOUS
Status: DISCONTINUED | OUTPATIENT
Start: 2025-06-16 | End: 2025-06-20 | Stop reason: HOSPADM

## 2025-06-16 RX ORDER — HYDROCODONE BITARTRATE AND ACETAMINOPHEN 10; 325 MG/1; MG/1
1 TABLET ORAL EVERY 4 HOURS PRN
Status: DISCONTINUED | OUTPATIENT
Start: 2025-06-16 | End: 2025-06-20 | Stop reason: HOSPADM

## 2025-06-16 RX ORDER — ROSUVASTATIN CALCIUM 20 MG/1
20 TABLET, COATED ORAL DAILY
Status: DISCONTINUED | OUTPATIENT
Start: 2025-06-16 | End: 2025-06-20 | Stop reason: HOSPADM

## 2025-06-16 RX ORDER — DEXAMETHASONE SODIUM PHOSPHATE 4 MG/ML
4 INJECTION, SOLUTION INTRA-ARTICULAR; INTRALESIONAL; INTRAMUSCULAR; INTRAVENOUS; SOFT TISSUE ONCE AS NEEDED
Status: COMPLETED | OUTPATIENT
Start: 2025-06-16 | End: 2025-06-16

## 2025-06-16 RX ORDER — DEXTROSE MONOHYDRATE 25 G/50ML
25 INJECTION, SOLUTION INTRAVENOUS
Status: DISCONTINUED | OUTPATIENT
Start: 2025-06-16 | End: 2025-06-20 | Stop reason: HOSPADM

## 2025-06-16 RX ORDER — CITALOPRAM HYDROBROMIDE 20 MG/1
40 TABLET ORAL DAILY
Status: DISCONTINUED | OUTPATIENT
Start: 2025-06-16 | End: 2025-06-20 | Stop reason: HOSPADM

## 2025-06-16 RX ORDER — SODIUM CHLORIDE 0.9 % (FLUSH) 0.9 %
3 SYRINGE (ML) INJECTION AS NEEDED
Status: DISCONTINUED | OUTPATIENT
Start: 2025-06-16 | End: 2025-06-16 | Stop reason: HOSPADM

## 2025-06-16 RX ORDER — SODIUM CHLORIDE, SODIUM LACTATE, POTASSIUM CHLORIDE, CALCIUM CHLORIDE 600; 310; 30; 20 MG/100ML; MG/100ML; MG/100ML; MG/100ML
100 INJECTION, SOLUTION INTRAVENOUS CONTINUOUS PRN
Status: DISCONTINUED | OUTPATIENT
Start: 2025-06-16 | End: 2025-06-16 | Stop reason: HOSPADM

## 2025-06-16 RX ORDER — ONDANSETRON 2 MG/ML
4 INJECTION INTRAMUSCULAR; INTRAVENOUS
Status: DISCONTINUED | OUTPATIENT
Start: 2025-06-16 | End: 2025-06-16 | Stop reason: HOSPADM

## 2025-06-16 RX ORDER — ACETAMINOPHEN 160 MG/5ML
650 SOLUTION ORAL EVERY 4 HOURS PRN
Status: DISCONTINUED | OUTPATIENT
Start: 2025-06-16 | End: 2025-06-20 | Stop reason: HOSPADM

## 2025-06-16 RX ORDER — POLYETHYLENE GLYCOL 3350 17 G/17G
17 POWDER, FOR SOLUTION ORAL DAILY PRN
Status: DISCONTINUED | OUTPATIENT
Start: 2025-06-16 | End: 2025-06-20 | Stop reason: HOSPADM

## 2025-06-16 RX ORDER — OXYCODONE AND ACETAMINOPHEN 10; 325 MG/1; MG/1
1 TABLET ORAL EVERY 4 HOURS PRN
Status: DISCONTINUED | OUTPATIENT
Start: 2025-06-16 | End: 2025-06-16 | Stop reason: HOSPADM

## 2025-06-16 RX ORDER — SODIUM CHLORIDE, SODIUM LACTATE, POTASSIUM CHLORIDE, CALCIUM CHLORIDE 600; 310; 30; 20 MG/100ML; MG/100ML; MG/100ML; MG/100ML
100 INJECTION, SOLUTION INTRAVENOUS CONTINUOUS
Status: DISCONTINUED | OUTPATIENT
Start: 2025-06-16 | End: 2025-06-16

## 2025-06-16 RX ORDER — SODIUM CHLORIDE 0.9 % (FLUSH) 0.9 %
10 SYRINGE (ML) INJECTION EVERY 12 HOURS SCHEDULED
Status: DISCONTINUED | OUTPATIENT
Start: 2025-06-16 | End: 2025-06-16 | Stop reason: HOSPADM

## 2025-06-16 RX ORDER — SODIUM CHLORIDE 0.9 % (FLUSH) 0.9 %
10 SYRINGE (ML) INJECTION AS NEEDED
Status: DISCONTINUED | OUTPATIENT
Start: 2025-06-16 | End: 2025-06-16 | Stop reason: SDUPTHER

## 2025-06-16 RX ORDER — PROPOFOL 10 MG/ML
VIAL (ML) INTRAVENOUS AS NEEDED
Status: DISCONTINUED | OUTPATIENT
Start: 2025-06-16 | End: 2025-06-16 | Stop reason: SURG

## 2025-06-16 RX ORDER — ONDANSETRON 2 MG/ML
4 INJECTION INTRAMUSCULAR; INTRAVENOUS EVERY 6 HOURS PRN
Status: DISCONTINUED | OUTPATIENT
Start: 2025-06-16 | End: 2025-06-20 | Stop reason: HOSPADM

## 2025-06-16 RX ORDER — VENLAFAXINE HYDROCHLORIDE 37.5 MG/1
37.5 CAPSULE, EXTENDED RELEASE ORAL DAILY
Status: DISCONTINUED | OUTPATIENT
Start: 2025-06-16 | End: 2025-06-20 | Stop reason: HOSPADM

## 2025-06-16 RX ORDER — LABETALOL HYDROCHLORIDE 5 MG/ML
5 INJECTION, SOLUTION INTRAVENOUS
Status: DISCONTINUED | OUTPATIENT
Start: 2025-06-16 | End: 2025-06-16 | Stop reason: HOSPADM

## 2025-06-16 RX ORDER — FAMOTIDINE 10 MG/ML
20 INJECTION, SOLUTION INTRAVENOUS
Status: COMPLETED | OUTPATIENT
Start: 2025-06-16 | End: 2025-06-16

## 2025-06-16 RX ORDER — MIDAZOLAM HYDROCHLORIDE 2 MG/2ML
1 INJECTION, SOLUTION INTRAMUSCULAR; INTRAVENOUS
Status: DISCONTINUED | OUTPATIENT
Start: 2025-06-16 | End: 2025-06-16 | Stop reason: HOSPADM

## 2025-06-16 RX ORDER — NICOTINE POLACRILEX 4 MG
15 LOZENGE BUCCAL
Status: DISCONTINUED | OUTPATIENT
Start: 2025-06-16 | End: 2025-06-20 | Stop reason: HOSPADM

## 2025-06-16 RX ORDER — MAGNESIUM HYDROXIDE 1200 MG/15ML
LIQUID ORAL AS NEEDED
Status: DISCONTINUED | OUTPATIENT
Start: 2025-06-16 | End: 2025-06-16 | Stop reason: HOSPADM

## 2025-06-16 RX ORDER — FENTANYL CITRATE 50 UG/ML
50 INJECTION, SOLUTION INTRAMUSCULAR; INTRAVENOUS
Status: COMPLETED | OUTPATIENT
Start: 2025-06-16 | End: 2025-06-16

## 2025-06-16 RX ORDER — CLINDAMYCIN PHOSPHATE 600 MG/50ML
600 INJECTION, SOLUTION INTRAVENOUS ONCE
Status: COMPLETED | OUTPATIENT
Start: 2025-06-16 | End: 2025-06-16

## 2025-06-16 RX ORDER — LIDOCAINE HYDROCHLORIDE 10 MG/ML
0.5 INJECTION, SOLUTION EPIDURAL; INFILTRATION; INTRACAUDAL; PERINEURAL ONCE AS NEEDED
Status: DISCONTINUED | OUTPATIENT
Start: 2025-06-16 | End: 2025-06-16 | Stop reason: HOSPADM

## 2025-06-16 RX ORDER — IBUPROFEN 600 MG/1
1 TABLET ORAL
Status: DISCONTINUED | OUTPATIENT
Start: 2025-06-16 | End: 2025-06-20 | Stop reason: HOSPADM

## 2025-06-16 RX ORDER — SODIUM CHLORIDE 9 MG/ML
40 INJECTION, SOLUTION INTRAVENOUS AS NEEDED
Status: DISCONTINUED | OUTPATIENT
Start: 2025-06-16 | End: 2025-06-20 | Stop reason: HOSPADM

## 2025-06-16 RX ADMIN — HYDROCODONE BITARTRATE AND ACETAMINOPHEN 1 TABLET: 10; 325 TABLET ORAL at 20:11

## 2025-06-16 RX ADMIN — ROSUVASTATIN CALCIUM 20 MG: 20 TABLET, FILM COATED ORAL at 17:22

## 2025-06-16 RX ADMIN — CYCLOBENZAPRINE HYDROCHLORIDE 10 MG: 10 TABLET, FILM COATED ORAL at 20:11

## 2025-06-16 RX ADMIN — OXYCODONE AND ACETAMINOPHEN 1 TABLET: 325; 10 TABLET ORAL at 09:56

## 2025-06-16 RX ADMIN — CITALOPRAM 40 MG: 20 TABLET, FILM COATED ORAL at 17:21

## 2025-06-16 RX ADMIN — INSULIN LISPRO 5 UNITS: 100 INJECTION, SOLUTION INTRAVENOUS; SUBCUTANEOUS at 17:10

## 2025-06-16 RX ADMIN — LISINOPRIL 40 MG: 20 TABLET ORAL at 17:22

## 2025-06-16 RX ADMIN — OXYCODONE HYDROCHLORIDE 20 MG: 20 TABLET, FILM COATED, EXTENDED RELEASE ORAL at 08:30

## 2025-06-16 RX ADMIN — Medication 3 ML: at 11:46

## 2025-06-16 RX ADMIN — HYDROCODONE BITARTRATE AND ACETAMINOPHEN 1 TABLET: 10; 325 TABLET ORAL at 15:06

## 2025-06-16 RX ADMIN — ROCURONIUM BROMIDE 50 MG: 10 INJECTION, SOLUTION INTRAVENOUS at 08:45

## 2025-06-16 RX ADMIN — ACETAMINOPHEN 1000 MG: 500 TABLET, FILM COATED ORAL at 08:30

## 2025-06-16 RX ADMIN — HYDROMORPHONE HYDROCHLORIDE 0.5 MG: 1 INJECTION, SOLUTION INTRAMUSCULAR; INTRAVENOUS; SUBCUTANEOUS at 17:32

## 2025-06-16 RX ADMIN — CYCLOBENZAPRINE HYDROCHLORIDE 10 MG: 10 TABLET, FILM COATED ORAL at 11:50

## 2025-06-16 RX ADMIN — FENTANYL CITRATE 50 MCG: 50 INJECTION, SOLUTION INTRAMUSCULAR; INTRAVENOUS at 10:00

## 2025-06-16 RX ADMIN — SODIUM CHLORIDE 100 ML/HR: 9 INJECTION, SOLUTION INTRAVENOUS at 11:44

## 2025-06-16 RX ADMIN — VENLAFAXINE HYDROCHLORIDE 37.5 MG: 37.5 CAPSULE, EXTENDED RELEASE ORAL at 17:21

## 2025-06-16 RX ADMIN — ONDANSETRON 4 MG: 2 INJECTION INTRAMUSCULAR; INTRAVENOUS at 10:29

## 2025-06-16 RX ADMIN — FENTANYL CITRATE 50 MCG: 50 INJECTION, SOLUTION INTRAMUSCULAR; INTRAVENOUS at 10:28

## 2025-06-16 RX ADMIN — DEXAMETHASONE SODIUM PHOSPHATE 4 MG: 4 INJECTION, SOLUTION INTRA-ARTICULAR; INTRALESIONAL; INTRAMUSCULAR; INTRAVENOUS; SOFT TISSUE at 08:30

## 2025-06-16 RX ADMIN — INSULIN HUMAN 4 UNITS: 100 INJECTION, SOLUTION PARENTERAL at 08:30

## 2025-06-16 RX ADMIN — PROPOFOL 200 MG: 10 INJECTION, EMULSION INTRAVENOUS at 08:45

## 2025-06-16 RX ADMIN — INSULIN LISPRO 6 UNITS: 100 INJECTION, SOLUTION INTRAVENOUS; SUBCUTANEOUS at 20:11

## 2025-06-16 RX ADMIN — FENTANYL CITRATE 100 MCG: 50 INJECTION, SOLUTION INTRAMUSCULAR; INTRAVENOUS at 08:45

## 2025-06-16 RX ADMIN — HYDROMORPHONE HYDROCHLORIDE 0.5 MG: 1 INJECTION, SOLUTION INTRAMUSCULAR; INTRAVENOUS; SUBCUTANEOUS at 11:50

## 2025-06-16 RX ADMIN — FAMOTIDINE 20 MG: 10 INJECTION INTRAVENOUS at 08:30

## 2025-06-16 RX ADMIN — LIDOCAINE HYDROCHLORIDE 80 MG: 20 INJECTION, SOLUTION EPIDURAL; INFILTRATION; INTRACAUDAL; PERINEURAL at 08:45

## 2025-06-16 RX ADMIN — DOCUSATE SODIUM 50 MG AND SENNOSIDES 8.6 MG 2 TABLET: 8.6; 5 TABLET, FILM COATED ORAL at 17:22

## 2025-06-16 RX ADMIN — CLINDAMYCIN IN 5 PERCENT DEXTROSE 600 MG: 12 INJECTION, SOLUTION INTRAVENOUS at 14:33

## 2025-06-16 RX ADMIN — SODIUM CHLORIDE 100 ML/HR: 9 INJECTION, SOLUTION INTRAVENOUS at 20:16

## 2025-06-16 RX ADMIN — SODIUM CHLORIDE, POTASSIUM CHLORIDE, SODIUM LACTATE AND CALCIUM CHLORIDE 100 ML/HR: 600; 310; 30; 20 INJECTION, SOLUTION INTRAVENOUS at 08:39

## 2025-06-16 RX ADMIN — INSULIN HUMAN 5 UNITS: 100 INJECTION, SOLUTION PARENTERAL at 10:26

## 2025-06-16 RX ADMIN — METFORMIN HYDROCHLORIDE 1000 MG: 500 TABLET ORAL at 17:22

## 2025-06-16 RX ADMIN — SODIUM CHLORIDE, POTASSIUM CHLORIDE, SODIUM LACTATE AND CALCIUM CHLORIDE 1000 ML: 600; 310; 30; 20 INJECTION, SOLUTION INTRAVENOUS at 08:33

## 2025-06-16 RX ADMIN — INSULIN HUMAN 5 UNITS: 100 INJECTION, SOLUTION PARENTERAL at 11:06

## 2025-06-16 RX ADMIN — MIDAZOLAM HYDROCHLORIDE 1 MG: 1 INJECTION, SOLUTION INTRAMUSCULAR; INTRAVENOUS at 08:34

## 2025-06-16 RX ADMIN — CLINDAMYCIN PHOSPHATE 900 MG: 900 INJECTION, SOLUTION INTRAVENOUS at 08:50

## 2025-06-16 RX ADMIN — HYDROMORPHONE HYDROCHLORIDE 0.5 MG: 1 INJECTION, SOLUTION INTRAMUSCULAR; INTRAVENOUS; SUBCUTANEOUS at 23:07

## 2025-06-16 NOTE — PLAN OF CARE
Goal Outcome Evaluation:  Plan of Care Reviewed With: patient, spouse        Progress: no change  Outcome Evaluation: The patient presents alert and oriented x4 lying in bed with spouse at bedside and LSO in the room. The patient is typically independent in all functional mobility. Today she demonstrates B hip flexion weakness with no focal sensation or coordination deficits. She is able to clear B feet during gait with a slow cadance and use of a RW. She was able to ambulate in the hallway as well. She will benefit from continued PT to work on increased activity tolerance between surgeries. Recommend discharge home with assist most likely after second surgery.    Anticipated Discharge Disposition (PT): home with assist

## 2025-06-16 NOTE — THERAPY EVALUATION
Patient Name: Ayla Echevarria  : 1970    MRN: [id number]                              Today's Date: 2025       Admit Date: 2025    Visit Dx:     ICD-10-CM ICD-9-CM   1. Impaired mobility [Z74.09]  Z74.09 799.89     Patient Active Problem List   Diagnosis    Hypertension    Hyperlipidemia    Disc degeneration, lumbosacral    Chronic bilateral low back pain with bilateral sciatica    Lumbar spinal stenosis    Lumbar stenosis    Drug-induced constipation    GERD without esophagitis    Sleep apnea    Class 3 severe obesity due to excess calories with serious comorbidity and body mass index (BMI) of 40.0 to 44.9 in adult    Diabetes mellitus    Lumbar radiculopathy     Past Medical History:   Diagnosis Date    Asthma     Carpal tunnel syndrome     Chronic bilateral low back pain with bilateral sciatica 2020    Depression 2019    Back problems    Diabetes mellitus     Pre diabetes    Disc degeneration, lumbosacral 2020    Glaucoma 2018    watching it, script given for prevention    Hyperlipidemia     Hypertension     Mononucleosis     In high school    Nerve pain     PONV (postoperative nausea and vomiting)     Psoriasis     Psoriatic arthritis     SI (sacroiliac) joint inflammation     was going to [facility] for injections    Sleep apnea     bi-pap     Past Surgical History:   Procedure Laterality Date    ANTERIOR LUMBAR EXPOSURE N/A 2020    Procedure: ANTERIOR LUMBAR EXPOSURE;  Surgeon: Salvatore Villalta DO;  Location:  [facility];  Service: Vascular    CARPAL TUNNEL RELEASE Bilateral      SECTION      x2    CHOLECYSTECTOMY      LUMBAR FUSION N/A 2020    Procedure: ANTERIOR DECOMPRESSION, ANTERIOR LUMBAR INTERBODY FUSION WITH INSTRUMENTATION L5-S1;  Surgeon: PAEMLA Bang MD;  Location:  [facility];  Service: Orthopedic Spine    LUMBAR FUSION Right 2020    Procedure: RIGHT  LATERAL LUMBAR  INTERBODY FUSION WITH INSTRUMENTATION L4-5;  Surgeon: PAMELA Bang  MD Sharif;  Location:  [facility];  Service: Orthopedic Spine    LUMBAR FUSION Left 07/05/2023    Procedure: LEFT LATERAL LUMBAR INTERBODY WITH INSTRUMENTATION L3-4;  Surgeon: PAMELA Bang MD;  Location:  [facility];  Service: Orthopedic Spine;  Laterality: Left;    LUMBAR LAMINECTOMY WITH FUSION N/A 01/24/2020    Procedure: POSTERIOR SPINAL FUSION WITH INSTRUMENTATION L4-S1;  Surgeon: PAMELA Bang MD;  Location:  [facility];  Service: Orthopedic Spine    LUMBAR LAMINECTOMY WITH FUSION N/A 07/07/2023    Procedure: REMOVAL OF INSTRUMENTATION, EXPLORATION OF FUSION L4-S1, POSTERIOR SPINAL FUSION L3-4 WITH INSTRUMENTATION L3-S1;  Surgeon: PAMELA Bang MD;  Location:  [facility];  Service: Orthopedic Spine;  Laterality: N/A;    REPLACEMENT TOTAL KNEE Left 03/14/2023    REPLACEMENT TOTAL KNEE Right 12/2023      General Information       Row Name 06/16/25 1306          Physical Therapy Time and Intention    Document Type evaluation  s/p L LLIF L2-3 due to back pain, B anterior and lateral thigh radiculopathy, neurogenic claudication  -MS     Mode of Treatment physical therapy;co-treatment  -MS       Row Name 06/16/25 1306          General Information    Patient Profile Reviewed yes  -MS     Prior Level of Function independent:;all household mobility;community mobility;ADL's;driving  rolling walker at times  -MS     Existing Precautions/Restrictions fall;brace worn when out of bed;LSO;spinal  -MS     Barriers to Rehab none identified  -MS       Row Name 06/16/25 1306          Living Environment    Current Living Arrangements home  -MS     People in Home spouse  -MS       Row Name 06/16/25 1306          Home Main Entrance    Number of Stairs, Main Entrance three  -MS     Stair Railings, Main Entrance none  -MS       Row Name 06/16/25 1306          Stairs Within Home, Primary    Number of Stairs, Within Home, Primary none  -MS       Row Name 06/16/25 1306          Cognition    Orientation Status (Cognition)  oriented x 4  -MS       Row Name 06/16/25 1306          Safety Issues/Impairments Affecting Functional Mobility    Impairments Affecting Function (Mobility) endurance/activity tolerance;range of motion (ROM)  -MS               User Key  (r) = Recorded By, (t) = Taken By, (c) = Cosigned By      Initials Name Provider Type    Susan Cifuentes JEREMY, PT, DPT, NCS Physical Therapist                   Mobility       Row Name 06/16/25 1306          Bed Mobility    Bed Mobility sidelying-sit;sit-sidelying  -MS     Sidelying-Sit [place] (Bed Mobility) standby assist;verbal cues;nonverbal cues (demo/gesture)  -MS     Sit-Sidelying [place] (Bed Mobility) standby assist;verbal cues  -MS     Assistive Device (Bed Mobility) bed rails  -MS       Row Name 06/16/25 1306          Sit-Stand Transfer    Sit-Stand [place] (Transfers) contact guard;verbal cues;nonverbal cues (demo/gesture)  -MS     Assistive Device (Sit-Stand Transfers) walker, front-wheeled  -MS       Row Name 06/16/25 1306          Gait/Stairs (Locomotion)    [place] Level (Gait) contact guard;verbal cues;nonverbal cues (demo/gesture)  -MS     Assistive Device (Gait) walker, front-wheeled  -MS     Patient was able to Ambulate yes  -MS     Distance in Feet (Gait) 250  -MS     Comment, (Gait/Stairs) slow cadance with decrease foot clearance B  -MS               User Key  (r) = Recorded By, (t) = Taken By, (c) = Cosigned By      Initials Name Provider Type    Susan Cifuentes JEREMY, PT, DPT, NCS Physical Therapist                   Obj/Interventions       Row Name 06/16/25 1306          Range of Motion Comprehensive    General Range of Motion bilateral upper extremity ROM WFL  -MS     Comment, General Range of Motion B hip flexion impaired 25%  -MS       Row Name 06/16/25 1306          Strength Comprehensive (MMT)    Comment, General Manual Muscle Testing (MMT) Assessment B hip flexion 3-/5  -MS       Row Name 06/16/25 1306          Balance    Balance  Assessment sitting static balance;sitting dynamic balance;standing static balance;standing dynamic balance  -MS     Static Sitting Balance independent  -MS     Dynamic Sitting Balance independent  -MS     Position, Sitting Balance unsupported;sitting edge of bed  -MS     Static Standing Balance contact guard  -MS     Dynamic Standing Balance contact guard  -MS     Position/Device Used, Standing Balance supported;walker, rolling  -MS       Row Name 06/16/25 1306          Sensory Assessment (Somatosensory)    Sensory Assessment (Somatosensory) sensation intact  -MS               User Key  (r) = Recorded By, (t) = Taken By, (c) = Cosigned By      Initials Name Provider Type    MS LakeSusan JEREMY, PT, DPT, NCS Physical Therapist                   Goals/Plan       Row Name 06/16/25 1306          Bed Mobility Goal 1 (PT)    Activity/Assistive Device (Bed Mobility Goal 1, PT) bed mobility activities, all  -MS     [place] Level/Cues Needed (Bed Mobility Goal 1, PT) independent  -MS     Time Frame (Bed Mobility Goal 1, PT) long term goal (LTG);by discharge  -MS     Progress/Outcomes (Bed Mobility Goal 1, PT) new goal  -MS       Row Name 06/16/25 1306          Transfer Goal 1 (PT)    Activity/Assistive Device (Transfer Goal 1, PT) sit-to-stand/stand-to-sit;bed-to-chair/chair-to-bed;walker, rolling  -MS     [place] Level/Cues Needed (Transfer Goal 1, PT) modified independence  -MS     Time Frame (Transfer Goal 1, PT) long term goal (LTG);by discharge  -MS     Progress/Outcome (Transfer Goal 1, PT) new goal  -MS       Row Name 06/16/25 1306          Gait Training Goal 1 (PT)    Activity/Assistive Device (Gait Training Goal 1, PT) gait (walking locomotion);assistive device use;increase energy conservation;walker, rolling  -MS     [place] Level (Gait Training Goal 1, PT) modified independence  -MS     Distance (Gait Training Goal 1, PT) 500ft  -MS     Progress/Outcome (Gait Training Goal 1, PT) new goal  -MS        Row Name 06/16/25 1306          Problem Specific Goal 1 (PT)    Problem Specific Goal 1 (PT) The patient will independently implement 1 pain management technique to decrease pain in order to improve functional mobility.  -MS     Time Frame (Problem Specific Goal 1, PT) long-term goal (LTG);by discharge  -MS     Progress/Outcome (Problem Specific Goal 1, PT) new goal  -MS       Row Name 06/16/25 1306          Therapy Assessment/Plan (PT)    Planned Therapy Interventions (PT) balance training;bed mobility training;gait training;patient/family education;transfer training;orthotic fitting/training  -MS               User Key  (r) = Recorded By, (t) = Taken By, (c) = Cosigned By      Initials Name Provider Type    MS Jaya Susan R, PT, DPT, NCS Physical Therapist                   Clinical Impression       Row Name 06/16/25 1306          Pain    Pretreatment Pain Rating 10/10  -MS     Posttreatment Pain Rating 10/10  -MS     Pain Location back  -MS     Pain Side/Orientation lower  -MS     Pain Management Interventions activity modification encouraged;exercise or physical activity utilized  -MS     Response to Pain Interventions activity participation with tolerable pain  -MS       Row Name 06/16/25 1306          Plan of Care Review    Plan of Care Reviewed With patient;spouse  -MS     Progress no change  -MS     Outcome Evaluation The patient presents alert and oriented x4 lying in bed with spouse at bedside and LSO in the room. The patient is typically independent in all functional mobility. Today she demonstrates B hip flexion weakness with no focal sensation or coordination deficits. She is able to clear B feet during gait with a slow cadance and use of a RW. She was able to ambulate in the hallway as well. She will benefit from continued PT to work on increased activity tolerance between surgeries. Recommend discharge home with assist most likely after second surgery.  -MS       Row Name 06/16/25 1306           Therapy Assessment/Plan (PT)    Patient/Family Therapy Goals Statement (PT) go home  -MS     Rehab Potential (PT) good  -MS     Criteria for Skilled Interventions Met (PT) yes;meets criteria;skilled treatment is necessary  -MS     Therapy Frequency (PT) 2 times/day  -MS     Predicted Duration of Therapy Intervention (PT) until discharge  -MS       Row Name 06/16/25 1306          Positioning and Restraints    Post Treatment Position bed  -MS     In Bed fowlers;call light within reach;encouraged to call for assist;with family/caregiver;side rails up x2  -MS               User Key  (r) = Recorded By, (t) = Taken By, (c) = Cosigned By      Initials Name Provider Type    Susan Cifuentes, PT, DPT, NCS Physical Therapist                   Outcome Measures       Row Name 06/16/25 1306          How much help from another person do you currently need...    Turning from your back to your side while in flat bed without using bedrails? 3  -MS     Moving from lying on back to sitting on the side of a flat bed without bedrails? 3  -MS     Moving to and from a bed to a chair (including a wheelchair)? 3  -MS     Standing up from a chair using your arms (e.g., wheelchair, bedside chair)? 3  -MS     Climbing 3-5 steps with a railing? 3  -MS     To walk in hospital room? 3  -MS     AM-PAC 6 Clicks Score (PT) 18  -MS     Highest Level of Mobility Goal Walk 10 Steps or More-6  -MS       Row Name 06/16/25 1306 06/16/25 1305       Functional Assessment    Outcome Measure Options AM-PAC 6 Clicks Basic Mobility (PT)  -MS AM-PAC 6 Clicks Daily Activity (OT)  -AC              User Key  (r) = Recorded By, (t) = Taken By, (c) = Cosigned By      Initials Name Provider Type    Roe Dolan, OTR/L, CNT Occupational Therapist    Susan Cifuentes, PT, DPT, NCS Physical Therapist                                 Physical Therapy Education       Title: PT OT SLP Therapies (In Progress)       Topic: Physical Therapy (In Progress)        Point: Mobility training (Done)       Learning Progress Summary            Patient Acceptance, E, VU by MS at 6/16/2025 1416    Comment: role of PT in her care, spinal restrictions, use of LSO                      Point: Home exercise program (Not Started)       Learner Progress:  Not documented in this visit.              Point: Body mechanics (Not Started)       Learner Progress:  Not documented in this visit.              Point: Precautions (Done)       Learning Progress Summary            Patient Acceptance, E, VU by MS at 6/16/2025 1416    Comment: role of PT in her care, spinal restrictions, use of LSO                                      User Key       Initials Effective Dates Name Provider Type Discipline    MS 07/11/23 -  Susan Lake, PT, DPT, NCS Physical Therapist PT                  PT Recommendation and Plan  Planned Therapy Interventions (PT): balance training, bed mobility training, gait training, patient/family education, transfer training, orthotic fitting/training  Progress: no change  Outcome Evaluation: The patient presents alert and oriented x4 lying in bed with spouse at bedside and LSO in the room. The patient is typically independent in all functional mobility. Today she demonstrates B hip flexion weakness with no focal sensation or coordination deficits. She is able to clear B feet during gait with a slow cadance and use of a RW. She was able to ambulate in the hallway as well. She will benefit from continued PT to work on increased activity tolerance between surgeries. Recommend discharge home with assist most likely after second surgery.     Time Calculation:         PT Charges       Row Name 06/16/25 1306             Time Calculation    Start Time 1306  -MS      Stop Time 1355  -MS      Time Calculation (min) 49 min  -MS      PT Received On 06/16/25  -MS      PT Goal Re-Cert Due Date 06/26/25  -MS         Untimed Charges    PT Eval/Re-eval Minutes 49  -MS         Total Minutes     Untimed Charges Total Minutes 49  -MS       Total Minutes 49  -MS                User Key  (r) = Recorded By, (t) = Taken By, (c) = Cosigned By      Initials Name Provider Type    Susan Cifuentes, PT, DPT, NCS Physical Therapist                      PT G-Codes  Outcome Measure Options: AM-PAC 6 Clicks Basic Mobility (PT)  AM-PAC 6 Clicks Score (PT): 18  AM-PAC 6 Clicks Score (OT): 17  PT Discharge Summary  Anticipated Discharge Disposition (PT): home with assist    Susan Lake, PT, DPT, KAMARI  6/16/2025

## 2025-06-16 NOTE — ANESTHESIA POSTPROCEDURE EVALUATION
Patient: Ayla Echevarria    Procedure Summary       Date: 06/16/25 Room / [facility]:  [facility] /  [facility]    Anesthesia Start: 0841 Anesthesia Stop: 0947    Procedure: LEFT LATERAL LUMBAR INTERBODY FUSION WITH INSTRUMENTATION L2-3 (Spine Lumbar) Diagnosis: (M54.16)    Surgeons: PAMELA Bang MD Provider: Terrance Crane CRNA    Anesthesia Type: general ASA Status: 3            Anesthesia Type: general    Vitals  Vitals Value Taken Time   /70 06/16/25 11:13   Temp 97.9 °F (36.6 °C) 06/16/25 11:13   Pulse 93 06/16/25 11:13   Resp 14 06/16/25 11:13   SpO2 95 % 06/16/25 11:13           Post Anesthesia Care and Evaluation    Patient location during evaluation: PACU  Patient participation: complete - patient participated  Level of consciousness: awake and awake and alert  Pain score: 0  Pain management: adequate    Airway patency: patent  Anesthetic complications: No anesthetic complications  PONV Status: none  Cardiovascular status: acceptable  Respiratory status: acceptable  Hydration status: acceptable    Comments: Patient discharged according to acceptable Car[place] score per RN assessment. See nursing records for further information.     Blood pressure 156/70, pulse 93, temperature 97.9 °F (36.6 °C), resp. rate 14, last menstrual period 07/10/2024, SpO2 95%, not currently breastfeeding.

## 2025-06-16 NOTE — ANESTHESIA PREPROCEDURE EVALUATION
Anesthesia Evaluation     history of anesthetic complications:  PONV  NPO Solid Status: > 8 hours  NPO Liquid Status: > 8 hours           Airway   Mallampati: II  No difficulty expected  Dental      Pulmonary    (+) a smoker Former, asthma,sleep apnea on CPAP  Cardiovascular   Exercise tolerance: poor (<4 METS)    (+) hypertension, hyperlipidemia      Neuro/Psych  GI/Hepatic/Renal/Endo    (+) morbid obesity, GERD, diabetes mellitus    Musculoskeletal     Abdominal    Substance History      OB/GYN          Other   arthritis,       Other Comment: Psoriasis, psoriatic arthritis    Glaucoma              Anesthesia Plan    ASA 3     general     intravenous induction     Anesthetic plan, risks, benefits, and alternatives have been provided, discussed and informed consent has been obtained with: patient.    CODE STATUS:

## 2025-06-16 NOTE — ANESTHESIA PROCEDURE NOTES
Peripheral IV    Patient location during procedure: holding area  Start time: 6/16/2025 8:38 AM  Line placed for Fluids/Medication Admin and Difficult Access.  Performed By   Anesthesiologist: Alma Real MD  Preanesthetic Checklist  Completed: patient identified, IV checked, site marked, risks and benefits discussed, surgical consent, monitors and equipment checked, pre-op evaluation and timeout performed  Peripheral IV Prep   Patient position: supine   Prep: ChloraPrep  Peripheral IV Procedure   Laterality:right  Location:  Antecubital  Catheter size: 18 G  Guidance: ultrasound guided          Post Assessment   Dressing Type: tape and transparent.    IV Dressing/Site: clean, dry and intact

## 2025-06-16 NOTE — THERAPY DISCHARGE NOTE
Acute Care - Occupational Therapy Initial Evaluation/Discharge  [facility]     Patient Name: Ayla Echevarria  : 1970  MRN: [id number]  Today's Date: 2025  Onset of Illness/Injury or Date of Surgery: 25     Referring Physician: Dr. Bang      Admit Date: 2025     No diagnosis found.  Patient Active Problem List   Diagnosis    Hypertension    Hyperlipidemia    Disc degeneration, lumbosacral    Chronic bilateral low back pain with bilateral sciatica    Lumbar spinal stenosis    Lumbar stenosis    Drug-induced constipation    GERD without esophagitis    Sleep apnea    Class 3 severe obesity due to excess calories with serious comorbidity and body mass index (BMI) of 40.0 to 44.9 in adult    Diabetes mellitus    Lumbar radiculopathy     Past Medical History:   Diagnosis Date    Asthma     Carpal tunnel syndrome     Chronic bilateral low back pain with bilateral sciatica 2020    Depression 2019    Back problems    Diabetes mellitus     Pre diabetes    Disc degeneration, lumbosacral 2020    Glaucoma 2018    watching it, script given for prevention    Hyperlipidemia     Hypertension     Mononucleosis     In high school    Nerve pain     PONV (postoperative nausea and vomiting)     Psoriasis     Psoriatic arthritis     SI (sacroiliac) joint inflammation     was going to [facility] for injections    Sleep apnea     bi-pap     Past Surgical History:   Procedure Laterality Date    ANTERIOR LUMBAR EXPOSURE N/A 2020    Procedure: ANTERIOR LUMBAR EXPOSURE;  Surgeon: Salvatore Villalta DO;  Location:  [facility] OR;  Service: Vascular    CARPAL TUNNEL RELEASE Bilateral      SECTION      x2    CHOLECYSTECTOMY      LUMBAR FUSION N/A 2020    Procedure: ANTERIOR DECOMPRESSION, ANTERIOR LUMBAR INTERBODY FUSION WITH INSTRUMENTATION L5-S1;  Surgeon: PAMELA Bang MD;  Location:  [facility];  Service: Orthopedic Spine    LUMBAR FUSION Right 2020    Procedure: RIGHT   LATERAL LUMBAR  INTERBODY FUSION WITH INSTRUMENTATION L4-5;  Surgeon: PAMELA Bang MD;  Location:  [facility];  Service: Orthopedic Spine    LUMBAR FUSION Left 07/05/2023    Procedure: LEFT LATERAL LUMBAR INTERBODY WITH INSTRUMENTATION L3-4;  Surgeon: PAMELA Bang MD;  Location:  [facility];  Service: Orthopedic Spine;  Laterality: Left;    LUMBAR LAMINECTOMY WITH FUSION N/A 01/24/2020    Procedure: POSTERIOR SPINAL FUSION WITH INSTRUMENTATION L4-S1;  Surgeon: PAMELA Bang MD;  Location:  [facility];  Service: Orthopedic Spine    LUMBAR LAMINECTOMY WITH FUSION N/A 07/07/2023    Procedure: REMOVAL OF INSTRUMENTATION, EXPLORATION OF FUSION L4-S1, POSTERIOR SPINAL FUSION L3-4 WITH INSTRUMENTATION L3-S1;  Surgeon: PAMELA Bang MD;  Location:  [facility];  Service: Orthopedic Spine;  Laterality: N/A;    REPLACEMENT TOTAL KNEE Left 03/14/2023    REPLACEMENT TOTAL KNEE Right 12/2023       OT ASSESSMENT FLOWSHEET (Last 12 Hours)       OT Evaluation and Treatment       Row Name 06/16/25 5948                   OT Time and Intention    Subjective Information complains of;pain;numbness;weakness  numbness in tip of tongue, subjective weakness in RLE  -AC        Document Type evaluation  -AC        Mode of Treatment occupational therapy  -AC           General Information    Patient Profile Reviewed yes  -AC        Onset of Illness/Injury or Date of Surgery 06/16/25  -AC        Referring Physician Dr. Bang  -        Prior Level of Function independent:;all household mobility;community mobility;gait;transfer;bed mobility;home management;cooking;cleaning;driving;feeding;grooming  rolling walker at times for amb  -AC        Equipment Currently Used at Home bipap/ cpap;shower chair;rollator;raised toilet;walker, rolling  reacher, LH shoe horn  -AC        Pertinent History of Current Functional Problem LBP, B anterior thigh radiculopathy, neurogenic claudication s/p L LLIF L2-3 on 6/16/25  -AC        Existing  Precautions/Restrictions fall;LSO;spinal;brace worn when out of bed  -AC        Risks Reviewed LOB;increased discomfort;change in vital signs;increased drainage;lines disloged;patient and family:  -AC        Benefits Reviewed improve function;increase independence;increase strength;increase balance;decrease pain;decrease risk of DVT;improve skin integrity;increase knowledge;patient and family:  -AC        Barriers to Rehab none identified  -AC           Living Environment    Current Living Arrangements home  tub shower  -AC        Home Accessibility stairs to enter home  -AC        People in Home spouse  -AC           Home Main Entrance    Number of Stairs, Main Entrance three  -AC        Stair Railings, Main Entrance none  -AC           Pain Assessment    Pretreatment Pain Rating 10/10  -AC        Posttreatment Pain Rating 10/10  -AC        Pain Location back  -AC        Pain Side/Orientation lower;left  -AC        Pain Management Interventions exercise or physical activity utilized;premedicated for activity  -AC        Response to Pain Interventions activity participation with tolerable pain  -AC           Cognition    Orientation Status (Cognition) oriented x 4  -AC        Follows Commands (Cognition) WFL  -AC        Personal Safety Interventions fall prevention program maintained;gait belt;muscle strengthening facilitated;nonskid shoes/slippers when out of bed;supervised activity  -AC           Range of Motion Comprehensive    General Range of Motion bilateral upper extremity ROM WFL  -AC           Strength Comprehensive (MMT)    Comment, General Manual Muscle Testing (MMT) Assessment functionally 4+/5 BUE  -AC           Activities of Daily Living    BADL Assessment/Intervention lower body dressing;upper body dressing;grooming;toileting  -AC           Upper Body Dressing Assessment/Training    [place] Level (Upper Body Dressing) don;set up  -AC        Position (Upper Body Dressing) edge of bed  sitting;unsupported standing  -AC        Comment, (Upper Body Dressing) LSO  -AC           Lower Body Dressing Assessment/Training    [place] Level (Lower Body Dressing) don;doff;socks;maximum assist (25% patient effort)  -        Position (Lower Body Dressing) edge of bed sitting  -           Grooming Assessment/Training    [place] Level (Grooming) wash face, hands;standby assist  -        Position (Grooming) sink side  -AC           Toileting Assessment/Training    [place] Level (Toileting) perform perineal hygiene;maximum assist (25% patient effort)  -        Assistive Devices (Toileting) commode  -        Position (Toileting) supported sitting;supported standing  -AC        Comment, (Toileting) assist from spouse per pt preference  -           BADL Safety/Performance    Impairments, BADL Safety/Performance pain;range of motion;endurance/activity tolerance;strength  -           Bed Mobility    Bed Mobility sidelying-sit;sit-sidelying;rolling right  -        Rolling Right [place] (Bed Mobility) standby assist  -        Sidelying-Sit [place] (Bed Mobility) standby assist;verbal cues  -        Sit-Sidelying [place] (Bed Mobility) standby assist;verbal cues  -        Assistive Device (Bed Mobility) bed rails  -           Functional Mobility    Functional Mobility- Ind. Level standby assist  -        Functional Mobility- Device walker, front-wheeled  -        Functional Mobility- Comment in hallway, to BR, to bed  -           Transfer Assessment/Treatment    Transfers sit-stand transfer;stand-sit transfer;toilet transfer  -           Sit-Stand Transfer    Sit-Stand [place] (Transfers) contact guard  -           Stand-Sit Transfer    Stand-Sit [place] (Transfers) contact guard  -           Toilet Transfer    Type (Toilet Transfer) sit-stand;stand-sit  -        [place] Level (Toilet Transfer) contact guard  -        Assistive Device  (Toilet Transfer) commode;grab bars/safety frame  -AC           Safety Issues/Impairments Affecting Functional Mobility    Impairments Affecting Function (Mobility) pain;range of motion (ROM);strength;endurance/activity tolerance  -           Balance    Balance Assessment sitting static balance;sitting dynamic balance;standing static balance;standing dynamic balance  -AC        Static Sitting Balance independent  -AC        Dynamic Sitting Balance independent  -AC        Position, Sitting Balance sitting edge of bed  -AC        Static Standing Balance standby assist  -AC        Dynamic Standing Balance standby assist  -AC        Position/Device Used, Standing Balance walker, front-wheeled  -AC           [REMOVED] Wound 07/05/23 0733 Left lower flank Incision    Wound - Properties Group Placement Date: 07/05/23 -SA Placement Time: 0733 -SA Present on Original Admission: N  -SA Side: Left  -SA Orientation: lower  -SA Location: flank  -SA Primary Wound Type: Incision  -SA Removal Date: 06/16/25 - Removal Time: 0742 -    Retired Wound - Properties Group Placement Date: 07/05/23 -SA Placement Time: 0733 -SA Present on Original Admission: N  -SA Side: Left  -SA Orientation: lower  -SA Location: flank  -SA Primary Wound Type: Incision  -SA Removal Date: 06/16/25 - Removal Time: 0742 -    Retired Wound - Properties Group Placement Date: 07/05/23 -SA Placement Time: 0733 -SA Present on Original Admission: N  -SA Side: Left  -SA Orientation: lower  -SA Location: flank  -SA Primary Wound Type: Incision  -SA Removal Date: 06/16/25 - Removal Time: 0742 -    Retired Wound - Properties Group Date first assessed: 07/05/23 -SA Time first assessed: 0733  -SA Present on Original Admission: N  -SA Side: Left  -SA Location: flank  -SA Primary Wound Type: Incision  -SA Resolution Date: 06/16/25 - Resolution Time: 0742 -       [REMOVED] Wound 07/07/23 1103 Bilateral lumbar spine Incision    Wound -  Properties Group Placement Date: 07/07/23  -DT Placement Time: 1103  -DT Present on Original Admission: N  -DT Side: Bilateral  -DT Location: lumbar spine  -DT Primary Wound Type: Incision  -DT Removal Date: 06/16/25  -WH Removal Time: 0742 -    Retired Wound - Properties Group Placement Date: 07/07/23  -DT Placement Time: 1103  -DT Present on Original Admission: N  -DT Side: Bilateral  -DT Location: lumbar spine  -DT Primary Wound Type: Incision  -DT Removal Date: 06/16/25  -WH Removal Time: 0742 -    Retired Wound - Properties Group Placement Date: 07/07/23  -DT Placement Time: 1103  -DT Present on Original Admission: N  -DT Side: Bilateral  -DT Location: lumbar spine  -DT Primary Wound Type: Incision  -DT Removal Date: 06/16/25  -WH Removal Time: 0742 -    Retired Wound - Properties Group Date first assessed: 07/07/23  -DT Time first assessed: 1103  -DT Present on Original Admission: N  -DT Side: Bilateral  -DT Location: lumbar spine  -DT Primary Wound Type: Incision  -DT Resolution Date: 06/16/25  -WH Resolution Time: 0742  -WH       Wound 06/16/25 0902 Left lower flank Surgical    Wound - Properties Group Placement Date: 06/16/25  -KT Placement Time: 0902  -KT Side: Left  -KT Orientation: lower  -KT Location: flank  -KT Primary Wound Type: Surgical  -KT    Retired Wound - Properties Group Placement Date: 06/16/25  -KT Placement Time: 0902  -KT Side: Left  -KT Orientation: lower  -KT Location: flank  -KT    Retired Wound - Properties Group Placement Date: 06/16/25  -KT Placement Time: 0902  -KT Side: Left  -KT Orientation: lower  -KT Location: flank  -KT    Retired Wound - Properties Group Date first assessed: 06/16/25  -KT Time first assessed: 0902  -KT Side: Left  -KT Location: flank  -KT       Plan of Care Review    Plan of Care Reviewed With patient  -AC        Progress no change  -AC        Outcome Evaluation OT eval completed.  Pt alert and oriented x4.  Pt lives at home with spouse and is  "typically independent with ADLs except requiring occasional assist with LB dressing.  Pt c/o 10/10 LBP/L flank.  She came to EOB using log roll method with SBA and donned LSO with set up.  Pt needed maxA to don socks due to weakness and pain. She stood with CGA and amb in hallway with  SBA and rw.  Pt c/o RLE weakness/\"funny feeling\" but no numbness or tingling.  Pt also c/o numbness on tip of tongue.  She had full AROM and strength WFL in BUE.  Pt completed toileting with S and hygiene with maxA, grooming with SBA.  Pt reports she has AE for LB ADLs and assist from her  at discharge.  At this time, she appears near baseline function.  Will reassess pt after 2nd sx and new orders are received.  -AC           Positioning and Restraints    Pre-Treatment Position in bed  -AC        Post Treatment Position bed  -AC        In Bed fowlers;call light within reach;encouraged to call for assist;with family/caregiver;side rails up x2  -AC           Therapy Assessment/Plan (OT)    OT Diagnosis --  -AC        Rehab Potential (OT) --  -AC        Criteria for Skilled Therapeutic Interventions Met (OT) no problems identified which require skilled intervention  -AC        Therapy Frequency (OT) evaluation only  -AC        Predicted Duration of Therapy Intervention (OT) --  -AC        Activity Limitations Related to Problem List (OT) --  -AC        Planned Therapy Interventions (OT) --  -AC           OT Goals    Transfer Goal Selection (OT) --  -AC        Bathing Goal Selection (OT) --  -AC        Dressing Goal Selection (OT) --  -AC        Problem Specific Goal Selection (OT) --  -AC           Transfer Goal 1 (OT)    Activity/Assistive Device (Transfer Goal 1, OT) --  -AC        Time Frame (Transfer Goal 1, OT) --  -AC        Progress/Outcome (Transfer Goal 1, OT) --  -AC           Bathing Goal 1 (OT)    Activity/Device (Bathing Goal 1, OT) --  -AC        Time Frame (Bathing Goal 1, OT) --  -AC        Progress/Outcomes " (Bathing Goal 1, OT) --  -AC           Dressing Goal 1 (OT)    Activity/Device (Dressing Goal 1, OT) --  -AC        Time Frame (Dressing Goal 1, OT) --  -AC        Progress/Outcome (Dressing Goal 1, OT) --  -AC           Problem Specific Goal 1 (OT)    Problem Specific Goal 1 (OT) --  -AC        Time Frame (Problem Specific Goal 1, OT) --  -AC        Progress/Outcome (Problem Specific Goal 1, OT) --  -AC                  User Key  (r) = Recorded By, (t) = Taken By, (c) = Cosigned By      Initials Name Effective Dates     Roe Calles, OTR/L, CNT 02/03/23 -     Deandra Gay, RN 06/16/21 - 05/01/25     Faina Hughes, RN 06/16/21 -     Wisam Holland RN 02/17/22 -     Jigar Wilson RN 02/17/22 -                     Occupational Therapy Education       Title: PT OT SLP Therapies (In Progress)       Topic: Occupational Therapy (Done)       Point: ADL training (Done)       Learning Progress Summary            Patient Acceptance, E, VU by  at 6/16/2025 1413   Family Acceptance, E, VU by  at 6/16/2025 1413                      Point: Precautions (Done)       Learning Progress Summary            Patient Acceptance, E, VU by  at 6/16/2025 1413   Family Acceptance, E, VU by  at 6/16/2025 1413                      Point: Body mechanics (Done)       Learning Progress Summary            Patient Acceptance, E, VU by  at 6/16/2025 1413   Family Acceptance, E, VU by  at 6/16/2025 1413                                      User Key       Initials Effective Dates Name Provider Type Discipline     02/03/23 -  Roe Calles, OTR/L, CNT Occupational Therapist OT                    OT Recommendation and Plan  Therapy Frequency (OT): evaluation only  Plan of Care Review  Plan of Care Reviewed With: patient  Progress: no change  Outcome Evaluation: OT eval completed.  Pt alert and oriented x4.  Pt lives at home with spouse and is typically independent with ADLs except requiring occasional assist with  "LB dressing.  Pt c/o 10/10 LBP/L flank.  She came to EOB using log roll method with SBA and donned LSO with set up.  Pt needed maxA to don socks due to weakness and pain. She stood with CGA and amb in hallway with  SBA and rw.  Pt c/o RLE weakness/\"funny feeling\" but no numbness or tingling.  Pt also c/o numbness on tip of tongue.  She had full AROM and strength WFL in BUE.  Pt completed toileting with S and hygiene with maxA, grooming with SBA.  Pt reports she has AE for LB ADLs and assist from her  at discharge.  At this time, she appears near baseline function.  Will reassess pt after 2nd sx and new orders are received.  Plan of Care Reviewed With: patient  Outcome Evaluation: OT eval completed.  Pt alert and oriented x4.  Pt lives at home with spouse and is typically independent with ADLs except requiring occasional assist with LB dressing.  Pt c/o 10/10 LBP/L flank.  She came to EOB using log roll method with SBA and donned LSO with set up.  Pt needed maxA to don socks due to weakness and pain. She stood with CGA and amb in hallway with  SBA and rw.  Pt c/o RLE weakness/\"funny feeling\" but no numbness or tingling.  Pt also c/o numbness on tip of tongue.  She had full AROM and strength WFL in BUE.  Pt completed toileting with S and hygiene with maxA, grooming with SBA.  Pt reports she has AE for LB ADLs and assist from her  at discharge.  At this time, she appears near baseline function.  Will reassess pt after 2nd sx and new orders are received.      OT Rehab Goals       Row Name 06/16/25 1307             Occupational Therapy Goals    Transfer Goal Selection (OT) --  -AC      Bathing Goal Selection (OT) --  -AC      Dressing Goal Selection (OT) --  -AC      Problem Specific Goal Selection (OT) --  -AC         Transfer Goal 1 (OT)    Activity/Assistive Device (Transfer Goal 1, OT) --  -AC      Time Frame (Transfer Goal 1, OT) --  -AC      Progress/Outcome (Transfer Goal 1, OT) --  -AC         " Bathing Goal 1 (OT)    Activity/Device (Bathing Goal 1, OT) --  -AC      Time Frame (Bathing Goal 1, OT) --  -AC      Progress/Outcomes (Bathing Goal 1, OT) --  -AC         Dressing Goal 1 (OT)    Activity/Device (Dressing Goal 1, OT) --  -AC      Time Frame (Dressing Goal 1, OT) --  -AC      Progress/Outcome (Dressing Goal 1, OT) --  -AC         Problem Specific Goal 1 (OT)    Problem Specific Goal 1 (OT) --  -AC      Time Frame (Problem Specific Goal 1, OT) --  -AC      Progress/Outcome (Problem Specific Goal 1, OT) --  -AC                User Key  (r) = Recorded By, (t) = Taken By, (c) = Cosigned By      Initials Name Provider Type Discipline    Roe Dolan, OTR/L, DORA Occupational Therapist OT                     Outcome Measures       Row Name 06/16/25 1305             How much help from another is currently needed...    Putting on and taking off regular lower body clothing? 2  -AC      Bathing (including washing, rinsing, and drying) 3  -AC      Toileting (which includes using toilet bed pan or urinal) 2  -AC      Putting on and taking off regular upper body clothing 3  -AC      Taking care of personal grooming (such as brushing teeth) 3  -AC      Eating meals 4  -AC      AM-PAC 6 Clicks Score (OT) 17  -AC         Functional Assessment    Outcome Measure Options AM-PAC 6 Clicks Daily Activity (OT)  -AC                User Key  (r) = Recorded By, (t) = Taken By, (c) = Cosigned By      Initials Name Provider Type    Roe Dolan OTR/L, DORA Occupational Therapist                    Time Calculation:    Time Calculation- OT       Row Name 06/16/25 1413             Time Calculation- OT    OT Start Time 1305  -AC      OT Stop Time 1335  -AC      OT Time Calculation (min) 30 min  -AC      OT Received On 06/16/25  -                User Key  (r) = Recorded By, (t) = Taken By, (c) = Cosigned By      Initials Name Provider Type    Roe Dolan OTR/L, DORA Occupational Therapist                     Therapy Charges for Today       Code Description Service Date Service Provider Modifiers Qty    25356770244 HC OT EVAL LOW COMPLEXITY 2 6/16/2025 Roe Calles, OTR/L, CNT GO 1                      ELLEN Earl/L, DORA  6/16/2025

## 2025-06-16 NOTE — PLAN OF CARE
"Goal Outcome Evaluation:  Plan of Care Reviewed With: patient        Progress: no change  Outcome Evaluation: OT elder completed.  Pt alert and oriented x4.  Pt lives at home with spouse and is typically independent with ADLs except requiring occasional assist with LB dressing.  Pt c/o 10/10 LBP/L flank.  She came to EOB using log roll method with SBA and donned LSO with set up.  Pt needed maxA to don socks due to weakness and pain. She stood with CGA and amb in hallway with  SBA and rw.  Pt c/o RLE weakness/\"funny feeling\" but no numbness or tingling.  Pt also c/o numbness on tip of tongue.  She had full AROM and strength WFL in BUE.  Pt completed toileting with S and hygiene with maxA, grooming with SBA.  Pt reports she has AE for LB ADLs and assist from her  at discharge.  At this time, she appears near baseline function.  Will reassess pt after 2nd sx and new orders are received.                             "

## 2025-06-16 NOTE — OP NOTE
Lateral lumbar interbody fusion with instrumentation procedure Note    Ayla Echevarria  6/16/2025    Pre-op Diagnosis:     1. Psoriasis.   2. Status post anterior decompression, ALIF with instrumentation L5-S1, 01/17/2020   3. Status post right LLIF with instrumentation L4-5, 01/22/2020   4. Status post PSF with instrumentation L4-S1, 01/24/2020   5. Broken right S1 pedicle screw   6. Status post left LLIF with instrumentation L3-4, 07/05/2023    7. Status post partial LORA L4-S1, PSF L3-4 with instrumentation L3-S1, 07/07/2023    8. Recurrent low back pain    9. Bilateral anterior and lateral thigh radiculopathy    10. Recurrent neurogenic claudication    11. Severe adjacent level degenerative disc disease L2-3    12. Severe adjacent level facet arthropathy L2-3    13. Large right central disc herniation L2-3    14. Severe central and bilateral foraminal stenosis L2-3    15. Obesity, BMI 48    Post-op Diagnosis:    same    Procedure/CPT® Codes:    1.  Left lateral lumbar interbody fusion L2-3  2.  Anterior spinal instrumentation L2-3 (ATEC lateral plate and screws)  3.  Use of titanium interbody biomechanical device for fusion L2-3 (ATEC titanium spacer)  4.  Use of allograft bone matrix for fusion (Induce NMP Fibers)  5.  Use of fluoroscopy for confirmation of surgical level, placement of titanium spacer and instrumentation  6.  Intraoperative neural monitoring    Spinal Surgery Levels Completed:1 Level     Anesthesia: General    Surgeon: ASHLEY Bang MD    Assistant: Chuck John PA-C    Estimated Blood Loss: Minimal    Complications: None    Condition: Stable to PACU.    Indications:    The patient is a 54-year-old who sees Dr. Rich Heaton for medical issues.  She has had fusion procedures performed in a staged fashion from L4-S1 in 2020 as well as at L3-4 in 2023.  Unfortunately she has presented back to the office with a recurrence of low back pain along with bilateral anterior and lateral thigh  radiculopathy as well as symptoms consistent with recurrent neurogenic claudication.  Repeat imaging studies have revealed severe adjacent level degenerative disc disease and facet arthropathy at L2-3 with a large right central disc herniation causing severe central and foraminal stenosis.    After failing all conservative measures, it was mutually decided that surgery would be the best option. Risks, benefits, and complications of surgery were discussed with the patient. The patient appeared well informed and wished to proceed. We specifically discussed the risk of infection, blood loss, nerve root injury, CSF leak, and the possibility of incomplete resolution of symptoms. We also discussed the possible risk of a nonunion and the potential need for additional surgery in the event of a pseudoarthrosis or hardware failure.     We elected proceed with a staged operation.  Today we are performing a lateral fusion of the L2-3 level.  It is planned that we will be returning to surgery for a second posterior procedure in a few days.    Operative Procedure:    After obtaining informed consent and verifying the correct operative site, the patient was brought to the operating room and placed supine on an operating table.  A general anesthetic was provided by the anesthesia service with the assistance of an endotracheal tube.  Once this was appropriately positioned and secured, the patient was carefully rotated into the lateral decubitus position with the left side up.  All bony prominences were well-padded.  3 inch wide cloth tape was used to maintain the patient's position.  It was also used to tip open the pelvis slightly allowing better access to the lumbar spine through a lateral approach.  Fluoroscopy was then used to identify the L2-3 level, and the skin was marked for our planned incision.  The left flank was then prepped and draped in the usual sterile fashion.  A surgical timeout was taken to confirm this was the  correct patient, we were working at the correct level, and that preoperative antibiotics were given in a timely fashion.    An oblique incision was created of the left flank using a 10 blade scalpel directly centered over the L2-3 segment. Dissection was carried bluntly through subcutaneous tissues. Blunt dissection was also carried between the external oblique and internal oblique musculature. The transversalis fascia was pierced allowing access to the retroperitoneal space. At this point a series of neuro monitoring probes were used to safely access the L2-3 level. We used stimulated and free run EMG for this purpose. Once nerve roots were confirmed to be out of harm's way, self retaining retractors were placed for continuous exposure.     An annulotomy was then created at the L2-3 area using a 15 blade scalpel on a long handle. A Castellano elevator was used to remove disc material off of the endplates. Disc material was removed using Kerrisons, pituitaries, and forward angled curettes. Once all disc material had been retrieved, I used the Castellano elevator to divide the contralateral annulus. This assisted with mobilization of the vertebral body. We then used a series of endplate scrapers to prepare the endplates for interbody fusion. Trial spacers were malleted into position and for the disc space it was felt that a 10 mm x 55 mm implant would be the best fit to restore disc height and correct the focal scoliosis at that level. The disc space was then thoroughly irrigated with saline solution.     A titanium spacer from the [facility] instrumentation set measuring 10 mm x 55 mm x 22 mm was then packed with an allograft bone matrix called Induce NMP Fibers as tightly as possible. This titanium spacer was then malleted into the L2-3 disc space under fluoroscopic guidance. It was placed as a titanium interbody biomechanical device to assist with interbody fusion. Once this spacer was confirmed to be properly positioned, I chose a  2-hole plate to help augment the fusion of L2-3. This plate was held into position using 2 screws. I placed a 5.0 mm x 35 mm screw into both L2 and L3 as fixation.     The wound was then irrigated thoroughly. A thorough inspection was then undertaken to ensure that we had adequate hemostasis. Bleeding at this point was controlled using thrombin gelfoam powder and bipolar cautery. Closure was then accomplished with a #1 Vicryl reapproximating the internal and external oblique musculature. Immediate subcutaneous tissues were closed with a 3-0 Vicryl and skin closure was accomplished using Mastisol and Steri-Strips. The wound was then sterilely dressed. The patient was then carefully rotated supine onto a hospital gurney, extubated, and sent to the recovery room in good stable condition.     The patient tolerated the procedure well. There were no complications. We estimated blood loss to be minimal. The patient remained hemodynamically stable.     Intraoperative neuro monitoring was ordered and carried out throughout the procedure to add an increased level of safety for the patient.  The interpreting physician was available by means of real-time continuous, bidirectional, remote audio and visual communication as needed throughout the entire procedure.  Modalities used during the procedure included SSEP, DNS, EMG, and TOF.  There were no neuro monitoring signal changes during the procedure.    Chuck John PA-C provided critical assistance during the procedure. His assistance was medically necessary in order to allow the procedure to occur in the most safe and efficient manner.  He assisted not only with patient positioning and wound closure, but more portly, he assisted during the discectomy and disc space preparation at L2-3 as well as the placement of the interbody device and instrumentation to obtain a fusion across L2-3.    ASHLEY Bang MD     Date: 6/16/2025  Time: 11:47 CDT

## 2025-06-16 NOTE — ANESTHESIA PROCEDURE NOTES
Airway  Reason: elective    Date/Time: 6/16/2025 8:45 AM  Airway not difficult    General Information and Staff    Patient location during procedure: OR  CRNA/CAA: Terrance Crane CRNA    Indications and Patient Condition  Indications for airway management: airway protection    Preoxygenated: yes  MILS maintained throughout    Mask difficulty assessment: 1 - vent by mask    Final Airway Details    Final airway type: endotracheal airway      Successful airway: ETT  Cuffed: yes   Successful intubation technique: direct laryngoscopy  Adjuncts used in placement: intubating stylet  Endotracheal tube insertion site: oral  Blade: Magdaleno  Blade size: 3  ETT size (mm): 7.0  Cormack-Lehane Classification: grade IIa - partial view of glottis  Placement verified by: chest auscultation and capnometry   Cuff volume (mL): 8  Measured from: lips  ETT/EBT  to lips (cm): 21  Number of attempts at approach: 1  Assessment: lips, teeth, and gum same as pre-op and atraumatic intubation

## 2025-06-16 NOTE — PLAN OF CARE
Goal Outcome Evaluation:        Patient A&Ox4, VSS, RA, cont pulse ox, medicated for pain as needed. IV fluids infusing and  IV antibiotics given as ordered. LSO brace fitted to patient. Patient sat up on side of bed and walked in room. Tolerated well. Voiding w/o difficulty. All safety maintained and call light in reach.

## 2025-06-17 LAB
ANION GAP SERPL CALCULATED.3IONS-SCNC: 14 MMOL/L (ref 5–15)
BASOPHILS # BLD AUTO: 0.03 10*3/MM3 (ref 0–0.2)
BASOPHILS NFR BLD AUTO: 0.3 % (ref 0–1.5)
BUN SERPL-MCNC: 9.7 MG/DL (ref 6–20)
BUN/CREAT SERPL: 17 (ref 7–25)
CALCIUM SPEC-SCNC: 9.5 MG/DL (ref 8.6–10.5)
CHLORIDE SERPL-SCNC: 99 MMOL/L (ref 98–107)
CO2 SERPL-SCNC: 23 MMOL/L (ref 22–29)
CREAT SERPL-MCNC: 0.57 MG/DL (ref 0.57–1)
DEPRECATED RDW RBC AUTO: 44.7 FL (ref 37–54)
EGFRCR SERPLBLD CKD-EPI 2021: 107.5 ML/MIN/1.73
EOSINOPHIL # BLD AUTO: 0.03 10*3/MM3 (ref 0–0.4)
EOSINOPHIL NFR BLD AUTO: 0.3 % (ref 0.3–6.2)
ERYTHROCYTE [DISTWIDTH] IN BLOOD BY AUTOMATED COUNT: 13.6 % (ref 12.3–15.4)
FOLATE SERPL-MCNC: >20 NG/ML (ref 4.78–24.2)
GLUCOSE BLDC GLUCOMTR-MCNC: 230 MG/DL (ref 70–130)
GLUCOSE BLDC GLUCOMTR-MCNC: 254 MG/DL (ref 70–130)
GLUCOSE BLDC GLUCOMTR-MCNC: 280 MG/DL (ref 70–130)
GLUCOSE BLDC GLUCOMTR-MCNC: 319 MG/DL (ref 70–130)
GLUCOSE SERPL-MCNC: 243 MG/DL (ref 65–99)
HCT VFR BLD AUTO: 35.9 % (ref 34–46.6)
HGB BLD-MCNC: 11.6 G/DL (ref 12–15.9)
IMM GRANULOCYTES # BLD AUTO: 0.03 10*3/MM3 (ref 0–0.05)
IMM GRANULOCYTES NFR BLD AUTO: 0.3 % (ref 0–0.5)
IRON 24H UR-MRATE: 50 MCG/DL (ref 37–145)
IRON SATN MFR SERPL: 13 % (ref 20–50)
LYMPHOCYTES # BLD AUTO: 1.78 10*3/MM3 (ref 0.7–3.1)
LYMPHOCYTES NFR BLD AUTO: 18.7 % (ref 19.6–45.3)
MCH RBC QN AUTO: 28.9 PG (ref 26.6–33)
MCHC RBC AUTO-ENTMCNC: 32.3 G/DL (ref 31.5–35.7)
MCV RBC AUTO: 89.3 FL (ref 79–97)
MONOCYTES # BLD AUTO: 0.74 10*3/MM3 (ref 0.1–0.9)
MONOCYTES NFR BLD AUTO: 7.8 % (ref 5–12)
NEUTROPHILS NFR BLD AUTO: 6.91 10*3/MM3 (ref 1.7–7)
NEUTROPHILS NFR BLD AUTO: 72.6 % (ref 42.7–76)
NRBC BLD AUTO-RTO: 0 /100 WBC (ref 0–0.2)
PLATELET # BLD AUTO: 286 10*3/MM3 (ref 140–450)
PMV BLD AUTO: 10.1 FL (ref 6–12)
POTASSIUM SERPL-SCNC: 4.1 MMOL/L (ref 3.5–5.2)
RBC # BLD AUTO: 4.02 10*6/MM3 (ref 3.77–5.28)
SODIUM SERPL-SCNC: 136 MMOL/L (ref 136–145)
TIBC SERPL-MCNC: 377 MCG/DL (ref 298–536)
TRANSFERRIN SERPL-MCNC: 253 MG/DL (ref 200–360)
VIT B12 BLD-MCNC: 566 PG/ML (ref 211–946)
WBC NRBC COR # BLD AUTO: 9.52 10*3/MM3 (ref 3.4–10.8)

## 2025-06-17 PROCEDURE — 63710000001 ONDANSETRON ODT 4 MG TABLET DISPERSIBLE: Performed by: ORTHOPAEDIC SURGERY

## 2025-06-17 PROCEDURE — 80048 BASIC METABOLIC PNL TOTAL CA: CPT | Performed by: ORTHOPAEDIC SURGERY

## 2025-06-17 PROCEDURE — 82607 VITAMIN B-12: CPT | Performed by: FAMILY MEDICINE

## 2025-06-17 PROCEDURE — 82948 REAGENT STRIP/BLOOD GLUCOSE: CPT

## 2025-06-17 PROCEDURE — 25010000002 HYDROMORPHONE PER 4 MG: Performed by: ORTHOPAEDIC SURGERY

## 2025-06-17 PROCEDURE — 25810000003 SODIUM CHLORIDE 0.9 % SOLUTION: Performed by: ORTHOPAEDIC SURGERY

## 2025-06-17 PROCEDURE — 97116 GAIT TRAINING THERAPY: CPT

## 2025-06-17 PROCEDURE — 82746 ASSAY OF FOLIC ACID SERUM: CPT | Performed by: FAMILY MEDICINE

## 2025-06-17 PROCEDURE — 85025 COMPLETE CBC W/AUTO DIFF WBC: CPT | Performed by: ORTHOPAEDIC SURGERY

## 2025-06-17 PROCEDURE — 84466 ASSAY OF TRANSFERRIN: CPT | Performed by: FAMILY MEDICINE

## 2025-06-17 PROCEDURE — 83540 ASSAY OF IRON: CPT | Performed by: FAMILY MEDICINE

## 2025-06-17 PROCEDURE — 63710000001 INSULIN LISPRO (HUMAN) PER 5 UNITS: Performed by: FAMILY MEDICINE

## 2025-06-17 RX ORDER — CLINDAMYCIN PHOSPHATE 600 MG/50ML
600 INJECTION, SOLUTION INTRAVENOUS
Status: DISCONTINUED | OUTPATIENT
Start: 2025-06-18 | End: 2025-06-18 | Stop reason: SDUPTHER

## 2025-06-17 RX ADMIN — Medication 3 ML: at 20:27

## 2025-06-17 RX ADMIN — METFORMIN HYDROCHLORIDE 1000 MG: 500 TABLET ORAL at 17:54

## 2025-06-17 RX ADMIN — INSULIN LISPRO 4 UNITS: 100 INJECTION, SOLUTION INTRAVENOUS; SUBCUTANEOUS at 17:54

## 2025-06-17 RX ADMIN — CITALOPRAM 40 MG: 20 TABLET, FILM COATED ORAL at 08:48

## 2025-06-17 RX ADMIN — SODIUM CHLORIDE 100 ML/HR: 9 INJECTION, SOLUTION INTRAVENOUS at 05:22

## 2025-06-17 RX ADMIN — HYDROCODONE BITARTRATE AND ACETAMINOPHEN 1 TABLET: 10; 325 TABLET ORAL at 05:08

## 2025-06-17 RX ADMIN — HYDROCODONE BITARTRATE AND ACETAMINOPHEN 1 TABLET: 10; 325 TABLET ORAL at 08:49

## 2025-06-17 RX ADMIN — VENLAFAXINE HYDROCHLORIDE 37.5 MG: 37.5 CAPSULE, EXTENDED RELEASE ORAL at 08:48

## 2025-06-17 RX ADMIN — INSULIN LISPRO 3 UNITS: 100 INJECTION, SOLUTION INTRAVENOUS; SUBCUTANEOUS at 20:26

## 2025-06-17 RX ADMIN — Medication 3 ML: at 08:53

## 2025-06-17 RX ADMIN — HYDROCODONE BITARTRATE AND ACETAMINOPHEN 1 TABLET: 10; 325 TABLET ORAL at 22:05

## 2025-06-17 RX ADMIN — INSULIN LISPRO 5 UNITS: 100 INJECTION, SOLUTION INTRAVENOUS; SUBCUTANEOUS at 11:15

## 2025-06-17 RX ADMIN — DOCUSATE SODIUM 50 MG AND SENNOSIDES 8.6 MG 2 TABLET: 8.6; 5 TABLET, FILM COATED ORAL at 20:26

## 2025-06-17 RX ADMIN — LISINOPRIL 40 MG: 20 TABLET ORAL at 08:48

## 2025-06-17 RX ADMIN — CYCLOBENZAPRINE HYDROCHLORIDE 10 MG: 10 TABLET, FILM COATED ORAL at 05:08

## 2025-06-17 RX ADMIN — HYDROCODONE BITARTRATE AND ACETAMINOPHEN 1 TABLET: 10; 325 TABLET ORAL at 00:27

## 2025-06-17 RX ADMIN — METFORMIN HYDROCHLORIDE 1000 MG: 500 TABLET ORAL at 08:48

## 2025-06-17 RX ADMIN — DOCUSATE SODIUM 50 MG AND SENNOSIDES 8.6 MG 2 TABLET: 8.6; 5 TABLET, FILM COATED ORAL at 08:48

## 2025-06-17 RX ADMIN — ONDANSETRON 4 MG: 4 TABLET, ORALLY DISINTEGRATING ORAL at 17:55

## 2025-06-17 RX ADMIN — ROSUVASTATIN CALCIUM 20 MG: 20 TABLET, FILM COATED ORAL at 08:49

## 2025-06-17 RX ADMIN — INSULIN LISPRO 4 UNITS: 100 INJECTION, SOLUTION INTRAVENOUS; SUBCUTANEOUS at 08:23

## 2025-06-17 RX ADMIN — HYDROCODONE BITARTRATE AND ACETAMINOPHEN 1 TABLET: 10; 325 TABLET ORAL at 17:24

## 2025-06-17 RX ADMIN — HYDROMORPHONE HYDROCHLORIDE 0.5 MG: 1 INJECTION, SOLUTION INTRAMUSCULAR; INTRAVENOUS; SUBCUTANEOUS at 02:09

## 2025-06-17 NOTE — THERAPY TREATMENT NOTE
Acute Care - Physical Therapy Treatment Note   [place]     Patient Name: Ayla Echevarria  : 1970  MRN: [id number]  Today's Date: 2025   Onset of Illness/Injury or Date of Surgery: 25  Visit Dx:     ICD-10-CM ICD-9-CM   1. Impaired mobility [Z74.09]  Z74.09 799.89     Patient Active Problem List   Diagnosis    Hypertension    Hyperlipidemia    Disc degeneration, lumbosacral    Chronic bilateral low back pain with bilateral sciatica    Lumbar spinal stenosis    Lumbar stenosis    Drug-induced constipation    GERD without esophagitis    Sleep apnea    Class 3 severe obesity due to excess calories with serious comorbidity and body mass index (BMI) of 40.0 to 44.9 in adult    Diabetes mellitus    Lumbar radiculopathy     Past Medical History:   Diagnosis Date    Asthma     Carpal tunnel syndrome     Chronic bilateral low back pain with bilateral sciatica 2020    Depression 2019    Back problems    Diabetes mellitus     Pre diabetes    Disc degeneration, lumbosacral 2020    Glaucoma 2018    watching it, script given for prevention    Hyperlipidemia     Hypertension     Mononucleosis     In high school    Nerve pain     PONV (postoperative nausea and vomiting)     Psoriasis     Psoriatic arthritis     SI (sacroiliac) joint inflammation     was going to [facility] for injections    Sleep apnea     bi-pap     Past Surgical History:   Procedure Laterality Date    ANTERIOR LUMBAR EXPOSURE N/A 2020    Procedure: ANTERIOR LUMBAR EXPOSURE;  Surgeon: Salvatore Villalta DO;  Location:  [facility] OR;  Service: Vascular    CARPAL TUNNEL RELEASE Bilateral      SECTION      x2    CHOLECYSTECTOMY      LUMBAR FUSION N/A 2020    Procedure: ANTERIOR DECOMPRESSION, ANTERIOR LUMBAR INTERBODY FUSION WITH INSTRUMENTATION L5-S1;  Surgeon: PAMELA Bang MD;  Location:  [facility] OR;  Service: Orthopedic Spine    LUMBAR FUSION Right 2020    Procedure: RIGHT  LATERAL LUMBAR  INTERBODY  FUSION WITH INSTRUMENTATION L4-5;  Surgeon: PAMELA Bang MD;  Location:  [facility] OR;  Service: Orthopedic Spine    LUMBAR FUSION Left 07/05/2023    Procedure: LEFT LATERAL LUMBAR INTERBODY WITH INSTRUMENTATION L3-4;  Surgeon: PAMELA Bang MD;  Location:  [facility] OR;  Service: Orthopedic Spine;  Laterality: Left;    LUMBAR LAMINECTOMY WITH FUSION N/A 01/24/2020    Procedure: POSTERIOR SPINAL FUSION WITH INSTRUMENTATION L4-S1;  Surgeon: PAMELA Bang MD;  Location:  [facility] OR;  Service: Orthopedic Spine    LUMBAR LAMINECTOMY WITH FUSION N/A 07/07/2023    Procedure: REMOVAL OF INSTRUMENTATION, EXPLORATION OF FUSION L4-S1, POSTERIOR SPINAL FUSION L3-4 WITH INSTRUMENTATION L3-S1;  Surgeon: PAMELA Bang MD;  Location:  [facility] OR;  Service: Orthopedic Spine;  Laterality: N/A;    REPLACEMENT TOTAL KNEE Left 03/14/2023    REPLACEMENT TOTAL KNEE Right 12/2023     PT Assessment (Last 12 Hours)       PT Evaluation and Treatment       [facility] Name 06/17/25 1141          Physical Therapy Time and Intention    Subjective Information complains of;pain  -     Document Type therapy note (daily note)  -     Mode of Treatment physical therapy  -[facility] Name 06/17/25 1141          General Information    Existing Precautions/Restrictions brace worn when out of bed;fall;LSO  -[facility] Name 06/17/25 1141          Pain    Pretreatment Pain Rating 6/10  -     Posttreatment Pain Rating 6/10  -     Pain Location back;extremity  -     Pain Side/Orientation right;lower  -[facility] Name 06/17/25 1141          Bed Mobility    Sidelying-Sit [place] (Bed Mobility) standby assist  -     Sit-Sidelying [place] (Bed Mobility) --  in bathroom with family  -[facility] Name 06/17/25 1141          Sit-Stand Transfer    Sit-Stand [place] (Transfers) standby assist  -[facility] Name 06/17/25 1141          Stand-Sit Transfer    Stand-Sit [place] (Transfers) standby assist  -[facility] Name  06/17/25 1141          Gait/Stairs (Locomotion)    Bent Level (Gait) standby assist  -     Assistive Device (Gait) walker, front-wheeled  -     Distance in Feet (Gait) 500  -AH       Row Name             Wound 06/16/25 0902 Left lower flank Surgical    Wound - Properties Group Placement Date: 06/16/25  -KT Placement Time: 0902 -KT Side: Left  -KT Orientation: lower  -KT Location: flank  -KT Primary Wound Type: Surgical  -KT    Retired Wound - Properties Group Placement Date: 06/16/25  -KT Placement Time: 0902 -KT Side: Left  -KT Orientation: lower  -KT Location: flank  -KT    Retired Wound - Properties Group Placement Date: 06/16/25  -KT Placement Time: 0902 -KT Side: Left  -KT Orientation: lower  -KT Location: flank  -KT    Retired Wound - Properties Group Date first assessed: 06/16/25  -KT Time first assessed: 0902 -KT Side: Left  -KT Location: flank  -KT      Row Name 06/17/25 1141          Positioning and Restraints    Pre-Treatment Position in bed  -     Post Treatment Position bathroom  -     Bathroom sitting;call light within reach;encouraged to call for assist;with family/caregiver  -               User Key  (r) = Recorded By, (t) = Taken By, (c) = Cosigned By      Initials Name Provider Type     Aga Sanchez PTA Physical Therapist Assistant    Jigar Wilson, RN Registered Nurse                    Physical Therapy Education       Title: PT OT SLP Therapies (In Progress)       Topic: Physical Therapy (In Progress)       Point: Mobility training (Done)       Learning Progress Summary            Patient Acceptance, E, VU by MS at 6/16/2025 2812    Comment: role of PT in her care, spinal restrictions, use of LSO                      Point: Home exercise program (Not Started)       Learner Progress:  Not documented in this visit.              Point: Body mechanics (Not Started)       Learner Progress:  Not documented in this visit.              Point: Precautions (Done)        Learning Progress Summary            Patient Acceptance, E, VU by MS at 6/16/2025 1416    Comment: role of PT in her care, spinal restrictions, use of LSO                                      User Key       Initials Effective Dates Name Provider Type Discipline    MS 07/11/23 -  Susan Lake, PT, DPT, NCS Physical Therapist PT                  PT Recommendation and Plan         Outcome Measures       Row Name 06/17/25 1100 06/16/25 1305          How much help from another person do you currently need...    Turning from your back to your side while in flat bed without using bedrails? 4  -AH --     Moving from lying on back to sitting on the side of a flat bed without bedrails? 4  -AH --     Moving to and from a bed to a chair (including a wheelchair)? 4  -AH --     Standing up from a chair using your arms (e.g., wheelchair, bedside chair)? 3  -AH --     Climbing 3-5 steps with a railing? 3  -AH --     To walk in hospital room? 3  -AH --     AM-PAC 6 Clicks Score (PT) 21  -AH --        How much help from another is currently needed...    Putting on and taking off regular lower body clothing? -- 2  -AC     Bathing (including washing, rinsing, and drying) -- 3  -AC     Toileting (which includes using toilet bed pan or urinal) -- 2  -AC     Putting on and taking off regular upper body clothing -- 3  -AC     Taking care of personal grooming (such as brushing teeth) -- 3  -AC     Eating meals -- 4  -AC     AM-PAC 6 Clicks Score (OT) -- 17  -AC        Functional Assessment    Outcome Measure Options AM-PAC 6 Clicks Basic Mobility (PT)  - AM-PAC 6 Clicks Daily Activity (OT)  -AC               User Key  (r) = Recorded By, (t) = Taken By, (c) = Cosigned By      Initials Name Provider Type    AC Roe Calles, OTR/L, CNT Occupational Therapist    Aga Roper, PTA Physical Therapist Assistant                     Time Calculation:    PT Charges       Row Name 06/17/25 6686             Time Calculation    Start  Time 1141  -      Stop Time 1156  -      Time Calculation (min) 15 min  -      PT Received On 06/17/25  -         Time Calculation- PT    Total Timed Code Minutes- PT 15 minute(s)  -         Timed Charges    24999 - Gait Training Minutes  15  -AH         Total Minutes    Timed Charges Total Minutes 15  -AH       Total Minutes 15  -AH                User Key  (r) = Recorded By, (t) = Taken By, (c) = Cosigned By      Initials Name Provider Type     Aga Sanchez PTA Physical Therapist Assistant                  Therapy Charges for Today       Code Description Service Date Service Provider Modifiers Qty    [id number]  GAIT TRAINING EA 15 MIN 6/17/2025 Aga Sanchez PTA GP 1            PT G-Codes  Outcome Measure Options: AM-PAC 6 Clicks Basic Mobility (PT)  AM-PAC 6 Clicks Score (PT): 21  AM-PAC 6 Clicks Score (OT): 17    Aga Sanchez PTA  6/17/2025

## 2025-06-17 NOTE — PLAN OF CARE
Goal Outcome Evaluation:  Plan of Care Reviewed With: patient, spouse        Progress: improving  Outcome Evaluation: Pt A/Ox4, pleasant and cooperative. Spouse at bedside. Up x1 assist with LSO to bathroom. Pain medication given as needed per pt request. Pt slept most of the night. Safety maintained. Call light in reach.

## 2025-06-17 NOTE — PROGRESS NOTES
Mrs. Echevarria is doing well this morning.  Some mild right-sided low back pain.  Her preoperative right thigh radiculopathy appears to be resolved.  She has good left hip flexor and quadricep strength.  Sensation intact.  Lateral dressing intact.  Plan to work with physical therapy today and return to surgery tomorrow for the second posterior stage the procedure.

## 2025-06-17 NOTE — PAYOR COMM NOTE
"Dania Merrittmarina YANCEY (54 y.o. Female) [id number]   Admit 06/16 Scheduled surgery  Clinical update  [facility]  [place] [phone]  [phone]      Date of Birth   1970    Social Security Number       [place]   [address] [facility] [place] [place] [address]    Home Phone   [phone]    MRN   [id number]       Presybeterian   Rastafari    Marital Status                               Admission Date   6/16/2025    Admission Type   Elective    Admitting Provider   PAMELA Bang MD    Attending Provider   PAMELA Bang MD    Department, Room/Bed   [facility] 3A, 340/1       Discharge Date       Discharge Disposition       Discharge Destination                                 Attending Provider: PAMELA Bang MD    Allergies: Ciprofloxacin, Penicillins, Cethexonium    Isolation: None   Infection: None   Code Status: CPR    Ht: 163.5 cm (64.37\")   Wt: 132 kg (290 lb 4.8 oz)    Admission Cmt: None   Principal Problem: Lumbar radiculopathy [M54.16]                   Active Insurance as of 6/16/2025       Primary Coverage       Payor Plan Insurance Group Employer/Plan Group    [employer] BLUE CROSS AN[employer]M BLUE CROSS BLUE SHIELD PPO [id number]       Payor Plan Address Payor Plan Phone Number Payor Plan Fax Number Effective Dates    [address] [phone]  1/1/2005 - None Entered    [facility] [id number]         Subscriber Name Subscriber Birth Date Member ID       ROSE,DUANE [birth year]1964 [id number]                     Emergency Contacts        (Rel.) Home Phone Work Phone Mobile Phone    Rose,Duane (Spouse) [phone] -- [phone]    TRUDY VILLAGOMEZ (Daughter) -- -- [phone]    SAGRARIO MERRITT (Son) -- -- [phone]                 Operative/Procedure Notes (last 48 hours)        PAMELA Bang MD at 06/16/25 0701          Lateral lumbar interbody fusion with instrumentation procedure Note    Ayla Merritt  6/16/2025    Pre-op Diagnosis:     1. Psoriasis.   2. " Status post anterior decompression, ALIF with instrumentation L5-S1, 01/17/2020   3. Status post right LLIF with instrumentation L4-5, 01/22/2020   4. Status post PSF with instrumentation L4-S1, 01/24/2020   5. Broken right S1 pedicle screw   6. Status post left LLIF with instrumentation L3-4, 07/05/2023    7. Status post partial LORA L4-S1, PSF L3-4 with instrumentation L3-S1, 07/07/2023    8. Recurrent low back pain    9. Bilateral anterior and lateral thigh radiculopathy    10. Recurrent neurogenic claudication    11. Severe adjacent level degenerative disc disease L2-3    12. Severe adjacent level facet arthropathy L2-3    13. Large right central disc herniation L2-3    14. Severe central and bilateral foraminal stenosis L2-3    15. Obesity, BMI 48    Post-op Diagnosis:    same    Procedure/CPT® Codes:    1.  Left lateral lumbar interbody fusion L2-3  2.  Anterior spinal instrumentation L2-3 (ATEC lateral plate and screws)  3.  Use of titanium interbody biomechanical device for fusion L2-3 (ATEC titanium spacer)  4.  Use of allograft bone matrix for fusion (Induce NMP Fibers)  5.  Use of fluoroscopy for confirmation of surgical level, placement of titanium spacer and instrumentation  6.  Intraoperative neural monitoring    Spinal Surgery Levels Completed:1 Level     Anesthesia: General    Surgeon: ASHLEY Bang MD    Assistant: Chuck John PA-C    Estimated Blood Loss: Minimal    Complications: None    Condition: Stable to PACU.    Indications:    The patient is a 54-year-old who sees Dr. Rich Heaton for medical issues.  She has had fusion procedures performed in a staged fashion from L4-S1 in 2020 as well as at L3-4 in 2023.  Unfortunately she has presented back to the office with a recurrence of low back pain along with bilateral anterior and lateral thigh radiculopathy as well as symptoms consistent with recurrent neurogenic claudication.  Repeat imaging studies have revealed severe adjacent level  degenerative disc disease and facet arthropathy at L2-3 with a large right central disc herniation causing severe central and foraminal stenosis.    After failing all conservative measures, it was mutually decided that surgery would be the best option. Risks, benefits, and complications of surgery were discussed with the patient. The patient appeared well informed and wished to proceed. We specifically discussed the risk of infection, blood loss, nerve root injury, CSF leak, and the possibility of incomplete resolution of symptoms. We also discussed the possible risk of a nonunion and the potential need for additional surgery in the event of a pseudoarthrosis or hardware failure.     We elected proceed with a staged operation.  Today we are performing a lateral fusion of the L2-3 level.  It is planned that we will be returning to surgery for a second posterior procedure in a few days.    Operative Procedure:    After obtaining informed consent and verifying the correct operative site, the patient was brought to the operating room and placed supine on an operating table.  A general anesthetic was provided by the anesthesia service with the assistance of an endotracheal tube.  Once this was appropriately positioned and secured, the patient was carefully rotated into the lateral decubitus position with the left side up.  All bony prominences were well-padded.  3 inch wide cloth tape was used to maintain the patient's position.  It was also used to tip open the pelvis slightly allowing better access to the lumbar spine through a lateral approach.  Fluoroscopy was then used to identify the L2-3 level, and the skin was marked for our planned incision.  The left flank was then prepped and draped in the usual sterile fashion.  A surgical timeout was taken to confirm this was the correct patient, we were working at the correct level, and that preoperative antibiotics were given in a timely fashion.    An oblique incision was  created of the left flank using a 10 blade scalpel directly centered over the L2-3 segment. Dissection was carried bluntly through subcutaneous tissues. Blunt dissection was also carried between the external oblique and internal oblique musculature. The transversalis fascia was pierced allowing access to the retroperitoneal space. At this point a series of neuro monitoring probes were used to safely access the L2-3 level. We used stimulated and free run EMG for this purpose. Once nerve roots were confirmed to be out of harm's way, self retaining retractors were placed for continuous exposure.     An annulotomy was then created at the L2-3 area using a 15 blade scalpel on a long handle. A Castellano elevator was used to remove disc material off of the endplates. Disc material was removed using Kerrisons, pituitaries, and forward angled curettes. Once all disc material had been retrieved, I used the Castellano elevator to divide the contralateral annulus. This assisted with mobilization of the vertebral body. We then used a series of endplate scrapers to prepare the endplates for interbody fusion. Trial spacers were malleted into position and for the disc space it was felt that a 10 mm x 55 mm implant would be the best fit to restore disc height and correct the focal scoliosis at that level. The disc space was then thoroughly irrigated with saline solution.     A titanium spacer from the [facility] instrumentation set measuring 10 mm x 55 mm x 22 mm was then packed with an allograft bone matrix called Induce NMP Fibers as tightly as possible. This titanium spacer was then malleted into the L2-3 disc space under fluoroscopic guidance. It was placed as a titanium interbody biomechanical device to assist with interbody fusion. Once this spacer was confirmed to be properly positioned, I chose a 2-hole plate to help augment the fusion of L2-3. This plate was held into position using 2 screws. I placed a 5.0 mm x 35 mm screw into both L2 and  L3 as fixation.     The wound was then irrigated thoroughly. A thorough inspection was then undertaken to ensure that we had adequate hemostasis. Bleeding at this point was controlled using thrombin gelfoam powder and bipolar cautery. Closure was then accomplished with a #1 Vicryl reapproximating the internal and external oblique musculature. Immediate subcutaneous tissues were closed with a 3-0 Vicryl and skin closure was accomplished using Mastisol and Steri-Strips. The wound was then sterilely dressed. The patient was then carefully rotated supine onto a hospital gurney, extubated, and sent to the recovery room in good stable condition.     The patient tolerated the procedure well. There were no complications. We estimated blood loss to be minimal. The patient remained hemodynamically stable.     Intraoperative neuro monitoring was ordered and carried out throughout the procedure to add an increased level of safety for the patient.  The interpreting physician was available by means of real-time continuous, bidirectional, remote audio and visual communication as needed throughout the entire procedure.  Modalities used during the procedure included SSEP, DNS, EMG, and TOF.  There were no neuro monitoring signal changes during the procedure.    Chuck John PA-C provided critical assistance during the procedure. His assistance was medically necessary in order to allow the procedure to occur in the most safe and efficient manner.  He assisted not only with patient positioning and wound closure, but more portly, he assisted during the discectomy and disc space preparation at L2-3 as well as the placement of the interbody device and instrumentation to obtain a fusion across L2-3.    ASHLEY Bang MD     Date: 6/16/2025  Time: 11:47 CDT    Electronically signed by PAMELA Bang MD at 06/16/25 2592          Physician Progress Notes (last 24 hours)        PAMELA Bang MD at 06/17/25 3711            "Swathi is doing well this morning.  Some mild right-sided low back pain.  Her preoperative right thigh radiculopathy appears to be resolved.  She has good left hip flexor and quadricep strength.  Sensation intact.  Lateral dressing intact.  Plan to work with physical therapy today and return to surgery tomorrow for the second posterior stage the procedure.    Electronically signed by PAMELA Bang MD at 06/17/25 0747       Tristin John PA-C at 06/17/25 0742       Attestation signed by PAMELA Bang MD at 06/17/25 0748      I have reviewed this documentation and agree.                      KANDACE John PA-C   Progress Note        Subjective/Overnight Events:  No acute issues overnight, pain controlled, up with PT/OT    Vitals  Vitals:    06/16/25 1152 06/16/25 1619 06/16/25 2058 06/17/25 0329   BP: 154/57 173/66 143/63 120/57   BP Location: Right arm Left arm Left arm Left arm   Patient Position: Lying Lying Lying Lying   Pulse: 87 89 92 88   Resp: 14 16 16 16   Temp: 97.8 °F (36.6 °C) 98.4 °F (36.9 °C) 98.1 °F (36.7 °C)    TempSrc: Oral Oral Oral    SpO2: 95% 96% 98% 97%   Weight: 132 kg (290 lb 4.8 oz)      Height: 163.5 cm (64.37\")          Current Facility-Administered Medications   Medication Dose Route Frequency Provider Last Rate Last Admin    acetaminophen (TYLENOL) tablet 650 mg  650 mg Oral Q4H PRN PAMELA Bang MD        Or    acetaminophen (TYLENOL) 160 MG/5ML oral solution 650 mg  650 mg Oral Q4H PRN PAMELA Bang MD        Or    acetaminophen (TYLENOL) suppository 650 mg  650 mg Rectal Q4H PRN PAMELA Bang MD        sennosides-docusate (PERICOLACE) 8.6-50 MG per tablet 2 tablet  2 tablet Oral BID PAMELA Bang MD   2 tablet at 06/16/25 1722    And    polyethylene glycol (MIRALAX) packet 17 g  17 g Oral Daily PRN PAMELA Bang MD        And    bisacodyl (DULCOLAX) EC tablet 5 mg  5 mg Oral Daily PRN PAMELA Bang MD    "     And    bisacodyl (DULCOLAX) suppository 10 mg  10 mg Rectal Daily PRN PAMELA Bang MD        citalopram (CeleXA) tablet 40 mg  40 mg Oral Daily PAMELA Bang MD   40 mg at 06/16/25 1721    [START ON 6/18/2025] clindamycin (CLEOCIN) 600 mg in dextrose 5% 50 mL IVPB (premix)  600 mg Intravenous On Call to OR Tristin John PA-C        cyclobenzaprine (FLEXERIL) tablet 10 mg  10 mg Oral TID PRN PAMELA Bang MD   10 mg at 06/17/25 0508    dextrose (D50W) (25 g/50 mL) IV injection 25 g  25 g Intravenous Q15 Min PRN Neeraj Hinson MD        dextrose (GLUTOSE) oral gel 15 g  15 g Oral Q15 Min PRN Neeraj Hinson MD        glucagon (GLUCAGEN) injection 1 mg  1 mg Intramuscular Q15 Min PRN Neeraj Hinson MD        HYDROcodone-acetaminophen (NORCO)  MG per tablet 1 tablet  1 tablet Oral Q4H PRN PAMELA Bang MD   1 tablet at 06/17/25 0508    HYDROcodone-acetaminophen (NORCO) 7.5-325 MG per tablet 1 tablet  1 tablet Oral Q4H PRN PAMELA Bang MD        HYDROmorphone (DILAUDID) injection 0.5 mg  0.5 mg Intravenous Q3H PRN PAMELA Bang MD   0.5 mg at 06/17/25 0209    And    naloxone (NARCAN) injection 0.4 mg  0.4 mg Intravenous Q5 Min PRN PAMELA Bang MD        Insulin Lispro (humaLOG) injection 2-7 Units  2-7 Units Subcutaneous 4x Daily AC & at Bedtime Neeraj Hinsno MD   6 Units at 06/16/25 2011    lisinopril (PRINIVIL,ZESTRIL) tablet 40 mg  40 mg Oral Daily PAMELA Bang MD   40 mg at 06/16/25 1722    metFORMIN (GLUCOPHAGE) tablet 1,000 mg  1,000 mg Oral BID With Meals PAMELA Bang MD   1,000 mg at 06/16/25 1722    ondansetron ODT (ZOFRAN-ODT) disintegrating tablet 4 mg  4 mg Oral Q6H PRN PAMELA Bang MD        Or    ondansetron (ZOFRAN) injection 4 mg  4 mg Intravenous Q6H PRN PAMELA Bang MD        rosuvastatin (CRESTOR) tablet 20 mg  20 mg Oral Daily PAMELA Bang MD   20 mg at 06/16/25 1722     sodium chloride 0.9 % flush 10 mL  10 mL Intravenous PRN PAMELA Bang MD        sodium chloride 0.9 % flush 3 mL  3 mL Intravenous Q12H PAMELA Bang MD   3 mL at 06/16/25 1146    sodium chloride 0.9 % infusion 40 mL  40 mL Intravenous PRN PAMELA Bang MD        sodium chloride 0.9 % infusion  100 mL/hr Intravenous Continuous PAMELA Bang  mL/hr at 06/17/25 0522 100 mL/hr at 06/17/25 0522    venlafaxine XR (EFFEXOR-XR) 24 hr capsule 37.5 mg  37.5 mg Oral Daily PAMELA Bang MD   37.5 mg at 06/16/25 1721       PHYSICAL EXAM:    Orientation:  alert and oriented to person, place, and time    Incision:  no significant drainage    Upper Extremity Motor :  5/5 bilaterally    Upper Motor Neuron Signs:  none     Lower Extremity Motor :  equal bilaterally    Lower Extremity Sensory:  Tibial nerve: Intact, Superficial peroneal nerve: Intact, and Deep peroneal nerve: Intact    Flatus:  flatus    ABNORMAL EXAM FINDINGS:  none    LABS:    Lab Results (last 24 hours)       Procedure Component Value Units Date/Time    Basic Metabolic Panel [[id number]]  (Abnormal) Collected: 06/17/25 0437    Specimen: Blood Updated: 06/17/25 0534     Glucose 243 mg/dL      BUN 9.7 mg/dL      Creatinine 0.57 mg/dL      Sodium 136 mmol/L      Potassium 4.1 mmol/L      Chloride 99 mmol/L      CO2 23.0 mmol/L      Calcium 9.5 mg/dL      BUN/Creatinine Ratio 17.0     Anion Gap 14.0 mmol/L      eGFR 107.5 mL/min/1.73     Narrative:      GFR Categories in Chronic Kidney Disease (CKD)              GFR Category          GFR (mL/min/1.73)    Interpretation  G1                    90 or greater        Normal or high (1)  G2                    60-89                Mild decrease (1)  G3a                   45-59                Mild to moderate decrease  G3b                   30-44                Moderate to severe decrease  G4                    15-29                Severe decrease  G5                    14 or less            Kidney failure    (1)In the absence of evidence of kidney disease, neither GFR category G1 or G2 fulfill the criteria for CKD.    eGFR calculation 2021 CKD-EPI creatinine equation, which does not include race as a factor    Iron Profile w/o Ferritin [[id number]]  (Abnormal) Collected: 06/17/25 0437    Specimen: Blood Updated: 06/17/25 0534     Iron 50 mcg/dL      Iron Saturation (TSAT) 13 %      Transferrin 253 mg/dL      TIBC 377 mcg/dL     CBC & Differential [[id number]]  (Abnormal) Collected: 06/17/25 0437    Specimen: Blood Updated: 06/17/25 0503    Narrative:      The following orders were created for panel order CBC & Differential.  Procedure                               Abnormality         Status                     ---------                               -----------         ------                     CBC Auto Differential[[id number]]        Abnormal            Final result                 Please view results for these tests on the individual orders.    CBC Auto Differential [[id number]]  (Abnormal) Collected: 06/17/25 0437    Specimen: Blood Updated: 06/17/25 0503     WBC 9.52 10*3/mm3      RBC 4.02 10*6/mm3      Hemoglobin 11.6 g/dL      Hematocrit 35.9 %      MCV 89.3 fL      MCH 28.9 pg      MCHC 32.3 g/dL      RDW 13.6 %      RDW-SD 44.7 fl      MPV 10.1 fL      Platelets 286 10*3/mm3      Neutrophil % 72.6 %      Lymphocyte % 18.7 %      Monocyte % 7.8 %      Eosinophil % 0.3 %      Basophil % 0.3 %      Immature Grans % 0.3 %      Neutrophils, Absolute 6.91 10*3/mm3      Lymphocytes, Absolute 1.78 10*3/mm3      Monocytes, Absolute 0.74 10*3/mm3      Eosinophils, Absolute 0.03 10*3/mm3      Basophils, Absolute 0.03 10*3/mm3      Immature Grans, Absolute 0.03 10*3/mm3      nRBC 0.0 /100 WBC     Vitamin B12 [[id number]] Collected: 06/17/25 0437    Specimen: Blood Updated: 06/17/25 0458    Folate [[id number]] Collected: 06/17/25 0437    Specimen: Blood Updated: 06/17/25 0458    POC Glucose Once [[id number]]   (Abnormal) Collected: 06/16/25 2007    Specimen: Blood Updated: 06/16/25 2018     Glucose 352 mg/dL      Comment: : [id number] Austin MarcyMeter ID: [id number]       POC Glucose Once [[id number]]  (Abnormal) Collected: 06/16/25 1622    Specimen: Blood Updated: 06/16/25 1641     Glucose 345 mg/dL      Comment: : [id number] Ken KimMeter ID: [id number]       POC Glucose Once [[id number]]  (Abnormal) Collected: 06/16/25 1050    Specimen: Blood Updated: 06/16/25 1102     Glucose 287 mg/dL      Comment: : [id number] Davion Mendez  LMeter ID: [id number]       POC Glucose Once [[id number]]  (Abnormal) Collected: 06/16/25 0949    Specimen: Blood Updated: 06/16/25 1001     Glucose 258 mg/dL      Comment: : [id number] Jarod VazquezshuaMeter ID: [id number]       POC Glucose Once [[id number]]  (Abnormal) Collected: 06/16/25 0907    Specimen: Blood Updated: 06/16/25 0927     Glucose 240 mg/dL      Comment: : [id number] Royal kaliMeter ID: [id number]       POC Glucose Once [[id number]]  (Abnormal) Collected: 06/16/25 0753    Specimen: Blood Updated: 06/16/25 0804     Glucose 269 mg/dL      Comment: : [id number] Hughes WhitneyMeter ID: [id number]               ASSESSMENT AND PLAN:    Post operative day 1 status post lateral lumbar fusion L2-3    1:  Activity Level:  Up with PT/OT, encourage mobility  2:  Pain Control:  Oral analgesia   3:  Discharge Planning:  Pending completion of next stage  4:  Other:  NPO after midnight, ABX on call to OR      Electronically signed by Tristin John PA-C 6/17/2025 07:42 CDT                Electronically signed by PAMELA Bang MD at 06/17/25 0748

## 2025-06-17 NOTE — CONSULTS
Consult Note    Referring Provider: Dr. Bang  Reason for Consultation: Medical management    Patient Care Team:  Neeraj Hinson MD as PCP - General (Family Medicine)    Chief complaint chronic low back pain    Subjective .     History of present illness:  The patient presents today for surgical correction after failing conservative management.  Outpatient work-up has been reviewed through provided outpatient notes per attending.  They have been through anti-inflammatories, muscle relaxers, and pain medication.  The pain has progressed to the point in time where it is affecting their activities of daily living and after being explained all their options, elected to undergo surgical correction.  Their primary care physician does not attend here at [facility]; therefore, I have been asked to take care of their primary medical needs in the perioperative period.  The postoperative pain is as expected.  There are no other precipitating or relieving factors. I have been requested by the Attending Physician to provide Medical Consultation in the perioperative period. The patient understands my role in their hospitalization and agrees with my treatment plan. They understand the importance of follow up with their PCP upon discharge  from [facility] for any concerns or abnormalities.  All questions were encouraged and answered to the best of my ability.       REVIEW OF SYSTEMS:    See HPI        History    Past Medical History:   Diagnosis Date    Asthma     Carpal tunnel syndrome     Chronic bilateral low back pain with bilateral sciatica 01/17/2020    Depression 2019    Back problems    Diabetes mellitus 2021    Pre diabetes    Disc degeneration, lumbosacral 01/17/2020    Glaucoma 2018    watching it, script given for prevention    Hyperlipidemia     Hypertension     Mononucleosis     In high school    Nerve pain     PONV (postoperative nausea and vomiting)     Psoriasis     Psoriatic arthritis      SI (sacroiliac) joint inflammation     was going to [facility] for injections    Sleep apnea     bi-pap     Past Surgical History:   Procedure Laterality Date    ANTERIOR LUMBAR EXPOSURE N/A 2020    Procedure: ANTERIOR LUMBAR EXPOSURE;  Surgeon: Salvatore Villalta DO;  Location:  [facility] OR;  Service: Vascular    CARPAL TUNNEL RELEASE Bilateral      SECTION      x2    CHOLECYSTECTOMY      LUMBAR FUSION N/A 2020    Procedure: ANTERIOR DECOMPRESSION, ANTERIOR LUMBAR INTERBODY FUSION WITH INSTRUMENTATION L5-S1;  Surgeon: PAMELA Bang MD;  Location:  [facility] OR;  Service: Orthopedic Spine    LUMBAR FUSION Right 2020    Procedure: RIGHT  LATERAL LUMBAR  INTERBODY FUSION WITH INSTRUMENTATION L4-5;  Surgeon: PAMELA Bang MD;  Location:  [facility] OR;  Service: Orthopedic Spine    LUMBAR FUSION Left 2023    Procedure: LEFT LATERAL LUMBAR INTERBODY WITH INSTRUMENTATION L3-4;  Surgeon: PAMELA Bang MD;  Location:  [facility] OR;  Service: Orthopedic Spine;  Laterality: Left;    LUMBAR LAMINECTOMY WITH FUSION N/A 2020    Procedure: POSTERIOR SPINAL FUSION WITH INSTRUMENTATION L4-S1;  Surgeon: PAMELA Bang MD;  Location:  [facility] OR;  Service: Orthopedic Spine    LUMBAR LAMINECTOMY WITH FUSION N/A 2023    Procedure: REMOVAL OF INSTRUMENTATION, EXPLORATION OF FUSION L4-S1, POSTERIOR SPINAL FUSION L3-4 WITH INSTRUMENTATION L3-S1;  Surgeon: PAMELA Bang MD;  Location:  [facility] OR;  Service: Orthopedic Spine;  Laterality: N/A;    REPLACEMENT TOTAL KNEE Left 2023    REPLACEMENT TOTAL KNEE Right 2023     Family History   Problem Relation Age of Onset    Hypertension Mother     Arthritis Mother     Hypertension Father     Arthritis Father     Heart disease Maternal Grandmother     Arthritis Maternal Grandmother     Arthritis Maternal Grandfather     Arthritis Paternal Grandmother     No Known Problems Paternal Grandfather      Social History     Tobacco Use     Smoking status: Former     Current packs/day: 0.00     Average packs/day: 0.5 packs/day for 30.0 years (15.0 ttl pk-yrs)     Types: Cigarettes     Start date: 11/15/1985     Quit date: 11/15/2015     Years since quittin.5     Passive exposure: Past    Smokeless tobacco: Never   Vaping Use    Vaping status: Never Used   Substance Use Topics    Alcohol use: Never    Drug use: Never     Medications Prior to Admission   Medication Sig Dispense Refill Last Dose/Taking    citalopram (CeleXA) 40 MG tablet Take 1 tablet by mouth Daily.   6/15/2025 Morning    diclofenac (VOLTAREN) 75 MG EC tablet Take 1 tablet by mouth 2 (Two) Times a Day.   6/15/2025 Morning    Guselkumab 100 MG/ML solution auto-injector Inject 1 dose as directed Every 2 (Two) Months.   Past Month    levocetirizine (XYZAL) 5 MG tablet Take 1 tablet by mouth Daily As Needed for Allergies.   Past Week    lisinopril (PRINIVIL,ZESTRIL) 40 MG tablet Take 1 tablet by mouth Daily.   6/15/2025 Morning    metFORMIN (GLUCOPHAGE) 500 MG tablet Take 2 tablets by mouth 2 (Two) Times a Day With Meals.   6/15/2025 Morning    multivitamin with minerals tablet tablet Take 1 tablet by mouth Daily.   6/15/2025    rosuvastatin (CRESTOR) 20 MG tablet Take 1 tablet by mouth Daily.   Past Week    timolol (TIMOPTIC) 0.5 % ophthalmic solution Administer 1 drop to both eyes Daily.   6/15/2025 Morning    venlafaxine XR (EFFEXOR-XR) 37.5 MG 24 hr capsule Take 1 capsule by mouth Daily.   6/15/2025    vitamin D (ERGOCALCIFEROL) 1.25 MG (58209 UT) capsule capsule Take 1 capsule by mouth 1 (One) Time Per Week.   6/15/2025       Allergies:  Ciprofloxacin, Penicillins, and Cethexonium    Objective     Vital Signs   Temp:  [97.8 °F (36.6 °C)-98.4 °F (36.9 °C)] 98.1 °F (36.7 °C)  Heart Rate:  [83-94] 88  Resp:  [12-22] 16  BP: (120-173)/(57-77) 120/57          Physical Exam:  Constitutional: oriented to person, place, and time. appears well-developed.   Head: Normocephalic and atraumatic.    Eyes: Pupils are equal, round, and reactive to light.  No icterus or erythema  Neck: Neck supple.  Without masses or carotid bruit  Cardiovascular: Regular rhythm and normal heart sounds.  No significant lift, rub or murmur noted  Pulmonary/Chest: Effort normal and breath sounds normal. CTAB, encourage deep breathing.  Abdominal: Soft. Bowel sounds are normal to hypoactive. No significant distension. There is no rebound and no guarding.   Musculoskeletal: Normal range of motion and no edema or tenderness outside of surgical area.   Neurological: Pt is alert and oriented to person, place, and time.  normal reflexes present.  Somewhat sedated from anesthesia and pain medicine noted  Skin: Skin is warm and dry.  No new rashes of concern.    Results Review:   I reviewed the patient's new imaging results and agree with the interpretation.      Assessment & Plan       Lumbar radiculopathy    Hypertension    Hyperlipidemia    Chronic bilateral low back pain with bilateral sciatica    Lumbar spinal stenosis    Drug-induced constipation    GERD without esophagitis    Class 3 severe obesity due to excess calories with serious comorbidity and body mass index (BMI) of 40.0 to 44.9 in adult    Diabetes mellitus      Type 2 DM/prediabetic- review labs and home medication. Discuss with patient importance of blood sugar control in the healing process. Review diet, medical,and lifestyle modifications for optimal medical treatment. Will restart their regular diabetic regimen and make consult for diabetic education / dietician available upon request.      Constipation-educate patient about the ill effects of anesthesia and narcotic pain medication on the normal peristalsis of the gut.  Encourage judicious use of pain medication and early ambulation to aid in bowel functioning returning to normal.  We will start with MiraLAX 1 capful BID until BM, then decrease to 1 x a day, then can step up to a prokinetic which can be used for  opioid-induced constipation, and ultimately end up with Relistor 12 mcg subq every 48 hours to block the effect of narcotics on the gut.  Our plan is to soften the stools so after surgical intervention if stimulation is needed with magnesium citrate and or Dulcolax suppository the stool will move much easier.  Encourage water consumption to help soften the stool as well.     GERD-exacerbated by pain medications and anesthesia, will add PPI and or H2 blocker as needed and can step up to Carafate 1 gm po before each meal and nightly. Patient educated about smaller portions with more frequent feedings. Patient also instructed on early mobilization to help with return of normal peristalsis of gut.       Hypertension-review pre and post BP's,will restart home BP meds when BP allows. Recent studies demonstrate the need for patience and less reactivity for precipitous drop of BP in the acute care setting. Will educate staff to use other modalities to help reduce MAP prior to adding additional medical interventions. Add clonidine 0.1 mg every 4 hours prn if bp> 140/90,monitor BP and adjust meds as necessary.     Explained to patient and staff that we were consulted for medical management during their acute care hospitalization. The Medical Consultation was requested by the Attending Physician. The patient has been recommended to f/u with their regular primary care provider concerning any further treatment and review of abnormalities found during their hospitalization at [facility]. They agree with the treatment plan as well as understand our role in their hospitalization. All questions were encouraged and answered to the best of my ability.       I discussed the patient's findings and my recommendations with patient, nursing staff, and consulting provider    Neeraj Hinson MD  06/17/25  07:38 CDT

## 2025-06-17 NOTE — PROGRESS NOTES
"KANDACE John PA-C   Progress Note        Subjective/Overnight Events:  No acute issues overnight, pain controlled, up with PT/OT    Vitals  Vitals:    06/16/25 1152 06/16/25 1619 06/16/25 2058 06/17/25 0329   BP: 154/57 173/66 143/63 120/57   BP Location: Right arm Left arm Left arm Left arm   Patient Position: Lying Lying Lying Lying   Pulse: 87 89 92 88   Resp: 14 16 16 16   Temp: 97.8 °F (36.6 °C) 98.4 °F (36.9 °C) 98.1 °F (36.7 °C)    TempSrc: Oral Oral Oral    SpO2: 95% 96% 98% 97%   Weight: 132 kg (290 lb 4.8 oz)      Height: 163.5 cm (64.37\")          Current Facility-Administered Medications   Medication Dose Route Frequency Provider Last Rate Last Admin    acetaminophen (TYLENOL) tablet 650 mg  650 mg Oral Q4H PRN PAMELA Bang MD        Or    acetaminophen (TYLENOL) 160 MG/5ML oral solution 650 mg  650 mg Oral Q4H PRN PAMELA Bang MD        Or    acetaminophen (TYLENOL) suppository 650 mg  650 mg Rectal Q4H PRN PAMELA Bang MD        sennosides-docusate (PERICOLACE) 8.6-50 MG per tablet 2 tablet  2 tablet Oral BID PAMELA Bang MD   2 tablet at 06/16/25 1722    And    polyethylene glycol (MIRALAX) packet 17 g  17 g Oral Daily PRN PAMELA Bang MD        And    bisacodyl (DULCOLAX) EC tablet 5 mg  5 mg Oral Daily PRN PAMELA Bang MD        And    bisacodyl (DULCOLAX) suppository 10 mg  10 mg Rectal Daily PRN PAMELA Bang MD        citalopram (CeleXA) tablet 40 mg  40 mg Oral Daily PAMELA Bang MD   40 mg at 06/16/25 1721    [START ON 6/18/2025] clindamycin (CLEOCIN) 600 mg in dextrose 5% 50 mL IVPB (premix)  600 mg Intravenous On Call to OR Dora, Tristin Neeraj, PA-C        cyclobenzaprine (FLEXERIL) tablet 10 mg  10 mg Oral TID PRN PAMELA Bang MD   10 mg at 06/17/25 0508    dextrose (D50W) (25 g/50 mL) IV injection 25 g  25 g Intravenous Q15 Min PRN Neeraj Hinson MD        dextrose (GLUTOSE) oral gel 15 g  " 15 g Oral Q15 Min PRN Neeraj Hinson MD        glucagon (GLUCAGEN) injection 1 mg  1 mg Intramuscular Q15 Min PRN Neeraj Hinson MD        HYDROcodone-acetaminophen (NORCO)  MG per tablet 1 tablet  1 tablet Oral Q4H PRN PAMELA Bang MD   1 tablet at 06/17/25 0508    HYDROcodone-acetaminophen (NORCO) 7.5-325 MG per tablet 1 tablet  1 tablet Oral Q4H PRN PAMELA Bang MD        HYDROmorphone (DILAUDID) injection 0.5 mg  0.5 mg Intravenous Q3H PRN PAMELA Bang MD   0.5 mg at 06/17/25 0209    And    naloxone (NARCAN) injection 0.4 mg  0.4 mg Intravenous Q5 Min PRN PAMELA Bang MD        Insulin Lispro (humaLOG) injection 2-7 Units  2-7 Units Subcutaneous 4x Daily AC & at Bedtime Neeraj Hinson MD   6 Units at 06/16/25 2011    lisinopril (PRINIVIL,ZESTRIL) tablet 40 mg  40 mg Oral Daily PAMELA Bang MD   40 mg at 06/16/25 1722    metFORMIN (GLUCOPHAGE) tablet 1,000 mg  1,000 mg Oral BID With Meals PAMELA Bang MD   1,000 mg at 06/16/25 1722    ondansetron ODT (ZOFRAN-ODT) disintegrating tablet 4 mg  4 mg Oral Q6H PRN PAMELA Bang MD        Or    ondansetron (ZOFRAN) injection 4 mg  4 mg Intravenous Q6H PRN PAMELA Bang MD        rosuvastatin (CRESTOR) tablet 20 mg  20 mg Oral Daily PAMELA Bang MD   20 mg at 06/16/25 1722    sodium chloride 0.9 % flush 10 mL  10 mL Intravenous PRN PAMELA Bang MD        sodium chloride 0.9 % flush 3 mL  3 mL Intravenous Q12H PAMELA Bang MD   3 mL at 06/16/25 1146    sodium chloride 0.9 % infusion 40 mL  40 mL Intravenous PRN PAMELA Bang MD        sodium chloride 0.9 % infusion  100 mL/hr Intravenous Continuous PAMELA Bang  mL/hr at 06/17/25 0522 100 mL/hr at 06/17/25 0522    venlafaxine XR (EFFEXOR-XR) 24 hr capsule 37.5 mg  37.5 mg Oral Daily PAMELA Bang MD   37.5 mg at 06/16/25 1721       PHYSICAL EXAM:    Orientation:  alert and oriented to person,  place, and time    Incision:  no significant drainage    Upper Extremity Motor :  5/5 bilaterally    Upper Motor Neuron Signs:  none     Lower Extremity Motor :  equal bilaterally    Lower Extremity Sensory:  Tibial nerve: Intact, Superficial peroneal nerve: Intact, and Deep peroneal nerve: Intact    Flatus:  flatus    ABNORMAL EXAM FINDINGS:  none    LABS:    Lab Results (last 24 hours)       Procedure Component Value Units Date/Time    Basic Metabolic Panel [[id number]]  (Abnormal) Collected: 06/17/25 0437    Specimen: Blood Updated: 06/17/25 0534     Glucose 243 mg/dL      BUN 9.7 mg/dL      Creatinine 0.57 mg/dL      Sodium 136 mmol/L      Potassium 4.1 mmol/L      Chloride 99 mmol/L      CO2 23.0 mmol/L      Calcium 9.5 mg/dL      BUN/Creatinine Ratio 17.0     Anion Gap 14.0 mmol/L      eGFR 107.5 mL/min/1.73     Narrative:      GFR Categories in Chronic Kidney Disease (CKD)              GFR Category          GFR (mL/min/1.73)    Interpretation  G1                    90 or greater        Normal or high (1)  G2                    60-89                Mild decrease (1)  G3a                   45-59                Mild to moderate decrease  G3b                   30-44                Moderate to severe decrease  G4                    15-29                Severe decrease  G5                    14 or less           Kidney failure    (1)In the absence of evidence of kidney disease, neither GFR category G1 or G2 fulfill the criteria for CKD.    eGFR calculation 2021 CKD-EPI creatinine equation, which does not include race as a factor    Iron Profile w/o Ferritin [[id number]]  (Abnormal) Collected: 06/17/25 0437    Specimen: Blood Updated: 06/17/25 0534     Iron 50 mcg/dL      Iron Saturation (TSAT) 13 %      Transferrin 253 mg/dL      TIBC 377 mcg/dL     CBC & Differential [[id number]]  (Abnormal) Collected: 06/17/25 0437    Specimen: Blood Updated: 06/17/25 0503    Narrative:      The following orders were created for  panel order CBC & Differential.  Procedure                               Abnormality         Status                     ---------                               -----------         ------                     CBC Auto Differential[[id number]]        Abnormal            Final result                 Please view results for these tests on the individual orders.    CBC Auto Differential [[id number]]  (Abnormal) Collected: 06/17/25 0437    Specimen: Blood Updated: 06/17/25 0503     WBC 9.52 10*3/mm3      RBC 4.02 10*6/mm3      Hemoglobin 11.6 g/dL      Hematocrit 35.9 %      MCV 89.3 fL      MCH 28.9 pg      MCHC 32.3 g/dL      RDW 13.6 %      RDW-SD 44.7 fl      MPV 10.1 fL      Platelets 286 10*3/mm3      Neutrophil % 72.6 %      Lymphocyte % 18.7 %      Monocyte % 7.8 %      Eosinophil % 0.3 %      Basophil % 0.3 %      Immature Grans % 0.3 %      Neutrophils, Absolute 6.91 10*3/mm3      Lymphocytes, Absolute 1.78 10*3/mm3      Monocytes, Absolute 0.74 10*3/mm3      Eosinophils, Absolute 0.03 10*3/mm3      Basophils, Absolute 0.03 10*3/mm3      Immature Grans, Absolute 0.03 10*3/mm3      nRBC 0.0 /100 WBC     Vitamin B12 [[id number]] Collected: 06/17/25 0437    Specimen: Blood Updated: 06/17/25 0458    Folate [[id number]] Collected: 06/17/25 0437    Specimen: Blood Updated: 06/17/25 0458    POC Glucose Once [[id number]]  (Abnormal) Collected: 06/16/25 2007    Specimen: Blood Updated: 06/16/25 2018     Glucose 352 mg/dL      Comment: : [id number] Austin ArandayMeter ID: [id number]       POC Glucose Once [[id number]]  (Abnormal) Collected: 06/16/25 1622    Specimen: Blood Updated: 06/16/25 1641     Glucose 345 mg/dL      Comment: : [id number] Ken MaherMeter ID: [id number]       POC Glucose Once [[id number]]  (Abnormal) Collected: 06/16/25 1050    Specimen: Blood Updated: 06/16/25 1102     Glucose 287 mg/dL      Comment: : [id number] Davion Mendez  LMeter ID: [id number]       POC Glucose Once [[id number]]   (Abnormal) Collected: 06/16/25 0949    Specimen: Blood Updated: 06/16/25 1001     Glucose 258 mg/dL      Comment: : [id number] Jarod PauluaMeter ID: [id number]       POC Glucose Once [[id number]]  (Abnormal) Collected: 06/16/25 0907    Specimen: Blood Updated: 06/16/25 0927     Glucose 240 mg/dL      Comment: : [id number] Royal kaliMeter ID: [id number]       POC Glucose Once [[id number]]  (Abnormal) Collected: 06/16/25 0753    Specimen: Blood Updated: 06/16/25 0804     Glucose 269 mg/dL      Comment: : [id number] Saul EisenbergMeter ID: [id number]               ASSESSMENT AND PLAN:    Post operative day 1 status post lateral lumbar fusion L2-3    1:  Activity Level:  Up with PT/OT, encourage mobility  2:  Pain Control:  Oral analgesia   3:  Discharge Planning:  Pending completion of next stage  4:  Other:  NPO after midnight, ABX on call to OR      Electronically signed by Tristin John PA-C 6/17/2025 07:42 CDT

## 2025-06-17 NOTE — CASE MANAGEMENT/SOCIAL WORK
Discharge Planning Assessment  [facility]     Patient Name: Ayla Echevarria  MRN: [id number]  Today's Date: 6/17/2025    Admit Date: 6/16/2025        Discharge Needs Assessment       Row Name 06/17/25 1307       Living Environment    People in Home spouse    Current Living Arrangements home    Potentially Unsafe Housing Conditions none    In the past 12 months has the electric, gas, oil, or water company threatened to shut off services in your home? No    Primary Care Provided by self    Provides Primary Care For no one    Family Caregiver if Needed child(alicia), adult;spouse    Quality of Family Relationships helpful;involved    Able to Return to Prior Arrangements yes       Resource/Environmental Concerns    Resource/Environmental Concerns none    Transportation Concerns none       Transportation Needs    In the past 12 months, has lack of transportation kept you from medical appointments or from getting medications? no    In the past 12 months, has lack of transportation kept you from meetings, work, or from getting things needed for daily living? No       Food Insecurity    Within the past 12 months, you worried that your food would run out before you got the money to buy more. Never true    Within the past 12 months, the food you bought just didn't last and you didn't have money to get more. Never true       Transition Planning    Patient/Family Anticipates Transition to home with family;home with help/services    Patient/Family Anticipated Services at Transition home health care;outpatient care    Transportation Anticipated family or friend will provide       Discharge Needs Assessment    Readmission Within the Last 30 Days no previous admission in last 30 days    Equipment Currently Used at Home none    Concerns to be Addressed adjustment to diagnosis/illness;discharge planning    Do you want help finding or keeping work or a job? I do not need or want help    Do you want help with school or training? For  example, starting or completing job training or getting a high school diploma, GED or equivalent No    Anticipated Changes Related to Illness none    Equipment Needed After Discharge other (see comments)    Outpatient/Agency/Support Group Needs homecare agency;outpatient therapy    Discharge Facility/Level of Care Needs home with home health;outpatient therapy                   Discharge Plan       Row Name 06/17/25 1306       Plan    Plan Comments Pt had 1st OR on 6-16 and 2nd OR is scheduled for tomorrow 6-18. Pt lives at home with spouse and was independent prior to admit. Pt is currently ambulating 500ft with therapy. Will follow up post op.                       Demographic Summary    No documentation.                  Functional Status    No documentation.                  Psychosocial    No documentation.                  Abuse/Neglect    No documentation.                  Legal    No documentation.                  Substance Abuse    No documentation.                  Patient Forms    No documentation.                     KATHYA Alvarado

## 2025-06-17 NOTE — THERAPY TREATMENT NOTE
Acute Care - Physical Therapy Treatment Note   [facility]     Patient Name: Ayla Echevarria  : 1970  MRN: [id number]  Today's Date: 2025   Onset of Illness/Injury or Date of Surgery: 25  Visit Dx:     ICD-10-CM ICD-9-CM   1. Impaired mobility [Z74.09]  Z74.09 799.89     Patient Active Problem List   Diagnosis    Hypertension    Hyperlipidemia    Disc degeneration, lumbosacral    Chronic bilateral low back pain with bilateral sciatica    Lumbar spinal stenosis    Lumbar stenosis    Drug-induced constipation    GERD without esophagitis    Sleep apnea    Class 3 severe obesity due to excess calories with serious comorbidity and body mass index (BMI) of 40.0 to 44.9 in adult    Diabetes mellitus    Lumbar radiculopathy     Past Medical History:   Diagnosis Date    Asthma     Carpal tunnel syndrome     Chronic bilateral low back pain with bilateral sciatica 2020    Depression 2019    Back problems    Diabetes mellitus     Pre diabetes    Disc degeneration, lumbosacral 2020    Glaucoma 2018    watching it, script given for prevention    Hyperlipidemia     Hypertension     Mononucleosis     In high school    Nerve pain     PONV (postoperative nausea and vomiting)     Psoriasis     Psoriatic arthritis     SI (sacroiliac) joint inflammation     was going to [facility] for injections    Sleep apnea     bi-pap     Past Surgical History:   Procedure Laterality Date    ANTERIOR LUMBAR EXPOSURE N/A 2020    Procedure: ANTERIOR LUMBAR EXPOSURE;  Surgeon: Salvatore Villalta DO;  Location:  [facility] OR;  Service: Vascular    CARPAL TUNNEL RELEASE Bilateral      SECTION      x2    CHOLECYSTECTOMY      LUMBAR FUSION N/A 2020    Procedure: ANTERIOR DECOMPRESSION, ANTERIOR LUMBAR INTERBODY FUSION WITH INSTRUMENTATION L5-S1;  Surgeon: PAMELA Bang MD;  Location:  [facility] OR;  Service: Orthopedic Spine    LUMBAR FUSION Right 2020    Procedure: RIGHT  LATERAL LUMBAR  INTERBODY  FUSION WITH INSTRUMENTATION L4-5;  Surgeon: PAMELA Bang MD;  Location:  [facility] OR;  Service: Orthopedic Spine    LUMBAR FUSION Left 07/05/2023    Procedure: LEFT LATERAL LUMBAR INTERBODY WITH INSTRUMENTATION L3-4;  Surgeon: PAMELA Bang MD;  Location:  [facility] OR;  Service: Orthopedic Spine;  Laterality: Left;    LUMBAR LAMINECTOMY WITH FUSION N/A 01/24/2020    Procedure: POSTERIOR SPINAL FUSION WITH INSTRUMENTATION L4-S1;  Surgeon: PAMELA Bang MD;  Location:  [facility];  Service: Orthopedic Spine    LUMBAR LAMINECTOMY WITH FUSION N/A 07/07/2023    Procedure: REMOVAL OF INSTRUMENTATION, EXPLORATION OF FUSION L4-S1, POSTERIOR SPINAL FUSION L3-4 WITH INSTRUMENTATION L3-S1;  Surgeon: PAMELA Bang MD;  Location:  [facility] OR;  Service: Orthopedic Spine;  Laterality: N/A;    REPLACEMENT TOTAL KNEE Left 03/14/2023    REPLACEMENT TOTAL KNEE Right 12/2023     PT Assessment (Last 12 Hours)       PT Evaluation and Treatment       Row Name 06/17/25 [facility] 06/17/25 1141       Physical Therapy Time and Intention    Subjective Information complains of;weakness;pain  - complains of;pain  -AH    Document Type therapy note (daily note)  - therapy note (daily note)  -    Mode of Treatment physical therapy  - physical therapy  -      Row Name 06/17/25 143 06/17/25 1141       General Information    Existing Precautions/Restrictions brace worn when out of bed;fall;LSO  -AH brace worn when out of bed;fall;LSO  -AH      Row Name 06/17/25 143 06/17/25 1141       Pain    Pretreatment Pain Rating 6/10  - 6/10  -    Posttreatment Pain Rating 6/10  - 6/10  -    Pain Location back;extremity  -AH back;extremity  -AH    Pain Side/Orientation right;lower  -AH right;lower  -    Pain Management Interventions exercise or physical activity utilized  - --    Response to Pain Interventions activity participation with tolerable pain  - --      Row Name 06/17/25 143 06/17/25 1141       Bed Mobility     Sidelying-Sit Mayer (Bed Mobility) standby assist  - standby assist  -AH    Sit-Sidelying Mayer (Bed Mobility) --  sitting EOB  -AH --  in bathroom with family  -      Row Name 06/17/25 1436 06/17/25 1141       Sit-Stand Transfer    Sit-Stand Mayer (Transfers) standby assist  - standby assist  -AH      Row Name 06/17/25 1436 06/17/25 1141       Stand-Sit Transfer    Stand-Sit Mayer (Transfers) standby assist  - standby assist  -AH      Row Name 06/17/25 1436 06/17/25 1141       Gait/Stairs (Locomotion)    Mayer Level (Gait) standby assist  - standby assist  -    Assistive Device (Gait) walker, front-wheeled  - walker, front-wheeled  -    Distance in Feet (Gait) 500  -  -AH      Row Name             Wound 06/16/25 0902 Left lower flank Surgical    Wound - Properties Group Placement Date: 06/16/25  -KT Placement Time: 0902  -KT Side: Left  -KT Orientation: lower  -KT Location: flank  -KT Primary Wound Type: Surgical  -KT    Retired Wound - Properties Group Placement Date: 06/16/25  -KT Placement Time: 0902  -KT Side: Left  -KT Orientation: lower  -KT Location: flank  -KT    Retired Wound - Properties Group Placement Date: 06/16/25  -KT Placement Time: 0902  -KT Side: Left  -KT Orientation: lower  -KT Location: flank  -KT    Retired Wound - Properties Group Date first assessed: 06/16/25  -KT Time first assessed: 0902  -KT Side: Left  -KT Location: flank  -KT      Row Name 06/17/25 1436 06/17/25 1141       Positioning and Restraints    Pre-Treatment Position in bed  -AH in bed  -AH    Post Treatment Position bed  -AH bathroom  -AH    In Bed sitting EOB;call light within reach;encouraged to call for assist;with family/caregiver  - --    Bathroom -- sitting;call light within reach;encouraged to call for assist;with family/caregiver  -              User Key  (r) = Recorded By, (t) = Taken By, (c) = Cosigned By      Initials Name Provider Type    Aga Roper  NAYE, PTA Physical Therapist Assistant    Jigar Wilson RN Registered Nurse                    Physical Therapy Education       Title: PT OT SLP Therapies (In Progress)       Topic: Physical Therapy (In Progress)       Point: Mobility training (Done)       Learning Progress Summary            Patient Acceptance, E, VU by MS at 6/16/2025 1416    Comment: role of PT in her care, spinal restrictions, use of LSO                      Point: Home exercise program (Not Started)       Learner Progress:  Not documented in this visit.              Point: Body mechanics (Not Started)       Learner Progress:  Not documented in this visit.              Point: Precautions (Done)       Learning Progress Summary            Patient Acceptance, E, VU by MS at 6/16/2025 1416    Comment: role of PT in her care, spinal restrictions, use of LSO                                      User Key       Initials Effective Dates Name Provider Type Discipline    MS 07/11/23 -  Susan Lake, PT, DPT, NCS Physical Therapist PT                  PT Recommendation and Plan         Outcome Measures       Row Name 06/17/25 1100 06/16/25 1305          How much help from another person do you currently need...    Turning from your back to your side while in flat bed without using bedrails? 4  -AH --     Moving from lying on back to sitting on the side of a flat bed without bedrails? 4  -AH --     Moving to and from a bed to a chair (including a wheelchair)? 4  -AH --     Standing up from a chair using your arms (e.g., wheelchair, bedside chair)? 3  -AH --     Climbing 3-5 steps with a railing? 3  -AH --     To walk in hospital room? 3  -AH --     AM-PAC 6 Clicks Score (PT) 21  -AH --        How much help from another is currently needed...    Putting on and taking off regular lower body clothing? -- 2  -AC     Bathing (including washing, rinsing, and drying) -- 3  -AC     Toileting (which includes using toilet bed pan or urinal) -- 2  -AC     Putting  on and taking off regular upper body clothing -- 3  -AC     Taking care of personal grooming (such as brushing teeth) -- 3  -AC     Eating meals -- 4  -AC     AM-PAC 6 Clicks Score (OT) -- 17  -AC        Functional Assessment    Outcome Measure Options AM-PAC 6 Clicks Basic Mobility (PT)  - AM-PAC 6 Clicks Daily Activity (OT)  -AC               User Key  (r) = Recorded By, (t) = Taken By, (c) = Cosigned By      Initials Name Provider Type     Roe Calles, OTR/L, CNT Occupational Therapist    Aga Roper PTA Physical Therapist Assistant                     Time Calculation:    PT Charges       Row Name 06/17/25 1449 06/17/25 1156          Time Calculation    Start Time 1436  -AH 1141  -     Stop Time 1449  - 1156  -     Time Calculation (min) 13 min  -AH 15 min  -     PT Received On -- 06/17/25  -        Time Calculation- PT    Total Timed Code Minutes- PT 13 minute(s)  -AH 15 minute(s)  -        Timed Charges    81005 - Gait Training Minutes  13  -AH 15  -AH        Total Minutes    Timed Charges Total Minutes 13  -AH 15  -AH      Total Minutes 13  -AH 15  -AH               User Key  (r) = Recorded By, (t) = Taken By, (c) = Cosigned By      Initials Name Provider Type    Aga Roper PTA Physical Therapist Assistant                  Therapy Charges for Today       Code Description Service Date Service Provider Modifiers Qty    99708373468 HC GAIT TRAINING EA 15 MIN 6/17/2025 Aga Sanchez, SASKIA GP 1    46928015161 HC GAIT TRAINING EA 15 MIN 6/17/2025 Aga Sanchez, SASKIA GP 1            PT G-Codes  Outcome Measure Options: AM-PAC 6 Clicks Basic Mobility (PT)  AM-PAC 6 Clicks Score (PT): 21  AM-PAC 6 Clicks Score (OT): 17    Aga Sanchez PTA  6/17/2025

## 2025-06-17 NOTE — PLAN OF CARE
Goal Outcome Evaluation:      Patient A&Ox4, VSS, RA, Patient up to chair and ambulates in room with LSO brace. PT walked patient in addison with LSO brace and tolerated well. Medicated for pain as needed. Patient to be NPO after MN for 2nd surgery tomorrow. All safety maintained and call light in reach.

## 2025-06-18 ENCOUNTER — ANESTHESIA EVENT (OUTPATIENT)
Dept: PERIOP | Facility: HOSPITAL | Age: 55
End: 2025-06-18
Payer: COMMERCIAL

## 2025-06-18 ENCOUNTER — APPOINTMENT (OUTPATIENT)
Dept: GENERAL RADIOLOGY | Facility: HOSPITAL | Age: 55
DRG: 402 | End: 2025-06-18
Payer: COMMERCIAL

## 2025-06-18 ENCOUNTER — ANESTHESIA (OUTPATIENT)
Dept: PERIOP | Facility: HOSPITAL | Age: 55
End: 2025-06-18
Payer: COMMERCIAL

## 2025-06-18 LAB
GLUCOSE BLDC GLUCOMTR-MCNC: 236 MG/DL (ref 70–130)
GLUCOSE BLDC GLUCOMTR-MCNC: 268 MG/DL (ref 70–130)
GLUCOSE BLDC GLUCOMTR-MCNC: 318 MG/DL (ref 70–130)
GLUCOSE BLDC GLUCOMTR-MCNC: 322 MG/DL (ref 70–130)

## 2025-06-18 PROCEDURE — 0QP004Z REMOVAL OF INTERNAL FIXATION DEVICE FROM LUMBAR VERTEBRA, OPEN APPROACH: ICD-10-PCS | Performed by: ORTHOPAEDIC SURGERY

## 2025-06-18 PROCEDURE — 25010000002 MIDAZOLAM PER 1MG: Performed by: ANESTHESIOLOGY

## 2025-06-18 PROCEDURE — C1713 ANCHOR/SCREW BN/BN,TIS/BN: HCPCS | Performed by: ORTHOPAEDIC SURGERY

## 2025-06-18 PROCEDURE — 25810000003 LACTATED RINGERS PER 1000 ML: Performed by: ANESTHESIOLOGY

## 2025-06-18 PROCEDURE — 25810000003 SODIUM CHLORIDE 0.9 % SOLUTION: Performed by: ORTHOPAEDIC SURGERY

## 2025-06-18 PROCEDURE — 25010000002 FENTANYL CITRATE (PF) 50 MCG/ML SOLUTION: Performed by: ANESTHESIOLOGY

## 2025-06-18 PROCEDURE — 25010000002 BUPIVACAINE LIPOSOME 1.3 % SUSPENSION 10 ML VIAL: Performed by: ORTHOPAEDIC SURGERY

## 2025-06-18 PROCEDURE — 25010000002 FENTANYL CITRATE (PF) 250 MCG/5ML SOLUTION: Performed by: NURSE ANESTHETIST, CERTIFIED REGISTERED

## 2025-06-18 PROCEDURE — 25010000002 PROPOFOL 10 MG/ML EMULSION: Performed by: NURSE ANESTHETIST, CERTIFIED REGISTERED

## 2025-06-18 PROCEDURE — 76000 FLUOROSCOPY <1 HR PHYS/QHP: CPT

## 2025-06-18 PROCEDURE — 72100 X-RAY EXAM L-S SPINE 2/3 VWS: CPT

## 2025-06-18 PROCEDURE — 0SG0071 FUSION OF LUMBAR VERTEBRAL JOINT WITH AUTOLOGOUS TISSUE SUBSTITUTE, POSTERIOR APPROACH, POSTERIOR COLUMN, OPEN APPROACH: ICD-10-PCS | Performed by: ORTHOPAEDIC SURGERY

## 2025-06-18 PROCEDURE — 63710000001 INSULIN LISPRO (HUMAN) PER 5 UNITS: Performed by: ORTHOPAEDIC SURGERY

## 2025-06-18 PROCEDURE — 25010000002 CLINDAMYCIN 900 MG/50ML SOLUTION: Performed by: ORTHOPAEDIC SURGERY

## 2025-06-18 PROCEDURE — 25010000002 ONDANSETRON PER 1 MG: Performed by: NURSE ANESTHETIST, CERTIFIED REGISTERED

## 2025-06-18 PROCEDURE — 25810000003 LACTATED RINGERS PER 1000 ML: Performed by: ORTHOPAEDIC SURGERY

## 2025-06-18 PROCEDURE — 4A11X4G MONITORING OF PERIPHERAL NERVOUS ELECTRICAL ACTIVITY, INTRAOPERATIVE, EXTERNAL APPROACH: ICD-10-PCS | Performed by: ORTHOPAEDIC SURGERY

## 2025-06-18 PROCEDURE — 25010000002 LIDOCAINE PF 2% 2 % SOLUTION: Performed by: NURSE ANESTHETIST, CERTIFIED REGISTERED

## 2025-06-18 PROCEDURE — 25810000003 SODIUM CHLORIDE PER 500 ML: Performed by: ORTHOPAEDIC SURGERY

## 2025-06-18 PROCEDURE — 25010000002 DROPERIDOL PER 5 MG: Performed by: NURSE ANESTHETIST, CERTIFIED REGISTERED

## 2025-06-18 PROCEDURE — 25010000002 DEXAMETHASONE PER 1 MG: Performed by: NURSE ANESTHETIST, CERTIFIED REGISTERED

## 2025-06-18 PROCEDURE — 82948 REAGENT STRIP/BLOOD GLUCOSE: CPT

## 2025-06-18 DEVICE — TI, MIS LORDOTIC ROD, VI2, 5.5MM X 70MM
Type: IMPLANTABLE DEVICE | Site: SPINE LUMBAR | Status: FUNCTIONAL
Brand: INVICTUS

## 2025-06-18 DEVICE — SET SCREW
Type: IMPLANTABLE DEVICE | Site: SPINE LUMBAR | Status: FUNCTIONAL
Brand: INVICTUS

## 2025-06-18 DEVICE — AVITENE FLOUR, 1.0 GRAM
Type: IMPLANTABLE DEVICE | Site: SPINE LUMBAR | Status: FUNCTIONAL
Brand: AVITENE

## 2025-06-18 DEVICE — CANNULATED EXTENDED TAB POLYAXIAL REDUCTION SCREW, 6.5 MM X 50 MM
Type: IMPLANTABLE DEVICE | Site: SPINE LUMBAR | Status: FUNCTIONAL
Brand: INVICTUS

## 2025-06-18 RX ORDER — SODIUM CHLORIDE 9 MG/ML
40 INJECTION, SOLUTION INTRAVENOUS AS NEEDED
Status: DISCONTINUED | OUTPATIENT
Start: 2025-06-18 | End: 2025-06-18 | Stop reason: HOSPADM

## 2025-06-18 RX ORDER — CLINDAMYCIN PHOSPHATE 900 MG/50ML
900 INJECTION, SOLUTION INTRAVENOUS ONCE
Status: COMPLETED | OUTPATIENT
Start: 2025-06-18 | End: 2025-06-18

## 2025-06-18 RX ORDER — FENTANYL CITRATE 50 UG/ML
50 INJECTION, SOLUTION INTRAMUSCULAR; INTRAVENOUS ONCE
Refills: 0 | Status: COMPLETED | OUTPATIENT
Start: 2025-06-18 | End: 2025-06-18

## 2025-06-18 RX ORDER — FLUMAZENIL 0.1 MG/ML
0.2 INJECTION INTRAVENOUS AS NEEDED
Status: DISCONTINUED | OUTPATIENT
Start: 2025-06-18 | End: 2025-06-18 | Stop reason: HOSPADM

## 2025-06-18 RX ORDER — SODIUM CHLORIDE 0.9 % (FLUSH) 0.9 %
10 SYRINGE (ML) INJECTION AS NEEDED
Status: DISCONTINUED | OUTPATIENT
Start: 2025-06-18 | End: 2025-06-18 | Stop reason: HOSPADM

## 2025-06-18 RX ORDER — PROPOFOL 10 MG/ML
VIAL (ML) INTRAVENOUS AS NEEDED
Status: DISCONTINUED | OUTPATIENT
Start: 2025-06-18 | End: 2025-06-18 | Stop reason: SURG

## 2025-06-18 RX ORDER — SODIUM CHLORIDE 0.9 % (FLUSH) 0.9 %
3 SYRINGE (ML) INJECTION EVERY 12 HOURS SCHEDULED
Status: DISCONTINUED | OUTPATIENT
Start: 2025-06-18 | End: 2025-06-18 | Stop reason: HOSPADM

## 2025-06-18 RX ORDER — FENTANYL CITRATE 50 UG/ML
50 INJECTION, SOLUTION INTRAMUSCULAR; INTRAVENOUS
Status: DISCONTINUED | OUTPATIENT
Start: 2025-06-18 | End: 2025-06-18 | Stop reason: HOSPADM

## 2025-06-18 RX ORDER — SODIUM CHLORIDE 0.9 % (FLUSH) 0.9 %
3 SYRINGE (ML) INJECTION AS NEEDED
Status: DISCONTINUED | OUTPATIENT
Start: 2025-06-18 | End: 2025-06-18 | Stop reason: HOSPADM

## 2025-06-18 RX ORDER — DEXAMETHASONE SODIUM PHOSPHATE 4 MG/ML
INJECTION, SOLUTION INTRA-ARTICULAR; INTRALESIONAL; INTRAMUSCULAR; INTRAVENOUS; SOFT TISSUE AS NEEDED
Status: DISCONTINUED | OUTPATIENT
Start: 2025-06-18 | End: 2025-06-18 | Stop reason: SURG

## 2025-06-18 RX ORDER — LIDOCAINE HYDROCHLORIDE 10 MG/ML
0.5 INJECTION, SOLUTION EPIDURAL; INFILTRATION; INTRACAUDAL; PERINEURAL ONCE AS NEEDED
Status: DISCONTINUED | OUTPATIENT
Start: 2025-06-18 | End: 2025-06-18 | Stop reason: HOSPADM

## 2025-06-18 RX ORDER — SODIUM CHLORIDE 9 MG/ML
50 INJECTION, SOLUTION INTRAVENOUS CONTINUOUS
Status: DISPENSED | OUTPATIENT
Start: 2025-06-18 | End: 2025-06-19

## 2025-06-18 RX ORDER — MAGNESIUM HYDROXIDE 1200 MG/15ML
LIQUID ORAL AS NEEDED
Status: DISCONTINUED | OUTPATIENT
Start: 2025-06-18 | End: 2025-06-18 | Stop reason: HOSPADM

## 2025-06-18 RX ORDER — MIDAZOLAM HYDROCHLORIDE 2 MG/2ML
1 INJECTION, SOLUTION INTRAMUSCULAR; INTRAVENOUS
Status: DISCONTINUED | OUTPATIENT
Start: 2025-06-18 | End: 2025-06-18 | Stop reason: HOSPADM

## 2025-06-18 RX ORDER — MIDAZOLAM HYDROCHLORIDE 2 MG/2ML
2 INJECTION, SOLUTION INTRAMUSCULAR; INTRAVENOUS ONCE
Status: COMPLETED | OUTPATIENT
Start: 2025-06-18 | End: 2025-06-18

## 2025-06-18 RX ORDER — SCOPOLAMINE 1 MG/3D
1 PATCH, EXTENDED RELEASE TRANSDERMAL ONCE
Status: COMPLETED | OUTPATIENT
Start: 2025-06-18 | End: 2025-06-19

## 2025-06-18 RX ORDER — IBUPROFEN 600 MG/1
600 TABLET, FILM COATED ORAL EVERY 6 HOURS PRN
Status: DISCONTINUED | OUTPATIENT
Start: 2025-06-18 | End: 2025-06-18 | Stop reason: HOSPADM

## 2025-06-18 RX ORDER — LABETALOL HYDROCHLORIDE 5 MG/ML
5 INJECTION, SOLUTION INTRAVENOUS
Status: DISCONTINUED | OUTPATIENT
Start: 2025-06-18 | End: 2025-06-18 | Stop reason: HOSPADM

## 2025-06-18 RX ORDER — FENTANYL CITRATE 50 UG/ML
INJECTION, SOLUTION INTRAMUSCULAR; INTRAVENOUS AS NEEDED
Status: DISCONTINUED | OUTPATIENT
Start: 2025-06-18 | End: 2025-06-18 | Stop reason: SURG

## 2025-06-18 RX ORDER — ONDANSETRON 2 MG/ML
INJECTION INTRAMUSCULAR; INTRAVENOUS AS NEEDED
Status: DISCONTINUED | OUTPATIENT
Start: 2025-06-18 | End: 2025-06-18 | Stop reason: SURG

## 2025-06-18 RX ORDER — HYDROMORPHONE HYDROCHLORIDE 1 MG/ML
0.5 INJECTION, SOLUTION INTRAMUSCULAR; INTRAVENOUS; SUBCUTANEOUS
Status: DISCONTINUED | OUTPATIENT
Start: 2025-06-18 | End: 2025-06-18 | Stop reason: HOSPADM

## 2025-06-18 RX ORDER — DROPERIDOL 2.5 MG/ML
INJECTION, SOLUTION INTRAMUSCULAR; INTRAVENOUS AS NEEDED
Status: DISCONTINUED | OUTPATIENT
Start: 2025-06-18 | End: 2025-06-18 | Stop reason: SURG

## 2025-06-18 RX ORDER — OXYCODONE HCL 20 MG/1
20 TABLET, FILM COATED, EXTENDED RELEASE ORAL ONCE
Status: COMPLETED | OUTPATIENT
Start: 2025-06-18 | End: 2025-06-18

## 2025-06-18 RX ORDER — SODIUM CHLORIDE 9 MG/ML
INJECTION, SOLUTION INTRAVENOUS AS NEEDED
Status: DISCONTINUED | OUTPATIENT
Start: 2025-06-18 | End: 2025-06-18 | Stop reason: HOSPADM

## 2025-06-18 RX ORDER — SODIUM CHLORIDE, SODIUM LACTATE, POTASSIUM CHLORIDE, CALCIUM CHLORIDE 600; 310; 30; 20 MG/100ML; MG/100ML; MG/100ML; MG/100ML
100 INJECTION, SOLUTION INTRAVENOUS CONTINUOUS PRN
Status: DISCONTINUED | OUTPATIENT
Start: 2025-06-18 | End: 2025-06-18 | Stop reason: HOSPADM

## 2025-06-18 RX ORDER — ROCURONIUM BROMIDE 10 MG/ML
INJECTION, SOLUTION INTRAVENOUS AS NEEDED
Status: DISCONTINUED | OUTPATIENT
Start: 2025-06-18 | End: 2025-06-18 | Stop reason: SURG

## 2025-06-18 RX ORDER — OXYCODONE AND ACETAMINOPHEN 10; 325 MG/1; MG/1
1 TABLET ORAL EVERY 4 HOURS PRN
Status: DISCONTINUED | OUTPATIENT
Start: 2025-06-18 | End: 2025-06-18 | Stop reason: HOSPADM

## 2025-06-18 RX ORDER — ONDANSETRON 2 MG/ML
4 INJECTION INTRAMUSCULAR; INTRAVENOUS
Status: DISCONTINUED | OUTPATIENT
Start: 2025-06-18 | End: 2025-06-18 | Stop reason: HOSPADM

## 2025-06-18 RX ORDER — SODIUM CHLORIDE, SODIUM LACTATE, POTASSIUM CHLORIDE, CALCIUM CHLORIDE 600; 310; 30; 20 MG/100ML; MG/100ML; MG/100ML; MG/100ML
100 INJECTION, SOLUTION INTRAVENOUS CONTINUOUS
Status: DISCONTINUED | OUTPATIENT
Start: 2025-06-18 | End: 2025-06-18

## 2025-06-18 RX ORDER — SODIUM CHLORIDE 0.9 % (FLUSH) 0.9 %
10 SYRINGE (ML) INJECTION EVERY 12 HOURS SCHEDULED
Status: DISCONTINUED | OUTPATIENT
Start: 2025-06-18 | End: 2025-06-18 | Stop reason: HOSPADM

## 2025-06-18 RX ORDER — LIDOCAINE HYDROCHLORIDE 20 MG/ML
INJECTION, SOLUTION EPIDURAL; INFILTRATION; INTRACAUDAL; PERINEURAL AS NEEDED
Status: DISCONTINUED | OUTPATIENT
Start: 2025-06-18 | End: 2025-06-18 | Stop reason: SURG

## 2025-06-18 RX ORDER — NALOXONE HCL 0.4 MG/ML
0.04 VIAL (ML) INJECTION AS NEEDED
Status: DISCONTINUED | OUTPATIENT
Start: 2025-06-18 | End: 2025-06-18 | Stop reason: HOSPADM

## 2025-06-18 RX ADMIN — ROCURONIUM BROMIDE 50 MG: 10 INJECTION, SOLUTION INTRAVENOUS at 07:41

## 2025-06-18 RX ADMIN — INSULIN LISPRO 5 UNITS: 100 INJECTION, SOLUTION INTRAVENOUS; SUBCUTANEOUS at 13:00

## 2025-06-18 RX ADMIN — DOCUSATE SODIUM 50 MG AND SENNOSIDES 8.6 MG 2 TABLET: 8.6; 5 TABLET, FILM COATED ORAL at 21:36

## 2025-06-18 RX ADMIN — FENTANYL CITRATE 150 MCG: 50 INJECTION, SOLUTION INTRAMUSCULAR; INTRAVENOUS at 07:39

## 2025-06-18 RX ADMIN — ROSUVASTATIN CALCIUM 20 MG: 20 TABLET, FILM COATED ORAL at 13:06

## 2025-06-18 RX ADMIN — CITALOPRAM 40 MG: 20 TABLET, FILM COATED ORAL at 13:09

## 2025-06-18 RX ADMIN — INSULIN LISPRO 4 UNITS: 100 INJECTION, SOLUTION INTRAVENOUS; SUBCUTANEOUS at 21:36

## 2025-06-18 RX ADMIN — OXYCODONE HYDROCHLORIDE 20 MG: 20 TABLET, FILM COATED, EXTENDED RELEASE ORAL at 06:54

## 2025-06-18 RX ADMIN — SODIUM CHLORIDE 50 ML/HR: 9 INJECTION, SOLUTION INTRAVENOUS at 13:09

## 2025-06-18 RX ADMIN — SODIUM CHLORIDE, POTASSIUM CHLORIDE, SODIUM LACTATE AND CALCIUM CHLORIDE 100 ML/HR: 600; 310; 30; 20 INJECTION, SOLUTION INTRAVENOUS at 06:40

## 2025-06-18 RX ADMIN — LIDOCAINE HYDROCHLORIDE 100 MG: 20 INJECTION, SOLUTION EPIDURAL; INFILTRATION; INTRACAUDAL; PERINEURAL at 07:39

## 2025-06-18 RX ADMIN — INSULIN LISPRO 5 UNITS: 100 INJECTION, SOLUTION INTRAVENOUS; SUBCUTANEOUS at 17:09

## 2025-06-18 RX ADMIN — METFORMIN HYDROCHLORIDE 1000 MG: 500 TABLET ORAL at 17:09

## 2025-06-18 RX ADMIN — HYDROCODONE BITARTRATE AND ACETAMINOPHEN 1 TABLET: 10; 325 TABLET ORAL at 21:42

## 2025-06-18 RX ADMIN — FENTANYL CITRATE 50 MCG: 50 INJECTION, SOLUTION INTRAMUSCULAR; INTRAVENOUS at 08:53

## 2025-06-18 RX ADMIN — HYDROCODONE BITARTRATE AND ACETAMINOPHEN 1 TABLET: 10; 325 TABLET ORAL at 13:06

## 2025-06-18 RX ADMIN — Medication 3 ML: at 13:02

## 2025-06-18 RX ADMIN — Medication 3 ML: at 21:37

## 2025-06-18 RX ADMIN — HYDROCODONE BITARTRATE AND ACETAMINOPHEN 1 TABLET: 10; 325 TABLET ORAL at 17:09

## 2025-06-18 RX ADMIN — ONDANSETRON 4 MG: 2 INJECTION INTRAMUSCULAR; INTRAVENOUS at 08:40

## 2025-06-18 RX ADMIN — SCOPOLAMINE 1 PATCH: 1.5 PATCH, EXTENDED RELEASE TRANSDERMAL at 06:54

## 2025-06-18 RX ADMIN — DROPERIDOL 1.25 MG: 2.5 INJECTION, SOLUTION INTRAMUSCULAR; INTRAVENOUS at 08:55

## 2025-06-18 RX ADMIN — CLINDAMYCIN PHOSPHATE 900 MG: 900 INJECTION, SOLUTION INTRAVENOUS at 07:52

## 2025-06-18 RX ADMIN — FENTANYL CITRATE 50 MCG: 50 INJECTION, SOLUTION INTRAMUSCULAR; INTRAVENOUS at 06:54

## 2025-06-18 RX ADMIN — PROPOFOL 200 MG: 10 INJECTION, EMULSION INTRAVENOUS at 07:41

## 2025-06-18 RX ADMIN — MIDAZOLAM HYDROCHLORIDE 2 MG: 1 INJECTION, SOLUTION INTRAMUSCULAR; INTRAVENOUS at 06:54

## 2025-06-18 RX ADMIN — HYDROCODONE BITARTRATE AND ACETAMINOPHEN 1 TABLET: 7.5; 325 TABLET ORAL at 02:12

## 2025-06-18 RX ADMIN — CYCLOBENZAPRINE HYDROCHLORIDE 10 MG: 10 TABLET, FILM COATED ORAL at 21:42

## 2025-06-18 RX ADMIN — FENTANYL CITRATE 50 MCG: 50 INJECTION, SOLUTION INTRAMUSCULAR; INTRAVENOUS at 07:44

## 2025-06-18 RX ADMIN — Medication 10 ML: at 13:01

## 2025-06-18 RX ADMIN — LISINOPRIL 40 MG: 20 TABLET ORAL at 12:59

## 2025-06-18 RX ADMIN — DEXAMETHASONE SODIUM PHOSPHATE 4 MG: 4 INJECTION, SOLUTION INTRA-ARTICULAR; INTRALESIONAL; INTRAMUSCULAR; INTRAVENOUS; SOFT TISSUE at 08:08

## 2025-06-18 RX ADMIN — METFORMIN HYDROCHLORIDE 1000 MG: 500 TABLET ORAL at 13:07

## 2025-06-18 NOTE — OP NOTE
SPINAL HARDWARE REMOVAL, LUMBAR LAMINECTOMY POSTERIOR LUMBAR INTERBODY FUSION  Procedure Note    Ayla Echevarria  6/18/2025    Pre-op Diagnosis:     1. Psoriasis.   2. Status post anterior decompression, ALIF with instrumentation L5-S1, 01/17/2020   3. Status post right LLIF with instrumentation L4-5, 01/22/2020   4. Status post PSF with instrumentation L4-S1, 01/24/2020   5. Broken right S1 pedicle screw   6. Status post left LLIF with instrumentation L3-4, 07/05/2023    7. Status post partial LORA L4-S1, PSF L3-4 with instrumentation L3-S1, 07/07/2023    8. Recurrent low back pain    9. Bilateral anterior and lateral thigh radiculopathy    10. Recurrent neurogenic claudication    11. Severe adjacent level degenerative disc disease L2-3    12. Severe adjacent level facet arthropathy L2-3    13. Large right central disc herniation L2-3    14. Severe central and bilateral foraminal stenosis L2-3    15. Obesity, BMI 48  16.  Status post left LLIF with instrumentation L2-3, 6/16/2025    Post-op Diagnosis:    same    Procedure/CPT® Codes:    1.  Partial removal of posterior instrumentation right L3-4  2.  Exploration of fusion L3-4  3.  Posterior spinal fusion L2-3  4.  Posterior spinal instrumentation L2-4 (ATEC pedicle screws and rods)  5.  Use of locally obtained autograft bone for fusion  6.  Use of fluoroscopy for confirmation of surgical level and placement of instrumentation  7.  Intraoperative neuromonitoring with pedicle screw stimulation    Spinal Surgery Levels Completed:1 Level      Anesthesia: General     Surgeon: ASHLEY Bang MD     Assistant: Chuck John PA-C     Estimated Blood Loss: 25 mL     Complications: None     Condition: Stable to PACU.     Indications:     The patient is a 54-year-old who sees Dr. Rich Heaton for medical issues. She has had fusion procedures performed in a staged fashion from L4-S1 in 2020 as well as at L3-4 in 2023. Unfortunately she has presented back to the office with  a recurrence of low back pain along with bilateral anterior and lateral thigh radiculopathy as well as symptoms consistent with recurrent neurogenic claudication. Repeat imaging studies have revealed severe adjacent level degenerative disc disease and facet arthropathy at L2-3 with a large right central disc herniation causing severe central and foraminal stenosis.      After failing all conservative measures, it was mutually decided that surgery would be the best option. Risks, benefits, and complications of surgery were discussed with the patient. The patient appeared well informed and wished to proceed. We specifically discussed the risk of infection, blood loss, nerve root injury, CSF leak, and the possibility of incomplete resolution of symptoms.  We also discussed the risk of a nonunion and the potential need for additional surgery in the event of a pseudoarthrosis or hardware failure.     We elected to proceed with surgery in a staged fashion.  Previously on 6/16/2025, the patient underwent a lateral fusion of L2-3.  Today we return to surgery for the second posterior stage of the procedure.     Operative Procedure:     After obtaining informed consent and verifying the correct operative site, the patient was brought to the operating room. A general anesthetic was provided by the anesthesia service with the assistance of an endotracheal tube. Once this was appropriately positioned and secured, the patient was carefully rotated prone onto a Young frame. All bony processes were well-padded. The lumbar region was prepped and draped in the usual sterile fashion. A surgical timeout was taken to confirm this was the correct patient, we were working at the correct levels, and that preoperative antibiotics were given in a timely fashion.    Next, the existing right sided Wiltsey incision spanning the previous instrumentation at L3-4 was reopened using a 10 blade scalpel.  Dissection was carried through subcutaneous  tissues using Bovie cautery. The fascia was divided in line with the incision. Blunt dissection was carried between the multifidus and longissimus muscles down to the previously placed instrumentation spanning L3-4. There was some scar tissue as expected around the screws and between the musculature.  The previous instrumentation was completely exposed using Bovie cautery removing some fibrous scar tissue from around the screw heads themselves. The appropriate screwdriver and antitorque wrench were used to remove the setscrews. The sean was then taken out with a sean rose. The area was then thoroughly explored.  Both of the pedicle screws appeared to be tight with no evidence of loosening.  There appeared to be adequate bone graft in the posterior lateral gutter consistent with a solid fusion of L3-4.    The screws on the right at both L3 and L4 were of the same instrumentation set that I plan to use across L2 and thus were left in position for use with our new construct.     The Wiltsey incision was then extended using a 10 blade scalpel up to L2.  The same intramuscular plane was used to gain access to the facet joint of L2-3 and the transverse processes of L2.  Self retaining retractors were placed for continuous exposure.  Bovie cautery was used to expose as much bony surface area on the transverse processes and to destroy the facet capsule at L2-3.  The wound was then thoroughly irrigated with saline solution.     Next under fluoroscopic guidance, I created a starting hole for the placement of a new pedicle screw L2.  A starting hole was created with a high-speed bur and the pedicle track was then created using a pedicle probe gaining access through the pedicle to the anterior vertebral body.  The pedicle tract was palpated with a ball-tipped feeler to ensure that the track was adequate.  Into the pedicle of L2, I placed a new screw measuring 6.5 mm x 50 mm from the [facility] instrumentation set.      Fluoroscopy  was used to confirm that all instrumentation was properly positioned in both the AP and lateral projections.  The wound most then copiously irrigated with saline solution.  I used a neuro monitoring probe to stimulate the screws to ensure that there were adequately positioned.  All screws appeared well positioned.     The high-speed bur was used to decorticate the posterior elements of L2 as well as the previous fusion mass at L3-4.  I also decorticated the facet joint of L2-3.  The local bone was left in situ to assist with obtaining a fusion. This constituted a posterior fusion of L2-3     An appropriate sean was then chosen to span the pedicle screw instrumentation spanning spanning L2-4.  The sean was bent to accommodate the patient's increased lordosis.  Set screws were inserted into the pedicle screw saddles fixing the sean into position.  The setscrews were then tightened using the appropriate antitorque wrench and torque limiting screwdriver provided by [facility].      A final inspection was then done to ensure that we had adequate hemostasis.  Bleeding was controlled using bipolar cautery and Gelfoam with thrombin.     After insuring that we had adequate hemostasis, closure was then undertaken using a #1 Vicryl to reapproximate the fascia. Immediate subcutaneous tissues were closed with a 2-0 Vicryl and skin closure was then accomplished using Mastisol and Steri-Strips.  I did infuse the subcutaneous layer with half percent Marcaine to assist with postoperative pain control.  The wound was then washed and sterilely dressed. The patient was then carefully rotated supine onto a [place], extubated, and sent to the recovery room in good stable condition. The patient tolerated the procedure well, there were no complications.  Estimated blood loss was approximately 25 mL.    Intraoperative neuro monitoring was ordered and carried out throughout the procedure to add an increased level of safety for the patient.  The interpreting physician was available by means of real-time continuous, bidirectional, remote audio and visual communication as needed throughout the entire procedure. Modalities used during the procedure included SSEP, EMG, TOF, and pedicle screw stimulation. There were no neuro monitoring signal changes during the procedure.      Chuck John PA-C provided critical assistance during the procedure.  His assistance was medically necessary in order to allow the procedure to occur in the most safe and efficient manner.  He assisted not only with the removal of instrumentation and exploration of fusion of L3-4, but also assisted with the placement of new instrumentation and bone graft spanning the new fusion construct from L2-4.    ASHLEY Bang MD     Date: 6/18/2025  Time: 09:55 CDT

## 2025-06-18 NOTE — ANESTHESIA POSTPROCEDURE EVALUATION
"Patient: Ayla Echevarria    Procedure Summary       Date: 06/18/25 Room / Location: [facility] OR  /  [facility] OR    Anesthesia Start: 0736 Anesthesia Stop: 0920    Procedures:       PARTIAL REMOVAL OF INSTRUMENTATION, EXPLORATION OF FUSION RIGHT L3-4, POSTERIOR SPINAL FUSION L2-3 WITH INSTRUMENTATION L2-4 (Spine Lumbar)      EXPLORATION OF FUSION RIGHT L3-4, POSTERIOR SPINAL FUSION L2-3 WITH INSTRUMENTATION L2-4 (Spine Lumbar) Diagnosis: (M54.16)    Surgeons: PAMELA Bang MD Provider: Donnell Petty CRNA    Anesthesia Type: general ASA Status: 3            Anesthesia Type: general    Vitals  Vitals Value Taken Time   /77 06/18/25 10:28   Temp 98.2 °F (36.8 °C) 06/18/25 09:16   Pulse 104 06/18/25 10:29   Resp 16 06/18/25 10:15   SpO2 95 % 06/18/25 10:29   Vitals shown include unfiled device data.        Post Anesthesia Care and Evaluation    Patient location during evaluation: PACU  Patient participation: complete - patient participated  Level of consciousness: awake and awake and alert  Pain score: 0  Pain management: adequate    Airway patency: patent  Anesthetic complications: No anesthetic complications  PONV Status: none  Cardiovascular status: acceptable  Respiratory status: acceptable  Hydration status: acceptable    Comments: Patient discharged according to acceptable Caridad score per RN assessment. See nursing records for further information.     Blood pressure 139/72, pulse 105, temperature 98.2 °F (36.8 °C), temperature source Temporal, resp. rate 16, height 163.5 cm (64.37\"), weight 132 kg (290 lb 4.8 oz), last menstrual period 07/10/2024, SpO2 92%, not currently breastfeeding.      "
none

## 2025-06-18 NOTE — PAYOR COMM NOTE
"Ayla Merritt (54 y.o. Female) [id number]   Approved Surgery Clinical Update  [facility]  [place] [phone]       Date of Birth   1970    Social Security Number       [address]   [address] [place] [address]    Home Phone   [phone]    MRN   [id number]       Worship   Gnosticist    Marital Status                               Admission Date   6/16/2025    Admission Type   Elective    Admitting Provider   PAMELA Bang MD    Attending Provider   PAMELA Bang MD    Department, Room/Bed   [facility] 3A, 340/1       Discharge Date       Discharge Disposition       Discharge Destination                                 Attending Provider: PAMELA Bang MD    Allergies: Ciprofloxacin, Penicillins, Cethexonium    Isolation: None   Infection: None   Code Status: CPR    Ht: 163.5 cm (64.37\")   Wt: 132 kg (290 lb 4.8 oz)    Admission Cmt: None   Principal Problem: Lumbar radiculopathy [M54.16]                   Active Insurance as of 6/16/2025       Primary Coverage       Payor Plan Insurance Group Employer/Plan Group    [employer]HE[employer] BLUE CROSS AN[employer]M BLUE CROSS BLUE SHIELD PPO [id number]       Payor Plan Address Payor Plan Phone Number Payor Plan Fax Number Effective Dates    [address] [phone]  1/1/2005 - None Entered    [facility] [id number]         Subscriber Name Subscriber Birth Date Member ID       ROSE,DUANE [birth year]/1964 [id number]                     Emergency Contacts        (Rel.) Home Phone Work Phone Mobile Phone    Rose,Duane (Spouse) [phone] -- [phone]    DAHLIATRUDY (Daughter) -- -- [phone]    SAGRARIO MERRITT (Son) -- -- [phone]              Vital Signs (last day)       Date/Time Temp Temp src Pulse Resp BP Patient Position SpO2    06/18/25 1121 99 (37.2) Axillary 100 16 146/76 Lying 96    06/18/25 1040 99 (37.2) Axillary 103 16 147/67 Lying 95    06/18/25 1015 -- -- 105 16 139/72 -- 92    " 06/18/25 1000 -- -- 108 16 151/70 -- 94    06/18/25 0945 -- -- 106 16 139/69 -- 98    06/18/25 0930 -- -- 105 16 117/67 -- 97    06/18/25 0925 -- -- 106 16 120/63 -- 95    06/18/25 0920 -- -- 105 16 118/59 -- 96    06/18/25 0916 98.2 (36.8) Temporal 104 16 124/71 Lying 95    06/18/25 0703 -- -- 97 16 -- -- --    06/18/25 0700 -- -- 99 -- -- -- 84    06/18/25 0655 -- -- 96 -- -- -- 95    06/18/25 0650 -- -- 100 -- -- -- 97    06/18/25 0630 -- -- 98 -- -- -- 95    06/18/25 0625 -- -- 93 -- -- -- 93    06/18/25 0620 -- -- 94 -- -- -- 92    06/18/25 0615 -- -- 97 -- -- -- 94    06/17/25 1944 98.2 (36.8) Oral 92 16 163/63 Lying 98    06/17/25 1518 98.2 (36.8) Oral 97 16 157/69 Lying 97    06/17/25 0803 97.9 (36.6) Oral 83 16 130/69 Lying 95    06/17/25 0329 -- -- 88 16 120/57 Lying 97          Current Facility-Administered Medications   Medication Dose Route Frequency Provider Last Rate Last Admin    acetaminophen (TYLENOL) tablet 650 mg  650 mg Oral Q4H PRN PAMELA Bang MD        Or    acetaminophen (TYLENOL) 160 MG/5ML oral solution 650 mg  650 mg Oral Q4H PRN PAMELA Bang MD        Or    acetaminophen (TYLENOL) suppository 650 mg  650 mg Rectal Q4H PRN PAMELA Bang MD        sennosides-docusate (PERICOLACE) 8.6-50 MG per tablet 2 tablet  2 tablet Oral BID PAMELA Bang MD   2 tablet at 06/17/25 2026    And    polyethylene glycol (MIRALAX) packet 17 g  17 g Oral Daily PRN PAMELA Bang MD        And    bisacodyl (DULCOLAX) EC tablet 5 mg  5 mg Oral Daily PRN PAMELA Bang MD        And    bisacodyl (DULCOLAX) suppository 10 mg  10 mg Rectal Daily PRN PAMELA Bang MD        citalopram (CeleXA) tablet 40 mg  40 mg Oral Daily PAMELA Bang MD   40 mg at 06/18/25 1309    cyclobenzaprine (FLEXERIL) tablet 10 mg  10 mg Oral TID PRN PAMELA Bang MD   10 mg at 06/17/25 0508    dextrose (D50W) (25 g/50 mL) IV injection 25 g  25 g Intravenous Q15 Min PRN PAMELA Bang  MD Sharif        dextrose (GLUTOSE) oral gel 15 g  15 g Oral Q15 Min PRN PAMELA Bang MD        [START ON 6/19/2025] empagliflozin (JARDIANCE) tablet 10 mg  10 mg Oral Daily Neeraj Hinson MD        glucagon (GLUCAGEN) injection 1 mg  1 mg Intramuscular Q15 Min PRN PAMELA Bang MD        HYDROcodone-acetaminophen (NORCO)  MG per tablet 1 tablet  1 tablet Oral Q4H PRN PAMELA Bang MD   1 tablet at 06/18/25 1306    HYDROcodone-acetaminophen (NORCO) 7.5-325 MG per tablet 1 tablet  1 tablet Oral Q4H PRN PAMELA Bang MD   1 tablet at 06/18/25 0212    HYDROmorphone (DILAUDID) injection 0.5 mg  0.5 mg Intravenous Q3H PRN PAMELA Bang MD   0.5 mg at 06/17/25 0209    And    naloxone (NARCAN) injection 0.4 mg  0.4 mg Intravenous Q5 Min PRN PAMELA Bang MD        Insulin Lispro (humaLOG) injection 2-7 Units  2-7 Units Subcutaneous 4x Daily AC & at Bedtime PAMELA Bang MD   5 Units at 06/18/25 1300    lisinopril (PRINIVIL,ZESTRIL) tablet 40 mg  40 mg Oral Daily PAMELA Bang MD   40 mg at 06/18/25 1259    metFORMIN (GLUCOPHAGE) tablet 1,000 mg  1,000 mg Oral BID With Meals PAMELA Bang MD   1,000 mg at 06/18/25 1307    ondansetron ODT (ZOFRAN-ODT) disintegrating tablet 4 mg  4 mg Oral Q6H PRN PAMELA Bang MD   4 mg at 06/17/25 1755    Or    ondansetron (ZOFRAN) injection 4 mg  4 mg Intravenous Q6H PRN PAMELA Bang MD        rosuvastatin (CRESTOR) tablet 20 mg  20 mg Oral Daily PAMELA Bang MD   20 mg at 06/18/25 1306    scopolamine patch 1 mg/72 hr  1 patch Transdermal Once Reji Collins MD   1 patch at 06/18/25 0654    sodium chloride 0.9 % flush 10 mL  10 mL Intravenous PRN PAMELA Bang MD   10 mL at 06/18/25 1301    sodium chloride 0.9 % flush 3 mL  3 mL Intravenous Q12H PAMELA Bang MD   3 mL at 06/17/25 2027    sodium chloride 0.9 % infusion 40 mL  40 mL Intravenous PRN PAMELA Bang MD        sodium  chloride 0.9 % infusion  50 mL/hr Intravenous Continuous PAMELA Bang MD 50 mL/hr at 06/18/25 1309 50 mL/hr at 06/18/25 1309    venlafaxine XR (EFFEXOR-XR) 24 hr capsule 37.5 mg  37.5 mg Oral Daily PAMELA Bang MD   37.5 mg at 06/17/25 0848        Operative/Procedure Notes (last 24 hours)        PAMELA Bang MD at 06/18/25 0640          SPINAL HARDWARE REMOVAL, LUMBAR LAMINECTOMY POSTERIOR LUMBAR INTERBODY FUSION  Procedure Note    Ayla YANCEY Swathi  6/18/2025    Pre-op Diagnosis:     1. Psoriasis.   2. Status post anterior decompression, ALIF with instrumentation L5-S1, 01/17/2020   3. Status post right LLIF with instrumentation L4-5, 01/22/2020   4. Status post PSF with instrumentation L4-S1, 01/24/2020   5. Broken right S1 pedicle screw   6. Status post left LLIF with instrumentation L3-4, 07/05/2023    7. Status post partial LORA L4-S1, PSF L3-4 with instrumentation L3-S1, 07/07/2023    8. Recurrent low back pain    9. Bilateral anterior and lateral thigh radiculopathy    10. Recurrent neurogenic claudication    11. Severe adjacent level degenerative disc disease L2-3    12. Severe adjacent level facet arthropathy L2-3    13. Large right central disc herniation L2-3    14. Severe central and bilateral foraminal stenosis L2-3    15. Obesity, BMI 48  16.  Status post left LLIF with instrumentation L2-3, 6/16/2025    Post-op Diagnosis:    same    Procedure/CPT® Codes:    1.  Partial removal of posterior instrumentation right L3-4  2.  Exploration of fusion L3-4  3.  Posterior spinal fusion L2-3  4.  Posterior spinal instrumentation L2-4 (ATEC pedicle screws and rods)  5.  Use of locally obtained autograft bone for fusion  6.  Use of fluoroscopy for confirmation of surgical level and placement of instrumentation  7.  Intraoperative neuromonitoring with pedicle screw stimulation    Spinal Surgery Levels Completed:1 Level      Anesthesia: General     Surgeon: ASHLEY Bang MD     Assistant: Chuck  Dora COLON     Estimated Blood Loss: 25 mL     Complications: None     Condition: Stable to PACU.     Indications:     The patient is a 54-year-old who sees Dr. Rich Heaton for medical issues. She has had fusion procedures performed in a staged fashion from L4-S1 in 2020 as well as at L3-4 in 2023. Unfortunately she has presented back to the office with a recurrence of low back pain along with bilateral anterior and lateral thigh radiculopathy as well as symptoms consistent with recurrent neurogenic claudication. Repeat imaging studies have revealed severe adjacent level degenerative disc disease and facet arthropathy at L2-3 with a large right central disc herniation causing severe central and foraminal stenosis.      After failing all conservative measures, it was mutually decided that surgery would be the best option. Risks, benefits, and complications of surgery were discussed with the patient. The patient appeared well informed and wished to proceed. We specifically discussed the risk of infection, blood loss, nerve root injury, CSF leak, and the possibility of incomplete resolution of symptoms.  We also discussed the risk of a nonunion and the potential need for additional surgery in the event of a pseudoarthrosis or hardware failure.     We elected to proceed with surgery in a staged fashion.  Previously on 6/16/2025, the patient underwent a lateral fusion of L2-3.  Today we return to surgery for the second posterior stage of the procedure.     Operative Procedure:     After obtaining informed consent and verifying the correct operative site, the patient was brought to the operating room. A general anesthetic was provided by the anesthesia service with the assistance of an endotracheal tube. Once this was appropriately positioned and secured, the patient was carefully rotated prone onto a Young frame. All bony processes were well-padded. The lumbar region was prepped and draped in the usual sterile  fashion. A surgical timeout was taken to confirm this was the correct patient, we were working at the correct levels, and that preoperative antibiotics were given in a timely fashion.    Next, the existing right sided Wiltsey incision spanning the previous instrumentation at L3-4 was reopened using a 10 blade scalpel.  Dissection was carried through subcutaneous tissues using Bovie cautery. The fascia was divided in line with the incision. Blunt dissection was carried between the multifidus and longissimus muscles down to the previously placed instrumentation spanning L3-4. There was some scar tissue as expected around the screws and between the musculature.  The previous instrumentation was completely exposed using Bovie cautery removing some fibrous scar tissue from around the screw heads themselves. The appropriate screwdriver and antitorque wrench were used to remove the setscrews. The sean was then taken out with a sean rose. The area was then thoroughly explored.  Both of the pedicle screws appeared to be tight with no evidence of loosening.  There appeared to be adequate bone graft in the posterior lateral gutter consistent with a solid fusion of L3-4.    The screws on the right at both L3 and L4 were of the same instrumentation set that I plan to use across L2 and thus were left in position for use with our new construct.     The Wiltsey incision was then extended using a 10 blade scalpel up to L2.  The same intramuscular plane was used to gain access to the facet joint of L2-3 and the transverse processes of L2.  Self retaining retractors were placed for continuous exposure.  Bovie cautery was used to expose as much bony surface area on the transverse processes and to destroy the facet capsule at L2-3.  The wound was then thoroughly irrigated with saline solution.     Next under fluoroscopic guidance, I created a starting hole for the placement of a new pedicle screw L2.  A starting hole was created with a  high-speed bur and the pedicle track was then created using a pedicle probe gaining access through the pedicle to the anterior vertebral body.  The pedicle tract was palpated with a ball-tipped feeler to ensure that the track was adequate.  Into the pedicle of L2, I placed a new screw measuring 6.5 mm x 50 mm from the [facility] instrumentation set.      Fluoroscopy was used to confirm that all instrumentation was properly positioned in both the AP and lateral projections.  The wound most then copiously irrigated with saline solution.  I used a neuro monitoring probe to stimulate the screws to ensure that there were adequately positioned.  All screws appeared well positioned.     The high-speed bur was used to decorticate the posterior elements of L2 as well as the previous fusion mass at L3-4.  I also decorticated the facet joint of L2-3.  The local bone was left in situ to assist with obtaining a fusion. This constituted a posterior fusion of L2-3     An appropriate sean was then chosen to span the pedicle screw instrumentation spanning spanning L2-4.  The sean was bent to accommodate the patient's increased lordosis.  Set screws were inserted into the pedicle screw saddles fixing the sean into position.  The setscrews were then tightened using the appropriate antitorque wrench and torque limiting screwdriver provided by [facility].      A final inspection was then done to ensure that we had adequate hemostasis.  Bleeding was controlled using bipolar cautery and Gelfoam with thrombin.     After insuring that we had adequate hemostasis, closure was then undertaken using a #1 Vicryl to reapproximate the fascia. Immediate subcutaneous tissues were closed with a 2-0 Vicryl and skin closure was then accomplished using Mastisol and Steri-Strips.  I did infuse the subcutaneous layer with half percent Marcaine to assist with postoperative pain control.  The wound was then washed and sterilely dressed. The patient was then carefully  rotated supine onto a hospital gurney, extubated, and sent to the recovery room in good stable condition. The patient tolerated the procedure well, there were no complications.  Estimated blood loss was approximately 25 mL.    Intraoperative neuro monitoring was ordered and carried out throughout the procedure to add an increased level of safety for the patient. The interpreting physician was available by means of real-time continuous, bidirectional, remote audio and visual communication as needed throughout the entire procedure. Modalities used during the procedure included SSEP, EMG, TOF, and pedicle screw stimulation. There were no neuro monitoring signal changes during the procedure.      Chuck John PA-C provided critical assistance during the procedure.  His assistance was medically necessary in order to allow the procedure to occur in the most safe and efficient manner.  He assisted not only with the removal of instrumentation and exploration of fusion of L3-4, but also assisted with the placement of new instrumentation and bone graft spanning the new fusion construct from L2-4.    ASHLEY Bang MD     Date: 6/18/2025  Time: 09:55 CDT    Electronically signed by PAMELA Bang MD at 06/18/25 0938          Physician Progress Notes (last 24 hours)        Neeraj Hinson MD at 06/18/25 0791          Ayla Echevarria is a 54 y.o. female patient.    N.p.o. for surgery today.  Plan for second step.  Elevated blood sugars noted.      Current Facility-Administered Medications   Medication Dose Route Frequency Provider Last Rate Last Admin    [Transfer Hold] acetaminophen (TYLENOL) tablet 650 mg  650 mg Oral Q4H PRN PAMELA Bang MD        Or    [Transfer Hold] acetaminophen (TYLENOL) 160 MG/5ML oral solution 650 mg  650 mg Oral Q4H PRN PAMELA Bang MD        Or    [Transfer Hold] acetaminophen (TYLENOL) suppository 650 mg  650 mg Rectal Q4H PRN PAMELA Bang MD        [Transfer  Hold] sennosides-docusate (PERICOLACE) 8.6-50 MG per tablet 2 tablet  2 tablet Oral BID PAMELA Bang MD   2 tablet at 06/17/25 2026    And    [Transfer Hold] polyethylene glycol (MIRALAX) packet 17 g  17 g Oral Daily PRN PAMELA Bang MD        And    [Transfer Hold] bisacodyl (DULCOLAX) EC tablet 5 mg  5 mg Oral Daily PRN PAMELA Bang MD        And    [Transfer Hold] bisacodyl (DULCOLAX) suppository 10 mg  10 mg Rectal Daily PRN PAMELA Bang MD        [Transfer Hold] citalopram (CeleXA) tablet 40 mg  40 mg Oral Daily PAMELA Bang MD   40 mg at 06/17/25 0848    clindamycin (CLEOCIN) 900 mg in dextrose 5% 50 mL IVPB (premix)  900 mg Intravenous Once PAMELA Bang MD        [Transfer Hold] cyclobenzaprine (FLEXERIL) tablet 10 mg  10 mg Oral TID PRN PAMELA Bang MD   10 mg at 06/17/25 0508    [Transfer Hold] dextrose (D50W) (25 g/50 mL) IV injection 25 g  25 g Intravenous Q15 Min PRN Neeraj Hinson MD        [Transfer Hold] dextrose (GLUTOSE) oral gel 15 g  15 g Oral Q15 Min PRN Neeraj Hinson MD        [START ON 6/19/2025] empagliflozin (JARDIANCE) tablet 10 mg  10 mg Oral Daily Neeraj Hinson MD        [Transfer Hold] glucagon (GLUCAGEN) injection 1 mg  1 mg Intramuscular Q15 Min PRN Neeraj Hinson MD        [Transfer Hold] HYDROcodone-acetaminophen (NORCO)  MG per tablet 1 tablet  1 tablet Oral Q4H PRN PAMELA Bang MD   1 tablet at 06/17/25 2205    [Transfer Hold] HYDROcodone-acetaminophen (NORCO) 7.5-325 MG per tablet 1 tablet  1 tablet Oral Q4H PRN PAMELA Bang MD   1 tablet at 06/18/25 0212    [Transfer Hold] HYDROmorphone (DILAUDID) injection 0.5 mg  0.5 mg Intravenous Q3H PRN PAMELA Bang MD   0.5 mg at 06/17/25 0209    And    [Transfer Hold] naloxone (NARCAN) injection 0.4 mg  0.4 mg Intravenous Q5 Min PRN PAMELA Bang MD        [Transfer Hold] Insulin Lispro (humaLOG) injection 2-7 Units  2-7 Units  Subcutaneous 4x Daily AC & at Bedtime Neeraj Hinson MD   3 Units at 06/17/25 2026    lactated ringers infusion  100 mL/hr Intravenous Continuous PRN PAMELA Bang MD        lactated ringers infusion  100 mL/hr Intravenous Continuous PAMELA Bang  mL/hr at 06/18/25 0640 100 mL/hr at 06/18/25 0640    lactated ringers infusion  100 mL/hr Intravenous Continuous Reji Collins  mL/hr at 06/18/25 0640 100 mL/hr at 06/18/25 0640    lidocaine PF 1% (XYLOCAINE) injection 0.5 mL  0.5 mL Intradermal Once PRN PAMELA Bang MD        lidocaine PF 1% (XYLOCAINE) injection 0.5 mL  0.5 mL Intradermal Once PRN PAMELA Bang MD        lisinopril (PRINIVIL,ZESTRIL) tablet 40 mg  40 mg Oral Daily PAMELA Bang MD   40 mg at 06/17/25 0848    [Transfer Hold] metFORMIN (GLUCOPHAGE) tablet 1,000 mg  1,000 mg Oral BID With Meals PAMELA Bang MD   1,000 mg at 06/17/25 1754    Midazolam HCl (PF) (VERSED) injection 1 mg  1 mg Intravenous Q10 Min PRN Reji Collins MD        [Transfer Hold] ondansetron ODT (ZOFRAN-ODT) disintegrating tablet 4 mg  4 mg Oral Q6H PRN PAMELA Bang MD   4 mg at 06/17/25 1755    Or    [Transfer Hold] ondansetron (ZOFRAN) injection 4 mg  4 mg Intravenous Q6H PRN PAMELA Bang MD        [Transfer Hold] rosuvastatin (CRESTOR) tablet 20 mg  20 mg Oral Daily PAMELA Bang MD   20 mg at 06/17/25 0849    scopolamine patch 1 mg/72 hr  1 patch Transdermal Once Reji Collins MD   1 patch at 06/18/25 0654    [Transfer Hold] sodium chloride 0.9 % flush 10 mL  10 mL Intravenous PRN PAMELA Bang MD        sodium chloride 0.9 % flush 10 mL  10 mL Intravenous Q12H PAMELA Bang MD        sodium chloride 0.9 % flush 10 mL  10 mL Intravenous PRN PAMELA Bang MD        sodium chloride 0.9 % flush 10 mL  10 mL Intravenous PRN PAMELA Bang MD        [Transfer Hold] sodium chloride 0.9 % flush 3 mL  3 mL Intravenous Q12H PAMELA Bang  "MD Sharif   3 mL at 06/17/25 2027    sodium chloride 0.9 % flush 3 mL  3 mL Intravenous PRN PAMELA Bang MD        sodium chloride 0.9 % flush 3 mL  3 mL Intravenous Q12H Reji Collins MD        [Transfer Hold] sodium chloride 0.9 % infusion 40 mL  40 mL Intravenous PRN PAMELA Bang MD        sodium chloride 0.9 % infusion 40 mL  40 mL Intravenous PRN PAMELA Bang MD        sodium chloride 0.9 % infusion  50 mL/hr Intravenous Continuous PAMELA Bang MD        [Transfer Hold] venlafaxine XR (EFFEXOR-XR) 24 hr capsule 37.5 mg  37.5 mg Oral Daily PAMELA Bang MD   37.5 mg at 06/17/25 0848     ALLERGIES:    Allergies   Allergen Reactions    Ciprofloxacin Swelling     Throat swelling    Penicillins Swelling     Throat swelling    Cethexonium Unknown - Low Severity     Patient stated eye burning       Lumbar radiculopathy    Hypertension    Hyperlipidemia    Chronic bilateral low back pain with bilateral sciatica    Lumbar spinal stenosis    Drug-induced constipation    GERD without esophagitis    Class 3 severe obesity due to excess calories with serious comorbidity and body mass index (BMI) of 40.0 to 44.9 in adult    Diabetes mellitus    Blood pressure 163/63, pulse 97, temperature 98.2 °F (36.8 °C), temperature source Oral, resp. rate 16, height 163.5 cm (64.37\"), weight 132 kg (290 lb 4.8 oz), last menstrual period 07/10/2024, SpO2 (!) 84%, not currently breastfeeding.      Subjective:  Symptoms:  Stable.    Diet:  NPO.    Activity level: Impaired due to pain.    Pain:  She complains of pain that is moderate.  She reports pain is unchanged.  Pain is partially controlled.      Review of Systems  Objective:  General Appearance:  Comfortable, well-appearing and in no acute distress.    Vital signs: (most recent): Blood pressure 163/63, pulse 97, temperature 98.2 °F (36.8 °C), temperature source Oral, resp. rate 16, height 163.5 cm (64.37\"), weight 132 kg (290 lb 4.8 oz), last " menstrual period 07/10/2024, SpO2 (!) 84%, not currently breastfeeding.  Vital signs are normal.    Output: Producing urine and minimal stool output.    HEENT: Normal HEENT exam.    Lungs:  Normal effort and normal respiratory rate.    Heart: Normal rate.  Regular rhythm.    Abdomen: Abdomen is soft.  Hypoactive bowel sounds.   There is generalized tenderness.     Extremities: Decreased range of motion.    Skin:  Warm and dry.                Labs:  Lab Results (last 72 hours)       Procedure Component Value Units Date/Time    POC Glucose Once [[id number]]  (Abnormal) Collected: 06/17/25 1943    Specimen: Blood Updated: 06/17/25 1954     Glucose 230 mg/dL      Comment: : [id number] Olayinka Dukeeter ID: [id number]       POC Glucose Once [[id number]]  (Abnormal) Collected: 06/17/25 1725    Specimen: Blood Updated: 06/17/25 1743     Glucose 254 mg/dL      Comment: : [id number] Herman [employer] ID: [id number]       Vitamin B12 [[id number]]  (Normal) Collected: 06/17/25 0437    Specimen: Blood Updated: 06/17/25 1156     Vitamin B-12 566 pg/mL     Narrative:      Results may be falsely increased if patient taking Biotin.      Folate [[id number]]  (Normal) Collected: 06/17/25 0437    Specimen: Blood Updated: 06/17/25 1156     Folate >20.00 ng/mL     Narrative:      Results may be falsely increased if patient taking Biotin.      POC Glucose Once [[id number]]  (Abnormal) Collected: 06/17/25 1102    Specimen: Blood Updated: 06/17/25 1113     Glucose 319 mg/dL      Comment: : [id number] Herman [employer] ID: [id number]       POC Glucose Once [[id number]]  (Abnormal) Collected: 06/17/25 0750    Specimen: Blood Updated: 06/17/25 0802     Glucose 280 mg/dL      Comment: : [id number] Herman [employer] ID: [id number]       Basic Metabolic Panel [[id number]]  (Abnormal) Collected: 06/17/25 0437    Specimen: Blood Updated: 06/17/25 0534     Glucose 243 mg/dL      BUN 9.7 mg/dL      Creatinine 0.57 mg/dL      Sodium 136  mmol/L      Potassium 4.1 mmol/L      Chloride 99 mmol/L      CO2 23.0 mmol/L      Calcium 9.5 mg/dL      BUN/Creatinine Ratio 17.0     Anion Gap 14.0 mmol/L      eGFR 107.5 mL/min/1.73     Narrative:      GFR Categories in Chronic Kidney Disease (CKD)              GFR Category          GFR (mL/min/1.73)    Interpretation  G1                    90 or greater        Normal or high (1)  G2                    60-89                Mild decrease (1)  G3a                   45-59                Mild to moderate decrease  G3b                   30-44                Moderate to severe decrease  G4                    15-29                Severe decrease  G5                    14 or less           Kidney failure    (1)In the absence of evidence of kidney disease, neither GFR category G1 or G2 fulfill the criteria for CKD.    eGFR calculation 2021 CKD-EPI creatinine equation, which does not include race as a factor    Iron Profile w/o Ferritin [[id number]]  (Abnormal) Collected: 06/17/25 0437    Specimen: Blood Updated: 06/17/25 0534     Iron 50 mcg/dL      Iron Saturation (TSAT) 13 %      Transferrin 253 mg/dL      TIBC 377 mcg/dL     CBC & Differential [[id number]]  (Abnormal) Collected: 06/17/25 0437    Specimen: Blood Updated: 06/17/25 0503    Narrative:      The following orders were created for panel order CBC & Differential.  Procedure                               Abnormality         Status                     ---------                               -----------         ------                     CBC Auto Differential[[id number]]        Abnormal            Final result                 Please view results for these tests on the individual orders.    CBC Auto Differential [[id number]]  (Abnormal) Collected: 06/17/25 0437    Specimen: Blood Updated: 06/17/25 0503     WBC 9.52 10*3/mm3      RBC 4.02 10*6/mm3      Hemoglobin 11.6 g/dL      Hematocrit 35.9 %      MCV 89.3 fL      MCH 28.9 pg      MCHC 32.3 g/dL      RDW 13.6 %       RDW-SD 44.7 fl      MPV 10.1 fL      Platelets 286 10*3/mm3      Neutrophil % 72.6 %      Lymphocyte % 18.7 %      Monocyte % 7.8 %      Eosinophil % 0.3 %      Basophil % 0.3 %      Immature Grans % 0.3 %      Neutrophils, Absolute 6.91 10*3/mm3      Lymphocytes, Absolute 1.78 10*3/mm3      Monocytes, Absolute 0.74 10*3/mm3      Eosinophils, Absolute 0.03 10*3/mm3      Basophils, Absolute 0.03 10*3/mm3      Immature Grans, Absolute 0.03 10*3/mm3      nRBC 0.0 /100 WBC     POC Glucose Once [[id number]]  (Abnormal) Collected: 06/16/25 2007    Specimen: Blood Updated: 06/16/25 2018     Glucose 352 mg/dL      Comment: : [id number] Austin MarcyMeter ID: [id number]       POC Glucose Once [[id number]]  (Abnormal) Collected: 06/16/25 1622    Specimen: Blood Updated: 06/16/25 1641     Glucose 345 mg/dL      Comment: : [id number] Ken KimMeter ID: [id number]       POC Glucose Once [[id number]]  (Abnormal) Collected: 06/16/25 1050    Specimen: Blood Updated: 06/16/25 1102     Glucose 287 mg/dL      Comment: : [id number] aDvion Mendez  LMeter ID: [id number]       POC Glucose Once [[id number]]  (Abnormal) Collected: 06/16/25 0949    Specimen: Blood Updated: 06/16/25 1001     Glucose 258 mg/dL      Comment: : [id number] Jarod PauluaMeter ID: [id number]       POC Glucose Once [[id number]]  (Abnormal) Collected: 06/16/25 0907    Specimen: Blood Updated: 06/16/25 0927     Glucose 240 mg/dL      Comment: : [id number] Royal kaliMeter ID: [id number]       POC Glucose Once [[id number]]  (Abnormal) Collected: 06/16/25 0753    Specimen: Blood Updated: 06/16/25 0804     Glucose 269 mg/dL      Comment: : [id number] Saul WhitneyMeter ID: [id number]               Imaging Results (Last 72 Hours)       Procedure Component Value Units Date/Time    XR Spine Lumbar AP & Lateral [[id number]] Collected: 06/16/25 1209     Updated: 06/16/25 1212    Narrative:      XR SPINE LUMBAR AP AND LATERAL-      HISTORY: llif      COMPARISON: None     FLUOROSCOPY TIME: 20.9-second     FLUOROSCOPY DOSE: 25.9 mGy     NUMBER OF IMAGES: 4       Impression:         Intraoperative fluoroscopic images during lumbar fusion.     Please refer to the operative note for more details.     This report was signed and finalized on 6/16/2025 12:09 PM by Dr. Jaya Magallon MD.       FL C Arm During Surgery [[id number]] Resulted: 06/16/25 1050     Updated: 06/16/25 1050    Narrative:      This procedure was auto-finalized with no dictation required.                  Assessment:    Condition: In stable condition.  Improving.       Plan:   Transfer Plan: To the operating room for the second step of surgery.  NPO.   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  Will add Jardiance to her Glucophage, change diet to carb control when restarted.  Continue with Accu-Cheks sliding scale insulin.  Will be an excellent candidate for a GLP 1/2 in the future.    Hypertension-blood pressure stable wide fluctuations related to poor pain control.    Patient on her way down for second step of surgery will follow-up after surgery with discharge planning 1-2 days.    ).     Problem List:     Lumbar radiculopathy    Hypertension    Hyperlipidemia    Chronic bilateral low back pain with bilateral sciatica    Lumbar spinal stenosis    Drug-induced constipation    GERD without esophagitis    Class 3 severe obesity due to excess calories with serious comorbidity and body mass index (BMI) of 40.0 to 44.9 in adult    Diabetes mellitus    Neeraj Hinson MD  6/18/2025                                                 Electronically signed by Neeraj Hinson MD at 06/18/25 0785       Consult Notes (last 24 hours)  Notes from 06/17/25 1442 through 06/18/25 1442   No notes of this type exist for this encounter.

## 2025-06-18 NOTE — PROGRESS NOTES
Ayla Echevarria is a 54 y.o. female patient.    N.p.o. for surgery today.  Plan for second step.  Elevated blood sugars noted.      Current Facility-Administered Medications   Medication Dose Route Frequency Provider Last Rate Last Admin    [Transfer Hold] acetaminophen (TYLENOL) tablet 650 mg  650 mg Oral Q4H PRN PAMELA Bang MD        Or    [Transfer Hold] acetaminophen (TYLENOL) 160 MG/5ML oral solution 650 mg  650 mg Oral Q4H PRN PAMELA Bang MD        Or    [Transfer Hold] acetaminophen (TYLENOL) suppository 650 mg  650 mg Rectal Q4H PRN PAMELA Bang MD        [Transfer Hold] sennosides-docusate (PERICOLACE) 8.6-50 MG per tablet 2 tablet  2 tablet Oral BID PAMELA Bang MD   2 tablet at 06/17/25 2026    And    [Transfer Hold] polyethylene glycol (MIRALAX) packet 17 g  17 g Oral Daily PRN PAMELA Bang MD        And    [Transfer Hold] bisacodyl (DULCOLAX) EC tablet 5 mg  5 mg Oral Daily PRN PAMELA Bang MD        And    [Transfer Hold] bisacodyl (DULCOLAX) suppository 10 mg  10 mg Rectal Daily PRN PAMELA Bang MD        [Transfer Hold] citalopram (CeleXA) tablet 40 mg  40 mg Oral Daily PAMELA Bang MD   40 mg at 06/17/25 0848    clindamycin (CLEOCIN) 900 mg in dextrose 5% 50 mL IVPB (premix)  900 mg Intravenous Once PAMELA Bang MD        [Transfer Hold] cyclobenzaprine (FLEXERIL) tablet 10 mg  10 mg Oral TID PRN PAMELA Bang MD   10 mg at 06/17/25 0508    [Transfer Hold] dextrose (D50W) (25 g/50 mL) IV injection 25 g  25 g Intravenous Q15 Min PRN Neeraj Hinson MD        [Transfer Hold] dextrose (GLUTOSE) oral gel 15 g  15 g Oral Q15 Min PRN Neeraj Hinson MD        [START ON 6/19/2025] empagliflozin (JARDIANCE) tablet 10 mg  10 mg Oral Daily Neeraj Hinson MD        [Transfer Hold] glucagon (GLUCAGEN) injection 1 mg  1 mg Intramuscular Q15 Min PRN Neeraj Hinson MD        [Transfer Hold] HYDROcodone-acetaminophen  (NORCO)  MG per tablet 1 tablet  1 tablet Oral Q4H PRN PAMELA Bang MD   1 tablet at 06/17/25 2205    [Transfer Hold] HYDROcodone-acetaminophen (NORCO) 7.5-325 MG per tablet 1 tablet  1 tablet Oral Q4H PRN PAMELA Bang MD   1 tablet at 06/18/25 0212    [Transfer Hold] HYDROmorphone (DILAUDID) injection 0.5 mg  0.5 mg Intravenous Q3H PRN PAMELA Bang MD   0.5 mg at 06/17/25 0209    And    [Transfer Hold] naloxone (NARCAN) injection 0.4 mg  0.4 mg Intravenous Q5 Min PRN PAMELA Bang MD        [Transfer Hold] Insulin Lispro (humaLOG) injection 2-7 Units  2-7 Units Subcutaneous 4x Daily AC & at Bedtime Neeraj Hinson MD   3 Units at 06/17/25 2026    lactated ringers infusion  100 mL/hr Intravenous Continuous PRN PAEMLA Bang MD        lactated ringers infusion  100 mL/hr Intravenous Continuous PAMELA Bang  mL/hr at 06/18/25 0640 100 mL/hr at 06/18/25 0640    lactated ringers infusion  100 mL/hr Intravenous Continuous Reji Collins  mL/hr at 06/18/25 0640 100 mL/hr at 06/18/25 0640    lidocaine PF 1% (XYLOCAINE) injection 0.5 mL  0.5 mL Intradermal Once PRN PAMELA Bang MD        lidocaine PF 1% (XYLOCAINE) injection 0.5 mL  0.5 mL Intradermal Once PRN PAMELA Bang MD        lisinopril (PRINIVIL,ZESTRIL) tablet 40 mg  40 mg Oral Daily PAMELA Bang MD   40 mg at 06/17/25 0848    [Transfer Hold] metFORMIN (GLUCOPHAGE) tablet 1,000 mg  1,000 mg Oral BID With Meals PAMELA Bang MD   1,000 mg at 06/17/25 1754    Midazolam HCl (PF) (VERSED) injection 1 mg  1 mg Intravenous Q10 Min PRN Reji Collins MD        [Transfer Hold] ondansetron ODT (ZOFRAN-ODT) disintegrating tablet 4 mg  4 mg Oral Q6H PRN PAMELA Bang MD   4 mg at 06/17/25 1755    Or    [Transfer Hold] ondansetron (ZOFRAN) injection 4 mg  4 mg Intravenous Q6H PRN PAMELA Bang MD        [Transfer Hold] rosuvastatin (CRESTOR) tablet 20 mg  20 mg Oral Daily  "PAMELA Bang MD   20 mg at 06/17/25 0849    scopolamine patch 1 mg/72 hr  1 patch Transdermal Once Reji Collins MD   1 patch at 06/18/25 0654    [Transfer Hold] sodium chloride 0.9 % flush 10 mL  10 mL Intravenous PRN PAMELA Bang MD        sodium chloride 0.9 % flush 10 mL  10 mL Intravenous Q12H PAMELA Bang MD        sodium chloride 0.9 % flush 10 mL  10 mL Intravenous PRN PAMELA Bang MD        sodium chloride 0.9 % flush 10 mL  10 mL Intravenous PRN PAMELA Bang MD        [Transfer Hold] sodium chloride 0.9 % flush 3 mL  3 mL Intravenous Q12H PAMELA Bang MD   3 mL at 06/17/25 2027    sodium chloride 0.9 % flush 3 mL  3 mL Intravenous PRN PAMELA Bang MD        sodium chloride 0.9 % flush 3 mL  3 mL Intravenous Q12H Reji Collins MD        [Transfer Hold] sodium chloride 0.9 % infusion 40 mL  40 mL Intravenous PRN PAMELA Bang MD        sodium chloride 0.9 % infusion 40 mL  40 mL Intravenous PRN PAMELA Bang MD        sodium chloride 0.9 % infusion  50 mL/hr Intravenous Continuous PAMELA Bang MD        [Transfer Hold] venlafaxine XR (EFFEXOR-XR) 24 hr capsule 37.5 mg  37.5 mg Oral Daily PAMELA Bang MD   37.5 mg at 06/17/25 0848     ALLERGIES:    Allergies   Allergen Reactions    Ciprofloxacin Swelling     Throat swelling    Penicillins Swelling     Throat swelling    Cethexonium Unknown - Low Severity     Patient stated eye burning       Lumbar radiculopathy    Hypertension    Hyperlipidemia    Chronic bilateral low back pain with bilateral sciatica    Lumbar spinal stenosis    Drug-induced constipation    GERD without esophagitis    Class 3 severe obesity due to excess calories with serious comorbidity and body mass index (BMI) of 40.0 to 44.9 in adult    Diabetes mellitus    Blood pressure 163/63, pulse 97, temperature 98.2 °F (36.8 °C), temperature source Oral, resp. rate 16, height 163.5 cm (64.37\"), weight 132 kg (290 lb " "4.8 oz), last menstrual period 07/10/2024, SpO2 (!) 84%, not currently breastfeeding.      Subjective:  Symptoms:  Stable.    Diet:  NPO.    Activity level: Impaired due to pain.    Pain:  She complains of pain that is moderate.  She reports pain is unchanged.  Pain is partially controlled.      Review of Systems  Objective:  General Appearance:  Comfortable, well-appearing and in no acute distress.    Vital signs: (most recent): Blood pressure 163/63, pulse 97, temperature 98.2 °F (36.8 °C), temperature source Oral, resp. rate 16, height 163.5 cm (64.37\"), weight 132 kg (290 lb 4.8 oz), last menstrual period 07/10/2024, SpO2 (!) 84%, not currently breastfeeding.  Vital signs are normal.    Output: Producing urine and minimal stool output.    HEENT: Normal HEENT exam.    Lungs:  Normal effort and normal respiratory rate.    Heart: Normal rate.  Regular rhythm.    Abdomen: Abdomen is soft.  Hypoactive bowel sounds.   There is generalized tenderness.     Extremities: Decreased range of motion.    Skin:  Warm and dry.                Labs:  Lab Results (last 72 hours)       Procedure Component Value Units Date/Time    POC Glucose Once [[id number]]  (Abnormal) Collected: 06/17/25 1943    Specimen: Blood Updated: 06/17/25 1954     Glucose 230 mg/dL      Comment: : [id number] Kolbyrhonda Baker ID: [id number]       POC Glucose Once [[id number]]  (Abnormal) Collected: 06/17/25 1725    Specimen: Blood Updated: 06/17/25 1743     Glucose 254 mg/dL      Comment: : [id number] Herman LewisavilaMeter ID: [id number]       Vitamin B12 [[id number]]  (Normal) Collected: 06/17/25 0437    Specimen: Blood Updated: 06/17/25 1156     Vitamin B-12 566 pg/mL     Narrative:      Results may be falsely increased if patient taking Biotin.      Folate [[id number]]  (Normal) Collected: 06/17/25 0437    Specimen: Blood Updated: 06/17/25 1156     Folate >20.00 ng/mL     Narrative:      Results may be falsely increased if patient taking Biotin.   "    POC Glucose Once [[id number]]  (Abnormal) Collected: 06/17/25 1102    Specimen: Blood Updated: 06/17/25 1113     Glucose 319 mg/dL      Comment: : [id number] Herman LewisetMeter ID: [id number]       POC Glucose Once [[id number]]  (Abnormal) Collected: 06/17/25 0750    Specimen: Blood Updated: 06/17/25 0802     Glucose 280 mg/dL      Comment: : [id number] Herman LewisetMeter ID: [id number]       Basic Metabolic Panel [[id number]]  (Abnormal) Collected: 06/17/25 0437    Specimen: Blood Updated: 06/17/25 0534     Glucose 243 mg/dL      BUN 9.7 mg/dL      Creatinine 0.57 mg/dL      Sodium 136 mmol/L      Potassium 4.1 mmol/L      Chloride 99 mmol/L      CO2 23.0 mmol/L      Calcium 9.5 mg/dL      BUN/Creatinine Ratio 17.0     Anion Gap 14.0 mmol/L      eGFR 107.5 mL/min/1.73     Narrative:      GFR Categories in Chronic Kidney Disease (CKD)              GFR Category          GFR (mL/min/1.73)    Interpretation  G1                    90 or greater        Normal or high (1)  G2                    60-89                Mild decrease (1)  G3a                   45-59                Mild to moderate decrease  G3b                   30-44                Moderate to severe decrease  G4                    15-29                Severe decrease  G5                    14 or less           Kidney failure    (1)In the absence of evidence of kidney disease, neither GFR category G1 or G2 fulfill the criteria for CKD.    eGFR calculation 2021 CKD-EPI creatinine equation, which does not include race as a factor    Iron Profile w/o Ferritin [[id number]]  (Abnormal) Collected: 06/17/25 0437    Specimen: Blood Updated: 06/17/25 0534     Iron 50 mcg/dL      Iron Saturation (TSAT) 13 %      Transferrin 253 mg/dL      TIBC 377 mcg/dL     CBC & Differential [[id number]]  (Abnormal) Collected: 06/17/25 0437    Specimen: Blood Updated: 06/17/25 0503    Narrative:      The following orders were created for panel order CBC & Differential.  Procedure                                Abnormality         Status                     ---------                               -----------         ------                     CBC Auto Differential[[id number]]        Abnormal            Final result                 Please view results for these tests on the individual orders.    CBC Auto Differential [[id number]]  (Abnormal) Collected: 06/17/25 0437    Specimen: Blood Updated: 06/17/25 0503     WBC 9.52 10*3/mm3      RBC 4.02 10*6/mm3      Hemoglobin 11.6 g/dL      Hematocrit 35.9 %      MCV 89.3 fL      MCH 28.9 pg      MCHC 32.3 g/dL      RDW 13.6 %      RDW-SD 44.7 fl      MPV 10.1 fL      Platelets 286 10*3/mm3      Neutrophil % 72.6 %      Lymphocyte % 18.7 %      Monocyte % 7.8 %      Eosinophil % 0.3 %      Basophil % 0.3 %      Immature Grans % 0.3 %      Neutrophils, Absolute 6.91 10*3/mm3      Lymphocytes, Absolute 1.78 10*3/mm3      Monocytes, Absolute 0.74 10*3/mm3      Eosinophils, Absolute 0.03 10*3/mm3      Basophils, Absolute 0.03 10*3/mm3      Immature Grans, Absolute 0.03 10*3/mm3      nRBC 0.0 /100 WBC     POC Glucose Once [[id number]]  (Abnormal) Collected: 06/16/25 2007    Specimen: Blood Updated: 06/16/25 2018     Glucose 352 mg/dL      Comment: : [id number] Austin ManojyMeter ID: [id number]       POC Glucose Once [[id number]]  (Abnormal) Collected: 06/16/25 1622    Specimen: Blood Updated: 06/16/25 1641     Glucose 345 mg/dL      Comment: : [id number] Ken MaherMeter ID: [id number]       POC Glucose Once [[id number]]  (Abnormal) Collected: 06/16/25 1050    Specimen: Blood Updated: 06/16/25 1102     Glucose 287 mg/dL      Comment: : [id number] Davion Mendez LMeter ID: [id number]       POC Glucose Once [[id number]]  (Abnormal) Collected: 06/16/25 0949    Specimen: Blood Updated: 06/16/25 1001     Glucose 258 mg/dL      Comment: : [id number] Jarod JoshuaMeter ID: [id number]       POC Glucose Once [[id number]]  (Abnormal) Collected: 06/16/25 0907     Specimen: Blood Updated: 06/16/25 0927     Glucose 240 mg/dL      Comment: : [id number] Royal kaliMeter ID: [id number]       POC Glucose Once [[id number]]  (Abnormal) Collected: 06/16/25 0753    Specimen: Blood Updated: 06/16/25 0804     Glucose 269 mg/dL      Comment: : [id number] Saul Armandoer ID: [id number]               Imaging Results (Last 72 Hours)       Procedure Component Value Units Date/Time    XR Spine Lumbar AP & Lateral [[id number]] Collected: 06/16/25 1209     Updated: 06/16/25 1212    Narrative:      XR SPINE LUMBAR AP AND LATERAL-      HISTORY: llif     COMPARISON: None     FLUOROSCOPY TIME: 20.9-second     FLUOROSCOPY DOSE: 25.9 mGy     NUMBER OF IMAGES: 4       Impression:         Intraoperative fluoroscopic images during lumbar fusion.     Please refer to the operative note for more details.     This report was signed and finalized on 6/16/2025 12:09 PM by Dr. Jaya Magallon MD.       FL C Arm During Surgery [[id number]] Resulted: 06/16/25 1050     Updated: 06/16/25 1050    Narrative:      This procedure was auto-finalized with no dictation required.                  Assessment:    Condition: In stable condition.  Improving.       Plan:   Transfer Plan: To the operating room for the second step of surgery.  NPO.   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  Will add Jardiance to her Glucophage, change diet to carb control when restarted.  Continue with Accu-Cheks sliding scale insulin.  Will be an excellent candidate for a GLP 1/2 in the future.    Hypertension-blood pressure stable wide fluctuations related to poor pain control.    Patient on her way down for second step of surgery will follow-up after surgery with discharge planning 1-2 days.    ).     Problem List:     Lumbar radiculopathy    Hypertension    Hyperlipidemia    Chronic bilateral low back pain with bilateral sciatica    Lumbar spinal stenosis    Drug-induced  constipation    GERD without esophagitis    Class 3 severe obesity due to excess calories with serious comorbidity and body mass index (BMI) of 40.0 to 44.9 in adult    Diabetes mellitus    Neeraj Hinson MD  6/18/2025

## 2025-06-18 NOTE — ANESTHESIA PROCEDURE NOTES
Airway  Reason: elective    Date/Time: 6/18/2025 7:43 AM    General Information and Staff    Patient location during procedure: OR  CRNA/CAA: Donnell Petty CRNA    Indications and Patient Condition  Indications for airway management: airway protection    Preoxygenated: yes    Mask difficulty assessment: 0 - not attempted    Final Airway Details    Final airway type: endotracheal airway      Successful airway: ETT  Cuffed: yes   Successful intubation technique: video laryngoscopy  Blade: Magdaleno  Blade size: 4  ETT size (mm): 7.5  Cormack-Lehane Classification: grade I - full view of glottis  Placement verified by: chest auscultation and capnometry   Cuff volume (mL): 6  Measured from: teeth  ETT/EBT  to teeth (cm): 22  Number of attempts at approach: 1  Assessment: lips, teeth, and gum same as pre-op and atraumatic intubation

## 2025-06-18 NOTE — PLAN OF CARE
Problem: Adult Inpatient Plan of Care  Goal: Plan of Care Review  Outcome: Progressing  Flowsheets (Taken 6/18/2025 4615)  Progress: improving  Outcome Evaluation: Patient A&Ox4, VSS, RA, Patient ambulated in the addison during the night, with LSO brace and tolerated well. Medicated for pain as needed. Patient NPO at 0000 for 2nd surgery 6/18. All safety maintained and call light in reach, family at bedside.  Plan of Care Reviewed With: patient

## 2025-06-18 NOTE — ANESTHESIA PREPROCEDURE EVALUATION
Anesthesia Evaluation     history of anesthetic complications:  PONV  NPO Solid Status: > 8 hours  NPO Liquid Status: > 8 hours           Airway   Mallampati: II  No difficulty expected  Dental      Pulmonary    (+) a smoker Former, asthma,sleep apnea on CPAP  Cardiovascular   Exercise tolerance: poor (<4 METS)    (+) hypertension, hyperlipidemia      Neuro/Psych  GI/Hepatic/Renal/Endo    (+) morbid obesity, GERD, diabetes mellitus    Musculoskeletal     Abdominal    Substance History      OB/GYN          Other   arthritis,       Other Comment: Psoriasis, psoriatic arthritis    Glaucoma                  Anesthesia Plan    ASA 3     general     (Poorly controlled glucose during this admission (multistage sgx)     Numb tongue discussed)  intravenous induction     Anesthetic plan, risks, benefits, and alternatives have been provided, discussed and informed consent has been obtained with: patient.      CODE STATUS:    Code Status (Patient has no pulse and is not breathing): CPR (Attempt to Resuscitate)  Medical Interventions (Patient has pulse or is breathing): Full Support

## 2025-06-19 LAB
GLUCOSE BLDC GLUCOMTR-MCNC: 174 MG/DL (ref 70–130)
GLUCOSE BLDC GLUCOMTR-MCNC: 196 MG/DL (ref 70–130)
GLUCOSE BLDC GLUCOMTR-MCNC: 226 MG/DL (ref 70–130)
GLUCOSE BLDC GLUCOMTR-MCNC: 228 MG/DL (ref 70–130)

## 2025-06-19 PROCEDURE — 25010000002 ONDANSETRON PER 1 MG: Performed by: ORTHOPAEDIC SURGERY

## 2025-06-19 PROCEDURE — 25010000002 HYDROMORPHONE PER 4 MG: Performed by: ORTHOPAEDIC SURGERY

## 2025-06-19 PROCEDURE — 25810000003 SODIUM CHLORIDE 0.9 % SOLUTION: Performed by: ORTHOPAEDIC SURGERY

## 2025-06-19 PROCEDURE — 82948 REAGENT STRIP/BLOOD GLUCOSE: CPT

## 2025-06-19 PROCEDURE — 63710000001 INSULIN LISPRO (HUMAN) PER 5 UNITS: Performed by: ORTHOPAEDIC SURGERY

## 2025-06-19 RX ADMIN — LISINOPRIL 40 MG: 20 TABLET ORAL at 09:16

## 2025-06-19 RX ADMIN — CYCLOBENZAPRINE HYDROCHLORIDE 10 MG: 10 TABLET, FILM COATED ORAL at 21:37

## 2025-06-19 RX ADMIN — METFORMIN HYDROCHLORIDE 1000 MG: 500 TABLET ORAL at 21:37

## 2025-06-19 RX ADMIN — CITALOPRAM 40 MG: 20 TABLET, FILM COATED ORAL at 09:16

## 2025-06-19 RX ADMIN — HYDROCODONE BITARTRATE AND ACETAMINOPHEN 1 TABLET: 10; 325 TABLET ORAL at 16:41

## 2025-06-19 RX ADMIN — Medication 3 ML: at 09:23

## 2025-06-19 RX ADMIN — HYDROCODONE BITARTRATE AND ACETAMINOPHEN 1 TABLET: 7.5; 325 TABLET ORAL at 21:37

## 2025-06-19 RX ADMIN — INSULIN LISPRO 3 UNITS: 100 INJECTION, SOLUTION INTRAVENOUS; SUBCUTANEOUS at 12:14

## 2025-06-19 RX ADMIN — SODIUM CHLORIDE 50 ML/HR: 9 INJECTION, SOLUTION INTRAVENOUS at 09:22

## 2025-06-19 RX ADMIN — HYDROCODONE BITARTRATE AND ACETAMINOPHEN 1 TABLET: 10; 325 TABLET ORAL at 12:19

## 2025-06-19 RX ADMIN — ONDANSETRON 4 MG: 2 INJECTION INTRAMUSCULAR; INTRAVENOUS at 12:14

## 2025-06-19 RX ADMIN — INSULIN LISPRO 3 UNITS: 100 INJECTION, SOLUTION INTRAVENOUS; SUBCUTANEOUS at 08:10

## 2025-06-19 RX ADMIN — NALOXEGOL OXALATE 25 MG: 25 TABLET, FILM COATED ORAL at 09:16

## 2025-06-19 RX ADMIN — Medication 3 ML: at 21:38

## 2025-06-19 RX ADMIN — METFORMIN HYDROCHLORIDE 1000 MG: 500 TABLET ORAL at 08:10

## 2025-06-19 RX ADMIN — HYDROCODONE BITARTRATE AND ACETAMINOPHEN 1 TABLET: 10; 325 TABLET ORAL at 05:42

## 2025-06-19 RX ADMIN — ROSUVASTATIN CALCIUM 20 MG: 20 TABLET, FILM COATED ORAL at 09:16

## 2025-06-19 RX ADMIN — VENLAFAXINE HYDROCHLORIDE 37.5 MG: 37.5 CAPSULE, EXTENDED RELEASE ORAL at 09:16

## 2025-06-19 RX ADMIN — INSULIN LISPRO 2 UNITS: 100 INJECTION, SOLUTION INTRAVENOUS; SUBCUTANEOUS at 16:40

## 2025-06-19 RX ADMIN — HYDROMORPHONE HYDROCHLORIDE 0.5 MG: 1 INJECTION, SOLUTION INTRAMUSCULAR; INTRAVENOUS; SUBCUTANEOUS at 08:12

## 2025-06-19 RX ADMIN — EMPAGLIFLOZIN 10 MG: 10 TABLET, FILM COATED ORAL at 09:16

## 2025-06-19 RX ADMIN — CYCLOBENZAPRINE HYDROCHLORIDE 10 MG: 10 TABLET, FILM COATED ORAL at 05:42

## 2025-06-19 RX ADMIN — INSULIN LISPRO 2 UNITS: 100 INJECTION, SOLUTION INTRAVENOUS; SUBCUTANEOUS at 21:37

## 2025-06-19 NOTE — PROGRESS NOTES
Ayla Echevarria is a 54 y.o. female patient.      Postop day #1 for second step of surgery.  Sitting up in chair, ambulated in hallway yesterday.   at bedside.      Current Facility-Administered Medications   Medication Dose Route Frequency Provider Last Rate Last Admin    acetaminophen (TYLENOL) tablet 650 mg  650 mg Oral Q4H PRN PAMELA Bang MD        Or    acetaminophen (TYLENOL) 160 MG/5ML oral solution 650 mg  650 mg Oral Q4H PRN PAMELA Bang MD        Or    acetaminophen (TYLENOL) suppository 650 mg  650 mg Rectal Q4H PRN PAMELA Bang MD        sennosides-docusate (PERICOLACE) 8.6-50 MG per tablet 2 tablet  2 tablet Oral BID PAMELA Bang MD   2 tablet at 06/18/25 2136    And    polyethylene glycol (MIRALAX) packet 17 g  17 g Oral Daily PRN PAMELA Bang MD        And    bisacodyl (DULCOLAX) EC tablet 5 mg  5 mg Oral Daily PRN PAMELA Bang MD        And    bisacodyl (DULCOLAX) suppository 10 mg  10 mg Rectal Daily PRN PAMELA Bang MD        citalopram (CeleXA) tablet 40 mg  40 mg Oral Daily PAMELA Bang MD   40 mg at 06/18/25 1309    cyclobenzaprine (FLEXERIL) tablet 10 mg  10 mg Oral TID PRN PAMELA Bang MD   10 mg at 06/19/25 0542    dextrose (D50W) (25 g/50 mL) IV injection 25 g  25 g Intravenous Q15 Min PRN PAMELA Bang MD        dextrose (GLUTOSE) oral gel 15 g  15 g Oral Q15 Min PRN PAMELA Bang MD        empagliflozin (JARDIANCE) tablet 10 mg  10 mg Oral Daily Neeraj Hinson MD        glucagon (GLUCAGEN) injection 1 mg  1 mg Intramuscular Q15 Min PRN PAMELA Bang MD        HYDROcodone-acetaminophen (NORCO)  MG per tablet 1 tablet  1 tablet Oral Q4H PRN PAMELA Bang MD   1 tablet at 06/19/25 0542    HYDROcodone-acetaminophen (NORCO) 7.5-325 MG per tablet 1 tablet  1 tablet Oral Q4H PRN PAMELA Bang MD   1 tablet at 06/18/25 0212    HYDROmorphone (DILAUDID) injection 0.5 mg  0.5 mg  Intravenous Q3H PRN PAMELA Bang MD   0.5 mg at 06/19/25 0812    And    naloxone (NARCAN) injection 0.4 mg  0.4 mg Intravenous Q5 Min PRN PAMELA Bang MD        Insulin Lispro (humaLOG) injection 2-7 Units  2-7 Units Subcutaneous 4x Daily AC & at Bedtime PAMELA Bang MD   3 Units at 06/19/25 0810    lisinopril (PRINIVIL,ZESTRIL) tablet 40 mg  40 mg Oral Daily PAMELA Bang MD   40 mg at 06/18/25 1259    metFORMIN (GLUCOPHAGE) tablet 1,000 mg  1,000 mg Oral BID With Meals PAMELA Bang MD   1,000 mg at 06/19/25 0810    ondansetron ODT (ZOFRAN-ODT) disintegrating tablet 4 mg  4 mg Oral Q6H PRN PAMELA Bang MD   4 mg at 06/17/25 1755    Or    ondansetron (ZOFRAN) injection 4 mg  4 mg Intravenous Q6H PRN PAMELA Bang MD        rosuvastatin (CRESTOR) tablet 20 mg  20 mg Oral Daily PAMELA Bang MD   20 mg at 06/18/25 1306    sodium chloride 0.9 % flush 10 mL  10 mL Intravenous PRN PAMELA Bang MD   10 mL at 06/18/25 1301    sodium chloride 0.9 % flush 3 mL  3 mL Intravenous Q12H PAMELA Bang MD   3 mL at 06/18/25 2137    sodium chloride 0.9 % infusion 40 mL  40 mL Intravenous PRN PAMELA Bang MD        sodium chloride 0.9 % infusion  50 mL/hr Intravenous Continuous PAMELA Bang MD 50 mL/hr at 06/18/25 1309 50 mL/hr at 06/18/25 1309    venlafaxine XR (EFFEXOR-XR) 24 hr capsule 37.5 mg  37.5 mg Oral Daily PAMELA Bang MD   37.5 mg at 06/17/25 0848     ALLERGIES:    Allergies   Allergen Reactions    Ciprofloxacin Swelling     Throat swelling    Penicillins Swelling     Throat swelling    Cethexonium Unknown - Low Severity     Patient stated eye burning       Lumbar radiculopathy    Hypertension    Hyperlipidemia    Chronic bilateral low back pain with bilateral sciatica    Lumbar spinal stenosis    Drug-induced constipation    GERD without esophagitis    Class 3 severe obesity due to excess calories with serious comorbidity and body mass  "index (BMI) of 40.0 to 44.9 in adult    Diabetes mellitus    Blood pressure 153/65, pulse 89, temperature 98.4 °F (36.9 °C), temperature source Oral, resp. rate 16, height 163.5 cm (64.37\"), weight 132 kg (290 lb 4.8 oz), last menstrual period 07/10/2024, SpO2 95%, not currently breastfeeding.      Subjective:  Symptoms:  Stable.  She reports weakness and anxiety.    Diet:  Poor intake.    Activity level: Impaired due to pain.    Pain:  She complains of pain that is moderate.  She reports pain is unchanged.  Pain is partially controlled.      Review of Systems   Neurological:  Positive for weakness.     Objective:  General Appearance:  Comfortable and well-appearing.    Vital signs: (most recent): Blood pressure 153/65, pulse 89, temperature 98.4 °F (36.9 °C), temperature source Oral, resp. rate 16, height 163.5 cm (64.37\"), weight 132 kg (290 lb 4.8 oz), last menstrual period 07/10/2024, SpO2 95%, not currently breastfeeding.  Vital signs are normal.    Output: Producing urine and minimal stool output.    HEENT: Normal HEENT exam.    Lungs:  Normal effort and normal respiratory rate.    Heart: Normal rate.  Regular rhythm.    Abdomen: Abdomen is soft.  Hypoactive bowel sounds.   There is generalized tenderness.     Skin:  Warm and dry.                Labs:  Lab Results (last 72 hours)       Procedure Component Value Units Date/Time    POC Glucose Once [[id number]]  (Abnormal) Collected: 06/19/25 0800    Specimen: Blood Updated: 06/19/25 0812     Glucose 228 mg/dL      Comment: : [id number] Ec[facility]AMeter ID: [id number]       POC Glucose Once [[id number]]  (Abnormal) Collected: 06/18/25 1952    Specimen: Blood Updated: 06/18/25 2003     Glucose 268 mg/dL      Comment: : [id number] [facility] [id number]       POC Glucose Once [[id number]]  (Abnormal) Collected: 06/18/25 1620    Specimen: Blood Updated: 06/18/25 1631     Glucose 318 mg/dL      Comment: : [id number] [employer] ID: " [id number]       POC Glucose Once [[id number]]  (Abnormal) Collected: 06/18/25 1214    Specimen: Blood Updated: 06/18/25 1240     Glucose 322 mg/dL      Comment: : [id number] Adalberto NaikMeter ID: [id number]       POC Glucose Once [[id number]]  (Abnormal) Collected: 06/18/25 0920    Specimen: Blood Updated: 06/18/25 0932     Glucose 236 mg/dL      Comment: : [id number] Jarod LombardiMeter ID: [id number]       POC Glucose Once [[id number]]  (Abnormal) Collected: 06/17/25 1943    Specimen: Blood Updated: 06/17/25 1954     Glucose 230 mg/dL      Comment: : [id number] Olayinka Baker ID: [id number]       POC Glucose Once [[id number]]  (Abnormal) Collected: 06/17/25 1725    Specimen: Blood Updated: 06/17/25 1743     Glucose 254 mg/dL      Comment: : [id number] Herman LewisetMeter ID: [id number]       Vitamin B12 [[id number]]  (Normal) Collected: 06/17/25 0437    Specimen: Blood Updated: 06/17/25 1156     Vitamin B-12 566 pg/mL     Narrative:      Results may be falsely increased if patient taking Biotin.      Folate [[id number]]  (Normal) Collected: 06/17/25 0437    Specimen: Blood Updated: 06/17/25 1156     Folate >20.00 ng/mL     Narrative:      Results may be falsely increased if patient taking Biotin.      POC Glucose Once [[id number]]  (Abnormal) Collected: 06/17/25 1102    Specimen: Blood Updated: 06/17/25 1113     Glucose 319 mg/dL      Comment: : [id number] Herman LewisetMeter ID: [id number]       POC Glucose Once [[id number]]  (Abnormal) Collected: 06/17/25 0750    Specimen: Blood Updated: 06/17/25 0802     Glucose 280 mg/dL      Comment: : [id number] Herman LewisetMeter ID: [id number]       Basic Metabolic Panel [[id number]]  (Abnormal) Collected: 06/17/25 0437    Specimen: Blood Updated: 06/17/25 0534     Glucose 243 mg/dL      BUN 9.7 mg/dL      Creatinine 0.57 mg/dL      Sodium 136 mmol/L      Potassium 4.1 mmol/L      Chloride 99 mmol/L      CO2 23.0 mmol/L      Calcium 9.5 mg/dL       BUN/Creatinine Ratio 17.0     Anion Gap 14.0 mmol/L      eGFR 107.5 mL/min/1.73     Narrative:      GFR Categories in Chronic Kidney Disease (CKD)              GFR Category          GFR (mL/min/1.73)    Interpretation  G1                    90 or greater        Normal or high (1)  G2                    60-89                Mild decrease (1)  G3a                   45-59                Mild to moderate decrease  G3b                   30-44                Moderate to severe decrease  G4                    15-29                Severe decrease  G5                    14 or less           Kidney failure    (1)In the absence of evidence of kidney disease, neither GFR category G1 or G2 fulfill the criteria for CKD.    eGFR calculation 2021 CKD-EPI creatinine equation, which does not include race as a factor    Iron Profile w/o Ferritin [[id number]]  (Abnormal) Collected: 06/17/25 0437    Specimen: Blood Updated: 06/17/25 0534     Iron 50 mcg/dL      Iron Saturation (TSAT) 13 %      Transferrin 253 mg/dL      TIBC 377 mcg/dL     CBC & Differential [[id number]]  (Abnormal) Collected: 06/17/25 0437    Specimen: Blood Updated: 06/17/25 0503    Narrative:      The following orders were created for panel order CBC & Differential.  Procedure                               Abnormality         Status                     ---------                               -----------         ------                     CBC Auto Differential[[id number]]        Abnormal            Final result                 Please view results for these tests on the individual orders.    CBC Auto Differential [[id number]]  (Abnormal) Collected: 06/17/25 0437    Specimen: Blood Updated: 06/17/25 0503     WBC 9.52 10*3/mm3      RBC 4.02 10*6/mm3      Hemoglobin 11.6 g/dL      Hematocrit 35.9 %      MCV 89.3 fL      MCH 28.9 pg      MCHC 32.3 g/dL      RDW 13.6 %      RDW-SD 44.7 fl      MPV 10.1 fL      Platelets 286 10*3/mm3      Neutrophil % 72.6 %      Lymphocyte %  18.7 %      Monocyte % 7.8 %      Eosinophil % 0.3 %      Basophil % 0.3 %      Immature Grans % 0.3 %      Neutrophils, Absolute 6.91 10*3/mm3      Lymphocytes, Absolute 1.78 10*3/mm3      Monocytes, Absolute 0.74 10*3/mm3      Eosinophils, Absolute 0.03 10*3/mm3      Basophils, Absolute 0.03 10*3/mm3      Immature Grans, Absolute 0.03 10*3/mm3      nRBC 0.0 /100 WBC     POC Glucose Once [[id number]]  (Abnormal) Collected: 06/16/25 2007    Specimen: Blood Updated: 06/16/25 2018     Glucose 352 mg/dL      Comment: : [id number] Austin MarcyMeter ID: [id number]       POC Glucose Once [[id number]]  (Abnormal) Collected: 06/16/25 1622    Specimen: Blood Updated: 06/16/25 1641     Glucose 345 mg/dL      Comment: : [id number] Ken KimMeter ID: [id number]       POC Glucose Once [[id number]]  (Abnormal) Collected: 06/16/25 1050    Specimen: Blood Updated: 06/16/25 1102     Glucose 287 mg/dL      Comment: : [id number] Davion Mendez  LMeter ID: [id number]       POC Glucose Once [[id number]]  (Abnormal) Collected: 06/16/25 0949    Specimen: Blood Updated: 06/16/25 1001     Glucose 258 mg/dL      Comment: : [id number] Jarod PauluaMeter ID: [id number]       POC Glucose Once [[id number]]  (Abnormal) Collected: 06/16/25 0907    Specimen: Blood Updated: 06/16/25 0927     Glucose 240 mg/dL      Comment: : [id number] Royal kaliMeter ID: [id number]               Imaging Results (Last 72 Hours)       Procedure Component Value Units Date/Time    XR Spine Lumbar AP & Lateral [[id number]] Collected: 06/18/25 0919     Updated: 06/18/25 0922    Narrative:      XR SPINE LUMBAR AP AND LATERAL-      HISTORY: fusion; Z74.09-Other reduced mobility     COMPARISON: None     FLUOROSCOPY TIME: 14.3 sec     FLUOROSCOPY DOSE: 20.8 mGy     NUMBER OF IMAGES: 2       Impression:         Intraoperative fluoroscopic images during lumbar fusion.     Please refer to the operative note for more details.     This report was signed and  finalized on 6/18/2025 9:19 AM by Dr. Jaya Magallon MD.       FL C Arm During Surgery [[id number]] Resulted: 06/18/25 0857     Updated: 06/18/25 0857    Narrative:      This procedure was auto-finalized with no dictation required.    XR Spine Lumbar AP & Lateral [[id number]] Collected: 06/16/25 1209     Updated: 06/16/25 1212    Narrative:      XR SPINE LUMBAR AP AND LATERAL-      HISTORY: llif     COMPARISON: None     FLUOROSCOPY TIME: 20.9-second     FLUOROSCOPY DOSE: 25.9 mGy     NUMBER OF IMAGES: 4       Impression:         Intraoperative fluoroscopic images during lumbar fusion.     Please refer to the operative note for more details.     This report was signed and finalized on 6/16/2025 12:09 PM by Dr. Jaya Magallon MD.       FL C Arm During Surgery [[id number]] Resulted: 06/16/25 1050     Updated: 06/16/25 1050    Narrative:      This procedure was auto-finalized with no dictation required.                  Assessment:    Condition: In stable condition.  Improving.       Plan:   Discharge home.  Encourage ambulation and per physical therapy.   (    Constipation/obstipation-will add Movantik to stool softeners and the Dulcolax for now.  Discussed with family least amount of pain medication most amount of activity.    Type 2 diabetes-continue home medications we did add some Jardiance to help control her sugars.  She would be an excellent candidate for a GLP 1/2 on an outpatient basis with a BMI of 50.  Once her GI tract gets straightened out after pain medication anesthesia would recommend starting on an outpatient basis certainly defer to her primary care.    Hypertension-blood pressure stable.    Medically stable for discharge home and cleared by spine surgery.    Explained to patient and staff that we were consulted for medical management during their acute care hospitalization. The Medical Consultation was requested by the Attending Physician. The patient has been recommended to f/u with their regular  primary care provider concerning any further treatment and review of abnormalities found during their hospitalization at [facility]. They agree with the treatment plan as well as understand our role in their hospitalization. All questions were encouraged and answered to the best of my ability. ).     Problem List:     Lumbar radiculopathy    Hypertension    Hyperlipidemia    Chronic bilateral low back pain with bilateral sciatica    Lumbar spinal stenosis    Drug-induced constipation    GERD without esophagitis    Class 3 severe obesity due to excess calories with serious comorbidity and body mass index (BMI) of 40.0 to 44.9 in adult    Diabetes mellitus    Neeraj Hinson MD  6/19/2025

## 2025-06-19 NOTE — PROGRESS NOTES
Ayla Echevarria  54 y.o.  female  Subjective     Post-Operative Day # 3/1    Systemic or Specific Complaints:     Pt c/o increased pain throughout the night. Pt states that she was unaware that pain meds were PRN instead of scheduled and therefore she missed a dose. I disscused staying on top of the medication today and the patient plans to do so. Otherwise no new complaints. Pt will work with PT today. Pending improvement she is agreeable with DC home today vs tomorrow.    Objective     Vital signs in last 24 hours:  Temp:  [98 °F (36.7 °C)-99 °F (37.2 °C)] 98.4 °F (36.9 °C)  Heart Rate:  [] 89  Resp:  [16] 16  BP: (117-153)/(48-76) 153/65    Physical Exam:    Alert and oriented ×3, no acute distress, grossly neurovascularly intact, vital signs stable, dressing clean dry and intact, moving all extremities without focal deficit    Diagnostic Data:  CBC:  Results from last 7 days   Lab Units 06/17/25  0437   WBC 10*3/mm3 9.52   RBC 10*6/mm3 4.02   HEMOGLOBIN g/dL 11.6*   HEMATOCRIT % 35.9   PLATELETS 10*3/mm3 286          Assessment & Plan     1. Psoriasis.   2. Status post anterior decompression, ALIF with instrumentation L5-S1, 01/17/2020   3. Status post right LLIF with instrumentation L4-5, 01/22/2020   4. Status post PSF with instrumentation L4-S1, 01/24/2020   5. Broken right S1 pedicle screw   6. Status post left LLIF with instrumentation L3-4, 07/05/2023    7. Status post partial LORA L4-S1, PSF L3-4 with instrumentation L3-S1, 07/07/2023    8. Recurrent low back pain    9. Bilateral anterior and lateral thigh radiculopathy    10. Recurrent neurogenic claudication    11. Severe adjacent level degenerative disc disease L2-3    12. Severe adjacent level facet arthropathy L2-3    13. Large right central disc herniation L2-3    14. Severe central and bilateral foraminal stenosis L2-3    15. Obesity, BMI 48  16.  Status post left LLIF with instrumentation L2-3, 6/16/2025  17. Status post partial LORA, EOF  L3-4, PSF L2-3, posterior spinal instrumentation L2-4, 6/18/2025    Plan:  1. PT/OT/Brace  2. Periops  3. Probably home today vs tomorrow        Duc Mejia PA-C    Date: 6/19/2025  Time: 08:20 CDT

## 2025-06-19 NOTE — PLAN OF CARE
Goal Outcome Evaluation:      A&O x 4. Pain managed with medication vss on room air. Patient up in room and in the hallway with LSO brace. Tolerated well. Medication given for opoid induced constipation as patient reports last bowel movement was 6/15. Patient is currently resting comfortably with no signs of pain.

## 2025-06-19 NOTE — NURSING NOTE
Patient A&O x 4. On room air vss. Medicated for pain as needed. Medication given for opoid induced constipation. No bowel movement yet. Up in room and Ambulated in the addison with LSO brace on patient tolerated well. Currently resting with no outward signs of pain or discomfort.

## 2025-06-19 NOTE — PLAN OF CARE
Goal Outcome Evaluation:  Plan of Care Reviewed With: patient        Progress: improving  Outcome Evaluation: . A&Ox4. VSS on RA. CPAP used at night. Up SBA with LSO to BR. Spouse helpful. IVF infusing. Dressing to back with small amount of marked drainage. No c/o N/T noted. Some c/o pain with PRN medication given. Pt ambulated in the addison this evening. Call light within reach. Safety maintained.

## 2025-06-19 NOTE — THERAPY DISCHARGE NOTE
Patient Name: Ayla Echevarria  : 1970    MRN: [id number]                              Today's Date: 2025       Admit Date: 2025    Visit Dx:     ICD-10-CM ICD-9-CM   1. Impaired mobility [Z74.09]  Z74.09 799.89     Patient Active Problem List   Diagnosis    Hypertension    Hyperlipidemia    Disc degeneration, lumbosacral    Chronic bilateral low back pain with bilateral sciatica    Lumbar spinal stenosis    Lumbar stenosis    Drug-induced constipation    GERD without esophagitis    Sleep apnea    Class 3 severe obesity due to excess calories with serious comorbidity and body mass index (BMI) of 40.0 to 44.9 in adult    Diabetes mellitus    Lumbar radiculopathy     Past Medical History:   Diagnosis Date    Asthma     Carpal tunnel syndrome     Chronic bilateral low back pain with bilateral sciatica 2020    Depression 2019    Back problems    Diabetes mellitus     Pre diabetes    Disc degeneration, lumbosacral 2020    Glaucoma 2018    watching it, script given for prevention    Hyperlipidemia     Hypertension     Mononucleosis     In high school    Nerve pain     PONV (postoperative nausea and vomiting)     Psoriasis     Psoriatic arthritis     SI (sacroiliac) joint inflammation     was going to [facility] for injections    Sleep apnea     bi-pap     Past Surgical History:   Procedure Laterality Date    ANTERIOR LUMBAR EXPOSURE N/A 2020    Procedure: ANTERIOR LUMBAR EXPOSURE;  Surgeon: Salvatore Villalta DO;  Location:  [facility] OR;  Service: Vascular    CARPAL TUNNEL RELEASE Bilateral      SECTION      x2    CHOLECYSTECTOMY      HARDWARE REMOVAL N/A 2025    Procedure: PARTIAL REMOVAL OF INSTRUMENTATION, EXPLORATION OF FUSION RIGHT L3-4, POSTERIOR SPINAL FUSION L2-3 WITH INSTRUMENTATION L2-4;  Surgeon: PAMELA Bang MD;  Location:  [facility] OR;  Service: Orthopedic Spine;  Laterality: N/A;    LUMBAR FUSION N/A 2020    Procedure: ANTERIOR DECOMPRESSION,  ANTERIOR LUMBAR INTERBODY FUSION WITH INSTRUMENTATION L5-S1;  Surgeon: PAMELA Bang MD;  Location:  [facility] OR;  Service: Orthopedic Spine    LUMBAR FUSION Right 01/22/2020    Procedure: RIGHT  LATERAL LUMBAR  INTERBODY FUSION WITH INSTRUMENTATION L4-5;  Surgeon: PAMELA Bang MD;  Location:  [facility];  Service: Orthopedic Spine    LUMBAR FUSION Left 07/05/2023    Procedure: LEFT LATERAL LUMBAR INTERBODY WITH INSTRUMENTATION L3-4;  Surgeon: PAMELA Bang MD;  Location:  [facility];  Service: Orthopedic Spine;  Laterality: Left;    LUMBAR LAMINECTOMY WITH FUSION N/A 01/24/2020    Procedure: POSTERIOR SPINAL FUSION WITH INSTRUMENTATION L4-S1;  Surgeon: PAMELA Bang MD;  Location:  [facility];  Service: Orthopedic Spine    LUMBAR LAMINECTOMY WITH FUSION N/A 07/07/2023    Procedure: REMOVAL OF INSTRUMENTATION, EXPLORATION OF FUSION L4-S1, POSTERIOR SPINAL FUSION L3-4 WITH INSTRUMENTATION L3-S1;  Surgeon: PAMELA Bang MD;  Location:  [facility];  Service: Orthopedic Spine;  Laterality: N/A;    LUMBAR LAMINECTOMY WITH FUSION N/A 6/18/2025    Procedure: EXPLORATION OF FUSION RIGHT L3-4, POSTERIOR SPINAL FUSION L2-3 WITH INSTRUMENTATION L2-4;  Surgeon: PAMELA Bang MD;  Location:  [facility];  Service: Orthopedic Spine;  Laterality: N/A;    REPLACEMENT TOTAL KNEE Left 03/14/2023    REPLACEMENT TOTAL KNEE Right 12/2023      General Information    No documentation.                  Mobility    No documentation.                  Obj/Interventions    No documentation.                  Goals/Plan    No documentation.                  Clinical Impression       Row Name 06/19/25 1459          Therapy Assessment/Plan (PT)    Criteria for Skilled Interventions Met (PT) no problems identified which require skilled intervention;does not meet criteria for skilled intervention  -MS               User Key  (r) = Recorded By, (t) = Taken By, (c) = Cosigned By      Initials Name Provider Type    MS Lake  Susan LUNA, PT, DPT, NCS Physical Therapist                   Outcome Measures       Row Name 06/19/25 0800          How much help from another person do you currently need...    Turning from your back to your side while in flat bed without using bedrails? 4  -AJ     Moving from lying on back to sitting on the side of a flat bed without bedrails? 4  -AJ     Moving to and from a bed to a chair (including a wheelchair)? 4  -AJ     Standing up from a chair using your arms (e.g., wheelchair, bedside chair)? 3  -AJ     Climbing 3-5 steps with a railing? 3  -AJ     To walk in hospital room? 3  -AJ     AM-PAC 6 Clicks Score (PT) 21  -AJ     Highest Level of Mobility Goal Walk 10 Steps or More-6  -AJ               User Key  (r) = Recorded By, (t) = Taken By, (c) = Cosigned By      Initials Name Provider Type    Jayden Stanley RNA Registered Nurse                  Physical Therapy Education       Title: PT OT SLP Therapies (In Progress)       Topic: Physical Therapy (In Progress)       Point: Mobility training (Done)       Learning Progress Summary            Patient Acceptance, E, VU by MS at 6/16/2025 1416    Comment: role of PT in her care, spinal restrictions, use of LSO                      Point: Home exercise program (Not Started)       Learner Progress:  Not documented in this visit.              Point: Body mechanics (Not Started)       Learner Progress:  Not documented in this visit.              Point: Precautions (Done)       Learning Progress Summary            Patient Acceptance, E, VU by MS at 6/16/2025 1416    Comment: role of PT in her care, spinal restrictions, use of LSO                                      User Key       Initials Effective Dates Name Provider Type Discipline    MS 07/11/23 -  Susan Lake, PT, DPT, NCS Physical Therapist PT                  PT Recommendation and Plan  Planned Therapy Interventions (PT): balance training, bed mobility training, gait training, patient/family education,  transfer training, orthotic fitting/training  Progress: no change  Outcome Evaluation: The patient presents alert and oriented x4 lying in bed with spouse at bedside and LSO in the room. The patient is typically independent in all functional mobility. Today she demonstrates B hip flexion weakness with no focal sensation or coordination deficits. She is able to clear B feet during gait with a slow cadance and use of a RW. She was able to ambulate in the hallway as well. She will benefit from continued PT to work on increased activity tolerance between surgeries. Recommend discharge home with assist most likely after second surgery.     Time Calculation:              PT G-Codes  Outcome Measure Options: AM-PAC 6 Clicks Basic Mobility (PT)  AM-PAC 6 Clicks Score (PT): 21  AM-PAC 6 Clicks Score (OT): 17    PT Discharge Summary  Anticipated Discharge Disposition (PT): home with assist    Susan Lake, PT, DPT, NCS  6/19/2025

## 2025-06-19 NOTE — CASE MANAGEMENT/SOCIAL WORK
Continued Stay Note   [place]     Patient Name: Ayla Echevarria  MRN: [id number]  Today's Date: 6/19/2025    Admit Date: 6/16/2025        Discharge Plan       Row Name 06/19/25 [id number]       Plan    Plan Comments Pt had 2nd OR yesterday. Plan is for return to home with spouse when medically stable.                   Discharge Codes    No documentation.                       KATHYA Alvarado

## 2025-06-19 NOTE — PAYOR COMM NOTE
"Ayla Merritt (54 y.o. Female) [id number]   Extended stay clinical update  [facility] [phone]      Date of Birth   1970    Social Security Number       [address]   [address] [place] [address]    Home Phone   [phone]    MRN   [id number]       Yazdanism   Alevism    Marital Status                               Admission Date   6/16/2025    Admission Type   Elective    Admitting Provider   PAMELA Bang MD    Attending Provider   PAMELA Bang MD    Department, Room/Bed   [facility] 3A, 340/1       Discharge Date       Discharge Disposition       Discharge Destination                                 Attending Provider: PAMELA Bang MD    Allergies: Ciprofloxacin, Penicillins, Cethexonium    Isolation: None   Infection: None   Code Status: CPR    Ht: 163.5 cm (64.37\")   Wt: 132 kg (290 lb 4.8 oz)    Admission Cmt: None   Principal Problem: Lumbar radiculopathy [M54.16]                   Active Insurance as of 6/16/2025       Primary Coverage       Payor Plan Insurance Group Employer/Plan Group    [employer] BLUE CROSS AN[employer]M BLUE CROSS BLUE SHIELD PPO [id number]       Payor Plan Address Payor Plan Phone Number Payor Plan Fax Number Effective Dates    [address] [phone]  1/1/2005 - None Entered    [place] [id number]         Subscriber Name Subscriber Birth Date Member ID       ROSE,DUANE [birth year]/1964 [id number]                     Emergency Contacts        (Rel.) Home Phone Work Phone Mobile Phone    Rose,Duane (Spouse) [phone] -- [phone]    DAHLIATRUDY (Daughter) -- -- [phone]    SAGRARIO MERRITT (Son) -- -- [phone]              Vital Signs (last day)       Date/Time Temp Temp src Pulse Resp BP Patient Position SpO2    06/19/25 0758 98.4 (36.9) Oral 89 16 153/65 Sitting 95    06/19/25 0442 98 (36.7) Axillary 87 16 136/62 Lying 98    06/19/25 0027 98.1 (36.7) Axillary 104 16 147/61 " Lying 94    06/18/25 2028 -- Oral 104 16 125/48 Lying 97    06/18/25 1555 98.7 (37.1) Oral 102 16 134/67 Lying 96    06/18/25 1121 99 (37.2) Axillary 100 16 146/76 Lying 96    06/18/25 1040 99 (37.2) Axillary 103 16 147/67 Lying 95    06/18/25 1015 -- -- 105 16 139/72 -- 92    06/18/25 1000 -- -- 108 16 151/70 -- 94    06/18/25 0945 -- -- 106 16 139/69 -- 98    06/18/25 0930 -- -- 105 16 117/67 -- 97    06/18/25 0925 -- -- 106 16 120/63 -- 95    06/18/25 0920 -- -- 105 16 118/59 -- 96    06/18/25 0916 98.2 (36.8) Temporal 104 16 124/71 Lying 95    06/18/25 0703 -- -- 97 16 -- -- --    06/18/25 0700 -- -- 99 -- -- -- 84    06/18/25 0655 -- -- 96 -- -- -- 95    06/18/25 0650 -- -- 100 -- -- -- 97    06/18/25 0630 -- -- 98 -- -- -- 95    06/18/25 0625 -- -- 93 -- -- -- 93    06/18/25 0620 -- -- 94 -- -- -- 92    06/18/25 0615 -- -- 97 -- -- -- 94          Current Facility-Administered Medications   Medication Dose Route Frequency Provider Last Rate Last Admin    acetaminophen (TYLENOL) tablet 650 mg  650 mg Oral Q4H PRN PAMELA Bang MD        Or    acetaminophen (TYLENOL) 160 MG/5ML oral solution 650 mg  650 mg Oral Q4H PRN PAMELA Bang MD        Or    acetaminophen (TYLENOL) suppository 650 mg  650 mg Rectal Q4H PRN PAMELA Bang MD        sennosides-docusate (PERICOLACE) 8.6-50 MG per tablet 2 tablet  2 tablet Oral BID PAMELA Bang MD   2 tablet at 06/18/25 2136    And    polyethylene glycol (MIRALAX) packet 17 g  17 g Oral Daily PRN PAMELA Bang MD        And    bisacodyl (DULCOLAX) EC tablet 5 mg  5 mg Oral Daily PRN PAMELA Bang MD        And    bisacodyl (DULCOLAX) suppository 10 mg  10 mg Rectal Daily PRN PAMELA Bang MD        citalopram (CeleXA) tablet 40 mg  40 mg Oral Daily PAMELA Bang MD   40 mg at 06/19/25 0916    cyclobenzaprine (FLEXERIL) tablet 10 mg  10 mg Oral TID PRN PAMELA Bang MD   10 mg at 06/19/25 0542    dextrose (D50W) (25 g/50 mL)  IV injection 25 g  25 g Intravenous Q15 Min PRN PAMELA Bang MD        dextrose (GLUTOSE) oral gel 15 g  15 g Oral Q15 Min PRN PAMELA Bang MD        empagliflozin (JARDIANCE) tablet 10 mg  10 mg Oral Daily Neeraj Hinson MD   10 mg at 06/19/25 0916    glucagon (GLUCAGEN) injection 1 mg  1 mg Intramuscular Q15 Min PRN PAMELA Bang MD        HYDROcodone-acetaminophen (NORCO)  MG per tablet 1 tablet  1 tablet Oral Q4H PRN PAMELA Bang MD   1 tablet at 06/19/25 0542    HYDROcodone-acetaminophen (NORCO) 7.5-325 MG per tablet 1 tablet  1 tablet Oral Q4H PRN PAMELA Bang MD   1 tablet at 06/18/25 0212    HYDROmorphone (DILAUDID) injection 0.5 mg  0.5 mg Intravenous Q3H PRN PAMELA Bang MD   0.5 mg at 06/19/25 0812    And    naloxone (NARCAN) injection 0.4 mg  0.4 mg Intravenous Q5 Min PRN PAMELA Bang MD        Insulin Lispro (humaLOG) injection 2-7 Units  2-7 Units Subcutaneous 4x Daily AC & at Bedtime PAMELA Bang MD   3 Units at 06/19/25 0810    lisinopril (PRINIVIL,ZESTRIL) tablet 40 mg  40 mg Oral Daily PAMELA Bang MD   40 mg at 06/19/25 0916    metFORMIN (GLUCOPHAGE) tablet 1,000 mg  1,000 mg Oral BID With Meals PAMELA Bang MD   1,000 mg at 06/19/25 0810    Naloxegol Oxalate (MOVANTIK) tablet 25 mg  25 mg Oral QAM Neeraj Hinosn MD   25 mg at 06/19/25 0916    ondansetron ODT (ZOFRAN-ODT) disintegrating tablet 4 mg  4 mg Oral Q6H PRN PAMELA Bang MD   4 mg at 06/17/25 1755    Or    ondansetron (ZOFRAN) injection 4 mg  4 mg Intravenous Q6H PRN PAMELA Bang MD        rosuvastatin (CRESTOR) tablet 20 mg  20 mg Oral Daily PAMELA Bang MD   20 mg at 06/19/25 0916    sodium chloride 0.9 % flush 10 mL  10 mL Intravenous PRN PAMELA Bang MD   10 mL at 06/18/25 1301    sodium chloride 0.9 % flush 3 mL  3 mL Intravenous Q12H PAMELA Bang MD   3 mL at 06/19/25 0923    sodium chloride 0.9 % infusion 40  mL  40 mL Intravenous PRN PAMELA Bang MD        sodium chloride 0.9 % infusion  50 mL/hr Intravenous Continuous PAMELA Bang MD 50 mL/hr at 06/19/25 0922 50 mL/hr at 06/19/25 0922    venlafaxine XR (EFFEXOR-XR) 24 hr capsule 37.5 mg  37.5 mg Oral Daily PAMELA Bang MD   37.5 mg at 06/19/25 0916        Operative/Procedure Notes (last 48 hours)        PAMELA Bang MD at 06/18/25 0640          SPINAL HARDWARE REMOVAL, LUMBAR LAMINECTOMY POSTERIOR LUMBAR INTERBODY FUSION  Procedure Note    Ayla YANCEY Swathi  6/18/2025    Pre-op Diagnosis:     1. Psoriasis.   2. Status post anterior decompression, ALIF with instrumentation L5-S1, 01/17/2020   3. Status post right LLIF with instrumentation L4-5, 01/22/2020   4. Status post PSF with instrumentation L4-S1, 01/24/2020   5. Broken right S1 pedicle screw   6. Status post left LLIF with instrumentation L3-4, 07/05/2023    7. Status post partial LORA L4-S1, PSF L3-4 with instrumentation L3-S1, 07/07/2023    8. Recurrent low back pain    9. Bilateral anterior and lateral thigh radiculopathy    10. Recurrent neurogenic claudication    11. Severe adjacent level degenerative disc disease L2-3    12. Severe adjacent level facet arthropathy L2-3    13. Large right central disc herniation L2-3    14. Severe central and bilateral foraminal stenosis L2-3    15. Obesity, BMI 48  16.  Status post left LLIF with instrumentation L2-3, 6/16/2025    Post-op Diagnosis:    same    Procedure/CPT® Codes:    1.  Partial removal of posterior instrumentation right L3-4  2.  Exploration of fusion L3-4  3.  Posterior spinal fusion L2-3  4.  Posterior spinal instrumentation L2-4 (ATEC pedicle screws and rods)  5.  Use of locally obtained autograft bone for fusion  6.  Use of fluoroscopy for confirmation of surgical level and placement of instrumentation  7.  Intraoperative neuromonitoring with pedicle screw stimulation    Spinal Surgery Levels Completed:1 Level      Anesthesia:  General     Surgeon: ASHLEY Bang MD     Assistant: Chuck John PA-C     Estimated Blood Loss: 25 mL     Complications: None     Condition: Stable to PACU.     Indications:     The patient is a 54-year-old who sees Dr. Rich Heaton for medical issues. She has had fusion procedures performed in a staged fashion from L4-S1 in 2020 as well as at L3-4 in 2023. Unfortunately she has presented back to the office with a recurrence of low back pain along with bilateral anterior and lateral thigh radiculopathy as well as symptoms consistent with recurrent neurogenic claudication. Repeat imaging studies have revealed severe adjacent level degenerative disc disease and facet arthropathy at L2-3 with a large right central disc herniation causing severe central and foraminal stenosis.      After failing all conservative measures, it was mutually decided that surgery would be the best option. Risks, benefits, and complications of surgery were discussed with the patient. The patient appeared well informed and wished to proceed. We specifically discussed the risk of infection, blood loss, nerve root injury, CSF leak, and the possibility of incomplete resolution of symptoms.  We also discussed the risk of a nonunion and the potential need for additional surgery in the event of a pseudoarthrosis or hardware failure.     We elected to proceed with surgery in a staged fashion.  Previously on 6/16/2025, the patient underwent a lateral fusion of L2-3.  Today we return to surgery for the second posterior stage of the procedure.     Operative Procedure:     After obtaining informed consent and verifying the correct operative site, the patient was brought to the operating room. A general anesthetic was provided by the anesthesia service with the assistance of an endotracheal tube. Once this was appropriately positioned and secured, the patient was carefully rotated prone onto a Young frame. All bony processes were well-padded. The  lumbar region was prepped and draped in the usual sterile fashion. A surgical timeout was taken to confirm this was the correct patient, we were working at the correct levels, and that preoperative antibiotics were given in a timely fashion.    Next, the existing right sided Wiltsey incision spanning the previous instrumentation at L3-4 was reopened using a 10 blade scalpel.  Dissection was carried through subcutaneous tissues using Bovie cautery. The fascia was divided in line with the incision. Blunt dissection was carried between the multifidus and longissimus muscles down to the previously placed instrumentation spanning L3-4. There was some scar tissue as expected around the screws and between the musculature.  The previous instrumentation was completely exposed using Bovie cautery removing some fibrous scar tissue from around the screw heads themselves. The appropriate screwdriver and antitorque wrench were used to remove the setscrews. The sean was then taken out with a sean rose. The area was then thoroughly explored.  Both of the pedicle screws appeared to be tight with no evidence of loosening.  There appeared to be adequate bone graft in the posterior lateral gutter consistent with a solid fusion of L3-4.    The screws on the right at both L3 and L4 were of the same instrumentation set that I plan to use across L2 and thus were left in position for use with our new construct.     The Wiltsey incision was then extended using a 10 blade scalpel up to L2.  The same intramuscular plane was used to gain access to the facet joint of L2-3 and the transverse processes of L2.  Self retaining retractors were placed for continuous exposure.  Bovie cautery was used to expose as much bony surface area on the transverse processes and to destroy the facet capsule at L2-3.  The wound was then thoroughly irrigated with saline solution.     Next under fluoroscopic guidance, I created a starting hole for the placement of a  new pedicle screw L2.  A starting hole was created with a high-speed bur and the pedicle track was then created using a pedicle probe gaining access through the pedicle to the anterior vertebral body.  The pedicle tract was palpated with a ball-tipped feeler to ensure that the track was adequate.  Into the pedicle of L2, I placed a new screw measuring 6.5 mm x 50 mm from the [facility] instrumentation set.      Fluoroscopy was used to confirm that all instrumentation was properly positioned in both the AP and lateral projections.  The wound most then copiously irrigated with saline solution.  I used a neuro monitoring probe to stimulate the screws to ensure that there were adequately positioned.  All screws appeared well positioned.     The high-speed bur was used to decorticate the posterior elements of L2 as well as the previous fusion mass at L3-4.  I also decorticated the facet joint of L2-3.  The local bone was left in situ to assist with obtaining a fusion. This constituted a posterior fusion of L2-3     An appropriate sean was then chosen to span the pedicle screw instrumentation spanning spanning L2-4.  The sean was bent to accommodate the patient's increased lordosis.  Set screws were inserted into the pedicle screw saddles fixing the sean into position.  The setscrews were then tightened using the appropriate antitorque wrench and torque limiting screwdriver provided by [facility].      A final inspection was then done to ensure that we had adequate hemostasis.  Bleeding was controlled using bipolar cautery and Gelfoam with thrombin.     After insuring that we had adequate hemostasis, closure was then undertaken using a #1 Vicryl to reapproximate the fascia. Immediate subcutaneous tissues were closed with a 2-0 Vicryl and skin closure was then accomplished using Mastisol and Steri-Strips.  I did infuse the subcutaneous layer with half percent Marcaine to assist with postoperative pain control.  The wound was then washed  and sterilely dressed. The patient was then carefully rotated supine onto a hospital gurney, extubated, and sent to the recovery room in good stable condition. The patient tolerated the procedure well, there were no complications.  Estimated blood loss was approximately 25 mL.    Intraoperative neuro monitoring was ordered and carried out throughout the procedure to add an increased level of safety for the patient. The interpreting physician was available by means of real-time continuous, bidirectional, remote audio and visual communication as needed throughout the entire procedure. Modalities used during the procedure included SSEP, EMG, TOF, and pedicle screw stimulation. There were no neuro monitoring signal changes during the procedure.      Chuck John PA-C provided critical assistance during the procedure.  His assistance was medically necessary in order to allow the procedure to occur in the most safe and efficient manner.  He assisted not only with the removal of instrumentation and exploration of fusion of L3-4, but also assisted with the placement of new instrumentation and bone graft spanning the new fusion construct from L2-4.    ASHLEY Bang MD     Date: 6/18/2025  Time: 09:55 CDT    Electronically signed by PAMELA Bang MD at 06/18/25 0962          Physician Progress Notes (last 24 hours)        Neeraj Hinson MD at 06/19/25 0820          Ayla Echevarria is a 54 y.o. female patient.      Postop day #1 for second step of surgery.  Sitting up in chair, ambulated in hallway yesterday.   at bedside.      Current Facility-Administered Medications   Medication Dose Route Frequency Provider Last Rate Last Admin    acetaminophen (TYLENOL) tablet 650 mg  650 mg Oral Q4H PRN PAMELA Bang MD        Or    acetaminophen (TYLENOL) 160 MG/5ML oral solution 650 mg  650 mg Oral Q4H PRN PAMELA Bang MD        Or    acetaminophen (TYLENOL) suppository 650 mg  650 mg Rectal Q4H  PRN PAMELA Bang MD        sennosides-docusate (PERICOLACE) 8.6-50 MG per tablet 2 tablet  2 tablet Oral BID PAMELA Bang MD   2 tablet at 06/18/25 2136    And    polyethylene glycol (MIRALAX) packet 17 g  17 g Oral Daily PRN PAMELA Bang MD        And    bisacodyl (DULCOLAX) EC tablet 5 mg  5 mg Oral Daily PRN PAMELA Bang MD        And    bisacodyl (DULCOLAX) suppository 10 mg  10 mg Rectal Daily PRN PAMELA Bang MD        citalopram (CeleXA) tablet 40 mg  40 mg Oral Daily PAMELA Bang MD   40 mg at 06/18/25 1309    cyclobenzaprine (FLEXERIL) tablet 10 mg  10 mg Oral TID PRN PAMELA Bang MD   10 mg at 06/19/25 0542    dextrose (D50W) (25 g/50 mL) IV injection 25 g  25 g Intravenous Q15 Min PRN PAMELA Bang MD        dextrose (GLUTOSE) oral gel 15 g  15 g Oral Q15 Min PRN PAMELA Bang MD        empagliflozin (JARDIANCE) tablet 10 mg  10 mg Oral Daily Neeraj Hinson MD        glucagon (GLUCAGEN) injection 1 mg  1 mg Intramuscular Q15 Min PRN PAMELA Bang MD        HYDROcodone-acetaminophen (NORCO)  MG per tablet 1 tablet  1 tablet Oral Q4H PRN PAMELA Bang MD   1 tablet at 06/19/25 0542    HYDROcodone-acetaminophen (NORCO) 7.5-325 MG per tablet 1 tablet  1 tablet Oral Q4H PRN PAMELA Bang MD   1 tablet at 06/18/25 0212    HYDROmorphone (DILAUDID) injection 0.5 mg  0.5 mg Intravenous Q3H PRN PAMELA Bang MD   0.5 mg at 06/19/25 0812    And    naloxone (NARCAN) injection 0.4 mg  0.4 mg Intravenous Q5 Min PRN PAMELA Bang MD        Insulin Lispro (humaLOG) injection 2-7 Units  2-7 Units Subcutaneous 4x Daily AC & at Bedtime PAMELA Bang MD   3 Units at 06/19/25 0810    lisinopril (PRINIVIL,ZESTRIL) tablet 40 mg  40 mg Oral Daily PAMELA Bang MD   40 mg at 06/18/25 1259    metFORMIN (GLUCOPHAGE) tablet 1,000 mg  1,000 mg Oral BID With Meals PAMELA Bang MD   1,000 mg at 06/19/25 0810     "ondansetron ODT (ZOFRAN-ODT) disintegrating tablet 4 mg  4 mg Oral Q6H PRN PAMELA Bang MD   4 mg at 06/17/25 1755    Or    ondansetron (ZOFRAN) injection 4 mg  4 mg Intravenous Q6H PRN PAMELA Bang MD        rosuvastatin (CRESTOR) tablet 20 mg  20 mg Oral Daily PAMELA Bang MD   20 mg at 06/18/25 1306    sodium chloride 0.9 % flush 10 mL  10 mL Intravenous PRN PAMELA Bang MD   10 mL at 06/18/25 1301    sodium chloride 0.9 % flush 3 mL  3 mL Intravenous Q12H PAMELA Bang MD   3 mL at 06/18/25 2137    sodium chloride 0.9 % infusion 40 mL  40 mL Intravenous PRN PAMELA Bang MD        sodium chloride 0.9 % infusion  50 mL/hr Intravenous Continuous PAMELA Bang MD 50 mL/hr at 06/18/25 1309 50 mL/hr at 06/18/25 1309    venlafaxine XR (EFFEXOR-XR) 24 hr capsule 37.5 mg  37.5 mg Oral Daily PAMELA Bang MD   37.5 mg at 06/17/25 0848     ALLERGIES:    Allergies   Allergen Reactions    Ciprofloxacin Swelling     Throat swelling    Penicillins Swelling     Throat swelling    Cethexonium Unknown - Low Severity     Patient stated eye burning       Lumbar radiculopathy    Hypertension    Hyperlipidemia    Chronic bilateral low back pain with bilateral sciatica    Lumbar spinal stenosis    Drug-induced constipation    GERD without esophagitis    Class 3 severe obesity due to excess calories with serious comorbidity and body mass index (BMI) of 40.0 to 44.9 in adult    Diabetes mellitus    Blood pressure 153/65, pulse 89, temperature 98.4 °F (36.9 °C), temperature source Oral, resp. rate 16, height 163.5 cm (64.37\"), weight 132 kg (290 lb 4.8 oz), last menstrual period 07/10/2024, SpO2 95%, not currently breastfeeding.      Subjective:  Symptoms:  Stable.  She reports weakness and anxiety.    Diet:  Poor intake.    Activity level: Impaired due to pain.    Pain:  She complains of pain that is moderate.  She reports pain is unchanged.  Pain is partially controlled.      Review " "of Systems   Neurological:  Positive for weakness.     Objective:  General Appearance:  Comfortable and well-appearing.    Vital signs: (most recent): Blood pressure 153/65, pulse 89, temperature 98.4 °F (36.9 °C), temperature source Oral, resp. rate 16, height 163.5 cm (64.37\"), weight 132 kg (290 lb 4.8 oz), last menstrual period 07/10/2024, SpO2 95%, not currently breastfeeding.  Vital signs are normal.    Output: Producing urine and minimal stool output.    HEENT: Normal HEENT exam.    Lungs:  Normal effort and normal respiratory rate.    Heart: Normal rate.  Regular rhythm.    Abdomen: Abdomen is soft.  Hypoactive bowel sounds.   There is generalized tenderness.     Skin:  Warm and dry.                Labs:  Lab Results (last 72 hours)       Procedure Component Value Units Date/Time    POC Glucose Once [[id number]]  (Abnormal) Collected: 06/19/25 0800    Specimen: Blood Updated: 06/19/25 0812     Glucose 228 mg/dL      Comment: : [id number] [employer] AutumnMeter ID: [id number]       POC Glucose Once [[id number]]  (Abnormal) Collected: 06/18/25 1952    Specimen: Blood Updated: 06/18/25 2003     Glucose 268 mg/dL      Comment: : [id number] Mahesh JaylinMeter ID: [id number]       POC Glucose Once [[id number]]  (Abnormal) Collected: 06/18/25 1620    Specimen: Blood Updated: 06/18/25 1631     Glucose 318 mg/dL      Comment: : [id number] [employer] AutumnMeter ID: [id number]       POC Glucose Once [[id number]]  (Abnormal) Collected: 06/18/25 1214    Specimen: Blood Updated: 06/18/25 1240     Glucose 322 mg/dL      Comment: : [id number] [employer]over AutumnMeter ID: [id number]       POC Glucose Once [[id number]]  (Abnormal) Collected: 06/18/25 0920    Specimen: Blood Updated: 06/18/25 0932     Glucose 236 mg/dL      Comment: : [id number] Jarod PauluaMeter ID: [id number]       POC Glucose Once [[id number]]  (Abnormal) Collected: 06/17/25 1943    Specimen: Blood Updated: 06/17/25 1954     Glucose 230 mg/dL      Comment: " : [id number] Olayinka Baker ID: [id number]       POC Glucose Once [[id number]]  (Abnormal) Collected: 06/17/25 1725    Specimen: Blood Updated: 06/17/25 1743     Glucose 254 mg/dL      Comment: : [id number] Herman VegaMeter ID: [id number]       Vitamin B12 [[id number]]  (Normal) Collected: 06/17/25 0437    Specimen: Blood Updated: 06/17/25 1156     Vitamin B-12 566 pg/mL     Narrative:      Results may be falsely increased if patient taking Biotin.      Folate [[id number]]  (Normal) Collected: 06/17/25 0437    Specimen: Blood Updated: 06/17/25 1156     Folate >20.00 ng/mL     Narrative:      Results may be falsely increased if patient taking Biotin.      POC Glucose Once [[id number]]  (Abnormal) Collected: 06/17/25 1102    Specimen: Blood Updated: 06/17/25 1113     Glucose 319 mg/dL      Comment: : [id number] Herman LewisetMeter ID: [id number]       POC Glucose Once [[id number]]  (Abnormal) Collected: 06/17/25 0750    Specimen: Blood Updated: 06/17/25 0802     Glucose 280 mg/dL      Comment: : [id number] Herman VegaMeter ID: [id number]       Basic Metabolic Panel [[id number]]  (Abnormal) Collected: 06/17/25 0437    Specimen: Blood Updated: 06/17/25 0534     Glucose 243 mg/dL      BUN 9.7 mg/dL      Creatinine 0.57 mg/dL      Sodium 136 mmol/L      Potassium 4.1 mmol/L      Chloride 99 mmol/L      CO2 23.0 mmol/L      Calcium 9.5 mg/dL      BUN/Creatinine Ratio 17.0     Anion Gap 14.0 mmol/L      eGFR 107.5 mL/min/1.73     Narrative:      GFR Categories in Chronic Kidney Disease (CKD)              GFR Category          GFR (mL/min/1.73)    Interpretation  G1                    90 or greater        Normal or high (1)  G2                    60-89                Mild decrease (1)  G3a                   45-59                Mild to moderate decrease  G3b                   30-44                Moderate to severe decrease  G4                    15-29                Severe decrease  G5                    14  or less           Kidney failure    (1)In the absence of evidence of kidney disease, neither GFR category G1 or G2 fulfill the criteria for CKD.    eGFR calculation 2021 CKD-EPI creatinine equation, which does not include race as a factor    Iron Profile w/o Ferritin [[id number]]  (Abnormal) Collected: 06/17/25 0437    Specimen: Blood Updated: 06/17/25 0534     Iron 50 mcg/dL      Iron Saturation (TSAT) 13 %      Transferrin 253 mg/dL      TIBC 377 mcg/dL     CBC & Differential [[id number]]  (Abnormal) Collected: 06/17/25 0437    Specimen: Blood Updated: 06/17/25 0503    Narrative:      The following orders were created for panel order CBC & Differential.  Procedure                               Abnormality         Status                     ---------                               -----------         ------                     CBC Auto Differential[[id number]]        Abnormal            Final result                 Please view results for these tests on the individual orders.    CBC Auto Differential [[id number]]  (Abnormal) Collected: 06/17/25 0437    Specimen: Blood Updated: 06/17/25 0503     WBC 9.52 10*3/mm3      RBC 4.02 10*6/mm3      Hemoglobin 11.6 g/dL      Hematocrit 35.9 %      MCV 89.3 fL      MCH 28.9 pg      MCHC 32.3 g/dL      RDW 13.6 %      RDW-SD 44.7 fl      MPV 10.1 fL      Platelets 286 10*3/mm3      Neutrophil % 72.6 %      Lymphocyte % 18.7 %      Monocyte % 7.8 %      Eosinophil % 0.3 %      Basophil % 0.3 %      Immature Grans % 0.3 %      Neutrophils, Absolute 6.91 10*3/mm3      Lymphocytes, Absolute 1.78 10*3/mm3      Monocytes, Absolute 0.74 10*3/mm3      Eosinophils, Absolute 0.03 10*3/mm3      Basophils, Absolute 0.03 10*3/mm3      Immature Grans, Absolute 0.03 10*3/mm3      nRBC 0.0 /100 WBC     POC Glucose Once [[id number]]  (Abnormal) Collected: 06/16/25 2007    Specimen: Blood Updated: 06/16/25 2018     Glucose 352 mg/dL      Comment: : [id number] Austin MarcyMeter ID: [id number]        POC Glucose Once [[id number]]  (Abnormal) Collected: 06/16/25 1622    Specimen: Blood Updated: 06/16/25 1641     Glucose 345 mg/dL      Comment: : [id number] Ken MaherMeter ID: [id number]       POC Glucose Once [[id number]]  (Abnormal) Collected: 06/16/25 1050    Specimen: Blood Updated: 06/16/25 1102     Glucose 287 mg/dL      Comment: : [id number] Davion Mendez  LMeter ID: [id number]       POC Glucose Once [[id number]]  (Abnormal) Collected: 06/16/25 0949    Specimen: Blood Updated: 06/16/25 1001     Glucose 258 mg/dL      Comment: : [id number] Jarod LombardiMeter ID: [id number]       POC Glucose Once [[id number]]  (Abnormal) Collected: 06/16/25 0907    Specimen: Blood Updated: 06/16/25 0927     Glucose 240 mg/dL      Comment: : [id number] Royal kaliMeter ID: [id number]               Imaging Results (Last 72 Hours)       Procedure Component Value Units Date/Time    XR Spine Lumbar AP & Lateral [[id number]] Collected: 06/18/25 0919     Updated: 06/18/25 0922    Narrative:      XR SPINE LUMBAR AP AND LATERAL-      HISTORY: fusion; Z74.09-Other reduced mobility     COMPARISON: None     FLUOROSCOPY TIME: 14.3 sec     FLUOROSCOPY DOSE: 20.8 mGy     NUMBER OF IMAGES: 2       Impression:         Intraoperative fluoroscopic images during lumbar fusion.     Please refer to the operative note for more details.     This report was signed and finalized on 6/18/2025 9:19 AM by Dr. Jaya Magallon MD.       FL C Arm During Surgery [[id number]] Resulted: 06/18/25 0857     Updated: 06/18/25 0857    Narrative:      This procedure was auto-finalized with no dictation required.    XR Spine Lumbar AP & Lateral [[id number]] Collected: 06/16/25 1209     Updated: 06/16/25 1212    Narrative:      XR SPINE LUMBAR AP AND LATERAL-      HISTORY: llif     COMPARISON: None     FLUOROSCOPY TIME: 20.9-second     FLUOROSCOPY DOSE: 25.9 mGy     NUMBER OF IMAGES: 4       Impression:         Intraoperative fluoroscopic images during  lumbar fusion.     Please refer to the operative note for more details.     This report was signed and finalized on 6/16/2025 12:09 PM by Dr. Jaya Magallon MD.       FL C Arm During Surgery [[id number]] Resulted: 06/16/25 1050     Updated: 06/16/25 1050    Narrative:      This procedure was auto-finalized with no dictation required.                  Assessment:    Condition: In stable condition.  Improving.       Plan:   Discharge home.  Encourage ambulation and per physical therapy.   (    Constipation/obstipation-will add Movantik to stool softeners and the Dulcolax for now.  Discussed with family least amount of pain medication most amount of activity.    Type 2 diabetes-continue home medications we did add some Jardiance to help control her sugars.  She would be an excellent candidate for a GLP 1/2 on an outpatient basis with a BMI of 50.  Once her GI tract gets straightened out after pain medication anesthesia would recommend starting on an outpatient basis certainly defer to her primary care.    Hypertension-blood pressure stable.    Medically stable for discharge home and cleared by spine surgery.    Explained to patient and staff that we were consulted for medical management during their acute care hospitalization. The Medical Consultation was requested by the Attending Physician. The patient has been recommended to f/u with their regular primary care provider concerning any further treatment and review of abnormalities found during their hospitalization at [facility]. They agree with the treatment plan as well as understand our role in their hospitalization. All questions were encouraged and answered to the best of my ability. ).     Problem List:     Lumbar radiculopathy    Hypertension    Hyperlipidemia    Chronic bilateral low back pain with bilateral sciatica    Lumbar spinal stenosis    Drug-induced constipation    GERD without esophagitis    Class 3 severe obesity due to excess calories  with serious comorbidity and body mass index (BMI) of 40.0 to 44.9 in adult    Diabetes mellitus    Neeraj Hinson MD  6/19/2025                                                  Electronically signed by Neeraj Hinson MD at 06/19/25 0822       Duc Mejia PA-C at 06/19/25 0820          Ayla YANCEY Swathi  54 y.o.  female  Subjective     Post-Operative Day # 3/1    Systemic or Specific Complaints:     Pt c/o increased pain throughout the night. Pt states that she was unaware that pain meds were PRN instead of scheduled and therefore she missed a dose. I disscused staying on top of the medication today and the patient plans to do so. Otherwise no new complaints. Pt will work with PT today. Pending improvement she is agreeable with DC home today vs tomorrow.    Objective     Vital signs in last 24 hours:  Temp:  [98 °F (36.7 °C)-99 °F (37.2 °C)] 98.4 °F (36.9 °C)  Heart Rate:  [] 89  Resp:  [16] 16  BP: (117-153)/(48-76) 153/65    Physical Exam:    Alert and oriented ×3, no acute distress, grossly neurovascularly intact, vital signs stable, dressing clean dry and intact, moving all extremities without focal deficit    Diagnostic Data:  CBC:  Results from last 7 days   Lab Units 06/17/25  0437   WBC 10*3/mm3 9.52   RBC 10*6/mm3 4.02   HEMOGLOBIN g/dL 11.6*   HEMATOCRIT % 35.9   PLATELETS 10*3/mm3 286          Assessment & Plan     1. Psoriasis.   2. Status post anterior decompression, ALIF with instrumentation L5-S1, 01/17/2020   3. Status post right LLIF with instrumentation L4-5, 01/22/2020   4. Status post PSF with instrumentation L4-S1, 01/24/2020   5. Broken right S1 pedicle screw   6. Status post left LLIF with instrumentation L3-4, 07/05/2023    7. Status post partial LORA L4-S1, PSF L3-4 with instrumentation L3-S1, 07/07/2023    8. Recurrent low back pain    9. Bilateral anterior and lateral thigh radiculopathy    10. Recurrent neurogenic claudication    11. Severe adjacent level  degenerative disc disease L2-3    12. Severe adjacent level facet arthropathy L2-3    13. Large right central disc herniation L2-3    14. Severe central and bilateral foraminal stenosis L2-3    15. Obesity, BMI 48  16.  Status post left LLIF with instrumentation L2-3, 6/16/2025  17. Status post partial LORA, EOF L3-4, PSF L2-3, posterior spinal instrumentation L2-4, 6/18/2025    Plan:  1. PT/OT/Brace  2. Periops  3. Probably home today vs tomorrow        Duc Mejia PA-C    Date: 6/19/2025  Time: 08:20 CDT    Electronically signed by Duc Mejia PA-C at 06/19/25 0863

## 2025-06-19 NOTE — PLAN OF CARE
Goal Outcome Evaluation:  Plan of Care Reviewed With: patient, spouse        Progress: improving  Outcome Evaluation: A+Ox4, LOMAX- second part of planned back surgery today, posterior incision dry/intact, LSO brace when up OOB- tolerating up to chair and walking to toilet with asst x1- IVF going, VSS at this time. Patient tolerating PO pain medication for moderate to severe pain  medication, but not wanting IV pain medication yet. Family at bedside assisting with patient. Tolerating food and drink without issues.

## 2025-06-20 ENCOUNTER — READMISSION MANAGEMENT (OUTPATIENT)
Dept: CALL CENTER | Facility: HOSPITAL | Age: 55
End: 2025-06-20
Payer: COMMERCIAL

## 2025-06-20 VITALS
WEIGHT: 290.3 LBS | BODY MASS INDEX: 49.56 KG/M2 | HEIGHT: 64 IN | TEMPERATURE: 98 F | DIASTOLIC BLOOD PRESSURE: 82 MMHG | HEART RATE: 91 BPM | OXYGEN SATURATION: 94 % | SYSTOLIC BLOOD PRESSURE: 171 MMHG | RESPIRATION RATE: 16 BRPM

## 2025-06-20 LAB — GLUCOSE BLDC GLUCOMTR-MCNC: 193 MG/DL (ref 70–130)

## 2025-06-20 PROCEDURE — 82948 REAGENT STRIP/BLOOD GLUCOSE: CPT

## 2025-06-20 PROCEDURE — 25010000002 ONDANSETRON PER 1 MG: Performed by: ORTHOPAEDIC SURGERY

## 2025-06-20 PROCEDURE — 63710000001 INSULIN LISPRO (HUMAN) PER 5 UNITS: Performed by: ORTHOPAEDIC SURGERY

## 2025-06-20 RX ORDER — HYDROCODONE BITARTRATE AND ACETAMINOPHEN 10; 325 MG/1; MG/1
1 TABLET ORAL EVERY 6 HOURS PRN
Qty: 45 TABLET | Refills: 0 | Status: SHIPPED | OUTPATIENT
Start: 2025-06-20 | End: 2025-07-02

## 2025-06-20 RX ORDER — ONDANSETRON 4 MG/1
4 TABLET, ORALLY DISINTEGRATING ORAL EVERY 8 HOURS PRN
Qty: 30 TABLET | Refills: 0 | Status: SHIPPED | OUTPATIENT
Start: 2025-06-20

## 2025-06-20 RX ORDER — CYCLOBENZAPRINE HCL 10 MG
10 TABLET ORAL 3 TIMES DAILY PRN
Qty: 30 TABLET | Refills: 0 | Status: SHIPPED | OUTPATIENT
Start: 2025-06-20

## 2025-06-20 RX ADMIN — EMPAGLIFLOZIN 10 MG: 10 TABLET, FILM COATED ORAL at 08:59

## 2025-06-20 RX ADMIN — METFORMIN HYDROCHLORIDE 1000 MG: 500 TABLET ORAL at 08:59

## 2025-06-20 RX ADMIN — NALOXEGOL OXALATE 25 MG: 25 TABLET, FILM COATED ORAL at 06:05

## 2025-06-20 RX ADMIN — ONDANSETRON 4 MG: 2 INJECTION INTRAMUSCULAR; INTRAVENOUS at 01:38

## 2025-06-20 RX ADMIN — Medication 3 ML: at 08:59

## 2025-06-20 RX ADMIN — ROSUVASTATIN CALCIUM 20 MG: 20 TABLET, FILM COATED ORAL at 08:59

## 2025-06-20 RX ADMIN — VENLAFAXINE HYDROCHLORIDE 37.5 MG: 37.5 CAPSULE, EXTENDED RELEASE ORAL at 08:59

## 2025-06-20 RX ADMIN — LISINOPRIL 40 MG: 20 TABLET ORAL at 08:59

## 2025-06-20 RX ADMIN — ONDANSETRON 4 MG: 2 INJECTION INTRAMUSCULAR; INTRAVENOUS at 08:59

## 2025-06-20 RX ADMIN — HYDROCODONE BITARTRATE AND ACETAMINOPHEN 1 TABLET: 7.5; 325 TABLET ORAL at 06:05

## 2025-06-20 RX ADMIN — HYDROCODONE BITARTRATE AND ACETAMINOPHEN 1 TABLET: 7.5; 325 TABLET ORAL at 01:38

## 2025-06-20 RX ADMIN — INSULIN LISPRO 2 UNITS: 100 INJECTION, SOLUTION INTRAVENOUS; SUBCUTANEOUS at 08:57

## 2025-06-20 RX ADMIN — CITALOPRAM 40 MG: 20 TABLET, FILM COATED ORAL at 08:59

## 2025-06-20 NOTE — PAYOR COMM NOTE
"NM HOME 6-20-25    Ayla Merritt (54 y.o. Female)       Date of Birth   1970    Social Security Number       Address   [address] [place] [address]    Home Phone   [phone]    MRN   [id number]       Hinduism   Christianity    Marital Status                               Admission Date   6/16/2025    Admission Type   Elective    Admitting Provider   PAMELA Bang MD    Attending Provider       Department, Room/Bed   [facility] 3A, 340/1       Discharge Date   6/20/2025    Discharge Disposition   Home or Self Care    Discharge Destination                                 Attending Provider: (none)   Allergies: Ciprofloxacin, Penicillins, Cethexonium    Isolation: None   Infection: None   Code Status: CPR    Ht: 163.5 cm (64.37\")   Wt: 132 kg (290 lb 4.8 oz)    Admission Cmt: None   Principal Problem: Lumbar radiculopathy [M54.16]                   Active Insurance as of 6/16/2025       Primary Coverage       Payor Plan Insurance Group Employer/Plan Group    [id number] PPO [id number]       Payor Plan Address Payor Plan Phone Number Payor Plan Fax Number Effective Dates    [address] [phone]  1/1/2005 - None Entered    [place] [id number]         Subscriber Name Subscriber Birth Date Member ID       ROSE,DUANE 3/10/1964 [id number]                     Emergency Contacts        (Rel.) Home Phone Work Phone Mobile Phone    Rose,Duane (Spouse) [phone] -- [phone]    DAHLIATRUDY (Daughter) -- -- [phone]    SHAINASAGRARIO (Son) -- -- [phone]                 Discharge Summary        Tristin John PA-C at 06/20/25 0700          [facility]  Tristin John PA-C  DISCHARGE SUMMARY  Patient ID:  Ayla Merritt  [id number]  54 y.o.  1970    Admit date: 6/16/2025    Discharge date and time: 6/20/2025    Admitting Physician: PAMELA Bang MD     Indication for Admission: " Planned surgical admission     Admission Diagnoses: Lumbar radiculopathy [M54.16]    Discharge Diagnoses: Lumbar radiculopathy [M54.16]    Procedures: Lateral lumbar fusion L2-3, posterior fernando fusion L2-3, instrumentation L2-4    Problem List:   Lumbar radiculopathy    Hypertension    Hyperlipidemia    Chronic bilateral low back pain with bilateral sciatica    Lumbar spinal stenosis    Drug-induced constipation    GERD without esophagitis    Class 3 severe obesity due to excess calories with serious comorbidity and body mass index (BMI) of 40.0 to 44.9 in adult    Diabetes mellitus      Consults: Family Medicine    Admission Condition: stable    Discharged Condition: stable    Hospital Course: no immediate post operative complication     Disposition: home    Patient Instructions:      Discharge Medications        ASK your doctor about these medications        Instructions Start Date   citalopram 40 MG tablet  Commonly known as: CeleXA   40 mg, Daily      diclofenac 75 MG EC tablet  Commonly known as: VOLTAREN   Take 1 tablet by mouth 2 (Two) Times a Day.      Guselkumab 100 MG/ML solution auto-injector   1 dose, Every 2 Months      levocetirizine 5 MG tablet  Commonly known as: XYZAL  Ask about: Which instructions should I use?   5 mg, Daily PRN      lisinopril 40 MG tablet  Commonly known as: PRINIVIL,ZESTRIL   1 tablet, Oral, Daily      metFORMIN 500 MG tablet  Commonly known as: GLUCOPHAGE   1,000 mg, 2 Times Daily With Meals      multivitamin with minerals tablet tablet   1 tablet, Daily      rosuvastatin 20 MG tablet  Commonly known as: CRESTOR   20 mg, Oral, Daily      timolol 0.5 % ophthalmic solution  Commonly known as: TIMOPTIC   1 drop, Daily      venlafaxine XR 37.5 MG 24 hr capsule  Commonly known as: EFFEXOR-XR   37.5 mg, Oral, Daily      vitamin D 1.25 MG (67781 UT) capsule capsule  Commonly known as: ERGOCALCIFEROL  Ask about: Which instructions should I use?   50,000 Units, Weekly              Activity: Avoid bending lifting or twisting, brace when up and out of bed, no driving while taking narcotic pain medication, walk 15 minutes 4 times daily  Diet: regular diet  Wound Care: keep wound clean and dry  Other: Contact [facility] office with questions or concerns    Follow-up with Dr Bang or PA's in 2 weeks.    Electronically signed by Tristin John PA-C 07:02 CDT 6/20/2025      Electronically signed by Tristin John PA-C at 06/20/25 0704

## 2025-06-20 NOTE — PLAN OF CARE
Goal Outcome Evaluation:  Plan of Care Reviewed With: patient, spouse              A/Ox4. VSS. RA. CPAP at HS. Scant drainage to posterior dressing, previously marked, no change. No neuro changes noted. Voiding. Up SBA w/LSO. Ambulating in hallway, tolerating well. PRN medications for c/o pain, relief per pt. Fluids and ambulation encouraged, flatus but no BM this shift. Family at bedside. Call light in reach, safety maintained.

## 2025-06-20 NOTE — OUTREACH NOTE
Prep Survey      Flowsheet Row Responses   [facility] facility patient discharged from? [facility]   Is LACE score < 7 ? No   Eligibility Readm Mgmt   Discharge diagnosis PARTIAL REMOVAL OF INSTRUMENTATION, EXPLORATION OF FUSION RIGHT L3-4, POSTERIOR SPINAL FUSION L2-3 WITH INSTRUMENTATION L2-4   Does the patient have one of the following disease processes/diagnoses(primary or secondary)? General Surgery   Prep survey completed? Yes            Susan LITTLE - Registered Nurse

## 2025-06-20 NOTE — PROGRESS NOTES
Ayla Echevarria is a 54 y.o. female patient.      Postop day #2 for second step of surgery.  Sitting up in chair, ambulated in hallway yesterday.   at bedside.      Current Facility-Administered Medications   Medication Dose Route Frequency Provider Last Rate Last Admin    acetaminophen (TYLENOL) tablet 650 mg  650 mg Oral Q4H PRN PAMELA Bang MD        Or    acetaminophen (TYLENOL) 160 MG/5ML oral solution 650 mg  650 mg Oral Q4H PRN PAMELA Bang MD        Or    acetaminophen (TYLENOL) suppository 650 mg  650 mg Rectal Q4H PRN PAMELA Bang MD        sennosides-docusate (PERICOLACE) 8.6-50 MG per tablet 2 tablet  2 tablet Oral BID PAMELA Bang MD   2 tablet at 06/18/25 2136    And    polyethylene glycol (MIRALAX) packet 17 g  17 g Oral Daily PRN PAMELA Bang MD        And    bisacodyl (DULCOLAX) EC tablet 5 mg  5 mg Oral Daily PRN PAMELA Bang MD        And    bisacodyl (DULCOLAX) suppository 10 mg  10 mg Rectal Daily PRN PAMELA Bang MD        citalopram (CeleXA) tablet 40 mg  40 mg Oral Daily PAMELA Bang MD   40 mg at 06/19/25 0916    cyclobenzaprine (FLEXERIL) tablet 10 mg  10 mg Oral TID PRN PAMELA Bang MD   10 mg at 06/19/25 2137    dextrose (D50W) (25 g/50 mL) IV injection 25 g  25 g Intravenous Q15 Min PRN PAMELA Bang MD        dextrose (GLUTOSE) oral gel 15 g  15 g Oral Q15 Min PRN PAMELA Bang MD        empagliflozin (JARDIANCE) tablet 10 mg  10 mg Oral Daily Neeraj Hinson MD   10 mg at 06/19/25 0916    glucagon (GLUCAGEN) injection 1 mg  1 mg Intramuscular Q15 Min PRN PAMELA Bang MD        HYDROcodone-acetaminophen (NORCO)  MG per tablet 1 tablet  1 tablet Oral Q4H PRN PAMELA Bang MD   1 tablet at 06/19/25 1641    HYDROcodone-acetaminophen (NORCO) 7.5-325 MG per tablet 1 tablet  1 tablet Oral Q4H PRN PAMELA Bang MD   1 tablet at 06/20/25 0605    HYDROmorphone (DILAUDID) injection 0.5  mg  0.5 mg Intravenous Q3H PRN PAMELA Bang MD   0.5 mg at 06/19/25 0812    And    naloxone (NARCAN) injection 0.4 mg  0.4 mg Intravenous Q5 Min PRN PAMELA Bang MD        Insulin Lispro (humaLOG) injection 2-7 Units  2-7 Units Subcutaneous 4x Daily AC & at Bedtime PAMELA Bang MD   2 Units at 06/19/25 2137    lisinopril (PRINIVIL,ZESTRIL) tablet 40 mg  40 mg Oral Daily PAMELA Bang MD   40 mg at 06/19/25 0916    metFORMIN (GLUCOPHAGE) tablet 1,000 mg  1,000 mg Oral BID With Meals PAMELA Bang MD   1,000 mg at 06/19/25 2137    Naloxegol Oxalate (MOVANTIK) tablet 25 mg  25 mg Oral Neeraj Stewart MD   25 mg at 06/20/25 0605    ondansetron ODT (ZOFRAN-ODT) disintegrating tablet 4 mg  4 mg Oral Q6H PRN PAMELA Bang MD   4 mg at 06/17/25 1755    Or    ondansetron (ZOFRAN) injection 4 mg  4 mg Intravenous Q6H PRN PAMELA Bang MD   4 mg at 06/20/25 0138    rosuvastatin (CRESTOR) tablet 20 mg  20 mg Oral Daily PAMELA Bang MD   20 mg at 06/19/25 0916    sodium chloride 0.9 % flush 10 mL  10 mL Intravenous PRN PAMELA Bang MD   10 mL at 06/18/25 1301    sodium chloride 0.9 % flush 3 mL  3 mL Intravenous Q12H PAMELA Bang MD   3 mL at 06/19/25 2138    sodium chloride 0.9 % infusion 40 mL  40 mL Intravenous PRN PAMELA Bang MD        venlafaxine XR (EFFEXOR-XR) 24 hr capsule 37.5 mg  37.5 mg Oral Daily PAMELA Bang MD   37.5 mg at 06/19/25 0916     ALLERGIES:    Allergies   Allergen Reactions    Ciprofloxacin Swelling     Throat swelling    Penicillins Swelling     Throat swelling    Cethexonium Unknown - Low Severity     Patient stated eye burning       Lumbar radiculopathy    Hypertension    Hyperlipidemia    Chronic bilateral low back pain with bilateral sciatica    Lumbar spinal stenosis    Drug-induced constipation    GERD without esophagitis    Class 3 severe obesity due to excess calories with serious comorbidity and body mass  "index (BMI) of 40.0 to 44.9 in adult    Diabetes mellitus    Blood pressure 174/74, pulse 91, temperature 99.2 °F (37.3 °C), temperature source Oral, resp. rate 16, height 163.5 cm (64.37\"), weight 132 kg (290 lb 4.8 oz), last menstrual period 07/10/2024, SpO2 94%, not currently breastfeeding.      Subjective:  Symptoms:  Stable.  She reports weakness and anxiety.    Diet:  Poor intake.    Activity level: Impaired due to pain.    Pain:  She complains of pain that is moderate.  She reports pain is unchanged.  Pain is partially controlled.      Review of Systems   Neurological:  Positive for weakness.     Objective:  General Appearance:  Comfortable and well-appearing.    Vital signs: (most recent): Blood pressure 174/74, pulse 91, temperature 99.2 °F (37.3 °C), temperature source Oral, resp. rate 16, height 163.5 cm (64.37\"), weight 132 kg (290 lb 4.8 oz), last menstrual period 07/10/2024, SpO2 94%, not currently breastfeeding.  Vital signs are normal.    Output: Producing urine and minimal stool output.    HEENT: Normal HEENT exam.    Lungs:  Normal effort and normal respiratory rate.    Heart: Normal rate.  Regular rhythm.    Abdomen: Abdomen is soft.  Hypoactive bowel sounds.   There is generalized tenderness.     Skin:  Warm and dry.                Labs:  Lab Results (last 72 hours)       Procedure Component Value Units Date/Time    POC Glucose Once [[id number]]  (Abnormal) Collected: 06/19/25 0800    Specimen: Blood Updated: 06/19/25 0812     Glucose 228 mg/dL      Comment: : [id number] ExacasterMeter ID: [id number]       POC Glucose Once [[id number]]  (Abnormal) Collected: 06/18/25 1952    Specimen: Blood Updated: 06/18/25 2003     Glucose 268 mg/dL      Comment: : [id number] [facility] Hospital[facility]: [id number]       POC Glucose Once [[id number]]  (Abnormal) Collected: 06/18/25 1620    Specimen: Blood Updated: 06/18/25 1631     Glucose 318 mg/dL      Comment: : [id number] ExacasterMeter ID: " [id number]       POC Glucose Once [[id number]]  (Abnormal) Collected: 06/18/25 1214    Specimen: Blood Updated: 06/18/25 1240     Glucose 322 mg/dL      Comment: : [id number] Adalberto NaikMeter ID: [id number]       POC Glucose Once [[id number]]  (Abnormal) Collected: 06/18/25 0920    Specimen: Blood Updated: 06/18/25 0932     Glucose 236 mg/dL      Comment: : [id number] Jarod LombardiMeter ID: [id number]       POC Glucose Once [[id number]]  (Abnormal) Collected: 06/17/25 1943    Specimen: Blood Updated: 06/17/25 1954     Glucose 230 mg/dL      Comment: : [id number] Olayinka Baker ID: [id number]       POC Glucose Once [[id number]]  (Abnormal) Collected: 06/17/25 1725    Specimen: Blood Updated: 06/17/25 1743     Glucose 254 mg/dL      Comment: : [id number] Herman LewisetMeter ID: [id number]       Vitamin B12 [[id number]]  (Normal) Collected: 06/17/25 0437    Specimen: Blood Updated: 06/17/25 1156     Vitamin B-12 566 pg/mL     Narrative:      Results may be falsely increased if patient taking Biotin.      Folate [[id number]]  (Normal) Collected: 06/17/25 0437    Specimen: Blood Updated: 06/17/25 1156     Folate >20.00 ng/mL     Narrative:      Results may be falsely increased if patient taking Biotin.      POC Glucose Once [[id number]]  (Abnormal) Collected: 06/17/25 1102    Specimen: Blood Updated: 06/17/25 1113     Glucose 319 mg/dL      Comment: : [id number] Herman LewisetMeter ID: [id number]       POC Glucose Once [[id number]]  (Abnormal) Collected: 06/17/25 0750    Specimen: Blood Updated: 06/17/25 0802     Glucose 280 mg/dL      Comment: : [id number] Herman LewisetMeter ID: [id number]       Basic Metabolic Panel [[id number]]  (Abnormal) Collected: 06/17/25 0437    Specimen: Blood Updated: 06/17/25 0534     Glucose 243 mg/dL      BUN 9.7 mg/dL      Creatinine 0.57 mg/dL      Sodium 136 mmol/L      Potassium 4.1 mmol/L      Chloride 99 mmol/L      CO2 23.0 mmol/L      Calcium 9.5 mg/dL       BUN/Creatinine Ratio 17.0     Anion Gap 14.0 mmol/L      eGFR 107.5 mL/min/1.73     Narrative:      GFR Categories in Chronic Kidney Disease (CKD)              GFR Category          GFR (mL/min/1.73)    Interpretation  G1                    90 or greater        Normal or high (1)  G2                    60-89                Mild decrease (1)  G3a                   45-59                Mild to moderate decrease  G3b                   30-44                Moderate to severe decrease  G4                    15-29                Severe decrease  G5                    14 or less           Kidney failure    (1)In the absence of evidence of kidney disease, neither GFR category G1 or G2 fulfill the criteria for CKD.    eGFR calculation 2021 CKD-EPI creatinine equation, which does not include race as a factor    Iron Profile w/o Ferritin [[id number]]  (Abnormal) Collected: 06/17/25 0437    Specimen: Blood Updated: 06/17/25 0534     Iron 50 mcg/dL      Iron Saturation (TSAT) 13 %      Transferrin 253 mg/dL      TIBC 377 mcg/dL     CBC & Differential [[id number]]  (Abnormal) Collected: 06/17/25 0437    Specimen: Blood Updated: 06/17/25 0503    Narrative:      The following orders were created for panel order CBC & Differential.  Procedure                               Abnormality         Status                     ---------                               -----------         ------                     CBC Auto Differential[[id number]]        Abnormal            Final result                 Please view results for these tests on the individual orders.    CBC Auto Differential [[id number]]  (Abnormal) Collected: 06/17/25 0437    Specimen: Blood Updated: 06/17/25 0503     WBC 9.52 10*3/mm3      RBC 4.02 10*6/mm3      Hemoglobin 11.6 g/dL      Hematocrit 35.9 %      MCV 89.3 fL      MCH 28.9 pg      MCHC 32.3 g/dL      RDW 13.6 %      RDW-SD 44.7 fl      MPV 10.1 fL      Platelets 286 10*3/mm3      Neutrophil % 72.6 %      Lymphocyte %  18.7 %      Monocyte % 7.8 %      Eosinophil % 0.3 %      Basophil % 0.3 %      Immature Grans % 0.3 %      Neutrophils, Absolute 6.91 10*3/mm3      Lymphocytes, Absolute 1.78 10*3/mm3      Monocytes, Absolute 0.74 10*3/mm3      Eosinophils, Absolute 0.03 10*3/mm3      Basophils, Absolute 0.03 10*3/mm3      Immature Grans, Absolute 0.03 10*3/mm3      nRBC 0.0 /100 WBC     POC Glucose Once [[id number]]  (Abnormal) Collected: 06/16/25 2007    Specimen: Blood Updated: 06/16/25 2018     Glucose 352 mg/dL      Comment: : [id number] Austin MarcyMeter ID: [id number]       POC Glucose Once [[id number]]  (Abnormal) Collected: 06/16/25 1622    Specimen: Blood Updated: 06/16/25 1641     Glucose 345 mg/dL      Comment: : [id number] Ken KimMeter ID: [id number]       POC Glucose Once [[id number]]  (Abnormal) Collected: 06/16/25 1050    Specimen: Blood Updated: 06/16/25 1102     Glucose 287 mg/dL      Comment: : [id number] Davion Mendez  LMeter ID: [id number]       POC Glucose Once [[id number]]  (Abnormal) Collected: 06/16/25 0949    Specimen: Blood Updated: 06/16/25 1001     Glucose 258 mg/dL      Comment: : [id number] Jarod PauluaMeter ID: [id number]       POC Glucose Once [[id number]]  (Abnormal) Collected: 06/16/25 0907    Specimen: Blood Updated: 06/16/25 0927     Glucose 240 mg/dL      Comment: : [id number] Royal kaliMeter ID: [id number]               Imaging Results (Last 72 Hours)       Procedure Component Value Units Date/Time    XR Spine Lumbar AP & Lateral [[id number]] Collected: 06/18/25 0919     Updated: 06/18/25 0922    Narrative:      XR SPINE LUMBAR AP AND LATERAL-      HISTORY: fusion; Z74.09-Other reduced mobility     COMPARISON: None     FLUOROSCOPY TIME: 14.3 sec     FLUOROSCOPY DOSE: 20.8 mGy     NUMBER OF IMAGES: 2       Impression:         Intraoperative fluoroscopic images during lumbar fusion.     Please refer to the operative note for more details.     This report was signed and  finalized on 6/18/2025 9:19 AM by Dr. Jaya Magallon MD.       FL C Arm During Surgery [[id number]] Resulted: 06/18/25 0857     Updated: 06/18/25 0857    Narrative:      This procedure was auto-finalized with no dictation required.                  Assessment:    Condition: In stable condition.  Improving.       Plan:   Discharge home.  Encourage ambulation and per physical therapy.   (    Constipation/obstipation-will add Movantik to stool softeners and the Dulcolax for now.  Discussed with family least amount of pain medication most amount of activity.    Type 2 diabetes-continue home medications we did add some Jardiance to help control her sugars.  She would be an excellent candidate for a GLP 1/2 on an outpatient basis with a BMI of 50.  Once her GI tract gets straightened out after pain medication anesthesia would recommend starting on an outpatient basis certainly defer to her primary care.    Hypertension-blood pressure stable.    Medically stable for discharge home and cleared by spine surgery.    Explained to patient and staff that we were consulted for medical management during their acute care hospitalization. The Medical Consultation was requested by the Attending Physician. The patient has been recommended to f/u with their regular primary care provider concerning any further treatment and review of abnormalities found during their hospitalization at [facility]. They agree with the treatment plan as well as understand our role in their hospitalization. All questions were encouraged and answered to the best of my ability. ).     Problem List:     Lumbar radiculopathy    Hypertension    Hyperlipidemia    Chronic bilateral low back pain with bilateral sciatica    Lumbar spinal stenosis    Drug-induced constipation    GERD without esophagitis    Class 3 severe obesity due to excess calories with serious comorbidity and body mass index (BMI) of 40.0 to 44.9 in adult    Diabetes  mellitus    Neeraj Hinson MD  6/20/2025

## 2025-06-20 NOTE — CASE MANAGEMENT/SOCIAL WORK
Continued Stay Note   [facility]     Patient Name: Ayla Echevarria  MRN: [id number]  Today's Date: 6/20/2025    Admit Date: 6/16/2025        Discharge Plan       Row Name 06/20/25 1000       Plan    Final Discharge Disposition Code 01 - home or self-care    Final Note Pt is being dcd home with spouse today. Phy therapy evaluated after 2nd surgery and recommends home with assist. No dc needs identified.                   Discharge Codes    No documentation.                 Expected Discharge Date and Time       Expected Discharge Date Expected Discharge Time    Jun 20, 2025               KATHYA Alvarado

## 2025-06-20 NOTE — DISCHARGE SUMMARY
[facility]  Tristin John PA-C  DISCHARGE SUMMARY  Patient ID:  Ayla Echeavrria  [id number]  54 y.o.  1970    Admit date: 6/16/2025    Discharge date and time: 6/20/2025    Admitting Physician: PAMELA Bang MD     Indication for Admission: Planned surgical admission     Admission Diagnoses: Lumbar radiculopathy [M54.16]    Discharge Diagnoses: Lumbar radiculopathy [M54.16]    Procedures: Lateral lumbar fusion L2-3, posterior fernando fusion L2-3, instrumentation L2-4    Problem List:   Lumbar radiculopathy    Hypertension    Hyperlipidemia    Chronic bilateral low back pain with bilateral sciatica    Lumbar spinal stenosis    Drug-induced constipation    GERD without esophagitis    Class 3 severe obesity due to excess calories with serious comorbidity and body mass index (BMI) of 40.0 to 44.9 in adult    Diabetes mellitus      Consults: Family Medicine    Admission Condition: stable    Discharged Condition: stable    Hospital Course: no immediate post operative complication     Disposition: home    Patient Instructions:      Discharge Medications        ASK your doctor about these medications        Instructions Start Date   citalopram 40 MG tablet  Commonly known as: CeleXA   40 mg, Daily      diclofenac 75 MG EC tablet  Commonly known as: VOLTAREN   Take 1 tablet by mouth 2 (Two) Times a Day.      Guselkumab 100 MG/ML solution auto-injector   1 dose, Every 2 Months      levocetirizine 5 MG tablet  Commonly known as: XYZAL  Ask about: Which instructions should I use?   5 mg, Daily PRN      lisinopril 40 MG tablet  Commonly known as: PRINIVIL,ZESTRIL   1 tablet, Oral, Daily      metFORMIN 500 MG tablet  Commonly known as: GLUCOPHAGE   1,000 mg, 2 Times Daily With Meals      multivitamin with minerals tablet tablet   1 tablet, Daily      rosuvastatin 20 MG tablet  Commonly known as: CRESTOR   20 mg, Oral, Daily      timolol 0.5 % ophthalmic solution  Commonly known as: TIMOPTIC   1 drop, Daily       venlafaxine XR 37.5 MG 24 hr capsule  Commonly known as: EFFEXOR-XR   37.5 mg, Oral, Daily      vitamin D 1.25 MG (57474 UT) capsule capsule  Commonly known as: ERGOCALCIFEROL  Ask about: Which instructions should I use?   50,000 Units, Weekly             Activity: Avoid bending lifting or twisting, brace when up and out of bed, no driving while taking narcotic pain medication, walk 15 minutes 4 times daily  Diet: regular diet  Wound Care: keep wound clean and dry  Other: Contact [facility] office with questions or concerns    Follow-up with Dr Bang or PA's in 2 weeks.    Electronically signed by Tristin John PA-C 07:02 CDT 6/20/2025

## 2025-06-21 NOTE — NURSING NOTE
Patient A+Ox4, LOMAX- Up ad adry- dressing to left flank changed to gauze and tegaderm, posterior dressing intact with gauze and tegaderm- LSO brace on appropriately without irritation to skin. Safety maintained. BM today. Tolerating food and drink without issues. Home with  and meds at bedside

## 2025-06-25 ENCOUNTER — READMISSION MANAGEMENT (OUTPATIENT)
Dept: CALL CENTER | Facility: HOSPITAL | Age: 55
End: 2025-06-25
Payer: COMMERCIAL

## 2025-06-25 NOTE — OUTREACH NOTE
General Surgery Week 1 Survey      Flowsheet Row Responses   [facility] patient discharged from? [facility]   Does the patient have one of the following disease processes/diagnoses(primary or secondary)? General Surgery   Week 1 attempt successful? Yes   Call start time 1247   Call end time 1250   Discharge diagnosis PARTIAL REMOVAL OF INSTRUMENTATION, EXPLORATION OF FUSION RIGHT L3-4, POSTERIOR SPINAL FUSION L2-3 WITH INSTRUMENTATION L2-4   Person spoke with today (if not patient) and relationship Patient   Medication alerts for this patient Flexeril, Norco, Zofran   Meds reviewed with patient/caregiver? Yes   Is the patient having any side effects they believe may be caused by any medication additions or changes? No   Does the patient have all medications related to this admission filled (includes all antibiotics, pain medications, etc.) Yes   Prescription comments No questions or concerns with medications. Discussed muscle relaxant and pain medication cause sleepiness.   Is the patient taking all medications as directed (includes completed medication regime)? Yes   Does the patient have a follow up appointment scheduled with their surgeon? Yes   Has the patient kept scheduled appointments due by today? N/A   Comments Dr. Bang in 2 weeks for post op appt.   Has home health visited the patient within 72 hours of discharge? N/A   Psychosocial issues? No   Comments Patient states she is doing ok. She is able to walk around the home and pain is under control.   Did the patient receive a copy of their discharge instructions? Yes   Nursing interventions Reviewed instructions with patient   What is the patient's perception of their health status since discharge? Improving   Nursing interventions Nurse provided patient education   Is the patient /caregiver able to teach back basic post-op care? Practice 'cough and deep breath', Drive as instructed by MD in discharge instructions, Take showers only when approved by  MD-sponge bathe until then, No tub bath, swimming, or hot tub until instructed by MD, Keep incision areas clean,dry and protected, Do not remove steri-strips, Lifting as instructed by MD in discharge instructions   Is the patient/caregiver able to teach back signs and symptoms of incisional infection? Increased drainage or bleeding, Incisional warmth, Increased redness, swelling or pain at the incisonal site, Fever, Pus or odor from incision   Is the patient/caregiver able to teach back steps to recovery at home? Rest and rebuild strength, gradually increase activity   If the patient is a current smoker, are they able to teach back resources for cessation? Not a smoker   Is the patient/caregiver able to teach back the hierarchy of who to call/visit for symptoms/problems? PCP, Specialist, Home health nurse, Urgent Care, ED, 911 Yes   Week 1 call completed? Yes   Graduated Yes   Is the patient interested in additional calls from an ambulatory ? No   Would this patient benefit from a Referral to [facility] Social Work? No   Graduated/Revoked comments Patient doing well, spouse is assisting with wound care. Denies any needs or concerns. No further calls needed.   Call end time 1250            Roxanna LUNA - Registered Nurse

## 2025-07-10 RX ORDER — ERGOCALCIFEROL 1.25 MG/1
50000 CAPSULE, LIQUID FILLED ORAL WEEKLY
Qty: 13 CAPSULE | Refills: 3 | Status: SHIPPED | OUTPATIENT
Start: 2025-07-10

## 2025-08-12 ENCOUNTER — OFFICE VISIT (OUTPATIENT)
Age: 55
End: 2025-08-12

## 2025-08-12 VITALS — BODY MASS INDEX: 47.84 KG/M2 | WEIGHT: 270 LBS | HEIGHT: 63 IN

## 2025-08-12 DIAGNOSIS — M75.41 ROTATOR CUFF IMPINGEMENT SYNDROME, RIGHT: Primary | ICD-10-CM

## 2025-08-12 DIAGNOSIS — M25.511 RIGHT SHOULDER PAIN, UNSPECIFIED CHRONICITY: ICD-10-CM

## 2025-08-12 RX ORDER — LEVOCETIRIZINE DIHYDROCHLORIDE 5 MG/1
5 TABLET, FILM COATED ORAL DAILY
COMMUNITY

## 2025-08-12 RX ORDER — HYDROCODONE BITARTRATE AND ACETAMINOPHEN 10; 325 MG/1; MG/1
TABLET ORAL
COMMUNITY

## 2025-08-12 RX ORDER — BETAMETHASONE SODIUM PHOSPHATE AND BETAMETHASONE ACETATE 3; 3 MG/ML; MG/ML
6 INJECTION, SUSPENSION INTRA-ARTICULAR; INTRALESIONAL; INTRAMUSCULAR; SOFT TISSUE ONCE
Status: COMPLETED | OUTPATIENT
Start: 2025-08-12 | End: 2025-08-12

## 2025-08-12 RX ORDER — TIRZEPATIDE 5 MG/.5ML
INJECTION, SOLUTION SUBCUTANEOUS
COMMUNITY

## 2025-08-12 RX ORDER — LIDOCAINE HYDROCHLORIDE 10 MG/ML
6 INJECTION, SOLUTION INFILTRATION; PERINEURAL ONCE
Status: COMPLETED | OUTPATIENT
Start: 2025-08-12 | End: 2025-08-12

## 2025-08-12 RX ADMIN — LIDOCAINE HYDROCHLORIDE 6 ML: 10 INJECTION, SOLUTION INFILTRATION; PERINEURAL at 11:38

## 2025-08-12 RX ADMIN — BETAMETHASONE SODIUM PHOSPHATE AND BETAMETHASONE ACETATE 6 MG: 3; 3 INJECTION, SUSPENSION INTRA-ARTICULAR; INTRALESIONAL; INTRAMUSCULAR; SOFT TISSUE at 11:37

## 2025-08-12 ASSESSMENT — ENCOUNTER SYMPTOMS: SHORTNESS OF BREATH: 0

## (undated) DEVICE — KT ACC LAT EMG/SSEP TIMBERLINE GEN2

## (undated) DEVICE — CATH IV ANGIO FEP 12G 3IN LTBLU 10PK

## (undated) DEVICE — TRAP FLD MINIVAC MEGADYNE 100ML

## (undated) DEVICE — YANKAUER SUCTION INSTRUMENT WITHOUT CONTROL VENT, OPEN TIP, CLEAR: Brand: YANKAUER

## (undated) DEVICE — ARCUS STIMULATING TARGETING NEEDLE, BEVEL TIP: Brand: ARCUS

## (undated) DEVICE — SPK10277 JACKSON/PRO-AXIS KIT: Brand: SPK10277 JACKSON/PRO-AXIS KIT

## (undated) DEVICE — SUT PROLN 5/0 C1 DA 24IN 8725H

## (undated) DEVICE — GLV SURG TRIUMPH PF LTX 6.5 STRL

## (undated) DEVICE — PLT LAT ORO SYS 2/SCRW 15X20MM
Type: IMPLANTABLE DEVICE | Status: NON-FUNCTIONAL
Removed: 2020-01-22

## (undated) DEVICE — SUT SILK 4/0 SUTUPAK TIES 24IN SA73H

## (undated) DEVICE — DRSNG SURESITE WNDW 4X4.5

## (undated) DEVICE — SUT SILK 3/0 SUTUPAK TIES 24IN SA74H

## (undated) DEVICE — NERVE BLOCK TRAY (FACET)-LF: Brand: MEDLINE INDUSTRIES, INC.

## (undated) DEVICE — PIN FIX L80MM DIA3.2MM STRL FLUT CAS

## (undated) DEVICE — LP VESL MAXI 2.5X1MM RED 2PK

## (undated) DEVICE — TOTAL TRAY, 16FR 10ML SIL FOLEY, URN: Brand: MEDLINE

## (undated) DEVICE — LIF ILLUMINATION SYSTEM, STERILE: Brand: ATEC LIGHT CABLE SYSTEM

## (undated) DEVICE — ELECTRD BLD EXT EDGE 1P COAT 6.5IN STRL

## (undated) DEVICE — INSTRUMENT KIT ORTHOPEDIC KNEE NAVITRACK

## (undated) DEVICE — GLOVE ORANGE PI 7 1/2   MSG9075

## (undated) DEVICE — DRAPE,UTILITY,TAPE,15X26,STERILE: Brand: MEDLINE

## (undated) DEVICE — GLV SURG SENSICARE PI LF PF 8 GRN STRL

## (undated) DEVICE — ANTIBACTERIAL UNDYED BRAIDED (POLYGLACTIN 910), SYNTHETIC ABSORBABLE SUTURE: Brand: COATED VICRYL

## (undated) DEVICE — SHIELD SURG COAX MULT TIP ORIFICE INTERPULSE

## (undated) DEVICE — PIN FIX L150MM DIA3.2MM FLUT CAS

## (undated) DEVICE — GLV SURG SENSICARE PI LF PF 7.5 GRN STRL

## (undated) DEVICE — SAFEOP 3 STIMULATING CLIP, STERILE: Brand: SAFEOP 3

## (undated) DEVICE — ADHESIVE SKIN CLOSURE WND 8.661X1.5 IN 22 CM LIQUIBAND SECUR

## (undated) DEVICE — DRP C/ARM W/BAND W/CLIPS 41X74IN

## (undated) DEVICE — SUTURE VCRL SZ 3-0 L27IN ABSRB UD L19MM PS-2 3/8 CIR PRIM J427H

## (undated) DEVICE — GOWN,NON-REINFORCED,SIRUS,SET IN SLV,XL: Brand: MEDLINE

## (undated) DEVICE — SUT PDS 0 CTX 36IN VIO PDP370T

## (undated) DEVICE — PACK,SET UP,NO DRAPES: Brand: MEDLINE

## (undated) DEVICE — PIN FIX STERILE L80MM DIA3.2MM FLUT CAS

## (undated) DEVICE — 3M™ IOBAN™ 2 ANTIMICROBIAL INCISE DRAPE 6650EZ: Brand: IOBAN™ 2

## (undated) DEVICE — SUTURE VCRL SZ 2-0 L36IN ABSRB UD L36MM CT-1 1/2 CIR J945H

## (undated) DEVICE — GOWN,PREVENTION PLUS,XL,ST,24/CS: Brand: MEDLINE

## (undated) DEVICE — SHEET,DRAPE,53X77,STERILE: Brand: MEDLINE

## (undated) DEVICE — SIGMA LTP BLADE EXTENDER, NARROW, 26MM: Brand: SIGMA

## (undated) DEVICE — BIPOLAR SEALER 23-113-1 AQM 2.3 OM NEURO: Brand: AQUAMANTYS ®

## (undated) DEVICE — CONN FLX BREATHE CIRCT

## (undated) DEVICE — GLV SURG DERMASSURE GRN LF PF 8.0

## (undated) DEVICE — CVR UNIV C/ARM

## (undated) DEVICE — PK SPINE LAT 30

## (undated) DEVICE — SYSTEM SKIN CLSR 22CM DERMBND PRINEO

## (undated) DEVICE — TAPE,CLOTH/SILK,CURAD,3"X10YD,LF,40/CS: Brand: CURAD

## (undated) DEVICE — SUT SILK 0 SUTUPAK TIES 24IN SA76G

## (undated) DEVICE — NDL TP BVL JAMSHIDI ACC GUIDE ARCUS

## (undated) DEVICE — GLV SURG BIOGEL LTX PF 7 1/2

## (undated) DEVICE — DUAL CUT SAGITTAL BLADE

## (undated) DEVICE — SPNG GZ WOVN 4X4IN 12PLY 10/BX STRL

## (undated) DEVICE — 4-PORT MANIFOLD: Brand: NEPTUNE 2

## (undated) DEVICE — RECIPROCATING BLADE DOUBLE SIDE (74.0 X 0.77MM)

## (undated) DEVICE — SCRW ORO LAT PLT SYS 5.5X50MM
Type: IMPLANTABLE DEVICE | Status: NON-FUNCTIONAL
Removed: 2020-01-22

## (undated) DEVICE — SNARE POLYP SM W13MMXL240CM SHTH DIA2.4MM OVL FLX DISP

## (undated) DEVICE — GLV SURG SENSICARE W/ALOE PF LF 7.5 STRL

## (undated) DEVICE — GLV SURG GRN DERMASSURE LF PF 7.5

## (undated) DEVICE — Z INACTIVE NO ACTIVE SUPPLIER APPLICATOR MEDICATED 26 CC TINT HI-LITE ORNG STRL CHLORAPREP

## (undated) DEVICE — PAD,NON-ADHERENT,3X8,STERILE,LF,1/PK: Brand: MEDLINE

## (undated) DEVICE — SCANLAN® SURG-I-PAW® INSTRUMENT COVERS - RED, 1/10" X 5"/ 3 MMX13 CM (2 - 5" PCS /PKG): Brand: SCANLAN® SURG-I-PAW® INSTRUMENT COVERS

## (undated) DEVICE — CEMENT MIXING SYSTEM WITH FEMORAL BREAKWAY NOZZLE: Brand: REVOLUTION

## (undated) DEVICE — THE STERILE LIGHT HANDLE COVER IS USED WITH STERIS SURGICAL LIGHTING AND VISUALIZATION SYSTEMS.

## (undated) DEVICE — DRP C/ARMOR

## (undated) DEVICE — SOLUTION IRRIG 3000ML 0.9% SOD CHL USP UROMATIC PLAS CONT

## (undated) DEVICE — PIN FIX STERILE L150MM DIA3.2MM FLUT

## (undated) DEVICE — NEEDLE SPNL 20 GAX6 IN

## (undated) DEVICE — SYRINGE MED 10ML TRNSLUC BRL PLUNG BLK MRK POLYPR CTRL

## (undated) DEVICE — PK SPINE POST 30

## (undated) DEVICE — APPL CHLORAPREP HI/LITE 26ML ORNG

## (undated) DEVICE — ROSA RBTC UNT 20 DROP

## (undated) DEVICE — APPL CHLORAPREP W/TINT 26ML ORNG

## (undated) DEVICE — ENDO KIT,LOURDES HOSPITAL: Brand: MEDLINE INDUSTRIES, INC.

## (undated) DEVICE — SAFEOP 3 PTP/LTP ELECTRODE KIT - EMG/SSEP - NEEDLE: Brand: SAFEOP 3

## (undated) DEVICE — ELECTRD BLD EZ CLN MOD XLNG 2.75IN

## (undated) DEVICE — ELECTRD BLD EDGE/INSUL1P 2.4X5.1MM STRL

## (undated) DEVICE — TROCAR TIP NITINOL GUIDEWIRE 18": Brand: INVICTUS

## (undated) DEVICE — YANKAUER,POOLE TIP,STERILE,50/CS: Brand: MEDLINE

## (undated) DEVICE — YANKAUER,OPEN TIP,W/O VENT,STERILE: Brand: MEDLINE INDUSTRIES, INC.

## (undated) DEVICE — SURGICAL PROCEDURE PACK KNEE TOT DBD CDS LOURDES HOSP LF

## (undated) DEVICE — GLOVE SURG SZ 85 L12IN FNGR THK79MIL GRN LTX FREE

## (undated) DEVICE — SET HNDPC W COAX FAN SPR TIP AND SUCT TB INTERPULSE

## (undated) DEVICE — THE STERILE THE STERIS STERILE CAMERA HANDLE COVERS ARE DESIGNED FOR HARMONYAIR 4K CAMERA MODULE, AND PROVIDE STERILE CONTROL THAT ALLOW FOR INCREASING AND DECREASING ILLUMINATION THROUGH SEVEN INTENSITY LEVELS.

## (undated) DEVICE — KWIRE TIMBERLINE BLNT
Type: IMPLANTABLE DEVICE | Status: NON-FUNCTIONAL
Removed: 2020-01-22

## (undated) DEVICE — SUT SILK 2/0 SH 75CM 30IN BLK C016D

## (undated) DEVICE — PTP ACCESS PUSHER CAP: Brand: LIF-PTP

## (undated) DEVICE — PK TURNOVER RM ADV

## (undated) DEVICE — LARYNGOSCOPE BLDE MAC HNDL M SZ 35 ST CURAPLEX CURAVIEW LED

## (undated) DEVICE — Device

## (undated) DEVICE — TUBE ET 7MM NSL ORAL BASIC CUF INTMED MURPHY EYE RADPQ MRK

## (undated) DEVICE — SUT MNCRYL 4/0 PS2 27IN UD MCP426H

## (undated) DEVICE — BANDAGE COMPR W3INXL15FT BGE E SGL LAYERED CLP CLSR

## (undated) DEVICE — SPONGE,LAP,12"X12",XR,ST,5/PK,40PK/CS: Brand: MEDLINE

## (undated) DEVICE — CLTH CLENS READYCLEANSE PERI CARE PK/5

## (undated) DEVICE — PATIENT RETURN ELECTRODE, SINGLE-USE, CONTACT QUALITY MONITORING, ADULT, WITH 9FT CORD, FOR PATIENTS WEIGING OVER 33LBS. (15KG): Brand: MEGADYNE

## (undated) DEVICE — GLV SURG SENSICARE W/ALOE PF LF 8 STRL

## (undated) DEVICE — 3.0MM PRECISION NEURO (MATCH HEAD)

## (undated) DEVICE — TROCAR TIP, STAINLESS STEEL GUIDEWIRE, 310MM: Brand: LIF-PTP

## (undated) DEVICE — TP SILK DURAPORE 3IN

## (undated) DEVICE — SUTURE ABSRB BRAID COAT UD CP NO 2 27IN VCRL J195H

## (undated) DEVICE — SAFEOP STIMULATING DILATOR KIT, STERILE - OVAL: Brand: SAFEOP

## (undated) DEVICE — GLV SURG BIOGEL LTX PF 6 1/2

## (undated) DEVICE — SPNG DISSCT SECTO KTTNER PK/5

## (undated) DEVICE — SIGMA LTP INTRADISCAL SHIM, NARROW: Brand: SIGMA

## (undated) DEVICE — KT MONTR EMG/SSEP TIMBERLINE GEN2 LD2.5M

## (undated) DEVICE — DRESSING FOAM W4XL12IN SIL RECT ADH WTRPRF FLM BK W/ BORD

## (undated) DEVICE — GLOVE SURG SZ 85 CRM LTX FREE POLYISOPRENE POLYMER BEAD ANTI

## (undated) DEVICE — BIPOLAR SEALER 23-113-1 AQM 2.3: Brand: AQUAMANTYS™

## (undated) DEVICE — DRESSING FOAM 4X8IN DISP POSTOP MEPILEX BORD AG

## (undated) DEVICE — GLV SURG SENSICARE PI PF LF 7 GRN STRL

## (undated) DEVICE — SYSTEM VENT MED AD NASAL PAP SUPERNO2VA

## (undated) DEVICE — SNARE ENDOSCP L240CM W15MM SHTH DIA2.4MM CHN 2.8MM STIFF

## (undated) DEVICE — NEPTUNE E-SEP SMOKE EVACUATION PENCIL, COATED, 70MM BLADE, ROCKER SWITCH: Brand: NEPTUNE E-SEP

## (undated) DEVICE — SAFEOP 3 STIMULATING BALL-TIP PROBE, STERILE: Brand: SAFEOP 3

## (undated) DEVICE — SUT SILK 2/0 SUTUPAK TIES 24IN SA75H